# Patient Record
Sex: FEMALE | Race: WHITE | NOT HISPANIC OR LATINO | Employment: OTHER | URBAN - METROPOLITAN AREA
[De-identification: names, ages, dates, MRNs, and addresses within clinical notes are randomized per-mention and may not be internally consistent; named-entity substitution may affect disease eponyms.]

---

## 2017-01-04 ENCOUNTER — GENERIC CONVERSION - ENCOUNTER (OUTPATIENT)
Dept: OTHER | Facility: OTHER | Age: 67
End: 2017-01-04

## 2017-02-20 ENCOUNTER — GENERIC CONVERSION - ENCOUNTER (OUTPATIENT)
Dept: OTHER | Facility: OTHER | Age: 67
End: 2017-02-20

## 2017-04-03 ENCOUNTER — ALLSCRIPTS OFFICE VISIT (OUTPATIENT)
Dept: OTHER | Facility: OTHER | Age: 67
End: 2017-04-03

## 2017-05-30 ENCOUNTER — ALLSCRIPTS OFFICE VISIT (OUTPATIENT)
Dept: OTHER | Facility: OTHER | Age: 67
End: 2017-05-30

## 2017-05-30 ENCOUNTER — GENERIC CONVERSION - ENCOUNTER (OUTPATIENT)
Dept: OTHER | Facility: OTHER | Age: 67
End: 2017-05-30

## 2017-05-30 ENCOUNTER — TRANSCRIBE ORDERS (OUTPATIENT)
Dept: ADMINISTRATIVE | Facility: HOSPITAL | Age: 67
End: 2017-05-30

## 2017-05-30 ENCOUNTER — APPOINTMENT (OUTPATIENT)
Dept: LAB | Facility: MEDICAL CENTER | Age: 67
End: 2017-05-30
Payer: MEDICARE

## 2017-05-30 DIAGNOSIS — Z85.3 PERSONAL HISTORY OF MALIGNANT NEOPLASM OF BREAST: Primary | ICD-10-CM

## 2017-05-30 DIAGNOSIS — C50.919 MALIGNANT NEOPLASM OF FEMALE BREAST (HCC): ICD-10-CM

## 2017-05-30 DIAGNOSIS — C44.91 BASAL CELL CARCINOMA OF SKIN: ICD-10-CM

## 2017-05-30 DIAGNOSIS — R20.2 PARESTHESIA OF SKIN: ICD-10-CM

## 2017-05-30 DIAGNOSIS — R53.83 OTHER FATIGUE: ICD-10-CM

## 2017-05-30 DIAGNOSIS — Z85.3 PERSONAL HISTORY OF MALIGNANT NEOPLASM OF BREAST: ICD-10-CM

## 2017-05-30 DIAGNOSIS — G51.4 FACIAL MYOKYMIA: ICD-10-CM

## 2017-05-30 DIAGNOSIS — E83.41 HYPERMAGNESEMIA: ICD-10-CM

## 2017-05-30 LAB
ALBUMIN SERPL BCP-MCNC: 3.9 G/DL (ref 3.5–5)
ALP SERPL-CCNC: 88 U/L (ref 46–116)
ALT SERPL W P-5'-P-CCNC: 25 U/L (ref 12–78)
ANION GAP SERPL CALCULATED.3IONS-SCNC: 5 MMOL/L (ref 4–13)
AST SERPL W P-5'-P-CCNC: 21 U/L (ref 5–45)
BASOPHILS # BLD AUTO: 0.09 THOUSANDS/ΜL (ref 0–0.1)
BASOPHILS NFR BLD AUTO: 1 % (ref 0–1)
BILIRUB SERPL-MCNC: 0.71 MG/DL (ref 0.2–1)
BUN SERPL-MCNC: 15 MG/DL (ref 5–25)
CALCIUM SERPL-MCNC: 9.4 MG/DL (ref 8.3–10.1)
CHLORIDE SERPL-SCNC: 105 MMOL/L (ref 100–108)
CO2 SERPL-SCNC: 31 MMOL/L (ref 21–32)
CREAT SERPL-MCNC: 0.83 MG/DL (ref 0.6–1.3)
EOSINOPHIL # BLD AUTO: 0.14 THOUSAND/ΜL (ref 0–0.61)
EOSINOPHIL NFR BLD AUTO: 2 % (ref 0–6)
ERYTHROCYTE [DISTWIDTH] IN BLOOD BY AUTOMATED COUNT: 14 % (ref 11.6–15.1)
GFR SERPL CREATININE-BSD FRML MDRD: >60 ML/MIN/1.73SQ M
GLUCOSE P FAST SERPL-MCNC: 96 MG/DL (ref 65–99)
HCT VFR BLD AUTO: 41.6 % (ref 34.8–46.1)
HGB BLD-MCNC: 13.6 G/DL (ref 11.5–15.4)
LYMPHOCYTES # BLD AUTO: 2.51 THOUSANDS/ΜL (ref 0.6–4.47)
LYMPHOCYTES NFR BLD AUTO: 37 % (ref 14–44)
MAGNESIUM SERPL-MCNC: 2.7 MG/DL (ref 1.6–2.6)
MCH RBC QN AUTO: 28.9 PG (ref 26.8–34.3)
MCHC RBC AUTO-ENTMCNC: 32.7 G/DL (ref 31.4–37.4)
MCV RBC AUTO: 88 FL (ref 82–98)
MONOCYTES # BLD AUTO: 0.57 THOUSAND/ΜL (ref 0.17–1.22)
MONOCYTES NFR BLD AUTO: 8 % (ref 4–12)
NEUTROPHILS # BLD AUTO: 3.53 THOUSANDS/ΜL (ref 1.85–7.62)
NEUTS SEG NFR BLD AUTO: 52 % (ref 43–75)
NRBC BLD AUTO-RTO: 0 /100 WBCS
PLATELET # BLD AUTO: 303 THOUSANDS/UL (ref 149–390)
PMV BLD AUTO: 10.1 FL (ref 8.9–12.7)
POTASSIUM SERPL-SCNC: 4.6 MMOL/L (ref 3.5–5.3)
PROT SERPL-MCNC: 7.2 G/DL (ref 6.4–8.2)
RBC # BLD AUTO: 4.71 MILLION/UL (ref 3.81–5.12)
SODIUM SERPL-SCNC: 141 MMOL/L (ref 136–145)
TSH SERPL DL<=0.05 MIU/L-ACNC: 1.47 UIU/ML (ref 0.36–3.74)
WBC # BLD AUTO: 6.85 THOUSAND/UL (ref 4.31–10.16)

## 2017-05-30 PROCEDURE — 36415 COLL VENOUS BLD VENIPUNCTURE: CPT

## 2017-05-30 PROCEDURE — 84443 ASSAY THYROID STIM HORMONE: CPT

## 2017-05-30 PROCEDURE — 83735 ASSAY OF MAGNESIUM: CPT

## 2017-05-30 PROCEDURE — 85025 COMPLETE CBC W/AUTO DIFF WBC: CPT

## 2017-05-30 PROCEDURE — 80053 COMPREHEN METABOLIC PANEL: CPT

## 2017-06-21 ENCOUNTER — APPOINTMENT (OUTPATIENT)
Dept: LAB | Facility: MEDICAL CENTER | Age: 67
End: 2017-06-21
Payer: MEDICARE

## 2017-06-21 DIAGNOSIS — E83.41 HYPERMAGNESEMIA: ICD-10-CM

## 2017-06-21 LAB — MAGNESIUM SERPL-MCNC: 2.5 MG/DL (ref 1.6–2.6)

## 2017-06-21 PROCEDURE — 36415 COLL VENOUS BLD VENIPUNCTURE: CPT

## 2017-06-21 PROCEDURE — 83735 ASSAY OF MAGNESIUM: CPT

## 2017-06-22 ENCOUNTER — GENERIC CONVERSION - ENCOUNTER (OUTPATIENT)
Dept: OTHER | Facility: OTHER | Age: 67
End: 2017-06-22

## 2017-09-12 ENCOUNTER — TRANSCRIBE ORDERS (OUTPATIENT)
Dept: ADMINISTRATIVE | Facility: HOSPITAL | Age: 67
End: 2017-09-12

## 2017-09-12 ENCOUNTER — APPOINTMENT (OUTPATIENT)
Dept: LAB | Facility: MEDICAL CENTER | Age: 67
End: 2017-09-12
Payer: MEDICARE

## 2017-09-12 DIAGNOSIS — C50.919 MALIGNANT NEOPLASM OF FEMALE BREAST (HCC): ICD-10-CM

## 2017-09-12 LAB
ALBUMIN SERPL BCP-MCNC: 3.7 G/DL (ref 3.5–5)
ALP SERPL-CCNC: 89 U/L (ref 46–116)
ALT SERPL W P-5'-P-CCNC: 25 U/L (ref 12–78)
ANION GAP SERPL CALCULATED.3IONS-SCNC: 7 MMOL/L (ref 4–13)
AST SERPL W P-5'-P-CCNC: 20 U/L (ref 5–45)
BASOPHILS # BLD AUTO: 0.06 THOUSANDS/ΜL (ref 0–0.1)
BASOPHILS NFR BLD AUTO: 1 % (ref 0–1)
BILIRUB SERPL-MCNC: 0.96 MG/DL (ref 0.2–1)
BUN SERPL-MCNC: 15 MG/DL (ref 5–25)
CALCIUM SERPL-MCNC: 9.1 MG/DL (ref 8.3–10.1)
CANCER AG27-29 SERPL-ACNC: 11.5 U/ML (ref 0–42.3)
CHLORIDE SERPL-SCNC: 103 MMOL/L (ref 100–108)
CO2 SERPL-SCNC: 30 MMOL/L (ref 21–32)
CREAT SERPL-MCNC: 0.85 MG/DL (ref 0.6–1.3)
EOSINOPHIL # BLD AUTO: 0.16 THOUSAND/ΜL (ref 0–0.61)
EOSINOPHIL NFR BLD AUTO: 4 % (ref 0–6)
ERYTHROCYTE [DISTWIDTH] IN BLOOD BY AUTOMATED COUNT: 13.4 % (ref 11.6–15.1)
GFR SERPL CREATININE-BSD FRML MDRD: 72 ML/MIN/1.73SQ M
GLUCOSE P FAST SERPL-MCNC: 76 MG/DL (ref 65–99)
HCT VFR BLD AUTO: 41.7 % (ref 34.8–46.1)
HGB BLD-MCNC: 13.9 G/DL (ref 11.5–15.4)
LYMPHOCYTES # BLD AUTO: 1.7 THOUSANDS/ΜL (ref 0.6–4.47)
LYMPHOCYTES NFR BLD AUTO: 37 % (ref 14–44)
MCH RBC QN AUTO: 29 PG (ref 26.8–34.3)
MCHC RBC AUTO-ENTMCNC: 33.3 G/DL (ref 31.4–37.4)
MCV RBC AUTO: 87 FL (ref 82–98)
MONOCYTES # BLD AUTO: 0.45 THOUSAND/ΜL (ref 0.17–1.22)
MONOCYTES NFR BLD AUTO: 10 % (ref 4–12)
NEUTROPHILS # BLD AUTO: 2.2 THOUSANDS/ΜL (ref 1.85–7.62)
NEUTS SEG NFR BLD AUTO: 48 % (ref 43–75)
NRBC BLD AUTO-RTO: 0 /100 WBCS
PLATELET # BLD AUTO: 268 THOUSANDS/UL (ref 149–390)
PMV BLD AUTO: 10 FL (ref 8.9–12.7)
POTASSIUM SERPL-SCNC: 4.4 MMOL/L (ref 3.5–5.3)
PROT SERPL-MCNC: 7 G/DL (ref 6.4–8.2)
RBC # BLD AUTO: 4.79 MILLION/UL (ref 3.81–5.12)
SODIUM SERPL-SCNC: 140 MMOL/L (ref 136–145)
WBC # BLD AUTO: 4.58 THOUSAND/UL (ref 4.31–10.16)

## 2017-09-12 PROCEDURE — 36415 COLL VENOUS BLD VENIPUNCTURE: CPT

## 2017-09-12 PROCEDURE — 86300 IMMUNOASSAY TUMOR CA 15-3: CPT

## 2017-09-12 PROCEDURE — 85025 COMPLETE CBC W/AUTO DIFF WBC: CPT

## 2017-09-12 PROCEDURE — 80053 COMPREHEN METABOLIC PANEL: CPT

## 2017-09-13 ENCOUNTER — GENERIC CONVERSION - ENCOUNTER (OUTPATIENT)
Dept: OTHER | Facility: OTHER | Age: 67
End: 2017-09-13

## 2017-10-23 ENCOUNTER — GENERIC CONVERSION - ENCOUNTER (OUTPATIENT)
Dept: OTHER | Facility: OTHER | Age: 67
End: 2017-10-23

## 2017-10-26 ENCOUNTER — ALLSCRIPTS OFFICE VISIT (OUTPATIENT)
Dept: OTHER | Facility: OTHER | Age: 67
End: 2017-10-26

## 2017-10-27 ENCOUNTER — APPOINTMENT (OUTPATIENT)
Dept: LAB | Facility: MEDICAL CENTER | Age: 67
End: 2017-10-27
Payer: MEDICARE

## 2017-10-27 ENCOUNTER — TRANSCRIBE ORDERS (OUTPATIENT)
Dept: ADMINISTRATIVE | Facility: HOSPITAL | Age: 67
End: 2017-10-27

## 2017-10-27 DIAGNOSIS — Z01.812 PRE-OPERATIVE LABORATORY EXAMINATION: ICD-10-CM

## 2017-10-27 DIAGNOSIS — M20.21 HALLUX RIGIDUS OF RIGHT FOOT: Primary | ICD-10-CM

## 2017-10-27 DIAGNOSIS — M20.21 HALLUX RIGIDUS OF RIGHT FOOT: ICD-10-CM

## 2017-10-27 LAB
ALBUMIN SERPL BCP-MCNC: 3.8 G/DL (ref 3.5–5)
ALP SERPL-CCNC: 81 U/L (ref 46–116)
ALT SERPL W P-5'-P-CCNC: 23 U/L (ref 12–78)
ANION GAP SERPL CALCULATED.3IONS-SCNC: 5 MMOL/L (ref 4–13)
AST SERPL W P-5'-P-CCNC: 13 U/L (ref 5–45)
BASOPHILS # BLD AUTO: 0.09 THOUSANDS/ΜL (ref 0–0.1)
BASOPHILS NFR BLD AUTO: 2 % (ref 0–1)
BILIRUB SERPL-MCNC: 0.97 MG/DL (ref 0.2–1)
BUN SERPL-MCNC: 15 MG/DL (ref 5–25)
CALCIUM SERPL-MCNC: 9.1 MG/DL (ref 8.3–10.1)
CHLORIDE SERPL-SCNC: 102 MMOL/L (ref 100–108)
CO2 SERPL-SCNC: 32 MMOL/L (ref 21–32)
CREAT SERPL-MCNC: 0.8 MG/DL (ref 0.6–1.3)
EOSINOPHIL # BLD AUTO: 0.14 THOUSAND/ΜL (ref 0–0.61)
EOSINOPHIL NFR BLD AUTO: 3 % (ref 0–6)
ERYTHROCYTE [DISTWIDTH] IN BLOOD BY AUTOMATED COUNT: 13.6 % (ref 11.6–15.1)
GFR SERPL CREATININE-BSD FRML MDRD: 77 ML/MIN/1.73SQ M
GLUCOSE P FAST SERPL-MCNC: 74 MG/DL (ref 65–99)
HCT VFR BLD AUTO: 42.2 % (ref 34.8–46.1)
HGB BLD-MCNC: 13.9 G/DL (ref 11.5–15.4)
LYMPHOCYTES # BLD AUTO: 1.69 THOUSANDS/ΜL (ref 0.6–4.47)
LYMPHOCYTES NFR BLD AUTO: 34 % (ref 14–44)
MCH RBC QN AUTO: 28.8 PG (ref 26.8–34.3)
MCHC RBC AUTO-ENTMCNC: 32.9 G/DL (ref 31.4–37.4)
MCV RBC AUTO: 88 FL (ref 82–98)
MONOCYTES # BLD AUTO: 0.47 THOUSAND/ΜL (ref 0.17–1.22)
MONOCYTES NFR BLD AUTO: 9 % (ref 4–12)
NEUTROPHILS # BLD AUTO: 2.65 THOUSANDS/ΜL (ref 1.85–7.62)
NEUTS SEG NFR BLD AUTO: 52 % (ref 43–75)
NRBC BLD AUTO-RTO: 0 /100 WBCS
PLATELET # BLD AUTO: 272 THOUSANDS/UL (ref 149–390)
PMV BLD AUTO: 9.9 FL (ref 8.9–12.7)
POTASSIUM SERPL-SCNC: 4.1 MMOL/L (ref 3.5–5.3)
PROT SERPL-MCNC: 7.1 G/DL (ref 6.4–8.2)
RBC # BLD AUTO: 4.82 MILLION/UL (ref 3.81–5.12)
SODIUM SERPL-SCNC: 139 MMOL/L (ref 136–145)
WBC # BLD AUTO: 5.04 THOUSAND/UL (ref 4.31–10.16)

## 2017-10-27 PROCEDURE — 85025 COMPLETE CBC W/AUTO DIFF WBC: CPT

## 2017-10-27 PROCEDURE — 36415 COLL VENOUS BLD VENIPUNCTURE: CPT

## 2017-10-27 PROCEDURE — 80053 COMPREHEN METABOLIC PANEL: CPT

## 2018-01-09 NOTE — RESULT NOTES
Verified Results  (1) MAGNESIUM [id number] 05:54PM Sabrina Rich    Order Number: [id number]     Test Name Result Flag Reference   MAGNESIUM 2 7 mg/dL H 1 6-2 6     (1) COMPREHENSIVE METABOLIC PANEL [id number] 54:14ED Sabrina Rich    Order Number: [id number]     Test Name Result Flag Reference   SODIUM 141 mmol/L  136-145   POTASSIUM 4 6 mmol/L  3 5-5 3   CHLORIDE 105 mmol/L  100-108   CARBON DIOXIDE 31 mmol/L  21-32   ANION GAP (CALC) 5 mmol/L  4-13   BLOOD UREA NITROGEN 15 mg/dL  5-25   CREATININE 0 83 mg/dL  0 60-1 30   Standardized to IDMS reference method   CALCIUM 9 4 mg/dL  8 3-10 1   BILI, TOTAL 0 71 mg/dL  0 20-1 00   ALK PHOSPHATAS 88 U/L     ALT (SGPT) 25 U/L  12-78   AST(SGOT) 21 U/L  5-45   ALBUMIN 3 9 g/dL  3 5-5 0   TOTAL PROTEIN 7 2 g/dL  6 4-8 2   eGFR Non-African American      >60 0 ml/min/1 73sq [facility] Disease Education Program recommendations are as follows:  GFR calculation is accurate only with a steady state creatinine  Chronic Kidney disease less than 60 ml/min/1 73 sq  meters  Kidney failure less than 15 ml/min/1 73 sq  meters  GLUCOSE FASTING 96 mg/dL  65-99     (1) CBC/PLT/DIFF [id number] 05:54PM Sabrina Rich    Order Number: [id number]     Test Name Result Flag Reference   WBC COUNT 6 85 Thousand/uL  4 31-10 16   RBC COUNT 4 71 Million/uL  3 81-5 12   HEMOGLOBIN 13 6 g/dL  11 5-15 4   HEMATOCRIT 41 6 %  34 8-46  1   MCV 88 fL  82-98   MCH 28 9 pg  26 8-34 3   MCHC 32 7 g/dL  31 4-37 4   RDW 14 0 %  11 6-15 1   MPV 10 1 fL  8 9-12 7   PLATELET COUNT 754 Thousands/uL  149-390   nRBC AUTOMATED 0 /100 WBCs     NEUTROPHILS RELATIVE PERCENT 52 %  43-75   LYMPHOCYTES RELATIVE PERCENT 37 %  14-44   MONOCYTES RELATIVE PERCENT 8 %  4-12   EOSINOPHILS RELATIVE PERCENT 2 %  0-6   BASOPHILS RELATIVE PERCENT 1 %  0-1   NEUTROPHILS ABSOLUTE COUNT 3 53 Thousands/? ??L  1 85-7 62   LYMPHOCYTES ABSOLUTE COUNT 2 51 Thousands/? ??L  0 60-4 47 MONOCYTES ABSOLUTE COUNT 0 57 Thousand/? ??L  0 17-1 22   EOSINOPHILS ABSOLUTE COUNT 0 14 Thousand/? ??L  0 00-0 61   BASOPHILS ABSOLUTE COUNT 0 09 Thousands/? ??L  0 00-0 10     (1) TSH WITH FT4 REFLEX [id number] 05:54PM Rupa CHRISTENSEN Order Number: [id number]     Test Name Result Flag Reference   TSH 1 470 uIU/mL  0 358-3 740   Patients undergoing fluorescein dye angiography may retain small amounts of fluorescein in the body for 48-72 hours post procedure  Samples containing fluorescein can produce falsely depressed TSH values  If the patient had this procedure,a specimen should be resubmitted post fluorescein clearance            The recommended reference ranges for TSH during pregnancy are as follows:  First trimester 0 1 to 2 5 uIU/mL  Second trimester  0 2 to 3 0 uIU/mL  Third trimester 0 3 to 3 0 uIU/m

## 2018-01-12 VITALS
HEIGHT: 65 IN | SYSTOLIC BLOOD PRESSURE: 140 MMHG | RESPIRATION RATE: 16 BRPM | DIASTOLIC BLOOD PRESSURE: 80 MMHG | BODY MASS INDEX: 25.33 KG/M2 | WEIGHT: 152 LBS | HEART RATE: 56 BPM

## 2018-01-14 VITALS
WEIGHT: 151.25 LBS | BODY MASS INDEX: 25.2 KG/M2 | HEART RATE: 60 BPM | SYSTOLIC BLOOD PRESSURE: 138 MMHG | TEMPERATURE: 97.9 F | HEIGHT: 65 IN | DIASTOLIC BLOOD PRESSURE: 80 MMHG

## 2018-01-14 NOTE — MISCELLANEOUS
Message   Recorded as Task   Date: 09/28/2016 04:05 PM, Created By: Rene Zheng   Task Name: Miscellaneous   Assigned To: Fabiola Kat   Regarding Patient: Yani Patrick, Status: Active   Comment:    Tessa Penaloza - 28 Sep 2016 4:05 PM     TASK CREATED  Patient called said she was told to call when she had her US Thyroid done  Had it done today at [address]  Spoke with pt and reviewed results  Active Problems    1  Acute costochondritis (733 6) (M94 0)   2  Adenocarcinoma of breast, unspecified laterality (174 9) (C50 919)   3  Backache (724 5) (M54 9)   4  Basal cell carcinoma of skin (173 91) (C44 91)   5  Breast cancer (174 9) (C50 919)   6  Esophageal reflux (530 81) (K21 9)   7  Hyperlipidemia (272 4) (E78 5)   8  Insomnia (780 52) (G47 00)   9  Irritable bowel syndrome (564 1) (K58 9)   10  Lumbar disc herniation (722 10) (M51 26)   11  Migraine headache (346 90) (G43 909)   12  Need for pneumococcal vaccination (V03 82) (Z23)   13  Need for tetanus booster (V03 7) (Z23)   14  Neoplasm of skin (239 2) (D49 2)   15  Penetrating wound of face, initial encounter (873 40) (S01 83XA)   16  Rosacea (695 3) (L71 9)   17  Thyroid disorder (246 9) (E07 9)   18  Trigger point with back pain (724 5) (M54 9)   19  Well adult on routine health check (V70 0) (Z00 00)    Current Meds   1  Diltiazem HCl ER Coated Beads 180 MG Oral Capsule Extended Release 24 Hour;   TAKE ONE CAPSULE BY MOUTH EVERY DAY; Therapy: 90Zps6669 to (Evaluate:17Oct2016)  Requested for: 45AKD6479; Last   Rx:15Zae0963 Ordered   2  DrRx Flexeril 10 MG #30; TAKE 1 TABLET AT BEDTIME AS NEEDED; Therapy: 84HAV3428 to (Evaluate:11Jun2014); Last Rx:80Huq9607 Ordered   3  Elite Magnesium 100 MG Oral Tablet; Therapy: (Recorded:10Oct2012) to Recorded   4  Glucosamine-Chondroitin Oral Capsule; Therapy: (Recorded:10Oct2012) to Recorded   5  Multi Complete CAPS; Therapy: (Recorded:10Oct2012) to Recorded   6  Omega 3 CAPS;    Therapy: (Recorded:10Oct2012) to Recorded   7  Omeprazole 20 MG Oral Capsule Delayed Release; Take 1 capsule daily as needed; Therapy: (Recorded:16Nov2015) to  Requested for: 29HXQ8050 Recorded   8  Vitamin D 1000 UNIT Oral Tablet; Therapy: (Recorded:30Nov2012) to Recorded    Allergies    1  Cipro TABS   2  Sulfa Drugs   3  Adhesive Bandages MISC   4  Bactrim TABS   5  CeleBREX CAPS   6   Ciprofloxacin HCl TABS    Signatures   Electronically signed by : Tony Lopes RN; Sep 29 2016  4:10PM EST                       (Author)

## 2018-01-16 NOTE — RESULT NOTES
Verified Results  (1) MAGNESIUM 21Jun2017 05:01PM Francheska Brower    Order Number: [id number]     Test Name Result Flag Reference   MAGNESIUM 2 5 mg/dL  1 6-2 6

## 2018-01-18 NOTE — CONSULTS
Chief Complaint  Pre- op Clearance - Right foot surgery performed at [facility] by Dr Heather Arango scheduled on 11/3/2017  Up-to-date with Flu shot      History of Present Illness  Pre-Op Visit (Brief): The patient is being seen for a preoperative visit  The procedure is a(n) R foot surgery with pinning scheduled for 11/3/17 with Dr Shaquille Quevedo  The indication for surgery is Foot pain and arthritis and decreased mobility  Surgical Risk Assessment:   Prior Anesthesia: She had prior anesthesia and no prior adverse reaction to general anesthesia  Pertinent Past Medical History: no angina, no arrhythmia, no CAD, CAD without prior MI, CAD without recent PCI, no CHF, no chronic liver disease, no acute hepatitis, no coagulation delay, no primary hypercoagulable state, no secondary hypercoagulable state, no pulmonary embolism, no DVT, does not use anticoagulants, no diabetes, does not use insulin, no thyroid disease, no neck osteoarthrosis, no TMJ osteoarthrosis, does not wear dentures, no seizure disorder, no CVA, no asthma, no COPD, not FREDA, no renal disease, no low serum albumin and no obesity  Exercise Capacity: able to walk four blocks without symptoms and able to walk two flights of stairs without symptoms  Lifestyle Factors: denies alcohol use, denies tobacco use and denies illegal drug use  Symptoms: no symptoms  STOP questionnaire score is 1  Other FREDA risk factors include age over 48, but normal BMI, female gender and normal neck circumference  Predicted risk of FREDA: Mild  Pertinent Family History: family history of a prior adverse reaction to anesthesia (mother, "all kinds of stuff"), ischemic heart disease (brother in 46s) and stroke (grandmother), but no aneurysm, no bleeding problems and no sudden early deaths  Living Situation: home is secure and supportive and no post-op concerns with her living situation     HPI: 79 yo F here for preop clearance d/t a planned R foot surgery for arthritis, flexibility  She takes all her medications without any side effects  She is complaining of insomnia, intermittently, less than once a week wakes up in the night and has trouble going back to sleep  Review of Systems    Constitutional: no fever and no chills    The patient presents with complaints of feeling tired (d/t insomnia)  Eyes: no eyesight problems  ENT: no hearing loss  Cardiovascular: no chest pain, no palpitations and no lower extremity edema  Respiratory: no shortness of breath  Gastrointestinal: has IBS, but no abdominal pain  Genitourinary: no dysuria  Musculoskeletal: arthralgias and joint stiffness  Integumentary: no rashes  Neurological: difficulty walking (from foot pain), but no headache, no numbness, no dizziness and no fainting  Psychiatric: sleep disturbances, but no anxiety and no depression  Endocrine: no muscle weakness  Hematologic/Lymphatic: no swollen glands  Active Problems    1  Adenocarcinoma of breast, unspecified laterality (174 9) (C50 919)   2  Breast cancer (174 9) (C50 919)   3  Carcinoma, basal cell, skin (173 91) (C44 91)   4  Encounter for screening colonoscopy (V76 51) (Z12 11)   5  Encounter for screening mammogram for malignant neoplasm of breast (V76 12)   (Z12 31)   6  Esophageal reflux (530 81) (K21 9)   7  Facial twitching (351 8) (G51 4)   8  Fatigue (780 79) (R53 83)   9  Flu vaccine need (V04 81) (Z23)   10  High magnesium levels (275 2) (E83 41)   11  Hyperlipidemia (272 4) (E78 5)   12  Insomnia (780 52) (G47 00)   13  Irritable bowel syndrome (564 1) (K58 9)   14  Left hand paresthesia (782 0) (R20 2)   15  Lumbar disc herniation (722 10) (M51 26)   16  Migraine headache (346 90) (G43 909)   17  Need for 23-polyvalent pneumococcal polysaccharide vaccine (V03 82) (Z23)   18  Need for revaccination (V05 9) (Z23)   19  Neoplasm of skin (239 2) (D49 2)   20  Recurrent low back pain (724 2) (M54 5)   21  Rosacea (695 3) (L71 9)   22  Thyroid disorder (246 9) (E07 9)   23  Welcome to Medicare preventive visit (V70 0) (Z00 00)   24  Well adult on routine health check (V70 0) (Z00 00)    Past Medical History    · History of Abnormal weight gain (783 1) (R63 5)   · History of Acute costochondritis (733 6) (M94 0)   · History of acute bronchitis (V12 69) (Z87 09)   · History of acute sinusitis (V12 69) (Z87 09)   · History of backache (V13 59) (Z87 39)   · History of Lumbar radiculopathy (724 4) (M54 16)   · History of Lumbar Strain (847 2)   · Malignant neoplasm of breast, unspecified laterality   · History of Need for pneumococcal vaccination (V03 82) (Z23)   · History of Need for tetanus booster (V03 7) (Z23)   · History of Penetrating wound of face, initial encounter (873 40) (I75 46XM)   · Personal history of arthritis (V13 4) (Z87 39)   · Personal history of gastric ulcer (V12 79) (Z87 19)   · Personal history of malignant neoplasm (V10 90) (Z85 9)   · History of Trigger point with back pain (724 5) (M54 9)    The active problems and past medical history were reviewed and updated today  Surgical History    · History of Abdominoplasty   · History of Breast Surgery Lumpectomy   · History of Breast Surgery Reduction Procedure   · History of  Section   · History of Cholecystectomy   · History of Colon Surgery   · History of Tonsillectomy With Adenoidectomy    The surgical history was reviewed and updated today         Family History    · Family history of Hypertension (V17 49)    · Family history of Acute Myocardial Infarction (V17 3)   · Family history of CABG   · Family history of Hypertension (V17 49)    · Family history of CABG   · Family history of Non-Hodgkin's Lymphoma    · Family history of Back disorder   · Family history of cerebrovascular accident (V17 1) (Z82 3)   · Family history of diabetes mellitus (V18 0) (Z83 3)   · Family history of malignant neoplasm (V16 9) (Z80 9)    The family history was reviewed and updated today  Social History    · Being A Social Drinker   · Former smoker (K82 08) (S09 123)   · Smoker in teenage years  Quit at 22  A pack a week  The social history was reviewed and is unchanged  Current Meds   1  DilTIAZem HCl ER Coated Beads 180 MG Oral Capsule Extended Release 24 Hour;   TAKE ONE CAPSULE BY MOUTH EVERY DAY; Therapy: 98WQS1593 to 322 9354)  Requested for: 03Apr2017; Last   Rx:03Apr2017 Ordered   2  Glucosamine-Chondroitin Oral Capsule; take 1 capsule daily; Therapy: (Recorded:03Apr2017) to Recorded   3  Melatonin 3 MG Oral Tablet; TAKE 1 TABLET Bedtime; Therapy: (Recorded:03Apr2017) to Recorded   4  Multi Complete CAPS; take 1 capsule daily; Therapy: (Recorded:03Apr2017) to Recorded   5  Omega 3 CAPS; take 1 capsule daily; Therapy: (Recorded:03Apr2017) to Recorded   6  Omeprazole 20 MG Oral Capsule Delayed Release; Take 1 capsule daily as needed; Therapy: (Recorded:24Hks2052) to  Requested for: 81HBI5483 Recorded   7  Vitamin D3 5000 UNIT Oral Tablet Chewable; CHEW 1 TABLET DAILY; Therapy: (Recorded:14Jrh6682) to Recorded    The medication list was reviewed and updated today  Allergies    1  Cipro TABS   2  amitriptyline   3  Sulfa Drugs   4  Adhesive Bandages MISC   5  Bactrim TABS   6  CeleBREX CAPS   7  Ciprofloxacin HCl TABS    Vitals  Signs    Heart Rate: 64  Respiration: 16  Systolic: 238  Diastolic: 72  Height: 5 ft 4 5 in  Weight: 151 lb   BMI Calculated: 25 52  BSA Calculated: 1 75  O2 Saturation: 98, RA    Physical Exam    Constitutional   General appearance: No acute distress, well appearing and well nourished  Head and Face   Head and face: Normal     Palpation of the face and sinuses: No sinus tenderness  Eyes   Conjunctiva and lids: No swelling, erythema or discharge  Anicteric  Pupils and irises: Equal, round, reactive to light  extraocular muscles intact, accommodation intact     Ears, Nose, Mouth, and Throat   External inspection of ears and nose: Normal     Hearing: Normal     Lips, teeth, and gums: Normal, good dentition  Oropharynx: Normal with no erythema, edema, exudate or lesions  Neck   Neck: Supple, symmetric, trachea midline, no masses  no rash  Pulmonary   Respiratory effort: No increased work of breathing or signs of respiratory distress  Auscultation of lungs: Clear to auscultation  Cardiovascular   Auscultation of heart: Normal rate and rhythm, normal S1 and S2, no murmurs  except: 2/6 systolic type ejection murmur at right upper sternal border  Carotid pulses: 2+ bilaterally  no bruit  Examination of extremities for edema and/or varicosities: Normal     Abdomen   Abdomen: Non-tender, no masses  normoactive bowel sounds x4  Lymphatic   Palpation of lymph nodes in neck: No lymphadenopathy  Musculoskeletal   Gait and station: Normal   TTP right forefoot, defer more specific exam to Podiatry  Muscle strength/tone: Normal     Skin   Skin and subcutaneous tissue: Normal without rashes or lesions  warm and dry, no rash  Neurologic   Cranial nerves: Cranial nerves II-XII intact  no focal deficits  Psychiatric   Judgment and insight: Normal     Orientation to person, place, and time: Normal     Mood and affect: Normal        Results/Data  EKG/ECG- POC 18VGM2060 10:16AM Zelpha Sniff     Test Name Result Flag Reference   EKG/ECG 10/26/2017       A 12 lead ECG was performed and was normal    Rhythm and rate: ventricular rate is 61 beats per minute  P-waves: FL interval is normal, but the P wave is normal    Axis: the QRS axis is normal    QRS: the QRS is normal    ST segment: the ST segments are normal    T waves: normal    Comparison to prior ECGs:  no prior ECGs were available for comparison  Assessment    1  Pre-op chest exam (V72 82) (Z01 811)   2  Insomnia (780 52) (G47 00)   3  Preop examination (V72 84) (Z01 818)   4  Esophageal reflux (530 81) (K21 9)   5   Pain in right foot (729 5) (O95 201)    Plan  Pre-op chest exam    · EKG/ECG- POC; Status:Complete;   Done: 27UBL0833 10:16AM    Discussion/Summary  Surgical Clearance: She is at a LOW risk from a cardiovascular standpoint at this time without any additional cardiac testing  Reevaluation needed, if she should present with symptoms prior to surgery/procedure  Comments:  <1% MACE risk in perioperative period  77 yo F here today for Preop visit  1) OK to proceed to OR without any further testing from my perspective, see above   2  Insomnia   The patient does not want any benzodiazepine or hypnotic drugs at this time, she is agreeable to trying over-the-counter medications for her insomnia, she reports melatonin did not work for her in the past but she is going to try units some to see if she has any relief  I reported to her that if she takes this more than 2-3 times weekly she should definitely return to the office to be re-evaluated  I have advised her on sleep hygiene, when she does awaken in the middle of the night, she should empty her bladder, and go to a quiet place and sit in a chair not lay in bed, and do an activity that does not involve the screen such as a crossword puzzle or reading a book or a magazine   3  Acid reflux  Stable, continue current management without any changes, tolerating medication, advised on the side effects of long-term use of acid blocking medications including osteopenia or osteoporosis        EKG in office today, reviewed and normal on my evaluation, we will fax this to her podiatrist along with this note  End of Encounter Meds    1  Omeprazole 20 MG Oral Capsule Delayed Release; Take 1 capsule daily as needed; Therapy: (Recorded:16Nov2015) to  Requested for: 90XJG4499 Recorded    2  DilTIAZem HCl ER Coated Beads 180 MG Oral Capsule Extended Release 24 Hour;   TAKE ONE CAPSULE BY MOUTH EVERY DAY;    Therapy: 24PQP2168 to Tahir Cline)  Requested for: 03Apr2017; Last   Rx:03Apr2017 Ordered 3  Glucosamine-Chondroitin Oral Capsule; take 1 capsule daily; Therapy: (Recorded:03Apr2017) to Recorded   4  Melatonin 3 MG Oral Tablet; TAKE 1 TABLET Bedtime; Therapy: (Recorded:03Apr2017) to Recorded   5  Multi Complete CAPS; take 1 capsule daily; Therapy: (Recorded:03Apr2017) to Recorded   6  Omega 3 CAPS; take 1 capsule daily; Therapy: (Recorded:03Apr2017) to Recorded   7  Vitamin D3 5000 UNIT Oral Tablet Chewable; CHEW 1 TABLET DAILY;    Therapy: (Recorded:20Szq3343) to Recorded    Signatures   Electronically signed by : Kristine Ratliff MD; Oct 26 2017 12:49PM EST                       (Author)

## 2018-01-22 VITALS
HEIGHT: 65 IN | DIASTOLIC BLOOD PRESSURE: 72 MMHG | RESPIRATION RATE: 16 BRPM | WEIGHT: 151 LBS | OXYGEN SATURATION: 98 % | HEART RATE: 64 BPM | SYSTOLIC BLOOD PRESSURE: 118 MMHG | BODY MASS INDEX: 25.16 KG/M2

## 2018-01-22 VITALS
OXYGEN SATURATION: 98 % | TEMPERATURE: 96.5 F | HEART RATE: 62 BPM | BODY MASS INDEX: 25.2 KG/M2 | RESPIRATION RATE: 15 BRPM | WEIGHT: 151.25 LBS | SYSTOLIC BLOOD PRESSURE: 104 MMHG | DIASTOLIC BLOOD PRESSURE: 72 MMHG | HEIGHT: 65 IN

## 2018-03-07 NOTE — PROGRESS NOTES
History of Present Illness    Revaccination   Vaccine Information: Vaccine(s) Given (names): adacel  Spoke with patient regarding vaccine out of temperature range, risks and benefits of revaccination and risks of not revaccinating  Action(s): Pt will be revaccinated  Pt contacted and will call back  [facility] (attempt 1[phone]   Other Information: will call back to schedule after April  Revaccination Completed: [id number]  Active Problems     1  Carcinoma, basal cell, skin (173 91) (C44 91)   2  Encounter for screening colonoscopy (V76 51) (Z12 11)   3  Encounter for screening mammogram for malignant neoplasm of breast (V76 12)   (Z12 31)   4  Esophageal reflux (530 81) (K21 9)   5  Flu vaccine need (V04 81) (Z23)   6  Hyperlipidemia (272 4) (E78 5)   7  Irritable bowel syndrome (564 1) (K58 9)   8  Lumbar disc herniation (722 10) (M51 26)   9  Migraine headache (346 90) (G43 909)   10  Need for 23-polyvalent pneumococcal polysaccharide vaccine (V03 82) (Z23)   11  Need for revaccination (V05 9) (Z23)   12  Neoplasm of skin (239 2) (D49 2)   13  Rosacea (695 3) (L71 9)   14  Thyroid disorder (246 9) (E07 9)   15  Welcome to Medicare preventive visit (V70 0) (Z00 00)   16  Well adult on routine health check (V70 0) (Z00 00)    Insomnia (780 52) (G47 00)       Adenocarcinoma of breast, unspecified laterality (174 9) (C50 919)       Breast cancer (174 9) (C50 919)          Immunizations  Influenza --- Sujey Higginbotham: [id number] [place]: 09-Oct-2013; Megan Chavez: 2015; Series4:  14-Oct-2016   PCV --- Series1: 2015   PPSV --- Sujey Higginbotham: 19-Dec-2016   Tdap --- Series1: 2015   Zoster --- Series1: 2014     Current Meds   1  DilTIAZem HCl ER Coated Beads 180 MG Oral Capsule Extended Release 24 Hour;   TAKE ONE CAPSULE BY MOUTH EVERY DAY   2  DrRx Flexeril 10 MG #30; TAKE 1 TABLET AT BEDTIME AS NEEDED   3  Elite Magnesium 100 MG Oral Tablet   4   Glucosamine-Chondroitin Oral Capsule   5  Multi Complete CAPS   6  Omega 3 CAPS   7  Omeprazole 20 MG Oral Capsule Delayed Release; Take 1 capsule daily as needed   8  Vitamin D 1000 UNIT Oral Tablet    Allergies    1  Cipro TABS   2  Sulfa Drugs   3  Adhesive Bandages MISC   4  Bactrim TABS   5  CeleBREX CAPS   6  Ciprofloxacin HCl TABS    Plan    1   RVAC-Adacel 5-2-15 5 LF-MCG/0 5 Intramuscular Suspension    Future Appointments    Date/Time Provider Specialty Site   09/13/2017 10:45 AM Beverly Montoya MD Hematology Oncology [facility]     Signatures   Electronically signed by : GHANSHYAM Cardenas ; Jun 7 2017 11:21PM EST

## 2018-04-10 DIAGNOSIS — I10 ESSENTIAL HYPERTENSION: Primary | ICD-10-CM

## 2018-04-10 RX ORDER — DILTIAZEM HYDROCHLORIDE 180 MG/1
1 CAPSULE, COATED, EXTENDED RELEASE ORAL DAILY
COMMUNITY
Start: 2011-04-04 | End: 2018-04-10 | Stop reason: SDUPTHER

## 2018-04-10 RX ORDER — DILTIAZEM HYDROCHLORIDE 180 MG/1
180 CAPSULE, COATED, EXTENDED RELEASE ORAL DAILY
Qty: 90 CAPSULE | Refills: 1 | Status: SHIPPED | OUTPATIENT
Start: 2018-04-10 | End: 2018-10-03 | Stop reason: SDUPTHER

## 2018-04-11 ENCOUNTER — TELEPHONE (OUTPATIENT)
Dept: FAMILY MEDICINE CLINIC | Facility: CLINIC | Age: 68
End: 2018-04-11

## 2018-04-11 NOTE — TELEPHONE ENCOUNTER
Pharmacist called to verify that it was ok to fill cardiazem 180 mg rx  as pt had been on Cartia 180mg prior to his refill  I said ok, if this is a problem I will call pharm back

## 2018-04-11 NOTE — TELEPHONE ENCOUNTER
I meant to refill the same thing she is on  These medications are somewhat interchange a bull, but we should probably keep her on the [address] [facility] [address] if that has been working for her

## 2018-05-04 ENCOUNTER — OFFICE VISIT (OUTPATIENT)
Dept: URGENT CARE | Facility: MEDICAL CENTER | Age: 68
End: 2018-05-04
Payer: MEDICARE

## 2018-05-04 VITALS
RESPIRATION RATE: 18 BRPM | DIASTOLIC BLOOD PRESSURE: 62 MMHG | TEMPERATURE: 97.7 F | WEIGHT: 150 LBS | BODY MASS INDEX: 24.99 KG/M2 | SYSTOLIC BLOOD PRESSURE: 100 MMHG | HEIGHT: 65 IN | HEART RATE: 67 BPM | OXYGEN SATURATION: 100 %

## 2018-05-04 DIAGNOSIS — J02.9 SORE THROAT: ICD-10-CM

## 2018-05-04 DIAGNOSIS — J02.9 ACUTE VIRAL PHARYNGITIS: Primary | ICD-10-CM

## 2018-05-04 LAB — S PYO AG THROAT QL: NEGATIVE

## 2018-05-04 PROCEDURE — 87070 CULTURE OTHR SPECIMN AEROBIC: CPT | Performed by: PHYSICIAN ASSISTANT

## 2018-05-04 PROCEDURE — 87430 STREP A AG IA: CPT | Performed by: PHYSICIAN ASSISTANT

## 2018-05-04 PROCEDURE — 99203 OFFICE O/P NEW LOW 30 MIN: CPT | Performed by: PHYSICIAN ASSISTANT

## 2018-05-04 PROCEDURE — G0463 HOSPITAL OUTPT CLINIC VISIT: HCPCS | Performed by: PHYSICIAN ASSISTANT

## 2018-05-04 RX ORDER — MAG HYDROX/ALUMINUM HYD/SIMETH 400-400-40
1 SUSPENSION, ORAL (FINAL DOSE FORM) ORAL DAILY
COMMUNITY

## 2018-05-04 RX ORDER — OMEPRAZOLE 20 MG/1
1 CAPSULE, DELAYED RELEASE ORAL DAILY PRN
COMMUNITY
End: 2020-05-05 | Stop reason: SDUPTHER

## 2018-05-04 RX ORDER — LANOLIN ALCOHOL/MO/W.PET/CERES
1 CREAM (GRAM) TOPICAL AS NEEDED
COMMUNITY
End: 2020-03-26 | Stop reason: ALTCHOICE

## 2018-05-04 RX ORDER — CHLORAL HYDRATE 500 MG
CAPSULE ORAL DAILY
COMMUNITY
End: 2021-06-10

## 2018-05-04 NOTE — PATIENT INSTRUCTIONS
Follow up with PCP in 3-5 days  Proceed to  ER if symptoms worsen  Sore Throat, Ambulatory Care   GENERAL INFORMATION:   A sore throat  is often caused by a cold or flu virus  A sore throat may also be caused by bacteria such as strep  Other causes include smoking, a runny nose, allergies, or acid reflux  Seek immediate care for the following symptoms:   · Trouble breathing or swallowing because your throat is swollen or sore    · Drooling because it hurts too much to swallow    · A painful lump in your throat that does not go away after 5 days    · A fever higher than 102? F (39?C) or lasts longer than 3 days    · Confusion    · Blood in your throat or ear  Treatment for a sore throat  will depend on the cause how severe it is  A sore throat cause by a virus will go away on its own without treatment  You will need antibiotics if your sore throat is caused by bacteria  Your sore throat should start to feel better within 3 to 5 days for both viral and bacterial infections  Care for your sore throat:   · Gargle with salt water  Mix ¼ teaspoon salt in a glass of warm water and gargle  This may help reduce swelling in your throat  · Take ibuprofen or acetaminophen:  These medicines decrease pain and fever  They are available without a doctor's order  Ask your healthcare provider which medicine is best for you  Ask how much to take and how often to take it  · Drink more liquids  Cold or warm drinks may help soothe your sore throat  Drinking liquids can also help prevent dehydration  · Use a cool-steam humidifier  to help moisten the air in your room and reduce your throat pain  · Use lozenges, ice, soft foods, or popsicles  to soothe your throat  · Rest your throat as much as possible  Try not to use your voice  This may irritate your throat and worsen your symptoms    Follow up with your healthcare provider as directed:  Write down your questions so you remember to ask them during your visits  CARE AGREEMENT:   You have the right to help plan your care  Learn about your health condition and how it may be treated  Discuss treatment options with your caregivers to decide what care you want to receive  You always have the right to refuse treatment  The above information is an  only  It is not intended as medical advice for individual conditions or treatments  Talk to your doctor, nurse or pharmacist before following any medical regimen to see if it is safe and effective for you  © 2014 [address] is for End User's use only and may not be sold, redistributed or otherwise used for commercial purposes  All illustrations and images included in CareNotes® are the copyrighted property of [employer] M [employer]  or [employer]  Pharyngitis   AMBULATORY CARE:   Pharyngitis , or sore throat, is inflammation of the tissues and structures in your pharynx (throat)  Pharyngitis is most often caused by bacteria  It may also be caused by a cold or flu virus  Other causes include smoking, allergies, or acid reflux  Signs and symptoms that may occur with pharyngitis:   · Sore throat or pain when you swallow    · Fever, chills, and body aches    · Hoarse or raspy voice    · Cough, runny or stuffy nose, itchy or watery eyes    · Headache    · Upset stomach and loss of appetite    · Mild neck stiffness    · Swollen glands that feel like hard lumps when you touch your neck    · White and yellow pus-filled blisters in the back of your throat  Call 911 for any of the following:   · You have trouble breathing or swallowing because your throat is swollen or sore  Seek care immediately if:   · You are drooling because it hurts too much to swallow  · Your fever is higher than 102? F (39?C) or lasts longer than 3 days  · You are confused  · You taste blood in your throat  Contact your healthcare provider if:   · Your throat pain gets worse      · You have a painful lump in your throat that does not go away after 5 days  · Your symptoms do not improve after 5 days  · You have questions or concerns about your condition or care  Treatment for pharyngitis:  Viral pharyngitis will go away on its own without treatment  Your sore throat should start to feel better in 3 to 5 days for both viral and bacterial infections  You may need any of the following:  · Antibiotics  treat a bacterial infection  · NSAIDs , such as ibuprofen, help decrease swelling, pain, and fever  NSAIDs can cause stomach bleeding or kidney problems in certain people  If you take blood thinner medicine, always ask your healthcare provider if NSAIDs are safe for you  Always read the medicine label and follow directions  · Acetaminophen  decreases pain and fever  It is available without a doctor's order  Ask how much to take and how often to take it  Follow directions  Acetaminophen can cause liver damage if not taken correctly  Manage your symptoms:   · Gargle salt water  Mix ¼ teaspoon salt in an 8 ounce glass of warm water and gargle  This may help decrease swelling in your throat  · Drink liquids as directed  You may need to drink more liquids than usual  Liquids may help soothe your throat and prevent dehydration  Ask how much liquid to drink each day and which liquids are best for you  · Use a cool-steam humidifier  to help moisten the air in your room and calm your cough  · Soothe your throat  with cough drops, ice, soft foods, or popsicles  Prevent the spread of pharyngitis:  Cover your mouth and nose when you cough or sneeze  Do not share food or drinks  Wash your hands often  Use soap and water  If soap and water are unavailable, use an alcohol based hand   Follow up with your healthcare provider as directed:  Write down your questions so you remember to ask them during your visits     © 2017 [employer] Information is for End User's use only and may not be sold, redistributed or otherwise used for commercial purposes  All illustrations and images included in CareNotes® are the copyrighted property of [employer]  or [address]  The above information is an  only  It is not intended as medical advice for individual conditions or treatments  Talk to your doctor, nurse or pharmacist before following any medical regimen to see if it is safe and effective for you

## 2018-05-04 NOTE — PROGRESS NOTES
[facility]        NAME: Tara Duverney is a 80 y o  female  : 1950    MRN: [id number]  DATE: May 4, 2018  TIME: 12:28 PM    Assessment and Plan   Acute viral pharyngitis [J02 8, B97 89]  1  Acute viral pharyngitis     2  Sore throat  POCT rapid strepA    Throat culture    Throat culture         Patient Instructions       Follow up with PCP in 3-5 days  Proceed to  ER if symptoms worsen  Chief Complaint     Chief Complaint   Patient presents with    Sore Throat     Patient c/o sore throat          History of Present Illness       Pt reports a sore throat since this morning  NO fevers but just didn't feel right  Was around someone with strep throat  Sore Throat    This is a new problem  The current episode started today  The problem has been unchanged  There has been no fever  The pain is mild  Pertinent negatives include no abdominal pain, congestion, coughing, diarrhea, ear pain, hoarse voice, shortness of breath, swollen glands, trouble swallowing or vomiting  She has had exposure to strep  She has tried nothing for the symptoms  Review of Systems   Review of Systems   Constitutional: Negative  HENT: Positive for sore throat  Negative for congestion, ear pain, hoarse voice and trouble swallowing  Eyes: Negative  Respiratory: Negative for cough and shortness of breath  Cardiovascular: Negative  Gastrointestinal: Negative for abdominal pain, diarrhea and vomiting  Musculoskeletal: Negative  Skin: Negative            Current Medications       Current Outpatient Prescriptions:     Cholecalciferol (VITAMIN D3) 1000 UNIT/SPRAY LIQD, Take 1 tablet by mouth daily, Disp: , Rfl:     diltiazem (CARDIZEM CD) 180 mg 24 hr capsule, Take 1 capsule (180 mg total) by mouth daily, Disp: 90 capsule, Rfl: 1    melatonin 3 mg, Take 1 tablet by mouth, Disp: , Rfl:     Misc Natural Products (GLUCOSAMINE CHONDROITIN ADV PO), Take 1 capsule by mouth daily, Disp: , Rfl:    Multiple Vitamins-Minerals (MULTI COMPLETE) CAPS, Take 1 capsule by mouth daily, Disp: , Rfl:     Omega-3 1000 MG CAPS, Take 1 capsule by mouth daily, Disp: , Rfl:     omeprazole (PriLOSEC) 20 mg delayed release capsule, Take 1 capsule by mouth daily as needed, Disp: , Rfl:     Current Allergies     Allergies as of 2018 - Reviewed 2018   Allergen Reaction Noted    Ciprofloxacin Anaphylaxis 10/30/2003    Amitriptyline GI Intolerance 2017    Celecoxib  2012    Other Other (See Comments) 10/02/2014    Wound dressing adhesive  2012    Bactrim [sulfamethoxazole-trimethoprim] Rash 10/30/2003    Sulfa antibiotics Rash 2012            The following portions of the patient's history were reviewed and updated as appropriate: allergies, current medications, past family history, past medical history, past social history, past surgical history and problem list      Past Medical History:   Diagnosis Date    Heart murmur     Migraine     skin and breast        Past Surgical History:   Procedure Laterality Date    BREAST SURGERY       SECTION      CHOLECYSTECTOMY      TONSILLECTOMY         No family history on file  Medications have been verified  Objective   /62   Pulse 67   Temp 97 7 °F (36 5 °C)   Resp 18   Ht 5' 5" (1 651 m)   Wt 68 kg (150 lb)   SpO2 100%   BMI 24 96 kg/m²        Physical Exam     Physical Exam   Constitutional: She is oriented to person, place, and time  She appears well-developed and well-nourished  No distress  HENT:   Head: Normocephalic and atraumatic  Right Ear: External ear normal    Left Ear: External ear normal    Nose: Nose normal    Mouth/Throat: Uvula is midline and mucous membranes are normal  Posterior oropharyngeal erythema present  Eyes: Conjunctivae are normal    Neck: Normal range of motion  Neck supple  Cardiovascular: Normal rate, regular rhythm, normal heart sounds and intact distal pulses  No murmur heard  Pulmonary/Chest: Effort normal and breath sounds normal  No respiratory distress  She has no rales  Abdominal: Soft  Bowel sounds are normal  There is no tenderness  Musculoskeletal: Normal range of motion  Lymphadenopathy:     She has no cervical adenopathy  Neurological: She is alert and oriented to person, place, and time  Skin: Skin is warm and dry  Psychiatric: She has a normal mood and affect  Nursing note and vitals reviewed

## 2018-05-06 LAB — BACTERIA THROAT CULT: NORMAL

## 2018-05-16 ENCOUNTER — APPOINTMENT (OUTPATIENT)
Dept: LAB | Facility: MEDICAL CENTER | Age: 68
End: 2018-05-16
Payer: MEDICARE

## 2018-05-16 ENCOUNTER — TRANSCRIBE ORDERS (OUTPATIENT)
Dept: ADMINISTRATIVE | Facility: HOSPITAL | Age: 68
End: 2018-05-16

## 2018-05-16 DIAGNOSIS — Z85.3 PERSONAL HISTORY OF MALIGNANT NEOPLASM OF BREAST: Primary | ICD-10-CM

## 2018-05-16 DIAGNOSIS — Z85.3 PERSONAL HISTORY OF MALIGNANT NEOPLASM OF BREAST: ICD-10-CM

## 2018-05-16 LAB
BUN SERPL-MCNC: 15 MG/DL (ref 5–25)
CREAT SERPL-MCNC: 0.8 MG/DL (ref 0.6–1.3)
GFR SERPL CREATININE-BSD FRML MDRD: 77 ML/MIN/1.73SQ M

## 2018-05-16 PROCEDURE — 36415 COLL VENOUS BLD VENIPUNCTURE: CPT

## 2018-05-16 PROCEDURE — 82565 ASSAY OF CREATININE: CPT

## 2018-05-16 PROCEDURE — 84520 ASSAY OF UREA NITROGEN: CPT

## 2018-08-13 ENCOUNTER — OFFICE VISIT (OUTPATIENT)
Dept: URGENT CARE | Facility: MEDICAL CENTER | Age: 68
End: 2018-08-13
Payer: MEDICARE

## 2018-08-13 VITALS
OXYGEN SATURATION: 96 % | HEIGHT: 65 IN | DIASTOLIC BLOOD PRESSURE: 78 MMHG | RESPIRATION RATE: 16 BRPM | BODY MASS INDEX: 25.33 KG/M2 | SYSTOLIC BLOOD PRESSURE: 121 MMHG | WEIGHT: 152 LBS | HEART RATE: 66 BPM | TEMPERATURE: 96.9 F

## 2018-08-13 DIAGNOSIS — J20.9 ACUTE BRONCHITIS, UNSPECIFIED ORGANISM: Primary | ICD-10-CM

## 2018-08-13 DIAGNOSIS — J04.0 ACUTE LARYNGITIS: ICD-10-CM

## 2018-08-13 PROCEDURE — 99213 OFFICE O/P EST LOW 20 MIN: CPT | Performed by: FAMILY MEDICINE

## 2018-08-13 PROCEDURE — G0463 HOSPITAL OUTPT CLINIC VISIT: HCPCS | Performed by: FAMILY MEDICINE

## 2018-08-13 RX ORDER — AZITHROMYCIN 250 MG/1
TABLET, FILM COATED ORAL
Qty: 6 TABLET | Refills: 0 | Status: SHIPPED | OUTPATIENT
Start: 2018-08-13 | End: 2018-08-17

## 2018-08-13 RX ORDER — BENZONATATE 100 MG/1
100 CAPSULE ORAL 3 TIMES DAILY PRN
Qty: 20 CAPSULE | Refills: 0 | Status: SHIPPED | OUTPATIENT
Start: 2018-08-13 | End: 2018-09-18

## 2018-08-13 NOTE — PATIENT INSTRUCTIONS
I prescribed Zithromax Z-Gio, Tessalon Perles every 8 hours for cough  Advised patient to increase fluid intake  Recommended gargling with salt water  Follow up with primary care provider symptoms worsen  Laryngitis   WHAT YOU NEED TO KNOW:   Laryngitis is when your larynx is swollen because of an infection or irritation  The larynx is also called the voice box because it contains your vocal cords  Your vocal cords also swell and change shape, which can cause your voice to sound different  DISCHARGE INSTRUCTIONS:   Take your medicine as directed  Contact your healthcare provider if you think your medicine is not helping or if you have side effects  Tell him of her if you are allergic to any medicine  Keep a list of the medicines, vitamins, and herbs you take  Include the amounts, and when and why you take them  Bring the list or the pill bottles to follow-up visits  Carry your medicine list with you in case of an emergency  Prevent laryngitis:   · Rest your voice:  Do not shout or scream if you get laryngitis often  This will help prevent swelling and irritation of your larynx  · Avoid irritants and harmful substances:  Do not breathe in chemicals or allergens, such as pollen  Alcohol and tobacco can also irritate your larynx  · Avoid foods and liquids that can cause acid reflux: These may include carbonated drinks, spicy foods and sauces, citrus fruit, peppermint, and chocolate  · Avoid the spread of germs:        [facility] your hands often with soap and water  Carry germ-killing gel with you  You can use the gel to clean your hands when there is no soap and water available  ¨ Do not touch your eyes, nose, or mouth unless you have washed your hands first     ¨ Always cover your mouth when you cough  Cough into a tissue or your shirtsleeve so you do not spread germs from your hands  ¨ Try to avoid people who have a cold or the flu   If you are sick, stay away from others as much as possible  Follow up with your healthcare provider as directed:  Write down your questions so you remember to ask them during your visits  Contact your healthcare provider if:   · You have a fever  · You feel large, tender lumps in your neck  · You are hoarse for more than 7 days  · You have new or increased throat pain  · You have questions about your condition or care  Return to the emergency department if:   · Your throat is bleeding  · You are hoarse for more than 7 days and your chest feels tight  · You are drooling and have trouble swallowing  · You have trouble breathing  © 2017 [address]  Information is for End User's use only and may not be sold, redistributed or otherwise used for commercial purposes  All illustrations and images included in CareNotes® are the copyrighted property of [employer]  or [employer]  The above information is an  only  It is not intended as medical advice for individual conditions or treatments  Talk to your doctor, nurse or pharmacist before following any medical regimen to see if it is safe and effective for you  Acute Bronchitis   WHAT YOU NEED TO KNOW:   Acute bronchitis is swelling and irritation in the air passages of your lungs  This irritation may cause you to cough or have other breathing problems  Acute bronchitis often starts because of another illness, such as a cold or the flu  The illness spreads from your nose and throat to your windpipe and airways  Bronchitis is often called a chest cold  Acute bronchitis lasts about 3 to 6 weeks and is usually not a serious illness  Your cough can last for several weeks  DISCHARGE INSTRUCTIONS:   Return to the emergency department if:   · You cough up blood  · Your lips or fingernails turn blue  · You feel like you are not getting enough air when you breathe  Contact your healthcare provider if:   · You have a fever      · Your breathing problems do not go away or get worse  · Your cough does not get better within 4 weeks  · You have questions or concerns about your condition or care  Self-care:   · Get more rest   Rest helps your body to heal  Slowly start to do more each day  Rest when you feel it is needed  · Avoid irritants in the air  Avoid chemicals, fumes, and dust  Wear a face mask if you must work around dust or fumes  Stay inside on days when air pollution levels are high  If you have allergies, stay inside when pollen counts are high  Do not use aerosol products, such as spray-on deodorant, bug spray, and hair spray  · Do not smoke or be around others who smoke  Nicotine and other chemicals in cigarettes and cigars damages the cilia that move mucus out of your lungs  Ask your healthcare provider for information if you currently smoke and need help to quit  E-cigarettes or smokeless tobacco still contain nicotine  Talk to your healthcare provider before you use these products  · Drink liquids as directed  Liquids help keep your air passages moist and help you cough up mucus  You may need to drink more liquids when you have acute bronchitis  Ask how much liquid to drink each day and which liquids are best for you  · Use a humidifier or vaporizer  Use a cool mist humidifier or a vaporizer to increase air moisture in your home  This may make it easier for you to breathe and help decrease your cough  Decrease risk for acute bronchitis:   · Get the vaccinations you need  Ask your healthcare provider if you should get vaccinated against the flu or pneumonia  · Prevent the spread of germs  You can decrease your risk of acute bronchitis and other illnesses by doing the following:     [facility] your hands often with soap and water  Carry germ-killing hand lotion or gel with you  You can use the lotion or gel to clean your hands when soap and water are not available      ¨ Do not touch your eyes, nose, or mouth unless you have washed your hands first     ¨ Always cover your mouth when you cough to prevent the spread of germs  It is best to cough into a tissue or your shirt sleeve instead of into your hand  Ask those around you cover their mouths when they cough  ¨ Try to avoid people who have a cold or the flu  If you are sick, stay away from others as much as possible  Medicines: Your healthcare provider may  give you any of the following:  · Ibuprofen or acetaminophen  are medicines that help lower your fever  They are available without a doctor's order  Ask your healthcare provider which medicine is right for you  Ask how much to take and how often to take it  Follow directions  These medicines can cause stomach bleeding if not taken correctly  Ibuprofen can cause kidney damage  Do not take ibuprofen if you have kidney disease, an ulcer, or allergies to aspirin  Acetaminophen can cause liver damage  Do not take more than 4,000 milligrams in 24 hours  · Decongestants  help loosen mucus in your lungs and make it easier to cough up  This can help you breathe easier  · Cough suppressants  decrease your urge to cough  If your cough produces mucus, do not take a cough suppressant unless your healthcare provider tells you to  Your healthcare provider may suggest that you take a cough suppressant at night so you can rest     · Inhalers  may be given  Your healthcare provider may give you one or more inhalers to help you breathe easier and cough less  An inhaler gives your medicine to open your airways  Ask your healthcare provider to show you how to use your inhaler correctly  · Take your medicine as directed  Contact your healthcare provider if you think your medicine is not helping or if you have side effects  Tell him of her if you are allergic to any medicine  Keep a list of the medicines, vitamins, and herbs you take  Include the amounts, and when and why you take them   Bring the list or the pill bottles to follow-up visits  Carry your medicine list with you in case of an emergency  Follow up with your healthcare provider as directed:  Write down questions you have so you will remember to ask them during your follow-up visits  © 2017 [address] Information is for End User's use only and may not be sold, redistributed or otherwise used for commercial purposes  All illustrations and images included in CareNotes® are the copyrighted property of [employer]  or [employer]  The above information is an  only  It is not intended as medical advice for individual conditions or treatments  Talk to your doctor, nurse or pharmacist before following any medical regimen to see if it is safe and effective for you

## 2018-08-13 NOTE — PROGRESS NOTES
[facility]        NAME: Gee Inman is a 80 y o  female  : 1950    MRN: [id number]  DATE: 2018  TIME: 2:47 PM    Assessment and Plan   Acute bronchitis, unspecified organism [J20 9]  1  Acute bronchitis, unspecified organism  azithromycin (ZITHROMAX) 250 mg tablet    benzonatate (TESSALON PERLES) 100 mg capsule   2  Acute laryngitis           Patient Instructions       Follow up with PCP in 3-5 days  Proceed to  ER if symptoms worsen  Chief Complaint     Chief Complaint   Patient presents with    Cough     Patient relates has been sick for 8 days now with a cough mostly dry, but some sputum yellow and green in color  Denies fever  History of Present Illness       Patient here today with complaints of cough for the last a day  It has gotten progressively worse, predominantly nonproductive cough however at time she is expectorating yellowish greenish sputum  Refers to some mild shortness of breath and wheezing  She has been taking some cough drops which seemed to help  Recently return from a trip 2 days ago and use some leftover promethazine syrup which seems to help  She has also developed some hoarseness in last 2 days  Developed headache secondary to cough  Refers to scratchy throat  She informs me she was exposed to her granddaughter who have viral-like symptoms about a week ago  Review of Systems   Review of Systems   Constitutional: Negative  HENT: Positive for voice change  Respiratory: Positive for cough and shortness of breath            Current Medications       Current Outpatient Prescriptions:     Cholecalciferol (VITAMIN D3) 5000 units CAPS, Take 1 tablet by mouth daily, Disp: , Rfl:     diltiazem (CARDIZEM CD) 180 mg 24 hr capsule, Take 1 capsule (180 mg total) by mouth daily, Disp: 90 capsule, Rfl: 1    melatonin 3 mg, Take 1 tablet by mouth as needed  , Disp: , Rfl:     Misc Natural Products (GLUCOSAMINE CHONDROITIN ADV PO), Take 1 capsule by mouth daily, Disp: , Rfl:     Multiple Vitamins-Minerals (MULTI COMPLETE) CAPS, Take 1 capsule by mouth daily, Disp: , Rfl:     Omega-3 1000 MG CAPS, Take 1 capsule by mouth daily, Disp: , Rfl:     omeprazole (PriLOSEC) 20 mg delayed release capsule, Take 1 capsule by mouth daily as needed, Disp: , Rfl:     azithromycin (ZITHROMAX) 250 mg tablet, Take 2 tablets today then 1 tablet daily x 4 days, Disp: 6 tablet, Rfl: 0    benzonatate (TESSALON PERLES) 100 mg capsule, Take 1 capsule (100 mg total) by mouth 3 (three) times a day as needed for cough, Disp: 20 capsule, Rfl: 0    Current Allergies     Allergies as of 2018 - Reviewed 2018   Allergen Reaction Noted    Ciprofloxacin Anaphylaxis 10/30/2003    Amitriptyline GI Intolerance 2017    Celecoxib  2012    Other Other (See Comments) 10/02/2014    Wound dressing adhesive  2012    Bactrim [sulfamethoxazole-trimethoprim] Rash 10/30/2003    Sulfa antibiotics Rash 2012            The following portions of the patient's history were reviewed and updated as appropriate: allergies, current medications, past family history, past medical history, past social history, past surgical history and problem list      Past Medical History:   Diagnosis Date    Heart murmur     Migraine     skin and breast        Past Surgical History:   Procedure Laterality Date    BREAST SURGERY       SECTION      CHOLECYSTECTOMY      TONSILLECTOMY         No family history on file  Medications have been verified  Objective   /78   Pulse 66   Temp (!) 96 9 °F (36 1 °C) (Tympanic)   Resp 16   Ht 5' 5" (1 651 m)   Wt 68 9 kg (152 lb)   SpO2 96%   BMI 25 29 kg/m²        Physical Exam     Physical Exam   Constitutional: She appears well-developed and well-nourished  HENT:   Mouth/Throat: Oropharynx is clear and moist    Neck: Normal range of motion  Neck supple     Pulmonary/Chest: Effort normal  She has abdiel    Nursing note and vitals reviewed

## 2018-09-05 DIAGNOSIS — C50.919 MALIGNANT NEOPLASM OF FEMALE BREAST (HCC): ICD-10-CM

## 2018-09-10 ENCOUNTER — APPOINTMENT (OUTPATIENT)
Dept: LAB | Facility: MEDICAL CENTER | Age: 68
End: 2018-09-10
Payer: MEDICARE

## 2018-09-10 ENCOUNTER — TRANSCRIBE ORDERS (OUTPATIENT)
Dept: ADMINISTRATIVE | Facility: HOSPITAL | Age: 68
End: 2018-09-10

## 2018-09-10 DIAGNOSIS — Z01.812 PRE-OPERATIVE LABORATORY EXAMINATION: ICD-10-CM

## 2018-09-10 DIAGNOSIS — M20.22 HALLUX RIGIDUS OF LEFT FOOT: ICD-10-CM

## 2018-09-10 DIAGNOSIS — M20.22 HALLUX RIGIDUS OF LEFT FOOT: Primary | ICD-10-CM

## 2018-09-10 LAB
ALBUMIN SERPL BCP-MCNC: 3.9 G/DL (ref 3.5–5)
ALP SERPL-CCNC: 97 U/L (ref 46–116)
ALT SERPL W P-5'-P-CCNC: 24 U/L (ref 12–78)
ANION GAP SERPL CALCULATED.3IONS-SCNC: 4 MMOL/L (ref 4–13)
AST SERPL W P-5'-P-CCNC: 19 U/L (ref 5–45)
BASOPHILS # BLD AUTO: 0.08 THOUSANDS/ΜL (ref 0–0.1)
BASOPHILS NFR BLD AUTO: 2 % (ref 0–1)
BILIRUB SERPL-MCNC: 0.74 MG/DL (ref 0.2–1)
BUN SERPL-MCNC: 11 MG/DL (ref 5–25)
CALCIUM SERPL-MCNC: 9.3 MG/DL (ref 8.3–10.1)
CHLORIDE SERPL-SCNC: 103 MMOL/L (ref 100–108)
CO2 SERPL-SCNC: 29 MMOL/L (ref 21–32)
CREAT SERPL-MCNC: 0.78 MG/DL (ref 0.6–1.3)
EOSINOPHIL # BLD AUTO: 0.17 THOUSAND/ΜL (ref 0–0.61)
EOSINOPHIL NFR BLD AUTO: 3 % (ref 0–6)
ERYTHROCYTE [DISTWIDTH] IN BLOOD BY AUTOMATED COUNT: 13.3 % (ref 11.6–15.1)
GFR SERPL CREATININE-BSD FRML MDRD: 79 ML/MIN/1.73SQ M
GLUCOSE SERPL-MCNC: 87 MG/DL (ref 65–140)
HCT VFR BLD AUTO: 42 % (ref 34.8–46.1)
HGB BLD-MCNC: 13.5 G/DL (ref 11.5–15.4)
IMM GRANULOCYTES # BLD AUTO: 0.01 THOUSAND/UL (ref 0–0.2)
IMM GRANULOCYTES NFR BLD AUTO: 0 % (ref 0–2)
LYMPHOCYTES # BLD AUTO: 2.06 THOUSANDS/ΜL (ref 0.6–4.47)
LYMPHOCYTES NFR BLD AUTO: 39 % (ref 14–44)
MCH RBC QN AUTO: 28.5 PG (ref 26.8–34.3)
MCHC RBC AUTO-ENTMCNC: 32.1 G/DL (ref 31.4–37.4)
MCV RBC AUTO: 89 FL (ref 82–98)
MONOCYTES # BLD AUTO: 0.44 THOUSAND/ΜL (ref 0.17–1.22)
MONOCYTES NFR BLD AUTO: 8 % (ref 4–12)
NEUTROPHILS # BLD AUTO: 2.56 THOUSANDS/ΜL (ref 1.85–7.62)
NEUTS SEG NFR BLD AUTO: 48 % (ref 43–75)
NRBC BLD AUTO-RTO: 0 /100 WBCS
PLATELET # BLD AUTO: 283 THOUSANDS/UL (ref 149–390)
PMV BLD AUTO: 10.3 FL (ref 8.9–12.7)
POTASSIUM SERPL-SCNC: 3.8 MMOL/L (ref 3.5–5.3)
PROT SERPL-MCNC: 7 G/DL (ref 6.4–8.2)
RBC # BLD AUTO: 4.73 MILLION/UL (ref 3.81–5.12)
SODIUM SERPL-SCNC: 136 MMOL/L (ref 136–145)
WBC # BLD AUTO: 5.32 THOUSAND/UL (ref 4.31–10.16)

## 2018-09-10 PROCEDURE — 85025 COMPLETE CBC W/AUTO DIFF WBC: CPT

## 2018-09-10 PROCEDURE — 80053 COMPREHEN METABOLIC PANEL: CPT

## 2018-09-10 PROCEDURE — 36415 COLL VENOUS BLD VENIPUNCTURE: CPT

## 2018-09-18 ENCOUNTER — CONSULT (OUTPATIENT)
Dept: FAMILY MEDICINE CLINIC | Facility: CLINIC | Age: 68
End: 2018-09-18
Payer: MEDICARE

## 2018-09-18 VITALS
HEART RATE: 62 BPM | RESPIRATION RATE: 18 BRPM | WEIGHT: 153.6 LBS | TEMPERATURE: 97.9 F | DIASTOLIC BLOOD PRESSURE: 82 MMHG | HEIGHT: 65 IN | SYSTOLIC BLOOD PRESSURE: 118 MMHG | BODY MASS INDEX: 25.59 KG/M2

## 2018-09-18 DIAGNOSIS — Z23 NEED FOR INFLUENZA VACCINATION: ICD-10-CM

## 2018-09-18 DIAGNOSIS — Z01.818 PREOPERATIVE CLEARANCE: Primary | ICD-10-CM

## 2018-09-18 DIAGNOSIS — E78.5 HYPERLIPIDEMIA, UNSPECIFIED HYPERLIPIDEMIA TYPE: ICD-10-CM

## 2018-09-18 DIAGNOSIS — M20.32 HALLUX HAMMERTOE, LEFT: ICD-10-CM

## 2018-09-18 PROBLEM — M19.90 ARTHRITIS: Status: ACTIVE | Noted: 2018-09-18

## 2018-09-18 PROBLEM — M85.80 OSTEOPENIA: Status: ACTIVE | Noted: 2017-10-23

## 2018-09-18 PROBLEM — F41.9 ANXIETY: Status: ACTIVE | Noted: 2018-09-18

## 2018-09-18 PROCEDURE — 90662 IIV NO PRSV INCREASED AG IM: CPT

## 2018-09-18 PROCEDURE — G0008 ADMIN INFLUENZA VIRUS VAC: HCPCS

## 2018-09-18 PROCEDURE — 99214 OFFICE O/P EST MOD 30 MIN: CPT | Performed by: INTERNAL MEDICINE

## 2018-09-18 NOTE — PROGRESS NOTES
Assessment/Plan:     Diagnoses and all orders for this visit:    Preoperative clearance    Hallux hammertoe, left    Hyperlipidemia, unspecified hyperlipidemia type  -     Lipid panel    Need for influenza vaccination  -     influenza vaccine, [id number], high-dose, PF 0 5 mL, for patients 65 yr+ (FLUZONE HIGH-DOSE)      Tarik Arboleda was seen and examined in the office today  Her examination today was largely normal  She does have noted murmur on exam but is otherwise largely normal    She does have previous known breast cancer and is UTD with mammogram follow up  WE did request a record of this  She is also now due for a colonoscopy follow up and will reach out to her gastroenterology group  Subjective:      Patient ID: Lorraine Can is a 80 y o  female  Tarikgema Arboleda is here today for a preoperative visit  She is going to under hammertoe surgery of the left foot  She actually had this in the past but seems that it was not successful  She had the right done with another physician and had great success with this  Other history was reviewed and updated  She reports feeling well overall  The following portions of the patient's history were reviewed and updated as appropriate: allergies, current medications, past family history, past medical history, past social history, past surgical history and problem list     Review of Systems   Constitutional: Negative for chills, fever and unexpected weight change  HENT: Negative for sinus pain, sinus pressure and sore throat  Eyes: Negative for visual disturbance  Respiratory: Negative for cough, chest tightness, shortness of breath and wheezing  Cardiovascular: Negative for chest pain, palpitations and leg swelling  Gastrointestinal: Negative for abdominal pain, blood in stool, constipation, diarrhea, nausea and vomiting  Genitourinary: Negative for difficulty urinating and dysuria  Musculoskeletal: Positive for arthralgias   Negative for back pain and myalgias  Neurological: Negative for dizziness, syncope, numbness and headaches  Psychiatric/Behavioral: Negative for dysphoric mood and sleep disturbance  The patient is not nervous/anxious  Objective:      /82   Pulse 62   Temp 97 9 °F (36 6 °C)   Resp 18   Ht 5' 5" (1 651 m)   Wt 69 7 kg (153 lb 9 6 oz)   BMI 25 56 kg/m²          Physical Exam   Constitutional: She is oriented to person, place, and time  She appears well-developed and well-nourished  No distress  HENT:   Head: Normocephalic and atraumatic  Right Ear: External ear normal    Left Ear: External ear normal    Mouth/Throat: Oropharynx is clear and moist    Eyes: Conjunctivae and EOM are normal  Pupils are equal, round, and reactive to light  Right eye exhibits no discharge  Left eye exhibits no discharge  Neck: Normal range of motion  Neck supple  No JVD present  No tracheal deviation present  No thyromegaly present  Cardiovascular: Normal rate and regular rhythm  Murmur heard  Pulmonary/Chest: Effort normal and breath sounds normal  No respiratory distress  She has no wheezes  Abdominal: Soft  Bowel sounds are normal  There is no tenderness  Musculoskeletal: Normal range of motion  Neurological: She is alert and oriented to person, place, and time  No cranial nerve deficit  Skin: Skin is warm and dry  She is not diaphoretic  Psychiatric: She has a normal mood and affect  Her speech is normal and behavior is normal  Judgment and thought content normal    Vitals reviewed

## 2018-09-26 ENCOUNTER — TELEPHONE (OUTPATIENT)
Dept: HEMATOLOGY ONCOLOGY | Facility: CLINIC | Age: 68
End: 2018-09-26

## 2018-09-26 ENCOUNTER — OFFICE VISIT (OUTPATIENT)
Dept: HEMATOLOGY ONCOLOGY | Facility: CLINIC | Age: 68
End: 2018-09-26
Payer: MEDICARE

## 2018-09-26 VITALS
DIASTOLIC BLOOD PRESSURE: 88 MMHG | RESPIRATION RATE: 18 BRPM | HEART RATE: 71 BPM | BODY MASS INDEX: 25.36 KG/M2 | HEIGHT: 65 IN | WEIGHT: 152.2 LBS | SYSTOLIC BLOOD PRESSURE: 126 MMHG | OXYGEN SATURATION: 97 % | TEMPERATURE: 97.5 F

## 2018-09-26 DIAGNOSIS — C50.911 ADENOCARCINOMA OF RIGHT BREAST (HCC): Primary | ICD-10-CM

## 2018-09-26 PROCEDURE — 99213 OFFICE O/P EST LOW 20 MIN: CPT | Performed by: INTERNAL MEDICINE

## 2018-09-26 NOTE — PROGRESS NOTES
HPI:  In 2007 patient was diagnosed to have hormone receptor positive, HER-2 negative stage I cancer of right breast  and she had lumpectomy, sentinel lymph node sampling and partial radiation therapy  Oncotype score was 20  Patient  did not want  to have chemotherapy  She decided to have hormonal therapy only  She was postmenopausal at that time  She was tried on Femara first but that did not agree with her  Treatment was changed to Aromasin and that did not agree with her either  At that point she decided not to have any hormonal therapy, not even tamoxifen  she has dryness of the vagina  She follows with her gynecologist for pelvic and breast examination and mammography              Current Outpatient Prescriptions:     Cholecalciferol (VITAMIN D3) 5000 units CAPS, Take 1 tablet by mouth daily, Disp: , Rfl:     diltiazem (CARDIZEM CD) 180 mg 24 hr capsule, Take 1 capsule (180 mg total) by mouth daily, Disp: 90 capsule, Rfl: 1    melatonin 3 mg, Take 1 tablet by mouth as needed  , Disp: , Rfl:     Misc Natural Products (GLUCOSAMINE CHONDROITIN ADV PO), Take 1 capsule by mouth daily, Disp: , Rfl:     Multiple Vitamins-Minerals (MULTI COMPLETE) CAPS, Take 1 capsule by mouth daily, Disp: , Rfl:     Omega-3 1000 MG CAPS, Take 1 capsule by mouth daily, Disp: , Rfl:     omeprazole (PriLOSEC) 20 mg delayed release capsule, Take 1 capsule by mouth daily as needed, Disp: , Rfl:     Allergies   Allergen Reactions    Ciprofloxacin Anaphylaxis     Anaphylaxis    Amitriptyline GI Intolerance     Action Taken: bloating,GI problems;     Celecoxib     Other Other (See Comments)     "black olives->GI upset"    Wound Dressing Adhesive     Bactrim [Sulfamethoxazole-Trimethoprim] Rash    Sulfa Antibiotics Rash        No history exists         ROS:  09/26/18 Reviewed 13 systems:  Presently no headaches, seizures, dizziness, diplopia, dysphagia, hoarseness, chest pain, palpitations, shortness of breath, cough, hemoptysis, abdominal pain, nausea, vomiting, change in bowel habits, melena, hematuria, fever, chills, bleeding, bone pains, skin rash, weight loss, arthritic symptoms, tiredness ,  weakness, numbness,  claudication and gait problem  No frequent infections  Not unusually sensitive to heat or cold  No swelling of the ankles  No swollen glands  Patient is anxious  Other symptoms are in HPI        /88 (BP Location: Left arm, Patient Position: Sitting, Cuff Size: Adult)   Pulse 71   Temp 97 5 °F (36 4 °C)   Resp 18   Ht 5' 5 25" (1 657 m)   Wt 69 kg (152 lb 3 2 oz)   SpO2 97%   BMI 25 13 kg/m²     Physical Exam:  Alert, oriented, not in distress, no icterus, no oral thrush, no palpable neck mass, clear lung fields, regular heart rate, abdomen  soft and non tender, no palpable abdominal mass, no ascites, no edema of ankles, no calf tenderness, no focal neurological deficit, no skin rash, no palpable lymphadenopathy  in the neck and axillary areas, good arterial pulses, no clubbing  Patient is anxious  Performance status 0      IMAGING:      LABS:  Results for orders placed or performed in visit on 09/10/18   Comprehensive metabolic panel   Result Value Ref Range    Sodium 136 136 - 145 mmol/L    Potassium 3 8 3 5 - 5 3 mmol/L    Chloride 103 100 - 108 mmol/L    CO2 29 21 - 32 mmol/L    ANION GAP 4 4 - 13 mmol/L    BUN 11 5 - 25 mg/dL    Creatinine 0 78 0 60 - 1 30 mg/dL    Glucose 87 65 - 140 mg/dL    Calcium 9 3 8 3 - 10 1 mg/dL    AST 19 5 - 45 U/L    ALT 24 12 - 78 U/L    Alkaline Phosphatase 97 46 - 116 U/L    Total Protein 7 0 6 4 - 8 2 g/dL    Albumin 3 9 3 5 - 5 0 g/dL    Total Bilirubin 0 74 0 20 - 1 00 mg/dL    eGFR 79 ml/min/1 73sq m   CBC and differential   Result Value Ref Range    WBC 5 32 4 31 - 10 16 Thousand/uL    RBC 4 73 3 81 - 5 12 Million/uL    Hemoglobin 13 5 11 5 - 15 4 g/dL    Hematocrit 42 0 34 8 - 46 1 %    MCV 89 82 - 98 fL    MCH 28 5 26 8 - 34 3 pg    MCHC 32 1 31 4 - 37 4 g/dL    RDW 13 3 11 6 - 15 1 %    MPV 10 3 8 9 - 12 7 fL    Platelets 146 826 - 096 Thousands/uL    nRBC 0 /100 WBCs    Neutrophils Relative 48 43 - 75 %    Immat GRANS % 0 0 - 2 %    Lymphocytes Relative 39 14 - 44 %    Monocytes Relative 8 4 - 12 %    Eosinophils Relative 3 0 - 6 %    Basophils Relative 2 (H) 0 - 1 %    Neutrophils Absolute 2 56 1 85 - 7 62 Thousands/µL    Immature Grans Absolute 0 01 0 00 - 0 20 Thousand/uL    Lymphocytes Absolute 2 06 0 60 - 4 47 Thousands/µL    Monocytes Absolute 0 44 0 17 - 1 22 Thousand/µL    Eosinophils Absolute 0 17 0 00 - 0 61 Thousand/µL    Basophils Absolute 0 08 0 00 - 0 10 Thousands/µL     Labs, Imaging, & Other studies:   All pertinent labs and imaging studies were personally reviewed    Lab Results   Component Value Date     09/10/2018    K 3 8 09/10/2018     09/10/2018    CO2 29 09/10/2018    ANIONGAP 5 12/17/2015    BUN 11 09/10/2018    CREATININE 0 78 09/10/2018    GLUCOSE 98 12/17/2015    GLUF 74 10/27/2017    CALCIUM 9 3 09/10/2018    AST 19 09/10/2018    ALT 24 09/10/2018    ALKPHOS 97 09/10/2018    PROT 6 9 12/17/2015    BILITOT 1 32 (H) 12/17/2015    EGFR 79 09/10/2018     Lab Results   Component Value Date    WBC 5 32 09/10/2018    HGB 13 5 09/10/2018    HCT 42 0 09/10/2018    MCV 89 09/10/2018     09/10/2018       Reviewed and discussed with patient  Assessment and plan: In 2007 patient was diagnosed to have hormone receptor positive, HER-2 negative stage I cancer of right breast  and she had lumpectomy, sentinel lymph node sampling and partial radiation therapy  Oncotype score was 20  Patient  did not want  to have chemotherapy  She decided to have hormonal therapy only  She was postmenopausal at that time  She was tried on Femara first but that did not agree with her  Treatment was changed to Aromasin and that did not agree with her either  At that point she decided not to have any hormonal therapy, not even tamoxifen   she has dryness of the vagina  She follows with her gynecologist for pelvic and breast examination and mammography     Physical examination and test results are as recorded and discussed  Breast cancer is in remission  Goal is cure from breast cancer  Condition discussed and explained  Questions answered  Discussed the importance of self-breast examination, eating healthy foods, staying active and health screening test    She will come back in 1 year with blood tests  She is self-care  1  Adenocarcinoma of right breast ([facility] [address] )    - CBC and differential; Future  - Cancer antigen 27 29; Future  - Comprehensive metabolic panel; Future      Patient voiced understanding and agreement in the discussion  Counseling / Coordination of Care   Greater than 50% of total time was spent with the patient and / or family counseling and / or coordination of care

## 2018-09-26 NOTE — TELEPHONE ENCOUNTER
Called and LVM for patient to get her labs completed prior to her appt  Informed her that if she is not able to get her labs completed we may need to R/S her appt

## 2018-09-26 NOTE — LETTER
September 26, 2018     Zaheer Flynn MD  [address]    Patient: Gabe Enriquez   YOB: 1950   Date of Visit: 9/26/2018       Dear Dr Smiley Bee: Thank you for referring Yennifer Lanza to me for evaluation  Below are my notes for this consultation  If you have questions, please do not hesitate to call me  I look forward to following your patient along with you  Sincerely,        Rosibel Bull MD        CC: No Recipients  Rosibel Bull MD  9/26/2018  5:49 PM  Sign at close encounter    HPI:  In 2007 patient was diagnosed to have hormone receptor positive, HER-2 negative stage I cancer of right breast  and she had lumpectomy, sentinel lymph node sampling and partial radiation therapy  Oncotype score was 20  Patient  did not want  to have chemotherapy  She decided to have hormonal therapy only  She was postmenopausal at that time  She was tried on Femara first but that did not agree with her  Treatment was changed to Aromasin and that did not agree with her either  At that point she decided not to have any hormonal therapy, not even tamoxifen  she has dryness of the vagina     She follows with her gynecologist for pelvic and breast examination and mammography              Current Outpatient Prescriptions:     Cholecalciferol (VITAMIN D3) 5000 units CAPS, Take 1 tablet by mouth daily, Disp: , Rfl:     diltiazem (CARDIZEM CD) 180 mg 24 hr capsule, Take 1 capsule (180 mg total) by mouth daily, Disp: 90 capsule, Rfl: 1    melatonin 3 mg, Take 1 tablet by mouth as needed  , Disp: , Rfl:     Misc Natural Products (GLUCOSAMINE CHONDROITIN ADV PO), Take 1 capsule by mouth daily, Disp: , Rfl:     Multiple Vitamins-Minerals (MULTI COMPLETE) CAPS, Take 1 capsule by mouth daily, Disp: , Rfl:     Omega-3 1000 MG CAPS, Take 1 capsule by mouth daily, Disp: , Rfl:     omeprazole (PriLOSEC) 20 mg delayed release capsule, Take 1 capsule by mouth daily as needed, Disp: , Rfl:     Allergies   Allergen Reactions    Ciprofloxacin Anaphylaxis     Anaphylaxis    Amitriptyline GI Intolerance     Action Taken: bloating,GI problems;     Celecoxib     Other Other (See Comments)     "black olives->GI upset"    Wound Dressing Adhesive     Bactrim [Sulfamethoxazole-Trimethoprim] Rash    Sulfa Antibiotics Rash        No history exists  ROS:  09/26/18 Reviewed 13 systems:  Presently no headaches, seizures, dizziness, diplopia, dysphagia, hoarseness, chest pain, palpitations, shortness of breath, cough, hemoptysis, abdominal pain, nausea, vomiting, change in bowel habits, melena, hematuria, fever, chills, bleeding, bone pains, skin rash, weight loss, arthritic symptoms, tiredness ,  weakness, numbness,  claudication and gait problem  No frequent infections  Not unusually sensitive to heat or cold  No swelling of the ankles  No swollen glands  Patient is anxious  Other symptoms are in HPI        /88 (BP Location: Left arm, Patient Position: Sitting, Cuff Size: Adult)   Pulse 71   Temp 97 5 °F (36 4 °C)   Resp 18   Ht 5' 5 25" (1 657 m)   Wt 69 kg (152 lb 3 2 oz)   SpO2 97%   BMI 25 13 kg/m²      Physical Exam:  Alert, oriented, not in distress, no icterus, no oral thrush, no palpable neck mass, clear lung fields, regular heart rate, abdomen  soft and non tender, no palpable abdominal mass, no ascites, no edema of ankles, no calf tenderness, no focal neurological deficit, no skin rash, no palpable lymphadenopathy  in the neck and axillary areas, good arterial pulses, no clubbing  Patient is anxious  Performance status 0      IMAGING:      LABS:  Results for orders placed or performed in visit on 09/10/18   Comprehensive metabolic panel   Result Value Ref Range    Sodium 136 136 - 145 mmol/L    Potassium 3 8 3 5 - 5 3 mmol/L    Chloride 103 100 - 108 mmol/L    CO2 29 21 - 32 mmol/L    ANION GAP 4 4 - 13 mmol/L    BUN 11 5 - 25 mg/dL Creatinine 0 78 0 60 - 1 30 mg/dL    Glucose 87 65 - 140 mg/dL    Calcium 9 3 8 3 - 10 1 mg/dL    AST 19 5 - 45 U/L    ALT 24 12 - 78 U/L    Alkaline Phosphatase 97 46 - 116 U/L    Total Protein 7 0 6 4 - 8 2 g/dL    Albumin 3 9 3 5 - 5 0 g/dL    Total Bilirubin 0 74 0 20 - 1 00 mg/dL    eGFR 79 ml/min/1 73sq m   CBC and differential   Result Value Ref Range    WBC 5 32 4 31 - 10 16 Thousand/uL    RBC 4 73 3 81 - 5 12 Million/uL    Hemoglobin 13 5 11 5 - 15 4 g/dL    Hematocrit 42 0 34 8 - 46 1 %    MCV 89 82 - 98 fL    MCH 28 5 26 8 - 34 3 pg    MCHC 32 1 31 4 - 37 4 g/dL    RDW 13 3 11 6 - 15 1 %    MPV 10 3 8 9 - 12 7 fL    Platelets 014 035 - 443 Thousands/uL    nRBC 0 /100 WBCs    Neutrophils Relative 48 43 - 75 %    Immat GRANS % 0 0 - 2 %    Lymphocytes Relative 39 14 - 44 %    Monocytes Relative 8 4 - 12 %    Eosinophils Relative 3 0 - 6 %    Basophils Relative 2 (H) 0 - 1 %    Neutrophils Absolute 2 56 1 85 - 7 62 Thousands/µL    Immature Grans Absolute 0 01 0 00 - 0 20 Thousand/uL    Lymphocytes Absolute 2 06 0 60 - 4 47 Thousands/µL    Monocytes Absolute 0 44 0 17 - 1 22 Thousand/µL    Eosinophils Absolute 0 17 0 00 - 0 61 Thousand/µL    Basophils Absolute 0 08 0 00 - 0 10 Thousands/µL     Labs, Imaging, & Other studies:   All pertinent labs and imaging studies were personally reviewed    Lab Results   Component Value Date     09/10/2018    K 3 8 09/10/2018     09/10/2018    CO2 29 09/10/2018    ANIONGAP 5 12/17/2015    BUN 11 09/10/2018    CREATININE 0 78 09/10/2018    GLUCOSE 98 12/17/2015    GLUF 74 10/27/2017    CALCIUM 9 3 09/10/2018    AST 19 09/10/2018    ALT 24 09/10/2018    ALKPHOS 97 09/10/2018    PROT 6 9 12/17/2015    BILITOT 1 32 (H) 12/17/2015    EGFR 79 09/10/2018     Lab Results   Component Value Date    WBC 5 32 09/10/2018    HGB 13 5 09/10/2018    HCT 42 0 09/10/2018    MCV 89 09/10/2018     09/10/2018       Reviewed and discussed with patient      Assessment and plan:   In 2007 patient was diagnosed to have hormone receptor positive, HER-2 negative stage I cancer of right breast  and she had lumpectomy, sentinel lymph node sampling and partial radiation therapy  Oncotype score was 20  Patient  did not want  to have chemotherapy  She decided to have hormonal therapy only  She was postmenopausal at that time  She was tried on Femara first but that did not agree with her  Treatment was changed to Aromasin and that did not agree with her either  At that point she decided not to have any hormonal therapy, not even tamoxifen  she has dryness of the vagina  She follows with her gynecologist for pelvic and breast examination and mammography     Physical examination and test results are as recorded and discussed  Breast cancer is in remission  Goal is cure from breast cancer  Condition discussed and explained  Questions answered  Discussed the importance of self-breast examination, eating healthy foods, staying active and health screening test    She will come back in 1 year with blood tests  She is self-care  1  Adenocarcinoma of right breast ([facility] [address] )    - CBC and differential; Future  - Cancer antigen 27 29; Future  - Comprehensive metabolic panel; Future      Patient voiced understanding and agreement in the discussion  Counseling / Coordination of Care   Greater than 50% of total time was spent with the patient and / or family counseling and / or coordination of care

## 2018-10-03 DIAGNOSIS — I10 ESSENTIAL HYPERTENSION: ICD-10-CM

## 2018-10-04 RX ORDER — DILTIAZEM HYDROCHLORIDE 180 MG/1
CAPSULE, EXTENDED RELEASE ORAL
Qty: 90 CAPSULE | Refills: 0 | Status: SHIPPED | OUTPATIENT
Start: 2018-10-04 | End: 2019-01-03 | Stop reason: SDUPTHER

## 2019-01-03 DIAGNOSIS — I10 ESSENTIAL HYPERTENSION: ICD-10-CM

## 2019-01-03 RX ORDER — DILTIAZEM HYDROCHLORIDE 180 MG/1
CAPSULE, EXTENDED RELEASE ORAL
Qty: 90 CAPSULE | Refills: 1 | Status: SHIPPED | OUTPATIENT
Start: 2019-01-03 | End: 2019-07-06 | Stop reason: SDUPTHER

## 2019-03-06 ENCOUNTER — TELEPHONE (OUTPATIENT)
Dept: FAMILY MEDICINE CLINIC | Facility: CLINIC | Age: 69
End: 2019-03-06

## 2019-03-06 NOTE — TELEPHONE ENCOUNTER
----- Message from Haley Winn sent at 3/6/2019  3:20 PM EST -----  Regarding: RE: Schedule  Scheduled awv for 8/14/19 w/ dr Grimes [facility]  ----- Message -----  From: Sabina Pedersen  Sent: 2/26/2019   6:43 PM  To: Juan Luis Dietz MA  Subject: Schedule                                         Please attempt to call and schedule pt for AWV, Last two visits have been pre-op visits

## 2019-05-07 ENCOUNTER — OFFICE VISIT (OUTPATIENT)
Dept: URGENT CARE | Facility: MEDICAL CENTER | Age: 69
End: 2019-05-07
Payer: MEDICARE

## 2019-05-07 VITALS
WEIGHT: 151 LBS | DIASTOLIC BLOOD PRESSURE: 75 MMHG | BODY MASS INDEX: 25.16 KG/M2 | RESPIRATION RATE: 18 BRPM | TEMPERATURE: 97.4 F | SYSTOLIC BLOOD PRESSURE: 140 MMHG | HEART RATE: 68 BPM | OXYGEN SATURATION: 98 % | HEIGHT: 65 IN

## 2019-05-07 DIAGNOSIS — B96.89 ACUTE BACTERIAL BRONCHITIS: Primary | ICD-10-CM

## 2019-05-07 DIAGNOSIS — J20.8 ACUTE BACTERIAL BRONCHITIS: Primary | ICD-10-CM

## 2019-05-07 PROCEDURE — G0463 HOSPITAL OUTPT CLINIC VISIT: HCPCS | Performed by: FAMILY MEDICINE

## 2019-05-07 PROCEDURE — 99214 OFFICE O/P EST MOD 30 MIN: CPT | Performed by: FAMILY MEDICINE

## 2019-05-07 RX ORDER — PROPRANOLOL/HYDROCHLOROTHIAZID 40 MG-25MG
1000 TABLET ORAL DAILY
COMMUNITY

## 2019-05-07 RX ORDER — BENZONATATE 100 MG/1
100 CAPSULE ORAL 3 TIMES DAILY PRN
Qty: 20 CAPSULE | Refills: 0 | Status: SHIPPED | OUTPATIENT
Start: 2019-05-07 | End: 2019-08-14 | Stop reason: ALTCHOICE

## 2019-05-07 RX ORDER — NIACIN 100 MG
100 TABLET ORAL
COMMUNITY
End: 2021-06-10

## 2019-05-07 RX ORDER — AZITHROMYCIN 250 MG/1
TABLET, FILM COATED ORAL
Qty: 6 TABLET | Refills: 0 | Status: SHIPPED | OUTPATIENT
Start: 2019-05-07 | End: 2019-05-11

## 2019-06-07 ENCOUNTER — TRANSCRIBE ORDERS (OUTPATIENT)
Dept: ADMINISTRATIVE | Facility: HOSPITAL | Age: 69
End: 2019-06-07

## 2019-06-07 ENCOUNTER — APPOINTMENT (OUTPATIENT)
Dept: LAB | Facility: MEDICAL CENTER | Age: 69
End: 2019-06-07
Payer: MEDICARE

## 2019-06-07 DIAGNOSIS — Z85.3 PERSONAL HISTORY OF MALIGNANT NEOPLASM OF BREAST: Primary | ICD-10-CM

## 2019-06-07 DIAGNOSIS — Z85.3 PERSONAL HISTORY OF MALIGNANT NEOPLASM OF BREAST: ICD-10-CM

## 2019-06-07 LAB
BUN SERPL-MCNC: 15 MG/DL (ref 5–25)
CREAT SERPL-MCNC: 0.85 MG/DL (ref 0.6–1.3)
GFR SERPL CREATININE-BSD FRML MDRD: 71 ML/MIN/1.73SQ M

## 2019-06-07 PROCEDURE — 36415 COLL VENOUS BLD VENIPUNCTURE: CPT

## 2019-06-07 PROCEDURE — 84520 ASSAY OF UREA NITROGEN: CPT

## 2019-06-07 PROCEDURE — 82565 ASSAY OF CREATININE: CPT

## 2019-06-23 DIAGNOSIS — Z85.3 PERSONAL HISTORY OF MALIGNANT NEOPLASM OF BREAST: ICD-10-CM

## 2019-07-06 DIAGNOSIS — I10 ESSENTIAL HYPERTENSION: ICD-10-CM

## 2019-07-07 RX ORDER — DILTIAZEM HYDROCHLORIDE 180 MG/1
CAPSULE, EXTENDED RELEASE ORAL
Qty: 90 CAPSULE | Refills: 0 | Status: SHIPPED | OUTPATIENT
Start: 2019-07-07 | End: 2019-08-14 | Stop reason: SDUPTHER

## 2019-08-14 ENCOUNTER — OFFICE VISIT (OUTPATIENT)
Dept: FAMILY MEDICINE CLINIC | Facility: CLINIC | Age: 69
End: 2019-08-14
Payer: MEDICARE

## 2019-08-14 VITALS
RESPIRATION RATE: 18 BRPM | WEIGHT: 156 LBS | OXYGEN SATURATION: 98 % | HEART RATE: 76 BPM | DIASTOLIC BLOOD PRESSURE: 88 MMHG | HEIGHT: 65 IN | SYSTOLIC BLOOD PRESSURE: 128 MMHG | BODY MASS INDEX: 25.99 KG/M2

## 2019-08-14 DIAGNOSIS — Z78.0 POST-MENOPAUSAL: ICD-10-CM

## 2019-08-14 DIAGNOSIS — G43.909 MIGRAINE WITHOUT STATUS MIGRAINOSUS, NOT INTRACTABLE, UNSPECIFIED MIGRAINE TYPE: ICD-10-CM

## 2019-08-14 DIAGNOSIS — M20.32 HALLUX HAMMERTOE, LEFT: ICD-10-CM

## 2019-08-14 DIAGNOSIS — I10 ESSENTIAL HYPERTENSION: ICD-10-CM

## 2019-08-14 DIAGNOSIS — Z12.11 ENCOUNTER FOR SCREENING COLONOSCOPY: ICD-10-CM

## 2019-08-14 DIAGNOSIS — C50.911 ADENOCARCINOMA OF RIGHT BREAST (HCC): ICD-10-CM

## 2019-08-14 DIAGNOSIS — E78.5 HYPERLIPIDEMIA, UNSPECIFIED HYPERLIPIDEMIA TYPE: ICD-10-CM

## 2019-08-14 DIAGNOSIS — E66.3 OVERWEIGHT WITH BODY MASS INDEX (BMI) 25.0-29.9: ICD-10-CM

## 2019-08-14 DIAGNOSIS — Z11.59 NEED FOR HEPATITIS C SCREENING TEST: ICD-10-CM

## 2019-08-14 DIAGNOSIS — M85.89 OSTEOPENIA OF MULTIPLE SITES: ICD-10-CM

## 2019-08-14 DIAGNOSIS — G60.9 IDIOPATHIC PERIPHERAL NEUROPATHY: ICD-10-CM

## 2019-08-14 DIAGNOSIS — K21.9 GASTROESOPHAGEAL REFLUX DISEASE WITHOUT ESOPHAGITIS: ICD-10-CM

## 2019-08-14 DIAGNOSIS — Z00.00 MEDICARE ANNUAL WELLNESS VISIT, SUBSEQUENT: Primary | ICD-10-CM

## 2019-08-14 DIAGNOSIS — C44.91 BASAL CELL CARCINOMA (BCC), UNSPECIFIED SITE: ICD-10-CM

## 2019-08-14 PROBLEM — Z01.818 PREOPERATIVE CLEARANCE: Status: RESOLVED | Noted: 2018-09-18 | Resolved: 2019-08-14

## 2019-08-14 PROBLEM — C44.90 MALIGNANT NEOPLASM OF SKIN: Status: ACTIVE | Noted: 2018-11-08

## 2019-08-14 PROCEDURE — 99214 OFFICE O/P EST MOD 30 MIN: CPT | Performed by: FAMILY MEDICINE

## 2019-08-14 PROCEDURE — G0439 PPPS, SUBSEQ VISIT: HCPCS | Performed by: FAMILY MEDICINE

## 2019-08-14 RX ORDER — DILTIAZEM HYDROCHLORIDE 180 MG/1
180 CAPSULE, COATED, EXTENDED RELEASE ORAL DAILY
Qty: 90 CAPSULE | Refills: 3 | Status: SHIPPED | OUTPATIENT
Start: 2019-08-14 | End: 2020-09-23

## 2019-08-14 RX ORDER — ASCORBIC ACID 250 MG
1000 TABLET,CHEWABLE ORAL DAILY
COMMUNITY

## 2019-08-14 NOTE — PROGRESS NOTES
Assessment and Plan:     Problem List Items Addressed This Visit        Other    Adenocarcinoma of breast [facility])     She follows with oncology           Other Visit Diagnoses     Medicare annual wellness visit, subsequent    -  Primary    Overweight with body mass index (BMI) 25 0-29 9        Need for hepatitis C screening test        Relevant Orders    Hepatitis C antibody    Encounter for screening colonoscopy        Relevant Orders    Ambulatory referral to Gastroenterology    Essential hypertension        Relevant Medications    diltiazem (CARTIA XT) 180 mg 24 hr capsule    Post-menopausal        Relevant Orders    DXA bone density spine hip and pelvis       The patient presents today for her Medicare wellness visit  Overall, she is doing quite well  She is up-to-date on follow-up for her history of breast cancer  She is due for colon cancer screening and will be referred to GI  She has no difficulty with her ADLs  She did have a recent fall when she tripped on a step but does not feel she is at risk for falls and in fact enjoys hiking has a past time  She is up-to-date on vaccines except for the Shingrix vaccine which is available at the pharmacy and is a vaccine for shingles       History of Present Illness:     Patient presents for Medicare Annual Wellness visit    Patient Care Team:  Martina Armendariz MD as PCP - General  DO Jed Sandy MD Hildred Pierre, DO     Problem List:     Patient Active Problem List   Diagnosis    Adenocarcinoma of breast ([facility] [address] )    Anxiety    Arthritis    Carcinoma, basal cell, skin    Esophageal reflux    Hyperlipidemia    Insomnia    Irritable bowel syndrome    Lumbar disc herniation    Migraine headache    Osteopenia    Preoperative clearance    Hallux hammertoe, left    Malignant neoplasm of skin      Past Medical and Surgical History:     Past Medical History:   Diagnosis Date    Arthritis     Breast cancer ([facility] [address] )     s/p Lumpectomy, last assessed 2012    Gastric ulcer     Heart murmur     Malignant neoplasm [facility])      Past Surgical History:   Procedure Laterality Date    ABDOMINOPLASTY      ADENOIDECTOMY      BREAST SURGERY      lumpectomy, resolved 2007     SECTION      CHOLECYSTECTOMY      COLON SURGERY      FOOT SURGERY      MOHS SURGERY      REDUCTION MAMMAPLASTY  2000    TOE SURGERY      left toe surgery    TONSILLECTOMY        Family History:     Family History   Problem Relation Age of Onset    Hypertension Mother     Melanoma Mother     Heart attack Father         acute MI, CABG    Hypertension Father     Other Brother         CABG    Lymphoma Brother         non-Hodgkin's    Hypertension Brother     Other Family         back disorder    Stroke Family         CVA    Diabetes Family     Cancer Family       Social History:     Social History     Tobacco Use   Smoking Status Former Smoker   Smokeless Tobacco Never Used   Tobacco Comment    Smoker in teenage years  Quit at 22  One pack per week       Social History     Substance and Sexual Activity   Alcohol Use Yes    Comment: occasional     Social History     Substance and Sexual Activity   Drug Use No      Medications and Allergies:     Current Outpatient Medications   Medication Sig Dispense Refill    Ascorbic Acid (VITAMIN C) 250 MG CHEW Chew daily      Cholecalciferol (VITAMIN D3) 5000 units CAPS Take 1 tablet by mouth daily      Collagen 500 MG CAPS Take by mouth daily      diltiazem (CARTIA XT) 180 mg 24 hr capsule Take 1 capsule (180 mg total) by mouth daily 90 capsule 3    melatonin 3 mg Take 1 tablet by mouth as needed        Misc Natural Products (GLUCOSAMINE CHONDROITIN ADV PO) Take 2 capsules by mouth daily       Multiple Vitamins-Minerals (MULTI COMPLETE) CAPS Take 1 capsule by mouth daily      niacin 100 mg tablet Take 100 mg by mouth daily with breakfast      Omega-3 1000 MG CAPS Take 1 capsule by mouth daily      omeprazole (PriLOSEC) 20 mg delayed release capsule Take 1 capsule by mouth daily as needed      Turmeric 500 MG CAPS Take 1,000 mg by mouth daily        No current facility-administered medications for this visit  Allergies   Allergen Reactions    Ciprofloxacin Anaphylaxis     Anaphylaxis    Amitriptyline GI Intolerance     Action Taken: bloating,GI problems;     Celecoxib     Other Other (See Comments)     "black olives->GI upset"    Wound Dressing Adhesive     Bactrim [Sulfamethoxazole-Trimethoprim] Rash    Sulfa Antibiotics Rash      Immunizations:     Immunization History   Administered Date(s) Administered    H1N1, All Formulations 10/26/2009    INFLUENZA 11/18/2005, 12/01/2006, 10/19/2007, 11/01/2015    Influenza Quadrivalent, 6-35 Months IM 11/01/2015    Influenza Split High Dose Preservative Free IM 10/14/2016, 10/09/2017    Influenza TIV (IM) 09/14/1997, 10/09/2013    Influenza, high dose seasonal 0 5 mL 09/18/2018    Pneumococcal Conjugate 13-Valent 11/16/2015    Pneumococcal Polysaccharide PPV23 12/19/2016    Tdap 11/16/2015, 07/04/2016, 04/03/2017    Zoster 01/06/2014      Medicare Screening Tests and Risk Assessments:     Adelina De La Torre is here for her Subsequent Wellness visit  Health Risk Assessment:  Patient rates overall health as excellent  Patient feels that their physical health rating is Same  Eyesight was rated as Same  Hearing was rated as Same  Patient feels that their emotional and mental health rating is Same  Pain experienced by patient in the last 7 days has been Some  Patient's pain rating has been 3/10  Emotional/Mental Health:  Patient has not been feeling nervous/anxious  PHQ-9 Depression Screening:    Frequency of the following problems over the past two weeks:      1  Little interest or pleasure in doing things: 0 - not at all      2  Feeling down, depressed, or hopeless: 0 - not at all  PHQ-2 Score: 0          Broken Bones/Falls:     Fall Risk Assessment:    In the past year, patient has experienced: History of falling in past year    Number of falls: 1    Injured during fall: No      Patient feels steady when standing or walking  Patient is not worried about falling  Bladder/Bowel:  Patient has not leaked urine accidently in the last six months  Patient reports no loss of bowel control  Immunizations:  Patient has had a flu vaccination within the last year  Patient has received a pneumonia shot  Patient has received a shingles shot  Patient has received tetanus/diphtheria shot  Date of tetanus/diphtheria shot: 4/3/2017    Home Safety:  Patient does not have trouble with stairs inside or outside of their home  Patient currently reports that there are no safety hazards present in home, working smoke alarms, working carbon monoxide detectors  Preventative Screenings:   Breast cancer screening performed, colon cancer screen completed, cholesterol screen completed, glaucoma eye exam completed,     Nutrition:  Current diet: Regular and Low Saturated Fat with servings of the following:    Medications:  Patient is currently taking over-the-counter supplements  Patient is able to manage medications  Lifestyle Choices:  Patient reports no tobacco use  Patient has smoked or used tobacco in the past   Patient has stopped her tobacco use  Tobacco use quit date: Quit 1977  Patient reports alcohol use  Patient drives a vehicle  Patient wears seat belt  Activities of Daily Living:  Can get out of bed by his or her self, able to dress self, able to make own meals, able to do own shopping, able to bathe self, can do own laundry/housekeeping, can manage own money, pay bills and track expenses    Previous Hospitalizations:  No hospitalization or ED visit in past 12 months        Advanced Directives:  Patient has decided on a power of   Patient has spoken to designated power of     Patient has not completed advanced directive  Preventative Screening/Counseling:      Cardiovascular:     Due for Labs/Analytes/Optional EKG: Lipid Panel          Diabetes:      General: Risks and Benefits Discussed      Due for labs: Blood Glucose          Colorectal Cancer:      General: Risks and Benefits Discussed      Due for studies: Colonoscopy - Low Risk          Breast Cancer:      General: Screening Current          Cervical Cancer:      General: Screening Not Indicated          Osteoporosis:      General: Risks and Benefits Discussed      Due for studies: DXA Appendicular          AAA:      General: Screening Not Indicated          Glaucoma:      General: Screening Current          HIV:      General: Screening Not Indicated          Hepatitis C:      General: Risks and Benefits Discussed      Counseling: has received general HCV counseling        Advanced Directives:   Patient has no living will for healthcare, patient does not have an advanced directive  Information on ACP and/or AD provided  No 5 wishes given  End of life assessment reviewed with patient  Additional Comments: Pt declines 5 wishes  Falls Plan of Care: Gait/balance/strength training instructions were given to patient   she had one trip and fell to her L knee

## 2019-08-14 NOTE — PATIENT INSTRUCTIONS
Obesity   AMBULATORY CARE:   Obesity  is when your body mass index (BMI) is greater than 30  Your healthcare provider will use your height and weight to measure your BMI  The risks of obesity include  many health problems, such as injuries or physical disability  You may need tests to check for the following:  · Diabetes     · High blood pressure or high cholesterol     · Heart disease     · Gallbladder or liver disease     · Cancer of the colon, breast, prostate, liver, or kidney     · Sleep apnea     · Arthritis or gout  Seek care immediately if:   · You have a severe headache, confusion, or difficulty speaking  · You have weakness on one side of your body  · You have chest pain, sweating, or shortness of breath  Contact your healthcare provider if:   · You have symptoms of gallbladder or liver disease, such as pain in your upper abdomen  · You have knee or hip pain and discomfort while walking  · You have symptoms of diabetes, such as intense hunger and thirst, and frequent urination  · You have symptoms of sleep apnea, such as snoring or daytime sleepiness  · You have questions or concerns about your condition or care  Treatment for obesity  focuses on helping you lose weight to improve your health  Even a small decrease in BMI can reduce the risk for many health problems  Your healthcare provider will help you set a weight-loss goal   · Lifestyle changes  are the first step in treating obesity  These include making healthy food choices and getting regular physical activity  Your healthcare provider may suggest a weight-loss program that involves coaching, education, and therapy  · Medicine  may help you lose weight when it is used with a healthy diet and physical activity  · Surgery  can help you lose weight if you are very obese and have other health problems  There are several types of weight-loss surgery  Ask your healthcare provider for more information    Be successful losing weight:   · Set small, realistic goals  An example of a small goal is to walk for 20 minutes 5 days a week  Anther goal is to lose 5% of your body weight  · Tell friends, family members, and coworkers about your goals  and ask for their support  Ask a friend to lose weight with you, or join a weight-loss support group  · Identify foods or triggers that may cause you to overeat , and find ways to avoid them  Remove tempting high-calorie foods from your home and workplace  Place a bowl of fresh fruit on your kitchen counter  If stress causes you to eat, then find other ways to cope with stress  · Keep a diary to track what you eat and drink  Also write down how many minutes of physical activity you do each day  Weigh yourself once a week and record it in your diary  Eating changes: You will need to eat 500 to 1,000 fewer calories each day than you currently eat to lose 1 to 2 pounds a week  The following changes will help you cut calories:  · Eat smaller portions  Use small plates, no larger than 9 inches in diameter  Fill your plate half full of fruits and vegetables  Measure your food using measuring cups until you know what a serving size looks like  · Eat 3 meals and 1 or 2 snacks each day  Plan your meals in advance  William Lard and eat at home most of the time  Eat slowly  · Eat fruits and vegetables at every meal   They are low in calories and high in fiber, which makes you feel full  Do not add butter, margarine, or cream sauce to vegetables  Use herbs to season steamed vegetables  · Eat less fat and fewer fried foods  Eat more baked or grilled chicken and fish  These protein sources are lower in calories and fat than red meat  Limit fast food  Dress your salads with olive oil and vinegar instead of bottled dressing  · Limit the amount of sugar you eat  Do not drink sugary beverages  Limit alcohol  Activity changes:  Physical activity is good for your body in many ways   It helps you burn calories and build strong muscles  It decreases stress and depression, and improves your mood  It can also help you sleep better  Talk to your healthcare provider before you begin an exercise program   · Exercise for at least 30 minutes 5 days a week  Start slowly  Set aside time each day for physical activity that you enjoy and that is convenient for you  It is best to do both weight training and an activity that increases your heart rate, such as walking, bicycling, or swimming  · Find ways to be more active  Do yard work and housecleaning  Walk up the stairs instead of using elevators  Spend your leisure time going to events that require walking, such as outdoor festivals or fairs  This extra physical activity can help you lose weight and keep it off  Follow up with your healthcare provider as directed: You may need to meet with a dietitian  Write down your questions so you remember to ask them during your visits  © 2017 [address] [employer] Information is for End User's use only and may not be sold, redistributed or otherwise used for commercial purposes  All illustrations and images included in CareNotes® are the copyrighted property of [employer]  or [employer]  The above information is an  only  It is not intended as medical advice for individual conditions or treatments  Talk to your doctor, nurse or pharmacist before following any medical regimen to see if it is safe and effective for you  Urinary Incontinence   WHAT YOU NEED TO KNOW:   What is urinary incontinence? Urinary incontinence (UI) is when you lose control of your bladder  What causes UI? UI occurs because your bladder cannot store or empty urine properly  The following are the most common types of UI:  · Stress incontinence  is when you leak urine due to increased bladder pressure  This may happen when you cough, sneeze, or exercise       · Urge incontinence  is when you feel the need to urinate right away and leak urine accidentally  · Mixed incontinence  is when you have both stress and urge UI  What are the signs and symptoms of UI?   · You feel like your bladder does not empty completely when you urinate  · You urinate often and need to urinate immediately  · You leak urine when you sleep, or you wake up with the urge to urinate  · You leak urine when you cough, sneeze, exercise, or laugh  How is UI diagnosed? Your healthcare provider will ask how often you leak urine and whether you have stress or urge symptoms  Tell him which medicines you take, how often you urinate, and how much liquid you drink each day  You may need any of the following tests:  · Urine tests  may show infection or kidney function  · A pelvic exam  may be done to check for blockages  A pelvic exam will also show if your bladder, uterus, or other organs have moved out of place  · An x-ray, ultrasound, or CT  may show problems with parts of your urinary system  You may be given contrast liquid to help your organs show up better in the pictures  Tell the healthcare provider if you have ever had an allergic reaction to contrast liquid  Do not enter the MRI room with anything metal  Metal can cause serious injury  Tell the healthcare provider if you have any metal in or on your body  · A bladder scan  will show how much urine is left in your bladder after you urinate  You will be asked to urinate and then healthcare providers will use a small ultrasound machine to check the urine left in your bladder  · Cystometry  is used to check the function of your urinary system  Your healthcare provider checks the pressure in your bladder while filling it with fluid  Your bladder pressure may also be tested when your bladder is full and while you urinate  How is UI treated? · Medicines  can help strengthen your bladder control      · Electrical stimulation  is used to send a small amount of electrical energy to your pelvic floor muscles  This helps control your bladder function  Electrodes may be placed outside your body or in your rectum  For women, the electrodes may be placed in the vagina  · A bulking agent  may be injected into the wall of your urethra to make it thicker  This helps keep your urethra closed and decreases urine leakage  · Devices  such as a clamp, pessary, or tampon may help stop urine leaks  Ask your healthcare provider for more information about these and other devices  · Surgery  may be needed if other treatments do not work  Several types of surgery can help improve your bladder control  Ask your healthcare provider for more information about the surgery you may need  How can I manage my symptoms? · Do pelvic muscle exercises often  Your pelvic muscles help you stop urinating  Squeeze these muscles tight for 5 seconds, then relax for 5 seconds  Gradually work up to squeezing for 10 seconds  Do 3 sets of 15 repetitions a day, or as directed  This will help strengthen your pelvic muscles and improve bladder control  · A catheter  may be used to help empty your bladder  A catheter is a tiny, plastic tube that is put into your bladder to drain your urine  Your healthcare provider may tell you to use a catheter to prevent your bladder from getting too full and leaking urine  · Keep a UI record  Write down how often you leak urine and how much you leak  Make a note of what you were doing when you leaked urine  · Train your bladder  Go to the bathroom at set times, such as every 2 hours, even if you do not feel the urge to go  You can also try to hold your urine when you feel the urge to go  For example, hold your urine for 5 minutes when you feel the urge to go  As that becomes easier, hold your urine for 10 minutes  · Drink liquids as directed  Ask your healthcare provider how much liquid to drink each day and which liquids are best for you   You may need to limit the amount of liquid you drink to help control your urine leakage  Limit or do not have drinks that contain caffeine or alcohol  Do not drink any liquid right before you go to bed  · Prevent constipation  Eat a variety of high-fiber foods  Good examples are high-fiber cereals, beans, vegetables, and whole-grain breads  Prune juice may help make your bowel movement softer  Walking is the best way to trigger your intestines to have a bowel movement  · Exercise regularly and maintain a healthy weight  Ask your healthcare provider how much you should weigh and about the best exercise plan for you  Weight loss and exercise will decrease pressure on your bladder and help you control your leakage  Ask him to help you create a weight loss plan if you are overweight  When should I seek immediate care? · You have severe pain  · You are confused or cannot think clearly  When should I contact my healthcare provider? · You have a fever  · You see blood in your urine  · You have pain when you urinate  · You have new or worse pain, even after treatment  · Your mouth feels dry or you have vision changes  · Your urine is cloudy or smells bad  · You have questions or concerns about your condition or care  CARE AGREEMENT:   You have the right to help plan your care  Learn about your health condition and how it may be treated  Discuss treatment options with your caregivers to decide what care you want to receive  You always have the right to refuse treatment  The above information is an  only  It is not intended as medical advice for individual conditions or treatments  Talk to your doctor, nurse or pharmacist before following any medical regimen to see if it is safe and effective for you  © 2017 [address] [employer] Information is for End User's use only and may not be sold, redistributed or otherwise used for commercial purposes   All illustrations and images included in CareNotes® are the copyrighted property of [employer] Inc  or [employer]  Cigarette Smoking and Your Health   AMBULATORY CARE:   Risks to your health if you smoke:  Nicotine and other chemicals found in tobacco damage every cell in your body  Even if you are a light smoker, you have an increased risk for cancer, heart disease, and lung disease  If you are pregnant or have diabetes, smoking increases your risk for complications  Benefits to your health if you stop smoking:   · You decrease respiratory symptoms such as coughing, wheezing, and shortness of breath  · You reduce your risk for cancers of the lung, mouth, throat, kidney, bladder, pancreas, stomach, and cervix  If you already have cancer, you increase the benefits of chemotherapy  You also reduce your risk for cancer returning or a second cancer from developing  · You reduce your risk for heart disease, blood clots, heart attack, and stroke  · You reduce your risk for lung infections, and diseases such as pneumonia, asthma, chronic bronchitis, and emphysema  · Your circulation improves  More oxygen can be delivered to your body  If you have diabetes, you lower your risk for complications, such as kidney, artery, and eye diseases  You also lower your risk for nerve damage  Nerve damage can lead to amputations, poor vision, and blindness  · You improve your body's ability to heal and to fight infections  Benefits to the health of others if you stop smoking:  Tobacco is harmful to nonsmokers who breathe in your secondhand smoke  The following are ways the health of others around you may improve when you stop smoking:  · You lower the risks for lung cancer and heart disease in nonsmoking adults  · If you are pregnant, you lower the risk for miscarriage, early delivery, low birth weight, and stillbirth  You also lower your baby's risk for SIDS, obesity, developmental delay, and neurobehavioral problems, such as ADHD  · If you have children, you lower their risk for ear infections, colds, pneumonia, bronchitis, and asthma  For more information and support to stop smoking:   · Smokefree  gov  Phone: 5- 176 - 627-1854  Web Address: www smokefree  gov  Follow up with your healthcare provider as directed:  Write down your questions so you remember to ask them during your visits  © 2017 [address] [employer] Information is for End User's use only and may not be sold, redistributed or otherwise used for commercial purposes  All illustrations and images included in CareNotes® are the copyrighted property of [employer]  or [employer]  The above information is an  only  It is not intended as medical advice for individual conditions or treatments  Talk to your doctor, nurse or pharmacist before following any medical regimen to see if it is safe and effective for you  Fall Prevention   AMBULATORY CARE:   Fall prevention  includes ways to make your home and other areas safer  It also includes ways you can move more carefully to prevent a fall  Health conditions that cause changes in your blood pressure, vision, or muscle strength and coordination may increase your risk for falls  Medicines may also increase your risk for falls if they make you dizzy, weak, or sleepy  Call 911 or have someone else call if:   · You have fallen and are unconscious  · You have fallen and cannot move part of your body  Contact your healthcare provider if:   · You have fallen and have pain or a headache  · You have questions or concerns about your condition or care  Fall prevention tips:   · Stand or sit up slowly  This may help you keep your balance and prevent falls  · Use assistive devices as directed  Your healthcare provider may suggest that you use a cane or walker to help you keep your balance  You may need to have grab bars put in your bathroom near the toilet or in the shower      · Wear shoes that fit well and have soles that   Wear shoes both inside and outside  Use slippers with good   Do not wear shoes with high heels  · Wear a personal alarm  This is a device that allows you to call 911 if you fall and need help  Ask your healthcare provider for more information  · Stay active  Exercise can help strengthen your muscles and improve your balance  Your healthcare provider may recommend water aerobics or walking  He or she may also recommend physical therapy to improve your coordination  Never start an exercise program without talking to your healthcare provider first      · Manage your medical conditions  Keep all appointments with your healthcare providers  Visit your eye doctor as directed  Home safety tips:   · Add items to prevent falls in the bathroom  Put nonslip strips on your bath or shower floor to prevent you from slipping  Use a bath mat if you do not have carpet in the bathroom  This will prevent you from falling when you step out of the bath or shower  Use a shower seat so you do not need to stand while you shower  Sit on the toilet or a chair in your bathroom to dry yourself and put on clothing  This will prevent you from losing your balance from drying or dressing yourself while you are standing  · Keep paths clear  Remove books, shoes, and other objects from walkways and stairs  Place cords for telephones and lamps out of the way so that you do not need to walk over them  Tape them down if you cannot move them  Remove small rugs  If you cannot remove a rug, secure it with double-sided tape  This will prevent you from tripping  · Install bright lights in your home  Use night lights to help light paths to the bathroom or kitchen  Always turn on the light before you start walking  · Keep items you use often on shelves within reach  Do not use a step stool to help you reach an item  · Paint or place reflective tape on the edges of your stairs    This will help you see the stairs better  Follow up with your healthcare provider as directed:  Write down your questions so you remember to ask them during your visits  © 2017 [address] [employer] Information is for End User's use only and may not be sold, redistributed or otherwise used for commercial purposes  All illustrations and images included in CareNotes® are the copyrighted property of [employer]  or [employer]  The above information is an  only  It is not intended as medical advice for individual conditions or treatments  Talk to your doctor, nurse or pharmacist before following any medical regimen to see if it is safe and effective for you  Advance Directives   WHAT YOU NEED TO KNOW:   What are advance directives? Advance directives are legal documents that state your wishes and plans for medical care  These plans are made ahead of time in case you lose your ability to make decisions for yourself  Advance directives can apply to any medical decision, such as the treatments you want, and if you want to donate organs  What are the types of advance directives? There are many types of advance directives, and each state has rules about how to use them  You may choose a combination of any of the following:  · Living will: This is a written record of the treatment you want  You can also choose which treatments you do not want, which to limit, and which to stop at a certain time  This includes surgery, medicine, IV fluid, and tube feedings  · Durable power of  for healthcare [facility]): This is a written record that states who you want to make healthcare choices for you when you are unable to make them for yourself  This person, called a proxy, is usually a family member or a friend  You may choose more than 1 proxy  · Do not resuscitate (DNR) order:  A DNR order is used in case your heart stops beating or you stop breathing   It is a request not to have certain forms of treatment, such as CPR  A DNR order may be included in other types of advance directives  · Medical directive: This covers the care that you want if you are in a coma, near death, or unable to make decisions for yourself  You can list the treatments you want for each condition  Treatment may include pain medicine, surgery, blood transfusions, dialysis, IV or tube feedings, and a ventilator (breathing machine)  · Values history: This document has questions about your views, beliefs, and how you feel and think about life  This information can help others choose the care that you would choose  Why are advance directives important? An advance directive helps you control your care  Although spoken wishes may be used, it is better to have your wishes written down  Spoken wishes can be misunderstood, or not followed  Treatments may be given even if you do not want them  An advance directive may make it easier for your family to make difficult choices about your care  How do I decide what to put in my advance directives? · Make informed decisions:  Make sure you fully understand treatments or care you may receive  Think about the benefits and problems your decisions could cause for you or your family  Talk to healthcare providers if you have concerns or questions before you write down your wishes  You may also want to talk with your Lutheran or , or a   Check your state laws to make sure that what you put in your advance directive is legal      · Sign all forms:  Sign and date your advance directive when you have finished  You may also need 2 witnesses to sign the forms  Witnesses cannot be your doctor or his staff, your spouse, heirs or beneficiaries, people you owe money to, or your chosen proxy  Talk to your family, proxy, and healthcare providers about your advance directive  Give each person a copy, and keep one for yourself in a place you can get to easily   Do not keep it hidden or locked away  · Review and revise your plans: You can revise your advance directive at any time, as long as you are able to make decisions  Review your plan every year, and when there are changes in your life, or your health  When you make changes, let your family, proxy, and healthcare providers know  Give each a new copy  Where can I find more information? · [employer]  [address] , [address]  Phone: 6- 740 - [phone]  Phone: 8- 220 - [phone]  Web Address: http://www  aafp org  · [address] [facility])  [address] [address] [facility], [address] [facility] , [address] [place] [address]  Phone: 6- [phone] - [phone]  Phone: [phone]  Web Address: Elsa foster  CARE AGREEMENT:   You have the right to help plan your care  To help with this plan, you must learn about your health condition and treatment options  You must also learn about advance directives and how they are used  Work with your healthcare providers to decide what care will be used to treat you  You always have the right to refuse treatment  The above information is an  only  It is not intended as medical advice for individual conditions or treatments  Talk to your doctor, nurse or pharmacist before following any medical regimen to see if it is safe and effective for you  © 2017 [address] Information is for End User's use only and may not be sold, redistributed or otherwise used for commercial purposes  All illustrations and images included in CareNotes® are the copyrighted property of [employer]  or [employer]

## 2019-08-14 NOTE — PROGRESS NOTES
Assessment/Plan:       Problem List Items Addressed This Visit        Digestive    Esophageal reflux     Stable with just occasional Prilosec  Relevant Orders    Magnesium       Cardiovascular and Mediastinum    Migraine headache    Relevant Medications    diltiazem (CARTIA XT) 180 mg 24 hr capsule       Nervous and Auditory    Idiopathic peripheral neuropathy     Check B12         Relevant Orders    Vitamin B12       Musculoskeletal and Integument    Carcinoma, basal cell, skin     Sees Derm annually  Osteopenia    RESOLVED: Hallux hammertoe, left     S/p joint replacement L 1st toe 11/18            Other    Adenocarcinoma of breast ([facility] [address] )     She follows with oncology         Hyperlipidemia    Relevant Orders    Comprehensive metabolic panel    Lipid Panel with Direct LDL reflex      Other Visit Diagnoses     Medicare annual wellness visit, subsequent    -  Primary    Overweight with body mass index (BMI) 25 0-29 9        Need for hepatitis C screening test        Relevant Orders    Hepatitis C antibody    Encounter for screening colonoscopy        Relevant Orders    Ambulatory referral to Gastroenterology    Essential hypertension        Relevant Medications    diltiazem (CARTIA XT) 180 mg 24 hr capsule    Other Relevant Orders    CBC and differential    Comprehensive metabolic panel    Post-menopausal        Relevant Orders    DXA bone density spine hip and pelvis          BMI Counseling: Body mass index is 25 96 kg/m²  Discussed the patient's BMI with her  The BMI is above average  BMI counseling and education was provided to the patient  Nutrition recommendations include reducing portion sizes  Exercise recommendations include moderate aerobic physical activity for 150 minutes/week  Subjective:      Patient ID: Phillip Hill is a 77 y o  female  HPI patient presents today for follow-up for chronic health issues    She is due for colonoscopy and she does have a history of intermittent reflux  She has noted intermittent sensation of a bubble at her stomach when she attempts to swallow  She will note that she has to regurgitate air  She denies food ever getting stuck but does continue to have some intermittent heartburn  She takes omeprazole just a few times a week at the most   She has a history of chronic migraines and has been on long-term diltiazem  She notes this has controlled her headaches very significantly for many years, so I am hesitant to stop this for her history of reflux  She has insomnia which is currently controlled with melatonin  She denies depression or anxiety  She has a history of breast cancer and continues to follow closely with Oncology as well as her gynecologist   She has a history of osteopenia and is due for DEXA scan  She remains very active with hiking and has had no cardiovascular limitations  She complains of occasional tingling over her body is if there is a insight climbing on her  This is very transient but is diffuse  She has a history of chemotherapy treatment but no known peripheral neuropathy at this time      The following portions of the patient's history were reviewed and updated as appropriate: allergies, current medications, past family history, past medical history, past social history, past surgical history and problem list       Current Outpatient Medications:     Ascorbic Acid (VITAMIN C) 250 MG CHEW, Chew daily, Disp: , Rfl:     Cholecalciferol (VITAMIN D3) 5000 units CAPS, Take 1 tablet by mouth daily, Disp: , Rfl:     Collagen 500 MG CAPS, Take by mouth daily, Disp: , Rfl:     diltiazem (CARTIA XT) 180 mg 24 hr capsule, Take 1 capsule (180 mg total) by mouth daily, Disp: 90 capsule, Rfl: 3    melatonin 3 mg, Take 1 tablet by mouth as needed  , Disp: , Rfl:     Misc Natural Products (GLUCOSAMINE CHONDROITIN ADV PO), Take 2 capsules by mouth daily , Disp: , Rfl:     Multiple Vitamins-Minerals (MULTI COMPLETE) CAPS, Take 1 capsule by mouth daily, Disp: , Rfl:     niacin 100 mg tablet, Take 100 mg by mouth daily with breakfast, Disp: , Rfl:     Omega-3 1000 MG CAPS, Take 1 capsule by mouth daily, Disp: , Rfl:     omeprazole (PriLOSEC) 20 mg delayed release capsule, Take 1 capsule by mouth daily as needed, Disp: , Rfl:     Turmeric 500 MG CAPS, Take 1,000 mg by mouth daily , Disp: , Rfl:      Review of Systems   Constitutional: Negative for appetite change, chills, fatigue, fever and unexpected weight change  HENT: Negative for trouble swallowing  Eyes: Negative for visual disturbance  Respiratory: Negative for cough, chest tightness, shortness of breath and wheezing  Cardiovascular: Negative for chest pain  Gastrointestinal: Negative for abdominal distention, abdominal pain, blood in stool, constipation and diarrhea  Endocrine: Negative for polyuria  Genitourinary: Negative for difficulty urinating and flank pain  Musculoskeletal: Negative for arthralgias and myalgias  Skin: Negative for rash  Neurological: Negative for dizziness and light-headedness  Hematological: Negative for adenopathy  Does not bruise/bleed easily  Psychiatric/Behavioral: Negative for sleep disturbance  Objective:      /88   Pulse 76   Resp 18   Ht 5' 5" (1 651 m)   Wt 70 8 kg (156 lb)   SpO2 98%   BMI 25 96 kg/m²          Physical Exam   Constitutional: She is oriented to person, place, and time  She appears well-developed and well-nourished  No distress  HENT:   Head: Normocephalic  Eyes: Pupils are equal, round, and reactive to light  Right eye exhibits no discharge  Left eye exhibits no discharge  Neck: No tracheal deviation present  No thyromegaly present  Cardiovascular: Normal rate, regular rhythm and normal heart sounds  No murmur heard  Pulmonary/Chest: Effort normal  No respiratory distress  She has no wheezes  She has no rales  Abdominal: Soft  She exhibits no distension  There is no tenderness  Musculoskeletal: Normal range of motion  She exhibits no edema  Lymphadenopathy:     She has no cervical adenopathy  Neurological: She is alert and oriented to person, place, and time  No cranial nerve deficit  Skin: Skin is warm  She is not diaphoretic  No erythema  Psychiatric: She has a normal mood and affect   Judgment and thought content normal          Marilee Salazar MD

## 2019-09-12 ENCOUNTER — APPOINTMENT (OUTPATIENT)
Dept: LAB | Facility: MEDICAL CENTER | Age: 69
End: 2019-09-12
Payer: MEDICARE

## 2019-09-12 DIAGNOSIS — G60.9 IDIOPATHIC PERIPHERAL NEUROPATHY: ICD-10-CM

## 2019-09-12 DIAGNOSIS — K21.9 GASTROESOPHAGEAL REFLUX DISEASE WITHOUT ESOPHAGITIS: ICD-10-CM

## 2019-09-12 DIAGNOSIS — I10 ESSENTIAL HYPERTENSION: ICD-10-CM

## 2019-09-12 DIAGNOSIS — Z11.59 NEED FOR HEPATITIS C SCREENING TEST: ICD-10-CM

## 2019-09-12 DIAGNOSIS — C50.911 ADENOCARCINOMA OF RIGHT BREAST (HCC): ICD-10-CM

## 2019-09-12 DIAGNOSIS — E78.5 HYPERLIPIDEMIA, UNSPECIFIED HYPERLIPIDEMIA TYPE: ICD-10-CM

## 2019-09-12 LAB
ALBUMIN SERPL BCP-MCNC: 4 G/DL (ref 3.5–5)
ALP SERPL-CCNC: 91 U/L (ref 46–116)
ALT SERPL W P-5'-P-CCNC: 23 U/L (ref 12–78)
ANION GAP SERPL CALCULATED.3IONS-SCNC: 3 MMOL/L (ref 4–13)
AST SERPL W P-5'-P-CCNC: 17 U/L (ref 5–45)
BASOPHILS # BLD AUTO: 0.07 THOUSANDS/ΜL (ref 0–0.1)
BASOPHILS NFR BLD AUTO: 2 % (ref 0–1)
BILIRUB SERPL-MCNC: 0.84 MG/DL (ref 0.2–1)
BUN SERPL-MCNC: 11 MG/DL (ref 5–25)
CALCIUM SERPL-MCNC: 9.1 MG/DL (ref 8.3–10.1)
CANCER AG27-29 SERPL-ACNC: 14.7 U/ML (ref 0–42.3)
CHLORIDE SERPL-SCNC: 106 MMOL/L (ref 100–108)
CHOLEST SERPL-MCNC: 215 MG/DL (ref 50–200)
CO2 SERPL-SCNC: 30 MMOL/L (ref 21–32)
CREAT SERPL-MCNC: 0.87 MG/DL (ref 0.6–1.3)
EOSINOPHIL # BLD AUTO: 0.1 THOUSAND/ΜL (ref 0–0.61)
EOSINOPHIL NFR BLD AUTO: 3 % (ref 0–6)
ERYTHROCYTE [DISTWIDTH] IN BLOOD BY AUTOMATED COUNT: 13.2 % (ref 11.6–15.1)
GFR SERPL CREATININE-BSD FRML MDRD: 69 ML/MIN/1.73SQ M
GLUCOSE P FAST SERPL-MCNC: 94 MG/DL (ref 65–99)
HCT VFR BLD AUTO: 42 % (ref 34.8–46.1)
HCV AB SER QL: NORMAL
HDLC SERPL-MCNC: 51 MG/DL (ref 40–60)
HGB BLD-MCNC: 13.2 G/DL (ref 11.5–15.4)
IMM GRANULOCYTES # BLD AUTO: 0.01 THOUSAND/UL (ref 0–0.2)
IMM GRANULOCYTES NFR BLD AUTO: 0 % (ref 0–2)
LDLC SERPL CALC-MCNC: 144 MG/DL (ref 0–100)
LYMPHOCYTES # BLD AUTO: 1.18 THOUSANDS/ΜL (ref 0.6–4.47)
LYMPHOCYTES NFR BLD AUTO: 34 % (ref 14–44)
MAGNESIUM SERPL-MCNC: 2.4 MG/DL (ref 1.6–2.6)
MCH RBC QN AUTO: 28.2 PG (ref 26.8–34.3)
MCHC RBC AUTO-ENTMCNC: 31.4 G/DL (ref 31.4–37.4)
MCV RBC AUTO: 90 FL (ref 82–98)
MONOCYTES # BLD AUTO: 0.38 THOUSAND/ΜL (ref 0.17–1.22)
MONOCYTES NFR BLD AUTO: 11 % (ref 4–12)
NEUTROPHILS # BLD AUTO: 1.71 THOUSANDS/ΜL (ref 1.85–7.62)
NEUTS SEG NFR BLD AUTO: 50 % (ref 43–75)
NRBC BLD AUTO-RTO: 0 /100 WBCS
PLATELET # BLD AUTO: 267 THOUSANDS/UL (ref 149–390)
PMV BLD AUTO: 10.4 FL (ref 8.9–12.7)
POTASSIUM SERPL-SCNC: 3.8 MMOL/L (ref 3.5–5.3)
PROT SERPL-MCNC: 7 G/DL (ref 6.4–8.2)
RBC # BLD AUTO: 4.68 MILLION/UL (ref 3.81–5.12)
SODIUM SERPL-SCNC: 139 MMOL/L (ref 136–145)
TRIGL SERPL-MCNC: 99 MG/DL
VIT B12 SERPL-MCNC: 709 PG/ML (ref 100–900)
WBC # BLD AUTO: 3.45 THOUSAND/UL (ref 4.31–10.16)

## 2019-09-12 PROCEDURE — 80061 LIPID PANEL: CPT

## 2019-09-12 PROCEDURE — 85025 COMPLETE CBC W/AUTO DIFF WBC: CPT

## 2019-09-12 PROCEDURE — 86300 IMMUNOASSAY TUMOR CA 15-3: CPT

## 2019-09-12 PROCEDURE — 86803 HEPATITIS C AB TEST: CPT

## 2019-09-12 PROCEDURE — 83735 ASSAY OF MAGNESIUM: CPT

## 2019-09-12 PROCEDURE — 36415 COLL VENOUS BLD VENIPUNCTURE: CPT

## 2019-09-12 PROCEDURE — 80053 COMPREHEN METABOLIC PANEL: CPT

## 2019-09-12 PROCEDURE — 82607 VITAMIN B-12: CPT

## 2019-09-27 ENCOUNTER — TELEPHONE (OUTPATIENT)
Dept: HEMATOLOGY ONCOLOGY | Facility: CLINIC | Age: 69
End: 2019-09-27

## 2019-09-27 ENCOUNTER — IMMUNIZATIONS (OUTPATIENT)
Dept: FAMILY MEDICINE CLINIC | Facility: CLINIC | Age: 69
End: 2019-09-27
Payer: MEDICARE

## 2019-09-27 DIAGNOSIS — Z23 NEED FOR INFLUENZA VACCINATION: Primary | ICD-10-CM

## 2019-09-27 PROCEDURE — 90662 IIV NO PRSV INCREASED AG IM: CPT

## 2019-09-27 PROCEDURE — G0008 ADMIN INFLUENZA VIRUS VAC: HCPCS

## 2019-09-27 NOTE — TELEPHONE ENCOUNTER
MARITZAM informing her that her appt on 10/2/19 will be with DILLON Fraser instead of Dr Jinny Jarquin  Instructed patient to call the office to R/S if the appt change does not work for her

## 2019-09-30 ENCOUNTER — TELEPHONE (OUTPATIENT)
Dept: HEMATOLOGY ONCOLOGY | Facility: CLINIC | Age: 69
End: 2019-09-30

## 2019-09-30 NOTE — TELEPHONE ENCOUNTER
Patient called answering service to R/S her appt  Called patient today and was able to R/S her appt to 11/4/19 at 11:20 am with Dr Nelsy Avendano  Patient is informed of appt date time and location

## 2019-11-04 ENCOUNTER — OFFICE VISIT (OUTPATIENT)
Dept: HEMATOLOGY ONCOLOGY | Facility: CLINIC | Age: 69
End: 2019-11-04
Payer: MEDICARE

## 2019-11-04 VITALS
TEMPERATURE: 97.9 F | SYSTOLIC BLOOD PRESSURE: 124 MMHG | HEART RATE: 71 BPM | BODY MASS INDEX: 26.2 KG/M2 | DIASTOLIC BLOOD PRESSURE: 84 MMHG | WEIGHT: 163 LBS | RESPIRATION RATE: 16 BRPM | HEIGHT: 66 IN | OXYGEN SATURATION: 99 %

## 2019-11-04 DIAGNOSIS — D70.9 NEUTROPENIA, UNSPECIFIED TYPE (HCC): ICD-10-CM

## 2019-11-04 DIAGNOSIS — C44.91 BASAL CELL CARCINOMA (BCC), UNSPECIFIED SITE: ICD-10-CM

## 2019-11-04 DIAGNOSIS — M19.90 ARTHRITIS: ICD-10-CM

## 2019-11-04 DIAGNOSIS — C50.911 ADENOCARCINOMA OF RIGHT BREAST (HCC): Primary | ICD-10-CM

## 2019-11-04 PROCEDURE — 99214 OFFICE O/P EST MOD 30 MIN: CPT | Performed by: INTERNAL MEDICINE

## 2019-11-04 NOTE — PROGRESS NOTES
HPI:  Follow-up visit for hormone receptor positive, HER2 negative, stage I cancer in right breast in 2007 and patient had lumpectomy, sentinel lymph node sampling and partial radiation therapy  Oncotype score was 20  Patient  did not want  to have chemotherapy  She decided to have hormonal therapy only  She was postmenopausal at that time  She was tried on Femara first but that did not agree with her  Treatment was changed to Aromasin and that did not agree with her either  At that point she decided not to have any hormonal therapy, not even tamoxifen     She is being followed    No symptoms at present          Current Outpatient Medications:     Ascorbic Acid (VITAMIN C) 250 MG CHEW, Chew daily, Disp: , Rfl:     Cholecalciferol (VITAMIN D3) 5000 units CAPS, Take 1 tablet by mouth daily, Disp: , Rfl:     Collagen 500 MG CAPS, Take by mouth daily, Disp: , Rfl:     diltiazem (CARTIA XT) 180 mg 24 hr capsule, Take 1 capsule (180 mg total) by mouth daily, Disp: 90 capsule, Rfl: 3    melatonin 3 mg, Take 1 tablet by mouth as needed  , Disp: , Rfl:     Misc Natural Products (GLUCOSAMINE CHONDROITIN ADV PO), Take 2 capsules by mouth daily , Disp: , Rfl:     Multiple Vitamins-Minerals (MULTI COMPLETE) CAPS, Take 1 capsule by mouth daily, Disp: , Rfl:     niacin 100 mg tablet, Take 100 mg by mouth daily with breakfast, Disp: , Rfl:     Omega-3 1000 MG CAPS, Take 1 capsule by mouth daily, Disp: , Rfl:     omeprazole (PriLOSEC) 20 mg delayed release capsule, Take 1 capsule by mouth daily as needed, Disp: , Rfl:     Turmeric 500 MG CAPS, Take 1,000 mg by mouth daily , Disp: , Rfl:     Allergies   Allergen Reactions    Ciprofloxacin Anaphylaxis     Anaphylaxis    Amitriptyline GI Intolerance     Action Taken: bloating,GI problems;     Celecoxib     Other Other (See Comments)     "black olives->GI upset"    Wound Dressing Adhesive     Bactrim [Sulfamethoxazole-Trimethoprim] Rash    Sulfa Antibiotics Rash No history exists  ROS:  11/04/19 Reviewed 13 systems:  Presently no neurological, cardiac, pulmonary, GI and  symptoms  No other symptoms like   fever, chills, bleeding, bone pains, skin rash, weight loss, arthritic symptoms, tiredness ,  weakness, numbness,  claudication and gait problem  No frequent infections  Not unusually sensitive to heat or cold  No swelling of the ankles  No swollen glands  Patient is not anxious  Other symptoms are in HPI        /84 (BP Location: Right arm, Patient Position: Sitting, Cuff Size: Adult)   Pulse 71   Temp 97 9 °F (36 6 °C)   Resp 16   Ht 5' 5 5" (1 664 m)   Wt 73 9 kg (163 lb)   SpO2 99%   BMI 26 71 kg/m²     Physical Exam:  Alert, oriented, not in distress, no icterus, no oral thrush, no palpable neck mass, clear lung fields, regular heart rate, abdomen  soft and non tender, no palpable abdominal mass, no ascites, no edema of ankles, no calf tenderness, no focal neurological deficit, no skin rash, no palpable lymphadenopathy in the neck and axillary areas, good arterial pulses, no clubbing  Patient is not anxious  Performance status 0  She goes to her gynecologist for examination of breasts  No lymphedema       IMAGING:      LABS:  Results for orders placed or performed in visit on 09/12/19   Cancer antigen 27 29   Result Value Ref Range    CA 27 29 14 7 0 0 - 42 3 U/mL   Hepatitis C antibody   Result Value Ref Range    Hepatitis C Ab Non-reactive Non-reactive   CBC and differential   Result Value Ref Range    WBC 3 45 (L) 4 31 - 10 16 Thousand/uL    RBC 4 68 3 81 - 5 12 Million/uL    Hemoglobin 13 2 11 5 - 15 4 g/dL    Hematocrit 42 0 34 8 - 46 1 %    MCV 90 82 - 98 fL    MCH 28 2 26 8 - 34 3 pg    MCHC 31 4 31 4 - 37 4 g/dL    RDW 13 2 11 6 - 15 1 %    MPV 10 4 8 9 - 12 7 fL    Platelets 385 436 - 452 Thousands/uL    nRBC 0 /100 WBCs    Neutrophils Relative 50 43 - 75 %    Immat GRANS % 0 0 - 2 %    Lymphocytes Relative 34 14 - 44 % Monocytes Relative 11 4 - 12 %    Eosinophils Relative 3 0 - 6 %    Basophils Relative 2 (H) 0 - 1 %    Neutrophils Absolute 1 71 (L) 1 85 - 7 62 Thousands/µL    Immature Grans Absolute 0 01 0 00 - 0 20 Thousand/uL    Lymphocytes Absolute 1 18 0 60 - 4 47 Thousands/µL    Monocytes Absolute 0 38 0 17 - 1 22 Thousand/µL    Eosinophils Absolute 0 10 0 00 - 0 61 Thousand/µL    Basophils Absolute 0 07 0 00 - 0 10 Thousands/µL   Comprehensive metabolic panel   Result Value Ref Range    Sodium 139 136 - 145 mmol/L    Potassium 3 8 3 5 - 5 3 mmol/L    Chloride 106 100 - 108 mmol/L    CO2 30 21 - 32 mmol/L    ANION GAP 3 (L) 4 - 13 mmol/L    BUN 11 5 - 25 mg/dL    Creatinine 0 87 0 60 - 1 30 mg/dL    Glucose, Fasting 94 65 - 99 mg/dL    Calcium 9 1 8 3 - 10 1 mg/dL    AST 17 5 - 45 U/L    ALT 23 12 - 78 U/L    Alkaline Phosphatase 91 46 - 116 U/L    Total Protein 7 0 6 4 - 8 2 g/dL    Albumin 4 0 3 5 - 5 0 g/dL    Total Bilirubin 0 84 0 20 - 1 00 mg/dL    eGFR 69 ml/min/1 73sq m   Lipid Panel with Direct LDL reflex   Result Value Ref Range    Cholesterol 215 (H) 50 - 200 mg/dL    Triglycerides 99 <=150 mg/dL    HDL, Direct 51 40 - 60 mg/dL    LDL Calculated 144 (H) 0 - 100 mg/dL   Vitamin B12   Result Value Ref Range    Vitamin B-12 709 100 - 900 pg/mL   Magnesium   Result Value Ref Range    Magnesium 2 4 1 6 - 2 6 mg/dL     Labs, Imaging, & Other studies:   All pertinent labs and imaging studies were personally reviewed    Lab Results   Component Value Date     12/17/2015    K 3 8 09/12/2019     09/12/2019    CO2 30 09/12/2019    ANIONGAP 5 12/17/2015    BUN 11 09/12/2019    CREATININE 0 87 09/12/2019    GLUCOSE 98 12/17/2015    GLUF 94 09/12/2019    CALCIUM 9 1 09/12/2019    AST 17 09/12/2019    ALT 23 09/12/2019    ALKPHOS 91 09/12/2019    PROT 6 9 12/17/2015    BILITOT 1 32 (H) 12/17/2015    EGFR 69 09/12/2019     Lab Results   Component Value Date    WBC 3 45 (L) 09/12/2019    HGB 13 2 09/12/2019    HCT 42 0 09/12/2019    MCV 90 09/12/2019     09/12/2019       Reviewed and discussed with patient  Assessment and plan: Follow-up visit for hormone receptor positive, HER2 negative, stage I cancer in right breast in 2007 and patient had lumpectomy, sentinel lymph node sampling and partial radiation therapy  Oncotype score was 20  Patient  did not want  to have chemotherapy  She decided to have hormonal therapy only  She was postmenopausal at that time  She was tried on Femara first but that did not agree with her  Treatment was changed to Aromasin and that did not agree with her either  At that point she decided not to have any hormonal therapy, not even tamoxifen     She is being followed  No symptoms at present      Physical examination and test results are as recorded and discussed  Breast cancer is in remission  Goal is cure from breast cancer  Condition discussed and explained  Questions answered  Discussed the importance of self-breast examination, eating healthy foods, staying active and health screening test   Patient goes to her gynecologist for pelvic and breast examination and mammography  I have noticed that she has leukopenia  She will have counts checked once a month for next 3 months and if problem persisted or worsened she will be contacted    She will come back in 1 year with blood tests  She is capable of self-care  1  Adenocarcinoma of right breast (HCC)    - Cancer antigen 27 29; Future  - CBC and differential; Future  - Comprehensive metabolic panel; Future    2  Neutropenia, unspecified type ([facility] [address] )    - CBC and differential; Standing  - CBC and differential    3  Basal cell carcinoma (BCC), unspecified site      4  Arthritis              Patient voiced understanding and agreement in the discussion  Counseling / Coordination of Care   Greater than 50% of total time was spent with the patient and / or family counseling and / or coordination of care

## 2019-11-18 ENCOUNTER — CONSULT (OUTPATIENT)
Dept: GASTROENTEROLOGY | Facility: MEDICAL CENTER | Age: 69
End: 2019-11-18
Payer: MEDICARE

## 2019-11-18 VITALS
BODY MASS INDEX: 26.2 KG/M2 | DIASTOLIC BLOOD PRESSURE: 77 MMHG | HEART RATE: 60 BPM | SYSTOLIC BLOOD PRESSURE: 122 MMHG | TEMPERATURE: 98.6 F | WEIGHT: 163 LBS | HEIGHT: 66 IN

## 2019-11-18 DIAGNOSIS — Z12.11 ENCOUNTER FOR SCREENING COLONOSCOPY: Primary | ICD-10-CM

## 2019-11-18 DIAGNOSIS — K58.1 IRRITABLE BOWEL SYNDROME WITH CONSTIPATION: ICD-10-CM

## 2019-11-18 DIAGNOSIS — K21.9 GASTROESOPHAGEAL REFLUX DISEASE WITHOUT ESOPHAGITIS: ICD-10-CM

## 2019-11-18 PROCEDURE — 99204 OFFICE O/P NEW MOD 45 MIN: CPT | Performed by: INTERNAL MEDICINE

## 2019-11-18 NOTE — PROGRESS NOTES
[facility] - Outpatient Consultation  Phu Acosta 77 y o  female MRN: [id number]  Encounter: [id number]          ASSESSMENT AND PLAN:      1  Encounter for screening colonoscopy    Her last colonoscopy was approximately 11 years ago  She is due for colonoscopy now based on this  We discuss risk and benefit of the procedure  Will be scheduling her for colonoscopy for surveillance of colonic polyps  - Colonoscopy; Future  - polyethylene glycol (GOLYTELY) 4000 mL solution; Take 4,000 mL by mouth once for 1 dose  Dispense: 4000 mL; Refill: 0    2  Irritable bowel syndrome with constipation  She has IBS like symptoms and history of acid reflux disease  Symptoms of IBS are stable which she has never had extensive evaluation for this  Will plan for evaluation with colonoscopy as well  - EGD; Future  - TSH, 3rd generation with Free T4 reflex; Future  - Celiac Disease Antibody Profile; Future    3  Gastroesophageal reflux disease without esophagitis-she takes p r n  PPI therapy no more than 4 to 5 times per year  Her symptoms are controlled by lifestyle and dietary changes  Denies any acute distress at this time  Will plan for EGD evaluation of for hiatal hernia and Martin's esophagus  Symptoms have also improved with being more inclined at night  Pelvic floor issues are likely in setting of hormonal therapies the past due to breast cancer  We can refer to pelvic PT in the future if she is interested     ______________________________________________________________________    HPI:      She is a 20-year-old with history of IBS mixed presents here for evaluation for screening for colorectal cancer  She does have history of stage I breast cancer currently in remission  Denies any acute distress at this time  She had her last colonoscopy 11 years ago in due for 1 now  She does have symptoms of IBS with constipation and mix at time    She may have fecal incontinence possibly secondary to overflow diarrhea  TSH was within normal limits in 2017  She does have symptoms of acid reflux overall well managed at this time with lifestyle and dietary changes  REVIEW OF SYSTEMS:    CONSTITUTIONAL: Denies any fever, chills, rigors, and weight loss  HEENT: No earache or tinnitus  Denies hearing loss or visual disturbances  CARDIOVASCULAR: No chest pain or palpitations  RESPIRATORY: Denies any cough, hemoptysis, shortness of breath or dyspnea on exertion  GASTROINTESTINAL: As noted in the History of Present Illness  GENITOURINARY: No problems with urination  Denies any hematuria or dysuria  NEUROLOGIC: No dizziness or vertigo, denies headaches  MUSCULOSKELETAL: Denies any muscle or joint pain  SKIN: Denies skin rashes or itching  ENDOCRINE: Denies excessive thirst  Denies intolerance to heat or cold  PSYCHOSOCIAL: Denies depression or anxiety  Denies any recent memory loss  Historical Information   Past Medical History:   Diagnosis Date    Arthritis     Breast cancer ([facility] [address] )     s/p Lumpectomy, last assessed 2012    Gastric ulcer     Heart murmur     Malignant [facility]oplasm [facility])      Past Surgical History:   Procedure Laterality Date    ABDOMINOPLASTY      ADENOIDECTOMY      BREAST SURGERY      lumpectomy, resolved 2007     SECTION      CHOLECYSTECTOMY      COLON SURGERY      FOOT SURGERY      MOHS SURGERY      REDUCTION MAMMAPLASTY      TOE SURGERY      left toe surgery    TONSILLECTOMY       Social History   Social History     Substance and Sexual Activity   Alcohol Use Yes    Comment: occasional     Social History     Substance and Sexual Activity   Drug Use No     Social History     Tobacco Use   Smoking Status Former Smoker   Smokeless Tobacco Never Used   Tobacco Comment    Smoker in teenage years  Quit at 22  One pack per week       Family History   Problem Relation Age of Onset    Hypertension Mother     Melanoma Mother    [facility] Heart attack Father         acute MI, CABG    Hypertension Father     Other Brother         CABG    Lymphoma Brother         non-Hodgkin's    Hypertension Brother     Other Family         back disorder    Stroke Family         CVA    Diabetes Family     Cancer Family        Meds/Allergies       Current Outpatient Medications:     Ascorbic Acid (VITAMIN C) 250 MG CHEW    Cholecalciferol (VITAMIN D3) 5000 units CAPS    Collagen 500 MG CAPS    diltiazem (CARTIA XT) 180 mg 24 hr capsule    melatonin 3 mg    Misc Natural Products (GLUCOSAMINE CHONDROITIN ADV PO)    Multiple Vitamins-Minerals (MULTI COMPLETE) CAPS    niacin 100 mg tablet    Omega-3 1000 MG CAPS    omeprazole (PriLOSEC) 20 mg delayed release capsule    Turmeric 500 MG CAPS    polyethylene glycol (GOLYTELY) 4000 mL solution    Allergies   Allergen Reactions    Ciprofloxacin Anaphylaxis     Anaphylaxis    Amitriptyline GI Intolerance     Action Taken: bloating,GI problems;     Celecoxib     Other Other (See Comments)     "black olives->GI upset"    Wound Dressing Adhesive     Bactrim [Sulfamethoxazole-Trimethoprim] Rash    Sulfa Antibiotics Rash           Objective     Blood pressure 122/77, pulse 60, temperature 98 6 °F (37 °C), temperature source Tympanic, height 5' 5 5" (1 664 m), weight 73 9 kg (163 lb)  Body mass index is 26 71 kg/m²  PHYSICAL EXAM:      General Appearance:   Alert, cooperative, no distress   HEENT:   Normocephalic, atraumatic, anicteric      Neck:  Supple, symmetrical, trachea midline   Lungs:   Clear to auscultation bilaterally; no rales, rhonchi or wheezing; respirations unlabored    Heart[de-identified]   Regular rate and rhythm; no murmur, rub, or gallop     Abdomen:   Soft, non-tender, non-distended; normal bowel sounds; no masses, no organomegaly    Genitalia:   Deferred    Rectal:   Deferred    Extremities:  No cyanosis, clubbing or edema    Pulses:  2+ and symmetric    Skin:  No jaundice, rashes, or lesions  Lymph nodes:  No palpable cervical lymphadenopathy        Lab Results:   No visits with results within 1 Day(s) from this visit  Latest known visit with results is:   Appointment on 09/12/2019   Component Date Value    CA 27 29 09/12/2019 14 7     Hepatitis C Ab 09/12/2019 Non-reactive     WBC 09/12/2019 3 45*    RBC 09/12/2019 4 68     Hemoglobin 09/12/2019 13 2     Hematocrit 09/12/2019 42 0     MCV 09/12/2019 90     MCH 09/12/2019 28 2     MCHC 09/12/2019 31 4     RDW 09/12/2019 13 2     MPV 09/12/2019 10 4     Platelets 00/29/4491 267     nRBC 09/12/2019 0     Neutrophils Relative 09/12/2019 50     Immat GRANS % 09/12/2019 0     Lymphocytes Relative 09/12/2019 34     Monocytes Relative 09/12/2019 11     Eosinophils Relative 09/12/2019 3     Basophils Relative 09/12/2019 2*    Neutrophils Absolute 09/12/2019 1 71*    Immature Grans Absolute 09/12/2019 0 01     Lymphocytes Absolute 09/12/2019 1 18     Monocytes Absolute 09/12/2019 0 38     Eosinophils Absolute 09/12/2019 0 10     Basophils Absolute 09/12/2019 0 07     Sodium 09/12/2019 139     Potassium 09/12/2019 3 8     Chloride 09/12/2019 106     CO2 09/12/2019 30     ANION GAP 09/12/2019 3*    BUN 09/12/2019 11     Creatinine 09/12/2019 0 87     Glucose, Fasting 09/12/2019 94     Calcium 09/12/2019 9 1     AST 09/12/2019 17     ALT 09/12/2019 23     Alkaline Phosphatase 09/12/2019 91     Total Protein 09/12/2019 7 0     Albumin 09/12/2019 4 0     Total Bilirubin 09/12/2019 0 84     eGFR 09/12/2019 69     Cholesterol 09/12/2019 215*    Triglycerides 09/12/2019 99     HDL, Direct 09/12/2019 51     LDL Calculated 09/12/2019 144*    Vitamin B-12 09/12/2019 709     Magnesium 09/12/2019 2 4          Radiology Results:   No results found

## 2019-11-18 NOTE — PATIENT INSTRUCTIONS
Patient scheduled for procedure on 12/17/19 with Golytely/Dulcolax prep  She is aware she will need a  and did not have any additional questions  Labs were given to the patient

## 2019-11-20 ENCOUNTER — ANESTHESIA EVENT (OUTPATIENT)
Dept: GASTROENTEROLOGY | Facility: MEDICAL CENTER | Age: 69
End: 2019-11-20

## 2019-11-21 ENCOUNTER — APPOINTMENT (OUTPATIENT)
Dept: LAB | Facility: MEDICAL CENTER | Age: 69
End: 2019-11-21
Payer: MEDICARE

## 2019-11-21 DIAGNOSIS — K58.1 IRRITABLE BOWEL SYNDROME WITH CONSTIPATION: ICD-10-CM

## 2019-11-21 LAB
BASOPHILS # BLD AUTO: 0.09 THOUSANDS/ΜL (ref 0–0.1)
BASOPHILS NFR BLD AUTO: 2 % (ref 0–1)
EOSINOPHIL # BLD AUTO: 0.14 THOUSAND/ΜL (ref 0–0.61)
EOSINOPHIL NFR BLD AUTO: 3 % (ref 0–6)
ERYTHROCYTE [DISTWIDTH] IN BLOOD BY AUTOMATED COUNT: 13.3 % (ref 11.6–15.1)
HCT VFR BLD AUTO: 42.9 % (ref 34.8–46.1)
HGB BLD-MCNC: 13.6 G/DL (ref 11.5–15.4)
IMM GRANULOCYTES # BLD AUTO: 0.01 THOUSAND/UL (ref 0–0.2)
IMM GRANULOCYTES NFR BLD AUTO: 0 % (ref 0–2)
LYMPHOCYTES # BLD AUTO: 1.67 THOUSANDS/ΜL (ref 0.6–4.47)
LYMPHOCYTES NFR BLD AUTO: 35 % (ref 14–44)
MCH RBC QN AUTO: 28.8 PG (ref 26.8–34.3)
MCHC RBC AUTO-ENTMCNC: 31.7 G/DL (ref 31.4–37.4)
MCV RBC AUTO: 91 FL (ref 82–98)
MONOCYTES # BLD AUTO: 0.55 THOUSAND/ΜL (ref 0.17–1.22)
MONOCYTES NFR BLD AUTO: 12 % (ref 4–12)
NEUTROPHILS # BLD AUTO: 2.31 THOUSANDS/ΜL (ref 1.85–7.62)
NEUTS SEG NFR BLD AUTO: 48 % (ref 43–75)
NRBC BLD AUTO-RTO: 0 /100 WBCS
PLATELET # BLD AUTO: 286 THOUSANDS/UL (ref 149–390)
PMV BLD AUTO: 9.8 FL (ref 8.9–12.7)
RBC # BLD AUTO: 4.72 MILLION/UL (ref 3.81–5.12)
TSH SERPL DL<=0.05 MIU/L-ACNC: 1.24 UIU/ML (ref 0.36–3.74)
WBC # BLD AUTO: 4.77 THOUSAND/UL (ref 4.31–10.16)

## 2019-11-21 PROCEDURE — 85025 COMPLETE CBC W/AUTO DIFF WBC: CPT | Performed by: INTERNAL MEDICINE

## 2019-11-21 PROCEDURE — 86255 FLUORESCENT ANTIBODY SCREEN: CPT

## 2019-11-21 PROCEDURE — 84443 ASSAY THYROID STIM HORMONE: CPT

## 2019-11-21 PROCEDURE — 83516 IMMUNOASSAY NONANTIBODY: CPT

## 2019-11-21 PROCEDURE — 36415 COLL VENOUS BLD VENIPUNCTURE: CPT | Performed by: INTERNAL MEDICINE

## 2019-11-21 PROCEDURE — 82784 ASSAY IGA/IGD/IGG/IGM EACH: CPT

## 2019-11-22 ENCOUNTER — HOSPITAL ENCOUNTER (EMERGENCY)
Facility: HOSPITAL | Age: 69
Discharge: HOME | End: 2019-11-22
Attending: EMERGENCY MEDICINE | Admitting: EMERGENCY MEDICINE
Payer: MEDICARE

## 2019-11-22 ENCOUNTER — APPOINTMENT (EMERGENCY)
Dept: RADIOLOGY | Facility: HOSPITAL | Age: 69
End: 2019-11-22
Payer: MEDICARE

## 2019-11-22 VITALS
BODY MASS INDEX: 26.3 KG/M2 | WEIGHT: 160.5 LBS | OXYGEN SATURATION: 100 % | RESPIRATION RATE: 16 BRPM | TEMPERATURE: 97.7 F | SYSTOLIC BLOOD PRESSURE: 154 MMHG | DIASTOLIC BLOOD PRESSURE: 70 MMHG | HEART RATE: 60 BPM

## 2019-11-22 DIAGNOSIS — S90.32XA CONTUSION OF LEFT FOOT, INITIAL ENCOUNTER: Primary | ICD-10-CM

## 2019-11-22 LAB
ENDOMYSIUM IGA SER QL: NEGATIVE
GLIADIN PEPTIDE IGA SER-ACNC: 4 UNITS (ref 0–19)
GLIADIN PEPTIDE IGG SER-ACNC: 2 UNITS (ref 0–19)
IGA SERPL-MCNC: 119 MG/DL (ref 87–352)
TTG IGA SER-ACNC: <2 U/ML (ref 0–3)
TTG IGG SER-ACNC: <2 U/ML (ref 0–5)

## 2019-11-22 PROCEDURE — 73630 X-RAY EXAM OF FOOT: CPT

## 2019-11-22 PROCEDURE — 99283 EMERGENCY DEPT VISIT LOW MDM: CPT

## 2019-11-22 PROCEDURE — 99282 EMERGENCY DEPT VISIT SF MDM: CPT | Performed by: EMERGENCY MEDICINE

## 2019-11-22 NOTE — ED PROVIDER NOTES
History  Chief Complaint   Patient presents with    Foot Injury     folding table fell on patient's foot last night  left foot  CMS intact     A 52-year-old female with past medical history of breast cancer and arthritis; presents with left foot pain  Patient states she dropped a foldable tray table on her left foot last evening  Patient states since then she has developed increased pain, bruising and swelling  Patient has been keeping the foot elevated and taking extra-strength Tylenol with some relief of her symptoms  Patient has been able to ambulate on the foot, although with a limp  Patient does have prior surgery on the left first MTP joint  Patient otherwise denies fever, chills, chest pain, shortness of breath, abdominal pain, nausea, vomiting, diarrhea, paresthesias, focal weakness, peripheral edema and rashes  A/P:  Left foot pain, ecchymosis and swelling appreciated across the mid metatarsals  Neurovascularly intact  Will obtain x-ray to rule out underlying fracture  History provided by:  Patient      Prior to Admission Medications   Prescriptions Last Dose Informant Patient Reported? Taking?    Ascorbic Acid (VITAMIN C) 250 MG CHEW  Self Yes No   Sig: Chew daily   Cholecalciferol (VITAMIN D3) 5000 units CAPS  Self Yes No   Sig: Take 1 tablet by mouth daily   Collagen 500 MG CAPS  Self Yes No   Sig: Take by mouth daily   Misc Natural Products (GLUCOSAMINE CHONDROITIN ADV PO)  Self Yes No   Sig: Take 2 capsules by mouth daily    Multiple Vitamins-Minerals (MULTI COMPLETE) CAPS  Self Yes No   Sig: Take 1 capsule by mouth daily   Omega-3 1000 MG CAPS  Self Yes No   Sig: Take 1 capsule by mouth daily   Turmeric 500 MG CAPS  Self Yes No   Sig: Take 1,000 mg by mouth daily    diltiazem (CARTIA XT) 180 mg 24 hr capsule  Self No No   Sig: Take 1 capsule (180 mg total) by mouth daily   melatonin 3 mg  Self Yes No   Sig: Take 1 tablet by mouth as needed     niacin 100 mg tablet  Self Yes No   Sig: Take 100 mg by mouth daily with breakfast   omeprazole (PriLOSEC) 20 mg delayed release capsule  Self Yes No   Sig: Take 1 capsule by mouth daily as needed   polyethylene glycol (GOLYTELY) 4000 mL solution   No No   Sig: Take 4,000 mL by mouth once for 1 dose      Facility-Administered Medications: None       Past Medical History:   Diagnosis Date    Arthritis     Breast cancer ([facility] [address] )     s/p Lumpectomy, last assessed 2012    Gastric ulcer     Heart murmur     Malignant neoplasm (HCC)        Past Surgical History:   Procedure Laterality Date    ABDOMINOPLASTY      ADENOIDECTOMY      BREAST SURGERY      lumpectomy, resolved 2007     SECTION      CHOLECYSTECTOMY      COLON SURGERY      FOOT SURGERY      MOHS SURGERY      REDUCTION MAMMAPLASTY  2000    TOE SURGERY      left toe surgery    TONSILLECTOMY         Family History   Problem Relation Age of Onset    Hypertension Mother     Melanoma Mother     Heart attack Father         acute MI, CABG    Hypertension Father     Other Brother         CABG    Lymphoma Brother         non-Hodgkin's    Hypertension Brother     Other Family         back disorder    Stroke Family         CVA    Diabetes Family     Cancer Family      I have reviewed and agree with the history as documented  Social History     Tobacco Use    Smoking status: Former Smoker    Smokeless tobacco: Never Used    Tobacco comment: Smoker in teenage years  Quit at 22  One pack per week  Substance Use Topics    Alcohol use: Yes     Comment: occasional    Drug use: No        Review of Systems   Musculoskeletal: Positive for arthralgias ( left foot)  All other systems reviewed and are negative        Physical Exam  Physical Exam  General Appearance: alert and oriented, nad, non toxic appearing  Skin:  Warm, dry, intact  HEENT: atraumatic, normocephalic  Neck: Supple, trachea midline  Cardiac: RRR; no murmurs, rub, gallops  Pulmonary: lungs CTAB; no wheezes, rales, rhonchi  Gastrointestinal: abdomen soft, nontender, nondistended; no guarding or rebound tenderness; good bowel sounds, no mass or bruits  Extremities:  Left foot with tenderness across the mid metatarsals, with associated swelling and faint ecchymosis  No obvious deformity  Remainder of left foot, ankle and lower extremity nontender with full range of motion  Sensation intact throughout    No pedal edema, 2+ pulses; no calf tenderness, no clubbing, no cyanosis  Neuro:  no focal motor or sensory deficits, CN 2-12 grossly intact  Psych:  Normal mood and affect, normal judgement and insight      Vital Signs  ED Triage Vitals   Temperature Pulse Respirations Blood Pressure SpO2   11/22/19 1615 11/22/19 1614 11/22/19 1614 11/22/19 1614 11/22/19 1614   97 7 °F (36 5 °C) 70 18 (!) 190/85 99 %      Temp Source Heart Rate Source Patient Position - Orthostatic VS BP Location FiO2 (%)   11/22/19 1615 11/22/19 1614 11/22/19 1614 11/22/19 1614 --   Oral Monitor Sitting Right arm       Pain Score       --                  Vitals:    11/22/19 1614 11/22/19 1719   BP: (!) 190/85 154/70   Pulse: 70 60   Patient Position - Orthostatic VS: Sitting Sitting         Visual Acuity      ED Medications  Medications - No data to display    Diagnostic Studies  Results Reviewed     None                 XR foot 3+ views LEFT   ED Interpretation by Maureen Brewer DO (11/22 1720)   No acute fracture or dislocation                 Procedures  Procedures       ED Course                               MDM    Disposition  Final diagnoses:   Contusion of left foot, initial encounter     Time reflects when diagnosis was documented in both MDM as applicable and the Disposition within this note     Time User Action Codes Description Comment    11/22/2019  5:21 PM Mariluz Hicks Add [S90 [id number]] Contusion of left foot, initial encounter       ED Disposition     ED Disposition Condition Date/Time Comment    Discharge Stable Fri Nov 22, 2019  5:21 PM Nathalienayeli Montesjoey discharge to home/self care  Follow-up Information     Follow up With Specialties Details Why Contact Info    Yony Lynch MD [facility] Schedule an appointment as soon as possible for a visit in 2 days For re-evaluation [address]  [address]  [phone]            Discharge Medication List as of 11/22/2019  5:22 PM      CONTINUE these medications which have NOT CHANGED    Details   Ascorbic Acid (VITAMIN C) 250 MG CHEW Chew daily, Historical Med      Cholecalciferol (VITAMIN D3) 5000 units CAPS Take 1 tablet by mouth daily, Historical Med      Collagen 500 MG CAPS Take by mouth daily, Historical Med      diltiazem (CARTIA XT) 180 mg 24 hr capsule Take 1 capsule (180 mg total) by mouth daily, Starting Wed 8/14/2019, Normal      melatonin 3 mg Take 1 tablet by mouth as needed  , Historical Med      Misc Natural Products (GLUCOSAMINE CHONDROITIN ADV PO) Take 2 capsules by mouth daily , Historical Med      Multiple Vitamins-Minerals (MULTI COMPLETE) CAPS Take 1 capsule by mouth daily, Historical Med      niacin 100 mg tablet Take 100 mg by mouth daily with breakfast, Historical Med      Omega-3 1000 MG CAPS Take 1 capsule by mouth daily, Historical Med      omeprazole (PriLOSEC) 20 mg delayed release capsule Take 1 capsule by mouth daily as needed, Historical Med      polyethylene glycol (GOLYTELY) 4000 mL solution Take 4,000 mL by mouth once for 1 dose, Starting Mon 11/18/2019, Normal      Turmeric 500 MG CAPS Take 1,000 mg by mouth daily , Historical Med           No discharge procedures on file      ED Provider  Electronically Signed by           Shlomo Ibanez DO  11/22/19 2936

## 2019-11-22 NOTE — DISCHARGE INSTRUCTIONS
Continue to take Tylenol as needed for pain  Apply ice and keep the foot elevated as this will help decrease swelling

## 2019-11-25 ENCOUNTER — TELEPHONE (OUTPATIENT)
Dept: GASTROENTEROLOGY | Facility: MEDICAL CENTER | Age: 69
End: 2019-11-25

## 2019-11-25 NOTE — TELEPHONE ENCOUNTER
----- Message from Edwar Knight MD sent at 11/22/2019  8:40 PM EST -----  Call patient to report normal results

## 2019-12-17 ENCOUNTER — HOSPITAL ENCOUNTER (OUTPATIENT)
Dept: GASTROENTEROLOGY | Facility: MEDICAL CENTER | Age: 69
Setting detail: OUTPATIENT SURGERY
Discharge: HOME | End: 2019-12-17
Attending: INTERNAL MEDICINE
Payer: MEDICARE

## 2019-12-17 ENCOUNTER — ANESTHESIA (OUTPATIENT)
Dept: GASTROENTEROLOGY | Facility: MEDICAL CENTER | Age: 69
End: 2019-12-17

## 2019-12-17 VITALS
WEIGHT: 150 LBS | TEMPERATURE: 97.2 F | OXYGEN SATURATION: 98 % | HEART RATE: 79 BPM | DIASTOLIC BLOOD PRESSURE: 76 MMHG | HEIGHT: 66 IN | SYSTOLIC BLOOD PRESSURE: 155 MMHG | RESPIRATION RATE: 20 BRPM | BODY MASS INDEX: 24.11 KG/M2

## 2019-12-17 DIAGNOSIS — Z12.11 ENCOUNTER FOR SCREENING COLONOSCOPY: ICD-10-CM

## 2019-12-17 DIAGNOSIS — K58.1 IRRITABLE BOWEL SYNDROME WITH CONSTIPATION: ICD-10-CM

## 2019-12-17 PROCEDURE — 88305 TISSUE EXAM BY PATHOLOGIST: CPT | Performed by: PATHOLOGY

## 2019-12-17 PROCEDURE — 45378 DIAGNOSTIC COLONOSCOPY: CPT | Performed by: INTERNAL MEDICINE

## 2019-12-17 PROCEDURE — 43239 EGD BIOPSY SINGLE/MULTIPLE: CPT | Performed by: INTERNAL MEDICINE

## 2019-12-17 PROCEDURE — NC001 PR NO CHARGE: Performed by: INTERNAL MEDICINE

## 2019-12-17 RX ORDER — ONDANSETRON 2 MG/ML
4 INJECTION INTRAMUSCULAR; INTRAVENOUS ONCE AS NEEDED
Status: DISCONTINUED | OUTPATIENT
Start: 2019-12-17 | End: 2019-12-21 | Stop reason: HOSPADM

## 2019-12-17 RX ORDER — SODIUM CHLORIDE 9 MG/ML
125 INJECTION, SOLUTION INTRAVENOUS CONTINUOUS
Status: DISCONTINUED | OUTPATIENT
Start: 2019-12-17 | End: 2019-12-17

## 2019-12-17 RX ORDER — PROPOFOL 10 MG/ML
INJECTION, EMULSION INTRAVENOUS CONTINUOUS PRN
Status: DISCONTINUED | OUTPATIENT
Start: 2019-12-17 | End: 2019-12-17 | Stop reason: SURG

## 2019-12-17 RX ORDER — LIDOCAINE HYDROCHLORIDE 20 MG/ML
INJECTION, SOLUTION EPIDURAL; INFILTRATION; INTRACAUDAL; PERINEURAL AS NEEDED
Status: DISCONTINUED | OUTPATIENT
Start: 2019-12-17 | End: 2019-12-17 | Stop reason: SURG

## 2019-12-17 RX ORDER — PROPOFOL 10 MG/ML
INJECTION, EMULSION INTRAVENOUS AS NEEDED
Status: DISCONTINUED | OUTPATIENT
Start: 2019-12-17 | End: 2019-12-17 | Stop reason: SURG

## 2019-12-17 RX ADMIN — PROPOFOL 20 MG: 10 INJECTION, EMULSION INTRAVENOUS at 10:20

## 2019-12-17 RX ADMIN — PROPOFOL 20 MG: 10 INJECTION, EMULSION INTRAVENOUS at 10:18

## 2019-12-17 RX ADMIN — PROPOFOL 130 MCG/KG/MIN: 10 INJECTION, EMULSION INTRAVENOUS at 10:21

## 2019-12-17 RX ADMIN — PROPOFOL 120 MG: 10 INJECTION, EMULSION INTRAVENOUS at 10:16

## 2019-12-17 RX ADMIN — LIDOCAINE HYDROCHLORIDE 3 ML: 20 INJECTION, SOLUTION EPIDURAL; INFILTRATION; INTRACAUDAL; PERINEURAL at 10:16

## 2019-12-17 RX ADMIN — SODIUM CHLORIDE: 0.9 INJECTION, SOLUTION INTRAVENOUS at 10:15

## 2019-12-17 NOTE — ANESTHESIA PREPROCEDURE EVALUATION
Review of Systems/Medical History  Patient summary reviewed  Chart reviewed      Cardiovascular  Hyperlipidemia,    Pulmonary  Negative pulmonary ROS        GI/Hepatic    PUD, GERD , Bowel prep            Endo/Other  Negative endo/other ROS      GYN    Breast cancer        Hematology  Negative hematology ROS      Musculoskeletal    Arthritis     Neurology    Headaches,    Psychology   Anxiety,              Physical Exam    Airway    Mallampati score: I  TM Distance: <3 FB  Neck ROM: full     Dental   No notable dental hx     Cardiovascular  Rhythm: regular, Rate: normal, Cardiovascular exam normal    Pulmonary  Pulmonary exam normal     Other Findings        Anesthesia Plan  ASA Score- 2     Anesthesia Type- IV sedation with anesthesia with ASA Monitors  Additional Monitors:   Airway Plan:         Plan Factors- Patient instructed to abstain from smoking on day of procedure  Patient did not smoke on day of surgery  Induction- intravenous  Postoperative Plan-     Informed Consent- Anesthetic plan and risks discussed with patient

## 2019-12-17 NOTE — DISCHARGE INSTRUCTIONS
Colonoscopy   WHAT YOU NEED TO KNOW:   A colonoscopy is a procedure to examine the inside of your colon (intestine) with a scope  Polyps or tissue growths may have been removed during your colonoscopy  It is normal to feel bloated and to have some abdominal discomfort  You should be passing gas  If you have hemorrhoids or you had polyps removed, you may have a small amount of bleeding  DISCHARGE INSTRUCTIONS:   Seek care immediately if:   · You have a large amount of bright red blood in your bowel movements  · Your abdomen is hard and firm and you have severe pain  · You have sudden trouble breathing  Contact your healthcare provider if:   · You develop a rash or hives  · You have a fever within 24 hours of your procedure  · You have not had a bowel movement for 3 days after your procedure  · You have questions or concerns about your condition or care  Activity:   · Do not lift, strain, or run  for 3 days after your procedure  · Rest after your procedure  You have been given medicine to relax you  Do not  drive or make important decisions until the day after your procedure  Return to your normal activity as directed  · Relieve gas and discomfort from bloating  by lying on your right side with a heating pad on your abdomen  You may need to take short walks to help the gas move out  Eat small meals until bloating is relieved  If you had polyps removed: For 7 days after your procedure:  · Do not  take aspirin  · Do not  go on long car rides  Help prevent constipation:   · Eat a variety of healthy foods  Healthy foods include fruit, vegetables, whole-grain breads, low-fat dairy products, beans, lean meat, and fish  Ask if you need to be on a special diet  Your healthcare provider may recommend that you eat high-fiber foods such as cooked beans  Fiber helps you have regular bowel movements  · Drink liquids as directed    Adults should drink between 9 and 13 eight-ounce cups of liquid every day  Ask what amount is best for you  For most people, good liquids to drink are water, juice, and milk  · Exercise as directed  Talk to your healthcare provider about the best exercise plan for you  Exercise can help prevent constipation, decrease your blood pressure and improve your health  Follow up with your healthcare provider as directed:  Write down your questions so you remember to ask them during your visits  © 2017 [address] [employer] Information is for End User's use only and may not be sold, redistributed or otherwise used for commercial purposes  All illustrations and images included in CareNotes® are the copyrighted property of [employer]  or [employer]  The above information is an  only  It is not intended as medical advice for individual conditions or treatments  Talk to your doctor, nurse or pharmacist before following any medical regimen to see if it is safe and effective for you  Diverticulosis   WHAT YOU NEED TO KNOW:   What is diverticulosis? Diverticulosis is a condition that causes small pockets called diverticula to form in your intestine  These pockets make it difficult for bowel movements to pass through your digestive system  What causes diverticulosis? Diverticula form when muscles have to work hard to move bowel movements through the intestine  The force causes bulges to form at weak areas in the intestine  This may happen if you eat foods that are low in fiber  Fiber helps give your bowel movements more bulk so they are larger and easier to move through your colon  The following may increase your risk of diverticulosis:  · A history of constipation    · Age 36 or older    · Obesity    · Lack of exercise  What are the signs and symptoms of diverticulosis? Diverticulosis usually does not cause any signs or symptoms   It may cause any of the following in some people:  · Pain or discomfort in your lower abdomen    · Abdominal bloating    · Constipation or diarrhea  How is diverticulosis diagnosed? Your healthcare provider will examine you and ask about your bowel movements, diet, and symptoms  He or she will also ask about any medical conditions you have or medicines you take  You may need any of the following:  · Blood tests  may be done to check for signs of inflammation  · A barium enema  is an x-ray of your colon that may show diverticula  A tube is put into your anus, and a liquid called barium is put through the tube  Barium is used so that healthcare providers can see your colon more clearly  · Flexible sigmoidoscopy  is a test to look for any changes in your lower intestines and rectum  It may also show the cause of any bleeding or pain  A soft, bendable tube with a light on the end will be put into your anus  It will then be moved forward into your intestine  · A colonoscopy  is used to look at your whole colon  A scope (long bendable tube with a light on the end) is used to take pictures  This test may show diverticula  · A CT scan , or CAT scan, may show diverticula  You may be given contrast liquid before the scan  Tell the healthcare provider if you have ever had an allergic reaction to contrast liquid  How is diverticulosis managed? The goal of treatment is to manage any symptoms you have and prevent other problems such as diverticulitis  Diverticulitis is swelling or infection of the diverticula  Your healthcare provider may recommend any of the following:  · Eat a variety of high-fiber foods  High-fiber foods help you have regular bowel movements  High-fiber foods include cooked beans, fruits, vegetables, and some cereals  Most adults need 25 to 35 grams of fiber each day  Your healthcare provider may recommend that you have more  Ask your healthcare provider how much fiber you need  Increase fiber slowly   You may have abdominal discomfort, bloating, and gas if you add fiber to your diet too quickly  You may need to take a fiber supplement if you are not getting enough fiber from food  · Medicines  to soften your bowel movements may be given  You may also need medicines to treat symptoms such as bloating and pain  · Drink liquids as directed  You may need to drink 2 to 3 liters (8 to 12 cups) of liquids every day  Ask your healthcare provider how much liquid to drink each day and which liquids are best for you  · Apply heat  on your abdomen for 20 to 30 minutes every 2 hours for as many days as directed  Heat helps decrease pain and muscle spasms  How can I help prevent diverticulitis or other symptoms? The following may help decrease your risk for diverticulitis or symptoms, such as bleeding  Talk to your provider about these or other things you can do to prevent problems that may occur with diverticulosis  · Exercise regularly  Ask your healthcare provider about the best exercise plan for you  Exercise can help you have regular bowel movements  Get 30 minutes of exercise on most days of the week  · Maintain a healthy weight  Ask your healthcare provider how much you should weigh  Ask him or her to help you create a weight loss plan if you are overweight  · Do not smoke  Nicotine and other chemicals in cigarettes increase your risk for diverticulitis  Ask your healthcare provider for information if you currently smoke and need help to quit  E-cigarettes or smokeless tobacco still contain nicotine  Talk to your healthcare provider before you use these products  · Ask your healthcare provider if it is safe to take NSAIDs  NSAIDs may increase your risk of diverticulitis  When should I seek immediate care? · You have severe pain on the left side of your lower abdomen  · Your bowel movements are bright or dark red  When should I contact my healthcare provider? · You have a fever and chills  · You feel dizzy or lightheaded       · You have nausea, or you are vomiting  · You have a change in your bowel movements  · You have questions or concerns about your condition or care  CARE AGREEMENT:   You have the right to help plan your care  Learn about your health condition and how it may be treated  Discuss treatment options with your caregivers to decide what care you want to receive  You always have the right to refuse treatment  The above information is an  only  It is not intended as medical advice for individual conditions or treatments  Talk to your doctor, nurse or pharmacist before following any medical regimen to see if it is safe and effective for you  © 2017 [address] [facility] Information is for End User's use only and may not be sold, redistributed or otherwise used for commercial purposes  All illustrations and images included in CareNotes® are the copyrighted property of A [employer] A M [employer] Inc  or [employer]

## 2019-12-17 NOTE — H&P
History and Physical -  [facility]  Zachary Uribe 77 y o  female MRN: [id number]                  HPI: Zachary Uribe is a 77y o  year old female who presents for screening colonoscopy and GERD  REVIEW OF SYSTEMS: Per the HPI, and otherwise unremarkable  Historical Information   Past Medical History:   Diagnosis Date    Arthritis     Breast cancer ([address] )     s/p Lumpectomy, last assessed 2012    Gastric ulcer     GERD (gastroesophageal reflux disease)     Heart murmur     Malignant neoplasm (HCC)     Migraine      Past Surgical History:   Procedure Laterality Date    ABDOMINOPLASTY      ADENOIDECTOMY      BREAST SURGERY      lumpectomy, resolved 2007     SECTION      CHOLECYSTECTOMY      COLON SURGERY      FOOT SURGERY      MOHS SURGERY      REDUCTION MAMMAPLASTY      TOE SURGERY      left toe surgery    TONSILLECTOMY       Social History   Social History     Substance and Sexual Activity   Alcohol Use Yes    Comment: occasional     Social History     Substance and Sexual Activity   Drug Use No     Social History     Tobacco Use   Smoking Status Former Smoker   Smokeless Tobacco Never Used   Tobacco Comment    Smoker in teenage years  Quit at 22  One pack per week       Family History   Problem Relation Age of Onset    Hypertension Mother     Melanoma Mother     Heart attack Father         acute MI, CABG    Hypertension Father     Other Brother         CABG    Lymphoma Brother         non-Hodgkin's    Hypertension Brother     Other Family         back disorder    Stroke Family         CVA    Diabetes Family     Cancer Family        Meds/Allergies       (Not in a hospital admission)    Allergies   Allergen Reactions    Ciprofloxacin Anaphylaxis     Anaphylaxis    Amitriptyline GI Intolerance     Action Taken: bloating,GI problems;     Celecoxib     Other Other (See Comments)     "black olives->GI upset"    Wound Dressing Adhesive     Bactrim [Sulfamethoxazole-Trimethoprim] Rash    Sulfa Antibiotics Rash       Objective     /82   Pulse 64   Temp (!) 97 2 °F (36 2 °C) (Temporal)   Resp 16   Ht 5' 5 5" (1 664 m)   Wt 68 kg (150 lb)   SpO2 99%   BMI 24 58 kg/m²       PHYSICAL EXAM    Gen: NAD  CV: RRR  CHEST: Clear  ABD: soft, NT/ND  EXT: no edema      ASSESSMENT/PLAN:  This is a 77y o  year old female here for EGD and colonoscopy evaluation and she is stable and optimized for her procedure

## 2019-12-17 NOTE — ANESTHESIA POSTPROCEDURE EVALUATION
Post-Op Assessment Note    CV Status:  Stable    Pain management: adequate     Mental Status:  Alert and awake   Hydration Status:  Euvolemic   PONV Controlled:  Controlled   Airway Patency:  Patent   Post Op Vitals Reviewed: Yes      Staff: Anesthesiologist           /71 (12/17/19 1035)    Temp     Pulse 69 (12/17/19 1035)   Resp 20 (12/17/19 1035)    SpO2 97 % (12/17/19 1035)

## 2020-03-26 ENCOUNTER — OFFICE VISIT (OUTPATIENT)
Dept: URGENT CARE | Facility: MEDICAL CENTER | Age: 70
End: 2020-03-26
Payer: MEDICARE

## 2020-03-26 ENCOUNTER — APPOINTMENT (OUTPATIENT)
Dept: RADIOLOGY | Facility: MEDICAL CENTER | Age: 70
End: 2020-03-26
Payer: MEDICARE

## 2020-03-26 VITALS
WEIGHT: 155 LBS | TEMPERATURE: 97.2 F | OXYGEN SATURATION: 97 % | SYSTOLIC BLOOD PRESSURE: 171 MMHG | HEIGHT: 65 IN | RESPIRATION RATE: 20 BRPM | DIASTOLIC BLOOD PRESSURE: 79 MMHG | HEART RATE: 82 BPM | BODY MASS INDEX: 25.83 KG/M2
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
## 2020-03-26 DIAGNOSIS — S62.339A CLOSED BOXER'S FRACTURE, INITIAL ENCOUNTER: Primary | ICD-10-CM

## 2020-03-26 PROCEDURE — 73130 X-RAY EXAM OF HAND: CPT

## 2020-03-26 PROCEDURE — 29125 APPL SHORT ARM SPLINT STATIC: CPT | Performed by: PHYSICIAN ASSISTANT

## 2020-03-26 PROCEDURE — 99213 OFFICE O/P EST LOW 20 MIN: CPT | Performed by: PHYSICIAN ASSISTANT

## 2020-03-26 PROCEDURE — G0463 HOSPITAL OUTPT CLINIC VISIT: HCPCS | Performed by: PHYSICIAN ASSISTANT

## 2020-03-26 NOTE — PATIENT INSTRUCTIONS
Follow up with Orthopedics  Monitor for numbness, dec  Circulation, increase in pain, loss of sensation  Can apply ice over the splint  Take tylenol as needed for pain  Boxer Fracture   WHAT YOU NEED TO KNOW:   A boxer fracture is a break of a bone in your hand  This type of fracture usually happens in the bone that connects your wrist to your little finger  It can also happen in bone that connects your wrist to your ring finger  A boxer fracture occurs when you hit an object with a closed fist  The bone may be out of place or in pieces  An open fracture is when there is a break in the skin  DISCHARGE INSTRUCTIONS:   Medicines:   · Medicines  may be given to decrease pain  Ask your healthcare provider how to take prescription pain medicine safely  If you have an open wound, you may also be given antibiotics or a tetanus vaccine to prevent an infection  · Take your medicine as directed  Contact your healthcare provider if you think your medicine is not helping or if you have side effects  Tell him or her if you are allergic to any medicine  Keep a list of the medicines, vitamins, and herbs you take  Include the amounts, and when and why you take them  Bring the list or the pill bottles to follow-up visits  Carry your medicine list with you in case of an emergency  Follow up with your healthcare provider or orthopedist as directed: You may need to return for more x-rays to check bone position  Write down your questions so you remember to ask them during your visits  Apply ice:  Apply ice on your injury for 15 to 20 minutes every hour or as directed  Use an ice pack, or put crushed ice in a plastic bag  Cover it with a towel  Ice helps prevent tissue damage and decreases swelling and pain  Elevate your hand:  Elevate your hand above the level of your heart as often as you can  This will help decrease swelling and pain  Prop your hand on pillows or blankets to keep it elevated comfortably     Go to physical therapy:  A physical therapist teaches you exercises to help improve movement and strength, and to decrease pain  Wound care:  Care for your wound as directed  Carefully wash the wound with soap and water  Dry the area and put on new, clean bandages as directed  Change your bandages when they get wet or dirty  Contact your healthcare provider or orthopedist if:   · You have a fever  · Your open wound is red, swollen, or draining pus  · You have trouble moving your finger  · You have questions or concerns about your condition or care  Return to the emergency department if:   · You cannot bend or extend your finger  · You have severe pain  · You have numbness or tingling in your finger  © 2017 [address] [facility] Information is for End User's use only and may not be sold, redistributed or otherwise used for commercial purposes  All illustrations and images included in CareNotes® are the copyrighted property of [employer]  or [employer]  The above information is an  only  It is not intended as medical advice for individual conditions or treatments  Talk to your doctor, nurse or pharmacist before following any medical regimen to see if it is safe and effective for you
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
## 2020-03-27 ENCOUNTER — TELEPHONE (OUTPATIENT)
Dept: OBGYN CLINIC | Facility: MEDICAL CENTER | Age: 70
End: 2020-03-27

## 2020-03-27 ENCOUNTER — OFFICE VISIT (OUTPATIENT)
Dept: OBGYN CLINIC | Facility: HOSPITAL | Age: 70
End: 2020-03-27
Payer: MEDICARE

## 2020-03-27 ENCOUNTER — OFFICE VISIT (OUTPATIENT)
Dept: OCCUPATIONAL THERAPY | Facility: HOSPITAL | Age: 70
End: 2020-03-27
Payer: MEDICARE

## 2020-03-27 VITALS
HEIGHT: 65 IN | SYSTOLIC BLOOD PRESSURE: 145 MMHG | HEART RATE: 67 BPM | BODY MASS INDEX: 25.83 KG/M2 | WEIGHT: 155 LBS | DIASTOLIC BLOOD PRESSURE: 86 MMHG

## 2020-03-27 DIAGNOSIS — S62.347D CLOSED NONDISPLACED FRACTURE OF BASE OF FIFTH METACARPAL BONE OF LEFT HAND WITH ROUTINE HEALING, SUBSEQUENT ENCOUNTER: ICD-10-CM

## 2020-03-27 DIAGNOSIS — M19.042 OSTEOARTHRITIS OF FINGERS OF BOTH HANDS: ICD-10-CM

## 2020-03-27 DIAGNOSIS — S62.347D CLOSED NONDISPLACED FRACTURE OF BASE OF FIFTH METACARPAL BONE OF LEFT HAND WITH ROUTINE HEALING, SUBSEQUENT ENCOUNTER: Primary | ICD-10-CM

## 2020-03-27 DIAGNOSIS — S62.337D CLOSED DISPLACED FRACTURE OF NECK OF FIFTH METACARPAL BONE OF LEFT HAND WITH ROUTINE HEALING, SUBSEQUENT ENCOUNTER: Primary | ICD-10-CM

## 2020-03-27 DIAGNOSIS — M19.041 OSTEOARTHRITIS OF FINGERS OF BOTH HANDS: ICD-10-CM

## 2020-03-27 DIAGNOSIS — M18.0 ARTHRITIS OF CARPOMETACARPAL (CMC) JOINT OF BOTH THUMBS: ICD-10-CM

## 2020-03-27 PROCEDURE — 99203 OFFICE O/P NEW LOW 30 MIN: CPT | Performed by: SURGERY

## 2020-03-27 PROCEDURE — 1036F TOBACCO NON-USER: CPT | Performed by: SURGERY

## 2020-03-27 PROCEDURE — L3913 HFO W/O JOINTS CF: HCPCS | Performed by: OCCUPATIONAL THERAPIST

## 2020-03-27 PROCEDURE — 3079F DIAST BP 80-89 MM HG: CPT | Performed by: SURGERY

## 2020-03-27 PROCEDURE — 3008F BODY MASS INDEX DOCD: CPT | Performed by: SURGERY

## 2020-03-27 PROCEDURE — 1160F RVW MEDS BY RX/DR IN RCRD: CPT | Performed by: SURGERY

## 2020-03-27 PROCEDURE — 3077F SYST BP >= 140 MM HG: CPT | Performed by: SURGERY

## 2020-03-27 PROCEDURE — 4040F PNEUMOC VAC/ADMIN/RCVD: CPT | Performed by: SURGERY

## 2020-03-27 NOTE — TELEPHONE ENCOUNTER
Tyrese Vargas please call back   Apply to [facility]

## 2020-03-27 NOTE — PROGRESS NOTES
Sathya GUO  Attending, Orthopaedic Surgery  Hand, Wrist, and Elbow Surgery  [facility]      ORTHOPAEDIC HAND, WRIST, AND ELBOW OFFICE  VISIT       ASSESSMENT/PLAN:      76 yo female with left 5th metacarpal neck fracture, DOI 3/26/20, and BL CMC arthritis   Ulnar gutter removable splint placed today, patient will be immobilized for roughly 6 weeks in the splint  We will see her back in about 3 weeks for repeat x-rays out of the splint , suspect this should heal completely fine without surgery  She was also given a right CMC Comfort Cool brace to wear during the day, will give her one for the left side once her fracture is healed  Voltaren gel script was sent to pharmacy  Discussed with the future she may consider steroid injections into the [facility] joints, also discussed possibility of surgery in the future however will proceed conservatively for now  Follow-up in 3 weeks for repeat x-rays of the left hand  The patient verbalized understanding of exam findings and treatment plan  We engaged in the shared decision-making process and treatment options were discussed at length with the patient  Surgical and conservative management discussed today along with risks and benefits  Diagnoses and all orders for this visit:    Closed nondisplaced fracture of base of fifth metacarpal bone of left hand with routine healing, subsequent encounter  -     Ambulatory referral to PT/OT hand therapy; Future    Arthritis of carpometacarpal (CMC) joint of both thumbs  -     diclofenac sodium (VOLTAREN) 1 %; Apply 2 g topically 4 (four) times a day    Osteoarthritis of fingers of both hands  -     diclofenac sodium (VOLTAREN) 1 %; Apply 2 g topically 4 (four) times a day        Follow Up:  Return in about 3 weeks (around 4/17/2020) for Recheck  To Do Next Visit:  Re-evaluation of current issue and X-rays of the  left  hand      General Discussions:  Fracture - Nonoperative Care:  The physiology of a fractured bone was discussed with the patient today  With non-displaced or minimally displaced fractures, conservative treatment such as casting or splinting often results in a functional recovery  Typically, these fractures are immobilized in either a cast or splint depending on the pattern  Radiographs are typically taken at intervals throughout the fracture healing to ensure that reduction or alignment is not lost   If the fracture loses its alignment, surgical intervention may be required to stabilize it  Medical conditions such as diabetes, osteoporosis, vitamin D deficiency, and a history of or exposure to smoking may delay or prevent fracture healing  Options between cast/splint immobilization and surgical treatment were offered and the risks and benefits of both were discussed  The anatomy and physiology of carpometacarpal joint arthritis was discussed with the patient today in the office  Deterioration of the articular cartilage eventually leads to hypermobility at the thumb [facility] joint, resulting in joint subluxation, osteophyte formation, cystic changes within the trapezium and base of the first metacarpal, as well as subchondral sclerosis  Eventually, pain, limited mobility, and compensatory hyperextension at the metacarpophalangeal joint may develop  While normal activity and usage of the thumb joint may provide a painful experience to the patient, this typically does not result in damage to the thumb or hand  Treatment options include resting thumb spica splints to decreased joint edema, pain, and inflammation  Therapy exercises to strengthen the thenar musculature may relieve pain, but do not alter the overall continued development of osteoarthritis  Oral medications, topical medications, corticosteroid injections may decrease pain and increase overall function    Eventually, approximately 5% of patients may require surgical intervention  ____________________________________________________________________________________________________________________________________________      CHIEF COMPLAINT:  Chief Complaint   Patient presents with    Left Hand - Pain       SUBJECTIVE:  Donnie Sylvester is a 77y o  year old RHD female who presents for evaluation of a left hand injury that occurred on 2020  Patient states she was walking on a sidewalk when she tripped over an uneven portion and fell on both outstretched hands  She was seen at urgent care and found to have a suspected 5th metacarpal neck fracture  She currently notes pain in the same area, denies any numbness or tingling  She does note joint pain particularly at the [facility] [place] joints of both hands which she has had for years  No fevers or chills          Pain/symptom timing:  Worse during the day when active  Pain/symptom context:  Worse with activites and work  Pain/symptom modifying factors:  Rest makes better, activities make worse  Pain/symptom associated signs/symptoms: none    Prior treatment   · NSAIDsYes   · Injections No   · Bracing/Orthotics No    Physical Therapy No     I have personally reviewed all the relevant PMH, PSH, SH, FH, Medications and allergies      PAST MEDICAL HISTORY:  Past Medical History:   Diagnosis Date    Arthritis     Breast cancer ([facility] [address] )     s/p Lumpectomy, last assessed 2012    Gastric ulcer     GERD (gastroesophageal reflux disease)     Heart murmur     Malignant neoplasm (HCC)     Migraine        PAST SURGICAL HISTORY:  Past Surgical History:   Procedure Laterality Date    ABDOMINOPLASTY      ADENOIDECTOMY      BREAST SURGERY      lumpectomy, resolved 2007     SECTION      CHOLECYSTECTOMY      COLON SURGERY      FOOT SURGERY      MOHS SURGERY      REDUCTION MAMMAPLASTY      TOE SURGERY      left toe surgery    TONSILLECTOMY         FAMILY HISTORY:  Family History   Problem Relation Age of Onset  Hypertension Mother     Melanoma Mother     Heart attack Father         acute MI, CABG    Hypertension Father     Other Brother         CABG    Lymphoma Brother         non-Hodgkin's    Hypertension Brother     Other Family         back disorder    Stroke Family         CVA    Diabetes Family     Cancer Family        SOCIAL HISTORY:  Social History     Tobacco Use    Smoking status: Former Smoker    Smokeless tobacco: Never Used    Tobacco comment: Smoker in teenage years  Quit at 22  One pack per week     Substance Use Topics    Alcohol use: Yes     Comment: occasional    Drug use: No       MEDICATIONS:    Current Outpatient Medications:     Ascorbic Acid (VITAMIN C) 250 MG CHEW, Chew daily, Disp: , Rfl:     Cholecalciferol (VITAMIN D3) 5000 units CAPS, Take 1 tablet by mouth daily, Disp: , Rfl:     Collagen 500 MG CAPS, Take by mouth daily, Disp: , Rfl:     diltiazem (CARTIA XT) 180 mg 24 hr capsule, Take 1 capsule (180 mg total) by mouth daily, Disp: 90 capsule, Rfl: 3    Misc Natural Products (GLUCOSAMINE CHONDROITIN ADV PO), Take 2 capsules by mouth daily , Disp: , Rfl:     Multiple Vitamins-Minerals (MULTI COMPLETE) CAPS, Take 1 capsule by mouth daily, Disp: , Rfl:     niacin 100 mg tablet, Take 100 mg by mouth daily with breakfast, Disp: , Rfl:     Omega-3 1000 MG CAPS, Take 1 capsule by mouth daily, Disp: , Rfl:     omeprazole (PriLOSEC) 20 mg delayed release capsule, Take 1 capsule by mouth daily as needed, Disp: , Rfl:     Turmeric 500 MG CAPS, Take 1,000 mg by mouth daily , Disp: , Rfl:     diclofenac sodium (VOLTAREN) 1 %, Apply 2 g topically 4 (four) times a day, Disp: 1 Tube, Rfl: 2    ALLERGIES:  Allergies   Allergen Reactions    Ciprofloxacin Anaphylaxis     Anaphylaxis    Amitriptyline GI Intolerance     Action Taken: bloating,GI problems;     Celecoxib     Other Other (See Comments)     "black olives->GI upset"    Wound Dressing Adhesive     Bactrim [Sulfamethoxazole-Trimethoprim] Rash    Sulfa Antibiotics Rash           REVIEW OF SYSTEMS:  Review of Systems   Constitutional: Negative for chills, diaphoresis, fatigue and fever  HENT: Negative for congestion, facial swelling, hearing loss, sore throat, trouble swallowing and voice change  Respiratory: Negative for apnea, cough, shortness of breath, wheezing and stridor  Cardiovascular: Negative for chest pain, palpitations and leg swelling  Gastrointestinal: Negative for abdominal pain, constipation, nausea and vomiting  Endocrine: Negative for cold intolerance and heat intolerance  Musculoskeletal: Positive for arthralgias and joint swelling  Negative for gait problem and myalgias  Skin: Negative for color change, pallor, rash and wound  Neurological: Negative for tremors, speech difficulty, weakness and numbness  Psychiatric/Behavioral: Negative for agitation, confusion, decreased concentration and hallucinations  The patient is not nervous/anxious          VITALS:  Vitals:    03/27/20 1316   BP: 145/86   Pulse: 67       LABS:  HgA1c: No results found for: HGBA1C  BMP:   Lab Results   Component Value Date    GLUCOSE 98 12/17/2015    CALCIUM 9 1 09/12/2019     12/17/2015    K 3 8 09/12/2019    CO2 30 09/12/2019     09/12/2019    BUN 11 09/12/2019    CREATININE 0 87 09/12/2019       _____________________________________________________  PHYSICAL EXAMINATION:  General: well developed and well nourished, alert, oriented times 3 and appears comfortable  Psychiatric: Normal  HEENT: Normocephalic, Atraumatic Trachea Midline, No torticollis  Pulmonary: No audible wheezing or respiratory distress   Cardiovascular: No pitting edema, 2+ radial pulse   Skin: No masses, erythema, lacerations, fluctation, ulcerations  Neurovascular: Sensation Intact to the Median, Ulnar, Radial Nerve, Motor Intact to the Median, Ulnar, Radial Nerve and Pulses Intact  Musculoskeletal: Normal, except as noted in detailed exam and in HPI        MUSCULOSKELETAL EXAMINATION:  Left hand:   Mild edema and ecchymosis in area of 5th MC   Tender to palpation over 5th MC head  Composite fist limited by pain at the 5th  Superficial abrasions on both palms, no active drainage, no evidence of infection    Bilateral CMC Exam:  No adduction contracture  10 degrees hyperextension deformity of MCP joint  Positive localized tenderness over radial and dorsal aspect of thumb (CMC joint)  Grind test is Positive for pain and Positive for crepitus  Metacarpal load shift test Positive  No triggering or tenderness over the A1 pulley  No pain with Finkelsteins maneuver       ___________________________________________________  STUDIES REVIEWED:  I have personally reviewed AP lateral and oblique radiographs of left hand which demonstrate suspected nondisplaced fracture of 5th MC neck       PROCEDURES PERFORMED:  Procedures  No procedures today  _____________________________________________________      Scribe Attestation    I,:   Jose G Kovacs PA-C am acting as a scribe while in the presence of the attending physician :        I,:   Soumya Madison MD personally performed the services described in this documentation    as scribed in my presence :

## 2020-03-27 NOTE — PROGRESS NOTES
Orthosis    Diagnosis:   1  Closed displaced fracture of neck of fifth metacarpal bone of left hand with routine healing, subsequent encounter       Indication: Motion Blocking    Location: Left  hand, ring finger and small finger  Supplies: Custom Fit Orthotic  Orthosis type: HB ulnar gutter with 4/5 MCP in flexion - IPs free  Wearing Schedule: Remove for hygiene only  Describe Position: MCP flexed    Precautions: Fracture    Patient or Caregiver expresses understanding of wearing Schedule and Precautions? Yes  Patient or Caregiver able to don/doff orthotic independently? Yes    Written orders provided to patient?  Yes  Orders Obtained: Written  Orders Obtained from: Rik Arciniega     Return for evaluation and treatment not at this time

## 2020-03-27 NOTE — TELEPHONE ENCOUNTER
Patient sees Dr Aj Meredith  [address] calling in wanting clarification on where the Voltaren is being used  She is asking for a call back relating to this        Call back # [phone]

## 2020-03-27 NOTE — TELEPHONE ENCOUNTER
I spoke with pharmacist and advised the voltaren gel is for B/L thumbs 2 grams, 4 x daily per thumb  WDL

## 2020-04-16 ENCOUNTER — APPOINTMENT (OUTPATIENT)
Dept: RADIOLOGY | Facility: AMBULARY SURGERY CENTER | Age: 70
End: 2020-04-16
Attending: SURGERY
Payer: MEDICARE

## 2020-04-16 ENCOUNTER — OFFICE VISIT (OUTPATIENT)
Dept: OBGYN CLINIC | Facility: CLINIC | Age: 70
End: 2020-04-16
Payer: MEDICARE

## 2020-04-16 VITALS
HEART RATE: 81 BPM | DIASTOLIC BLOOD PRESSURE: 88 MMHG | WEIGHT: 155 LBS | BODY MASS INDEX: 25.83 KG/M2 | HEIGHT: 65 IN | SYSTOLIC BLOOD PRESSURE: 138 MMHG

## 2020-04-16 DIAGNOSIS — S62.347D CLOSED NONDISPLACED FRACTURE OF BASE OF FIFTH METACARPAL BONE OF LEFT HAND WITH ROUTINE HEALING, SUBSEQUENT ENCOUNTER: ICD-10-CM

## 2020-04-16 DIAGNOSIS — S62.347D CLOSED NONDISPLACED FRACTURE OF BASE OF FIFTH METACARPAL BONE OF LEFT HAND WITH ROUTINE HEALING, SUBSEQUENT ENCOUNTER: Primary | ICD-10-CM

## 2020-04-16 PROCEDURE — 3079F DIAST BP 80-89 MM HG: CPT | Performed by: SURGERY

## 2020-04-16 PROCEDURE — 4040F PNEUMOC VAC/ADMIN/RCVD: CPT | Performed by: SURGERY

## 2020-04-16 PROCEDURE — 1160F RVW MEDS BY RX/DR IN RCRD: CPT | Performed by: SURGERY

## 2020-04-16 PROCEDURE — 3008F BODY MASS INDEX DOCD: CPT | Performed by: SURGERY

## 2020-04-16 PROCEDURE — 1036F TOBACCO NON-USER: CPT | Performed by: SURGERY

## 2020-04-16 PROCEDURE — 3075F SYST BP GE 130 - 139MM HG: CPT | Performed by: SURGERY

## 2020-04-16 PROCEDURE — 73130 X-RAY EXAM OF HAND: CPT

## 2020-04-16 PROCEDURE — 99213 OFFICE O/P EST LOW 20 MIN: CPT | Performed by: SURGERY

## 2020-05-05 ENCOUNTER — TELEMEDICINE (OUTPATIENT)
Dept: FAMILY MEDICINE CLINIC | Facility: CLINIC | Age: 70
End: 2020-05-05
Payer: MEDICARE

## 2020-05-05 VITALS
BODY MASS INDEX: 26.13 KG/M2 | DIASTOLIC BLOOD PRESSURE: 80 MMHG | WEIGHT: 157 LBS | SYSTOLIC BLOOD PRESSURE: 122 MMHG | HEART RATE: 66 BPM

## 2020-05-05 DIAGNOSIS — K21.9 GASTROESOPHAGEAL REFLUX DISEASE WITHOUT ESOPHAGITIS: ICD-10-CM

## 2020-05-05 DIAGNOSIS — R51.9 NONINTRACTABLE HEADACHE, UNSPECIFIED CHRONICITY PATTERN, UNSPECIFIED HEADACHE TYPE: ICD-10-CM

## 2020-05-05 DIAGNOSIS — H81.13 BENIGN PAROXYSMAL POSITIONAL VERTIGO DUE TO BILATERAL VESTIBULAR DISORDER: Primary | ICD-10-CM

## 2020-05-05 PROCEDURE — 99214 OFFICE O/P EST MOD 30 MIN: CPT | Performed by: PHYSICIAN ASSISTANT

## 2020-05-05 RX ORDER — FAMOTIDINE 40 MG/1
40 TABLET, FILM COATED ORAL DAILY PRN
Qty: 30 TABLET | Refills: 1 | Status: SHIPPED | OUTPATIENT
Start: 2020-05-05 | End: 2021-03-08

## 2020-05-05 RX ORDER — ASCORBIC ACID 125 MG
TABLET,CHEWABLE ORAL
COMMUNITY

## 2020-05-05 RX ORDER — OMEPRAZOLE 20 MG/1
20 CAPSULE, DELAYED RELEASE ORAL DAILY PRN
Qty: 30 CAPSULE | Refills: 1 | Status: SHIPPED | OUTPATIENT
Start: 2020-05-05 | End: 2020-05-26

## 2020-05-07 ENCOUNTER — OFFICE VISIT (OUTPATIENT)
Dept: OBGYN CLINIC | Facility: CLINIC | Age: 70
End: 2020-05-07
Payer: MEDICARE

## 2020-05-07 VITALS
HEIGHT: 65 IN | DIASTOLIC BLOOD PRESSURE: 90 MMHG | SYSTOLIC BLOOD PRESSURE: 138 MMHG | BODY MASS INDEX: 26.16 KG/M2 | WEIGHT: 157 LBS | HEART RATE: 58 BPM

## 2020-05-07 DIAGNOSIS — S62.347D CLOSED NONDISPLACED FRACTURE OF BASE OF FIFTH METACARPAL BONE OF LEFT HAND WITH ROUTINE HEALING, SUBSEQUENT ENCOUNTER: Primary | ICD-10-CM

## 2020-05-07 PROCEDURE — 3080F DIAST BP >= 90 MM HG: CPT | Performed by: SURGERY

## 2020-05-07 PROCEDURE — 99213 OFFICE O/P EST LOW 20 MIN: CPT | Performed by: SURGERY

## 2020-05-07 PROCEDURE — 4040F PNEUMOC VAC/ADMIN/RCVD: CPT | Performed by: SURGERY

## 2020-05-07 PROCEDURE — 1036F TOBACCO NON-USER: CPT | Performed by: SURGERY

## 2020-05-07 PROCEDURE — 3008F BODY MASS INDEX DOCD: CPT | Performed by: SURGERY

## 2020-05-07 PROCEDURE — 3075F SYST BP GE 130 - 139MM HG: CPT | Performed by: SURGERY

## 2020-05-07 PROCEDURE — 1160F RVW MEDS BY RX/DR IN RCRD: CPT | Performed by: SURGERY

## 2020-05-19 ENCOUNTER — TELEPHONE (OUTPATIENT)
Dept: FAMILY MEDICINE CLINIC | Facility: CLINIC | Age: 70
End: 2020-05-19

## 2020-05-26 ENCOUNTER — OFFICE VISIT (OUTPATIENT)
Dept: FAMILY MEDICINE CLINIC | Facility: CLINIC | Age: 70
End: 2020-05-26
Payer: MEDICARE

## 2020-05-26 VITALS
DIASTOLIC BLOOD PRESSURE: 50 MMHG | HEART RATE: 72 BPM | HEIGHT: 65 IN | OXYGEN SATURATION: 97 % | TEMPERATURE: 97.9 F | BODY MASS INDEX: 26.7 KG/M2 | WEIGHT: 160.25 LBS | SYSTOLIC BLOOD PRESSURE: 102 MMHG

## 2020-05-26 DIAGNOSIS — H69.82 EUSTACHIAN TUBE DYSFUNCTION, LEFT: Primary | ICD-10-CM

## 2020-05-26 PROCEDURE — 3008F BODY MASS INDEX DOCD: CPT | Performed by: PHYSICIAN ASSISTANT

## 2020-05-26 PROCEDURE — 1036F TOBACCO NON-USER: CPT | Performed by: PHYSICIAN ASSISTANT

## 2020-05-26 PROCEDURE — 3078F DIAST BP <80 MM HG: CPT | Performed by: PHYSICIAN ASSISTANT

## 2020-05-26 PROCEDURE — 1160F RVW MEDS BY RX/DR IN RCRD: CPT | Performed by: PHYSICIAN ASSISTANT

## 2020-05-26 PROCEDURE — 4040F PNEUMOC VAC/ADMIN/RCVD: CPT | Performed by: PHYSICIAN ASSISTANT

## 2020-05-26 PROCEDURE — 3074F SYST BP LT 130 MM HG: CPT | Performed by: PHYSICIAN ASSISTANT

## 2020-05-26 PROCEDURE — 99213 OFFICE O/P EST LOW 20 MIN: CPT | Performed by: PHYSICIAN ASSISTANT

## 2020-05-26 RX ORDER — FLUTICASONE PROPIONATE 50 MCG
2 SPRAY, SUSPENSION (ML) NASAL DAILY
Qty: 16 G | Refills: 0 | Status: SHIPPED | OUTPATIENT
Start: 2020-05-26

## 2020-08-24 ENCOUNTER — OFFICE VISIT (OUTPATIENT)
Dept: FAMILY MEDICINE CLINIC | Facility: CLINIC | Age: 70
End: 2020-08-24
Payer: MEDICARE

## 2020-08-24 VITALS
HEIGHT: 65 IN | SYSTOLIC BLOOD PRESSURE: 122 MMHG | BODY MASS INDEX: 26.12 KG/M2 | OXYGEN SATURATION: 98 % | RESPIRATION RATE: 20 BRPM | TEMPERATURE: 98 F | DIASTOLIC BLOOD PRESSURE: 72 MMHG | WEIGHT: 156.8 LBS | HEART RATE: 63 BPM

## 2020-08-24 DIAGNOSIS — M76.891 HIP FLEXOR TENDINITIS, RIGHT: ICD-10-CM

## 2020-08-24 DIAGNOSIS — Z20.828 EXPOSURE TO SARS-ASSOCIATED CORONAVIRUS: ICD-10-CM

## 2020-08-24 DIAGNOSIS — D70.9 NEUTROPENIA, UNSPECIFIED TYPE (HCC): ICD-10-CM

## 2020-08-24 DIAGNOSIS — Z00.00 WELCOME TO MEDICARE PREVENTIVE VISIT: ICD-10-CM

## 2020-08-24 DIAGNOSIS — E78.5 HYPERLIPIDEMIA, UNSPECIFIED HYPERLIPIDEMIA TYPE: ICD-10-CM

## 2020-08-24 DIAGNOSIS — K21.9 GASTROESOPHAGEAL REFLUX DISEASE WITHOUT ESOPHAGITIS: ICD-10-CM

## 2020-08-24 DIAGNOSIS — M72.0 DUPUYTREN'S CONTRACTURE: ICD-10-CM

## 2020-08-24 DIAGNOSIS — Z12.31 BREAST CANCER SCREENING BY MAMMOGRAM: ICD-10-CM

## 2020-08-24 DIAGNOSIS — I10 ESSENTIAL HYPERTENSION: ICD-10-CM

## 2020-08-24 DIAGNOSIS — C50.911 ADENOCARCINOMA OF RIGHT BREAST (HCC): ICD-10-CM

## 2020-08-24 DIAGNOSIS — F41.9 ANXIETY: ICD-10-CM

## 2020-08-24 DIAGNOSIS — F51.01 PRIMARY INSOMNIA: ICD-10-CM

## 2020-08-24 DIAGNOSIS — M85.89 OSTEOPENIA OF MULTIPLE SITES: ICD-10-CM

## 2020-08-24 DIAGNOSIS — Z85.3 PERSONAL HISTORY OF MALIGNANT NEOPLASM OF BREAST: Primary | ICD-10-CM

## 2020-08-24 PROCEDURE — 4040F PNEUMOC VAC/ADMIN/RCVD: CPT | Performed by: FAMILY MEDICINE

## 2020-08-24 PROCEDURE — 1036F TOBACCO NON-USER: CPT | Performed by: FAMILY MEDICINE

## 2020-08-24 PROCEDURE — 99214 OFFICE O/P EST MOD 30 MIN: CPT | Performed by: FAMILY MEDICINE

## 2020-08-24 PROCEDURE — 3074F SYST BP LT 130 MM HG: CPT | Performed by: FAMILY MEDICINE

## 2020-08-24 PROCEDURE — 3008F BODY MASS INDEX DOCD: CPT | Performed by: FAMILY MEDICINE

## 2020-08-24 PROCEDURE — 1160F RVW MEDS BY RX/DR IN RCRD: CPT | Performed by: FAMILY MEDICINE

## 2020-08-24 PROCEDURE — 1125F AMNT PAIN NOTED PAIN PRSNT: CPT | Performed by: FAMILY MEDICINE

## 2020-08-24 PROCEDURE — 3078F DIAST BP <80 MM HG: CPT | Performed by: FAMILY MEDICINE

## 2020-08-24 PROCEDURE — 1170F FXNL STATUS ASSESSED: CPT | Performed by: FAMILY MEDICINE

## 2020-08-24 PROCEDURE — G0438 PPPS, INITIAL VISIT: HCPCS | Performed by: FAMILY MEDICINE

## 2020-08-24 RX ORDER — MELOXICAM 15 MG/1
15 TABLET ORAL DAILY
Qty: 30 TABLET | Refills: 1 | Status: SHIPPED | OUTPATIENT
Start: 2020-08-24 | End: 2021-08-27 | Stop reason: ALTCHOICE

## 2020-08-24 RX ORDER — DILTIAZEM HYDROCHLORIDE 180 MG/1
180 CAPSULE, COATED, EXTENDED RELEASE ORAL DAILY
Qty: 90 CAPSULE | Refills: 3 | Status: CANCELLED | OUTPATIENT
Start: 2020-08-24

## 2020-08-24 NOTE — PROGRESS NOTES
Assessment/Plan:       Problem List Items Addressed This Visit        Digestive    Esophageal reflux     Stable at this time  Continue on twice daily Pepcid            Cardiovascular and Mediastinum    Essential hypertension     Controlled at this time  Continue diltiazem  Relevant Orders    CBC and differential    Comprehensive metabolic panel    Lipid Panel with Direct LDL reflex       Musculoskeletal and Integument    Osteopenia     Check DEXA after her routine follow-up  Continue weight-bearing exercise  Hip flexor tendinitis, right     Trial of meloxicam 15 mg daily as needed  I gave her number 30 with a refill  She should take the medication for at least a week straight to try to decrease inflammation present  Consider physical therapy if she is not improving  Relevant Medications    meloxicam (MOBIC) 15 mg tablet       Other    Adenocarcinoma of breast ([address] )     Gets an annual mammogram and MRI done through her gynecologist   She was scheduling that in near future         Anxiety     She is having mostly some insomnia at this point  She is going to try melatonin  Consider low-dose lorazepam as needed  Relevant Orders    CBC and differential    Comprehensive metabolic panel    TSH, 3rd generation with Free T4 reflex    Hyperlipidemia     Continue to monitor lipids         Relevant Orders    Comprehensive metabolic panel    Lipid Panel with Direct LDL reflex    Insomnia     Trial of melatonin 10 mg nightly  If she is not improving, we could try something such as a low-dose lorazepam on as needed basis  She had some side effects from Ambien in the past         Neutropenia (HCC)     Stable on recent CBC  Dupuytren's contracture     We will continue to monitor this  This is not causing any flexion deformity of her head at this time             Other Visit Diagnoses     Personal history of malignant neoplasm of breast    -  Primary    Breast cancer screening by mammogram        Welcome to Medicare preventive visit        BMI 26 0-26 9,adult        Exposure to SARS-associated coronavirus        Relevant Orders    SARS-CoV2 Antibody, Total (IgG, IgA, IgM) [facility]- Lab Collect          BMI Counseling: Body mass index is 26 09 kg/m²  The BMI is above normal  Nutrition recommendations include encouraging healthy choices of fruits and vegetables  Exercise recommendations include moderate physical activity 150 minutes/week  Subjective:      Patient ID: Kamlesh Velasquez is a 72 y o  female  HPI patient presents today for follow-up for multiple chronic health issues in addition to Medicare wellness visit  She would like to have antibodies done for COVID  She notes in late December she was quite ill after some travel down [address]  She was extremely fatigue and had a cough the last several weeks  She is doing well at this time and denies any further cough, fever, wheeze or shortness of breath  She has a history of some mild peripheral neuropathy symptoms  This is actually stable at this time she denies excessive pain or numbness of the lower extremities  The patient presents today for a hypertension follow-up  Patient remains compliant with medications and denies any chest pain, shortness of breath, palpitations, lightheadedness or diaphoresis  She is having some pain in her right upper leg  She notes she had been walking aggressively to try to lose weight and start having some acute pain about a month ago  This pain is worse with ongoing ambulation      The following portions of the patient's history were reviewed and updated as appropriate: allergies, current medications, past family history, past medical history, past social history, past surgical history and problem list       Current Outpatient Medications:     Ascorbic Acid (VITAMIN C) 250 MG CHEW, Chew 1,000 mg daily , Disp: , Rfl:     Cholecalciferol (VITAMIN D3) 5000 units CAPS, Take 1 tablet by mouth daily, Disp: , Rfl:     Collagen 500 MG CAPS, Take by mouth daily, Disp: , Rfl:     Cyanocobalamin (B-12) 5000 MCG CAPS, Take by mouth, Disp: , Rfl:     diclofenac sodium (VOLTAREN) 1 %, Apply 2 g topically 4 (four) times a day, Disp: 1 Tube, Rfl: 2    diltiazem (CARTIA XT) 180 mg 24 hr capsule, Take 1 capsule (180 mg total) by mouth daily, Disp: 90 capsule, Rfl: 3    famotidine (PEPCID) 40 MG tablet, Take 1 tablet (40 mg total) by mouth daily as needed for heartburn, Disp: 30 tablet, Rfl: 1    fluticasone (FLONASE) 50 mcg/act nasal spray, 2 sprays into each nostril daily, Disp: 16 g, Rfl: 0    Misc Natural Products (GLUCOSAMINE CHONDROITIN ADV PO), Take 2 capsules by mouth daily , Disp: , Rfl:     Multiple Vitamins-Minerals (MULTI COMPLETE) CAPS, Take 1 capsule by mouth daily, Disp: , Rfl:     niacin 100 mg tablet, Take 100 mg by mouth daily with breakfast, Disp: , Rfl:     Omega-3 1000 MG CAPS, Take by mouth daily , Disp: , Rfl:     Turmeric 500 MG CAPS, Take 1,000 mg by mouth daily , Disp: , Rfl:     Zinc 50 MG CAPS, Take by mouth daily, Disp: , Rfl:     meloxicam (MOBIC) 15 mg tablet, Take 1 tablet (15 mg total) by mouth daily, Disp: 30 tablet, Rfl: 1     Review of Systems   Constitutional: Negative for appetite change, chills, fatigue, fever and unexpected weight change  HENT: Negative for trouble swallowing  Eyes: Negative for visual disturbance  Respiratory: Negative for cough, chest tightness, shortness of breath and wheezing  Cardiovascular: Negative for chest pain, palpitations and leg swelling  Gastrointestinal: Negative for abdominal distention, abdominal pain, blood in stool, constipation and diarrhea  Endocrine: Negative for polyuria  Genitourinary: Negative for difficulty urinating and flank pain  Musculoskeletal: Negative for arthralgias and myalgias  Skin: Negative for rash  Neurological: Negative for dizziness and light-headedness     Hematological: Negative for adenopathy  Does not bruise/bleed easily  Psychiatric/Behavioral: Positive for dysphoric mood and sleep disturbance  The patient is not nervous/anxious  Objective:      /72 (BP Location: Left arm, Patient Position: Sitting, Cuff Size: Adult)   Pulse 63   Temp 98 °F (36 7 °C) (Temporal)   Resp 20   Ht 5' 5" (1 651 m)   Wt 71 1 kg (156 lb 12 8 oz)   SpO2 98%   BMI 26 09 kg/m²          Physical Exam  Constitutional:       General: She is not in acute distress  Appearance: She is well-developed  She is not diaphoretic  HENT:      Head: Normocephalic  Eyes:      General:         Right eye: No discharge  Left eye: No discharge  Pupils: Pupils are equal, round, and reactive to light  Neck:      Thyroid: No thyromegaly  Trachea: No tracheal deviation  Cardiovascular:      Rate and Rhythm: Normal rate and regular rhythm  Heart sounds: Normal heart sounds  No murmur  Pulmonary:      Effort: Pulmonary effort is normal  No respiratory distress  Breath sounds: No wheezing or rales  Abdominal:      General: There is no distension  Palpations: Abdomen is soft  Tenderness: There is no abdominal tenderness  Musculoskeletal: Normal range of motion  General: No swelling  Comments: She has Dupuytren's contracture of the left palm  There is no flexion abnormality  She has some pain over her right hip flexors  Bilateral hips move extremely well  There is no limitation to movement  She has some pain over bilateral trochanteric bursa  Lymphadenopathy:      Cervical: No cervical adenopathy  Skin:     General: Skin is warm  Findings: No erythema  Neurological:      Mental Status: She is alert and oriented to person, place, and time  Cranial Nerves: No cranial nerve deficit  Psychiatric:         Thought Content:  Thought content normal          Judgment: Judgment normal            Gabriel Terrazas MD

## 2020-08-24 NOTE — ASSESSMENT & PLAN NOTE
Trial of melatonin 10 mg nightly  If she is not improving, we could try something such as a low-dose lorazepam on as needed basis    She had some side effects from Ambien in the past

## 2020-08-24 NOTE — ASSESSMENT & PLAN NOTE
She is having mostly some insomnia at this point  She is going to try melatonin  Consider low-dose lorazepam as needed

## 2020-08-24 NOTE — ASSESSMENT & PLAN NOTE
Trial of meloxicam 15 mg daily as needed  I gave her number 30 with a refill  She should take the medication for at least a week straight to try to decrease inflammation present  Consider physical therapy if she is not improving

## 2020-08-24 NOTE — ASSESSMENT & PLAN NOTE
We will continue to monitor this  This is not causing any flexion deformity of her head at this time

## 2020-08-24 NOTE — ASSESSMENT & PLAN NOTE
Gets an annual mammogram and MRI done through her gynecologist   She was scheduling that in near future

## 2020-08-24 NOTE — PROGRESS NOTES
Assessment and Plan:     Problem List Items Addressed This Visit     None      Visit Diagnoses     Personal history of malignant neoplasm of breast    -  Primary    Breast cancer screening by mammogram        Welcome to Medicare preventive visit        BMI 26 0-26 9,adult        Essential hypertension            BMI Counseling: Body mass index is 26 09 kg/m²  The BMI is above normal  Nutrition recommendations include encouraging healthy choices of fruits and vegetables  Exercise recommendations include moderate physical activity 150 minutes/week  Patient presents today for Medicare wellness visit  Overall, she is doing well  She has some persistent pain in her right hip area which we will be evaluating  She is up-to-date on vaccines except for Shingrix which I explained is available at the pharmacy for Medicare patients  She does not have a living will and I will give her a copy of 5 wishes today  She screens negative for cognitive issues and depression  She remains very active in completes all of her own ADLs  Preventive health issues were discussed with patient, and age appropriate screening tests were ordered as noted in patient's After Visit Summary  Personalized health advice and appropriate referrals for health education or preventive services given if needed, as noted in patient's After Visit Summary       History of Present Illness:     Patient presents for Medicare Annual Wellness visit    Patient Care Team:  Carolina Salguero MD as PCP - General  DO Rell Harrison MD Adelfa Griffiths, DO     Problem List:     Patient Active Problem List   Diagnosis    Adenocarcinoma of breast ([address] )    Anxiety    Arthritis    Carcinoma, basal cell, skin    Esophageal reflux    Hyperlipidemia    Insomnia    Irritable bowel syndrome    Lumbar disc herniation    Migraine headache    Osteopenia    Malignant neoplasm of skin    Idiopathic peripheral neuropathy    Neutropenia [facility])    Encounter for screening colonoscopy      Past Medical and Surgical History:     Past Medical History:   Diagnosis Date    Arthritis     Breast cancer ([address] )     s/p Lumpectomy, last assessed 2012    Gastric ulcer     GERD (gastroesophageal reflux disease)     Heart murmur     Malignant neoplasm (HCC)     Migraine      Past Surgical History:   Procedure Laterality Date    ABDOMINOPLASTY      ADENOIDECTOMY      BREAST SURGERY      lumpectomy, resolved 2007     SECTION      CHOLECYSTECTOMY      COLON SURGERY      FOOT SURGERY      MOHS SURGERY      REDUCTION MAMMAPLASTY      TOE SURGERY      left toe surgery    TONSILLECTOMY        Family History:     Family History   Problem Relation Age of Onset    Hypertension Mother     Melanoma Mother     Heart attack Father         acute MI, CABG    Hypertension Father     Other Brother         CABG    Lymphoma Brother         non-Hodgkin's    Hypertension Brother     Other Family         back disorder    Stroke Family         CVA    Diabetes Family     Cancer Family       Social History:        Social History     Socioeconomic History    Marital status:      Spouse name: None    Number of children: None    Years of education: None    Highest education level: None   Occupational History    None   Social Needs    Financial resource strain: None    Food insecurity     Worry: None     Inability: None    Transportation needs     Medical: None     Non-medical: None   Tobacco Use    Smoking status: Former Smoker    Smokeless tobacco: Never Used    Tobacco comment: Smoker in teenage years  Quit at 22  One pack per week     Substance and Sexual Activity    Alcohol use: Yes     Comment: occasional    Drug use: No    Sexual activity: None   Lifestyle    Physical activity     Days per week: None     Minutes per session: None    Stress: None   Relationships    Social connections     Talks on phone: None Gets together: None     Attends [employer] service: None     Active member of club or organization: None     Attends meetings of clubs or organizations: None     Relationship status: None    Intimate partner violence     Fear of current or ex partner: None     Emotionally abused: None     Physically abused: None     Forced sexual activity: None   Other Topics Concern    None   Social History Narrative    None      Medications and Allergies:     Current Outpatient Medications   Medication Sig Dispense Refill    Ascorbic Acid (VITAMIN C) 250 MG CHEW Chew 1,000 mg daily       Cholecalciferol (VITAMIN D3) 5000 units CAPS Take 1 tablet by mouth daily      Collagen 500 MG CAPS Take by mouth daily      Cyanocobalamin (B-12) 5000 MCG CAPS Take by mouth      diclofenac sodium (VOLTAREN) 1 % Apply 2 g topically 4 (four) times a day 1 Tube 2    diltiazem (CARTIA XT) 180 mg 24 hr capsule Take 1 capsule (180 mg total) by mouth daily 90 capsule 3    famotidine (PEPCID) 40 MG tablet Take 1 tablet (40 mg total) by mouth daily as needed for heartburn 30 tablet 1    fluticasone (FLONASE) 50 mcg/act nasal spray 2 sprays into each nostril daily 16 g 0    Misc Natural Products (GLUCOSAMINE CHONDROITIN ADV PO) Take 2 capsules by mouth daily       Multiple Vitamins-Minerals (MULTI COMPLETE) CAPS Take 1 capsule by mouth daily      niacin 100 mg tablet Take 100 mg by mouth daily with breakfast      Omega-3 1000 MG CAPS Take by mouth daily       Turmeric 500 MG CAPS Take 1,000 mg by mouth daily       Zinc 50 MG CAPS Take by mouth daily       No current facility-administered medications for this visit        Allergies   Allergen Reactions    Ciprofloxacin Anaphylaxis     Anaphylaxis    Amitriptyline GI Intolerance     Action Taken: bloating,GI problems;     Celecoxib     Other Other (See Comments)     "black olives->GI upset"    Wound Dressing Adhesive     Bactrim [Sulfamethoxazole-Trimethoprim] Rash    Sulfa Antibiotics Rash      Immunizations:     Immunization History   Administered Date(s) Administered    H1N1, All Formulations 10/26/2009    INFLUENZA 11/18/2005, 12/01/2006, 10/19/2007, 11/01/2015    Influenza Quadrivalent, 6-35 Months IM 11/01/2015    Influenza Split High Dose Preservative Free IM 10/14/2016, 10/09/2017    Influenza TIV (IM) 09/14/1997, 10/09/2013    Influenza, high dose seasonal 0 7 mL 09/18/2018, 09/27/2019    Pneumococcal Conjugate 13-Valent 11/16/2015    Pneumococcal Polysaccharide PPV23 12/19/2016    Tdap 11/16/2015, 07/04/2016, 04/03/2017    Zoster 01/06/2014      Health Maintenance:         Topic Date Due    Cervical Cancer Screening  04/30/1971    MAMMOGRAM  06/17/2020    Hepatitis C Screening  Completed         Topic Date Due    Influenza Vaccine  07/01/2020      Medicare Health Risk Assessment:     /72 (BP Location: Left arm, Patient Position: Sitting, Cuff Size: Adult)   Pulse 63   Temp 98 °F (36 7 °C) (Temporal)   Resp 20   Ht 5' 5" (1 651 m)   Wt 71 1 kg (156 lb 12 8 oz)   SpO2 98%   BMI 26 09 kg/m²      Tyrone Alfred is here for her Subsequent Wellness visit  Health Risk Assessment:   Patient rates overall health as very good  Patient feels that their physical health rating is same  Eyesight was rated as same  Hearing was rated as same  Patient feels that their emotional and mental health rating is same  Pain experienced in the last 7 days has been some  Patient's pain rating has been 6/10  Patient states that she has experienced weight loss or gain in last 6 months  Depression Screening:   PHQ-2 Score: 0      Fall Risk Screening: In the past year, patient has experienced: history of falling in past year    Number of falls: 1  Injured during fall?: Yes    Feels unsteady when standing or walking?: No    Worried about falling?: No      Urinary Incontinence Screening:   Patient has not leaked urine accidently in the last six months       Home Safety:  Patient does not have trouble with stairs inside or outside of their home  Patient has working smoke alarms and has working carbon monoxide detector  Home safety hazards include: none  Nutrition:   Current diet is Regular  Medications:   Patient is currently taking over-the-counter supplements  OTC medications include: see medication list  Patient is able to manage medications  Activities of Daily Living (ADLs)/Instrumental Activities of Daily Living (IADLs):   Walk and transfer into and out of bed and chair?: Yes  Dress and groom yourself?: Yes    Bathe or shower yourself?: Yes    Feed yourself?  Yes  Do your laundry/housekeeping?: Yes  Manage your money, pay your bills and track your expenses?: Yes  Make your own meals?: Yes    Do your own shopping?: Yes    Advance Care Planning:   Living will: No    Advanced directive: Yes    Advanced directive counseling given: Yes    Five wishes given: Yes    End of Life Decisions reviewed with patient: Yes      Cognitive Screening:   Provider or family/friend/caregiver concerned regarding cognition?: No    PREVENTIVE SCREENINGS      Cardiovascular Screening:    General: Screening Not Indicated and History Lipid Disorder      Diabetes Screening:     General: Screening Current      Colorectal Cancer Screening:     General: Screening Current      Breast Cancer Screening:     General: History Breast Cancer      Cervical Cancer Screening:    General: Screening Not Indicated      Osteoporosis Screening:    General: Risks and Benefits Discussed    Due for: DXA Appendicular      Abdominal Aortic Aneurysm (AAA) Screening:        General: Screening Not Indicated      Lung Cancer Screening:     General: Screening Not Indicated      Hepatitis C Screening:    General: Screening Current      Bárbara Malave MD

## 2020-08-24 NOTE — ASSESSMENT & PLAN NOTE
She has a history of skin cancer  She does have a few lesions on her chest that may be early basal cell cancers and she was encouraged to follow-up with dermatology  She notes she will schedule that in the very near future

## 2020-08-28 ENCOUNTER — APPOINTMENT (OUTPATIENT)
Dept: LAB | Facility: MEDICAL CENTER | Age: 70
End: 2020-08-28
Payer: MEDICARE

## 2020-08-28 DIAGNOSIS — Z20.828 EXPOSURE TO SARS-ASSOCIATED CORONAVIRUS: ICD-10-CM

## 2020-08-28 DIAGNOSIS — E78.5 HYPERLIPIDEMIA, UNSPECIFIED HYPERLIPIDEMIA TYPE: ICD-10-CM

## 2020-08-28 DIAGNOSIS — I10 ESSENTIAL HYPERTENSION: ICD-10-CM

## 2020-08-28 DIAGNOSIS — F41.9 ANXIETY: ICD-10-CM

## 2020-08-28 LAB
ALBUMIN SERPL BCP-MCNC: 4.3 G/DL (ref 3.5–5)
ALP SERPL-CCNC: 89 U/L (ref 46–116)
ALT SERPL W P-5'-P-CCNC: 31 U/L (ref 12–78)
ANION GAP SERPL CALCULATED.3IONS-SCNC: 3 MMOL/L (ref 4–13)
AST SERPL W P-5'-P-CCNC: 18 U/L (ref 5–45)
BILIRUB SERPL-MCNC: 0.84 MG/DL (ref 0.2–1)
BUN SERPL-MCNC: 14 MG/DL (ref 5–25)
CALCIUM SERPL-MCNC: 9.5 MG/DL (ref 8.3–10.1)
CHLORIDE SERPL-SCNC: 105 MMOL/L (ref 100–108)
CHOLEST SERPL-MCNC: 249 MG/DL (ref 50–200)
CO2 SERPL-SCNC: 29 MMOL/L (ref 21–32)
CREAT SERPL-MCNC: 0.87 MG/DL (ref 0.6–1.3)
GFR SERPL CREATININE-BSD FRML MDRD: 68 ML/MIN/1.73SQ M
GLUCOSE P FAST SERPL-MCNC: 100 MG/DL (ref 65–99)
HDLC SERPL-MCNC: 50 MG/DL
LDLC SERPL CALC-MCNC: 176 MG/DL (ref 0–100)
POTASSIUM SERPL-SCNC: 4.4 MMOL/L (ref 3.5–5.3)
PROT SERPL-MCNC: 7.5 G/DL (ref 6.4–8.2)
SODIUM SERPL-SCNC: 137 MMOL/L (ref 136–145)
TRIGL SERPL-MCNC: 115 MG/DL
TSH SERPL DL<=0.05 MIU/L-ACNC: 0.94 UIU/ML (ref 0.36–3.74)

## 2020-08-28 PROCEDURE — 80053 COMPREHEN METABOLIC PANEL: CPT

## 2020-08-28 PROCEDURE — 80061 LIPID PANEL: CPT

## 2020-08-28 PROCEDURE — 86769 SARS-COV-2 COVID-19 ANTIBODY: CPT

## 2020-08-28 PROCEDURE — 84443 ASSAY THYROID STIM HORMONE: CPT

## 2020-08-29 LAB — SARS-COV-2 IGG+IGM SERPL QL IA: NORMAL

## 2020-09-09 DIAGNOSIS — E78.5 HYPERLIPIDEMIA, UNSPECIFIED HYPERLIPIDEMIA TYPE: Primary | ICD-10-CM

## 2020-09-09 RX ORDER — ROSUVASTATIN CALCIUM 10 MG/1
10 TABLET, COATED ORAL DAILY
Qty: 90 TABLET | Refills: 3 | Status: SHIPPED | OUTPATIENT
Start: 2020-09-09 | End: 2021-09-03

## 2020-09-09 NOTE — PROGRESS NOTES
The patient has elevated lipids and she would like to initiate statin therapy  I am going to place her on rosuvastatin 10 mg daily    Check fasting labs in 4-6 months

## 2020-09-23 ENCOUNTER — OFFICE VISIT (OUTPATIENT)
Dept: GASTROENTEROLOGY | Facility: CLINIC | Age: 70
End: 2020-09-23
Payer: MEDICARE

## 2020-09-23 VITALS
HEART RATE: 62 BPM | DIASTOLIC BLOOD PRESSURE: 70 MMHG | HEIGHT: 65 IN | TEMPERATURE: 98 F | WEIGHT: 158 LBS | SYSTOLIC BLOOD PRESSURE: 120 MMHG | BODY MASS INDEX: 26.33 KG/M2

## 2020-09-23 DIAGNOSIS — K62.5 RECTAL BLEEDING: Primary | ICD-10-CM

## 2020-09-23 DIAGNOSIS — I10 ESSENTIAL HYPERTENSION: ICD-10-CM

## 2020-09-23 DIAGNOSIS — R10.30 LOWER ABDOMINAL PAIN: ICD-10-CM

## 2020-09-23 DIAGNOSIS — K58.9 IRRITABLE BOWEL SYNDROME, UNSPECIFIED TYPE: ICD-10-CM

## 2020-09-23 DIAGNOSIS — Z12.11 ENCOUNTER FOR SCREENING COLONOSCOPY: ICD-10-CM

## 2020-09-23 PROCEDURE — 99214 OFFICE O/P EST MOD 30 MIN: CPT | Performed by: INTERNAL MEDICINE

## 2020-09-23 RX ORDER — POLYETHYLENE GLYCOL 3350 17 G/17G
17 POWDER, FOR SOLUTION ORAL DAILY
Qty: 14 EACH | Refills: 0 | Status: SHIPPED | OUTPATIENT
Start: 2020-09-23 | End: 2021-01-06 | Stop reason: ALTCHOICE

## 2020-09-23 RX ORDER — DILTIAZEM HYDROCHLORIDE 180 MG/1
CAPSULE, EXTENDED RELEASE ORAL
Qty: 90 CAPSULE | Refills: 3 | Status: SHIPPED | OUTPATIENT
Start: 2020-09-23 | End: 2021-09-27 | Stop reason: SDUPTHER

## 2020-09-23 NOTE — PROGRESS NOTES
[facility] - Outpatient Consultation  Abigail Alvarez 72 y o  female MRN: [id number]  Encounter: [id number]      PCP: Kelli Sicard, MD  Referring: Kelli Sicard , MD  [address] [facility]  [address] [place]      ASSESSMENT AND PLAN:      1  Rectal bleeding  - with 4-6 weeks of rectal bleeding  - concern for anorectal disease vs diverticular vs other lesion  - polyethylene glycol (MIRALAX) 17 g packet; Take 17 g by mouth daily  Dispense: 14 each; Refill: 0, encouraged use 3-4 days prior to colonoscopy prep instructions given her previous poor prep and concern for underlying constipation  - Colonoscopy; Future  - polyethylene glycol (GOLYTELY) 4000 mL solution; Take 4,000 mL by mouth once for 1 dose  Dispense: 4000 mL; Refill: 0    2  Irritable bowel syndrome, unspecified type  3  Lower abdominal pain  - plan for CT if colonoscopy is unrevealing    4  Encounter for screening colonoscopy  - poor prep previously in Dec 2019  - polyethylene glycol (GOLYTELY) 4000 mL solution; Take 4,000 mL by mouth once for 1 dose  Dispense: 4000 mL; Refill: 0      ______________________________________________________________________    HPI:      Patient is a 56-year-old female who sees our office in follow-up for rectal bleeding  She has breast cancer (in remission for 13 years), lumbar disc herniation, idiopathic peripheral neuropathy, HTN, migraine headaches, osteopenia, basal cell skin cancer, HLD  She is post-menopausal  Surgical history for  x 3, abdominoplasty, and open cholecystectomy remotely  She was last seen in 2019 by Dr Kevin Hunter and underwent EGD/colonoscopy with evidence of diverticular disease, poor prep (repeat recommended in 1 year), and LA class B esophagitis, biopsies negative for celiac disease, H pylori  She reports a 4-6 week history of rectal bleeding  She states that she sees blood when wiping, and blood in a halo around the stool    This is associated with anal itching, liquid seepage and loose liquid blood coming from her rectum not always associated with bowel movements  She has been taking Metamucil 1 tsp daily, and states that this has softened her stool, she has bowel movements once daily  She feels that she is not straining, but does have a longstanding history of irregular bowel habits (constipation in childhood - does not feel she has it now)  She complains of crampy lower abdominal pain  She describes long standing "IBS symptoms" that consist of fecal urgency and abdominal pain when stressed and after eating  She feels these symptoms are under control at this time and not related to her rectal bleeding  She is concerned about her personal history of breast cancer, however  REVIEW OF SYSTEMS:    CONSTITUTIONAL: Denies any fever, chills, rigors, and weight loss  HEENT: No earache or tinnitus  Denies hearing loss or visual disturbances  CARDIOVASCULAR: No chest pain or palpitations  RESPIRATORY: Denies any cough, hemoptysis, shortness of breath or dyspnea on exertion  GASTROINTESTINAL: As noted in the History of Present Illness  GENITOURINARY: No problems with urination  Denies any hematuria or dysuria  NEUROLOGIC: No dizziness or vertigo, denies headaches  MUSCULOSKELETAL: Denies any muscle or joint pain  SKIN: Denies skin rashes or itching  ENDOCRINE: Denies excessive thirst  Denies intolerance to heat or cold  PSYCHOSOCIAL: Denies depression or anxiety  Denies any recent memory loss         Historical Information   Past Medical History:   Diagnosis Date    Arthritis     Breast cancer ([facility] [address] )     s/p Lumpectomy, last assessed 12/11/2012    Gastric ulcer     GERD (gastroesophageal reflux disease)     Heart murmur     Malignant neoplasm (HCC)     Migraine      Past Surgical History:   Procedure Laterality Date    ABDOMINOPLASTY      ADENOIDECTOMY      BREAST SURGERY      lumpectomy, resolved 11/01/2007     SECTION      CHOLECYSTECTOMY      COLON SURGERY      FOOT SURGERY      MOHS SURGERY      REDUCTION MAMMAPLASTY      TOE SURGERY      left toe surgery    TONSILLECTOMY       Social History   Social History     Substance and Sexual Activity   Alcohol Use Yes    Comment: occasional     Social History     Substance and Sexual Activity   Drug Use No     Social History     Tobacco Use   Smoking Status Former Smoker   Smokeless Tobacco Never Used   Tobacco Comment    Smoker in teenage years  Quit at 22  One pack per week       Family History   Problem Relation Age of Onset    Hypertension Mother     Melanoma Mother     Heart attack Father         acute MI, CABG    Hypertension Father     Other Brother         CABG    Lymphoma Brother         non-Hodgkin's    Hypertension Brother     Other Family         back disorder    Stroke Family         CVA    Diabetes Family     Cancer Family        Meds/Allergies       Current Outpatient Medications:     Ascorbic Acid (VITAMIN C) 250 MG CHEW    Cholecalciferol (VITAMIN D3) 5000 units CAPS    Collagen 500 MG CAPS    Cyanocobalamin (B-12) 5000 MCG CAPS    diclofenac sodium (VOLTAREN) 1 %    diltiazem (CARTIA XT) 180 mg 24 hr capsule    famotidine (PEPCID) 40 MG tablet    fluticasone (FLONASE) 50 mcg/act nasal spray    meloxicam (MOBIC) 15 mg tablet    Misc Natural Products (GLUCOSAMINE CHONDROITIN ADV PO)    Multiple Vitamins-Minerals (MULTI COMPLETE) CAPS    niacin 100 mg tablet    Omega-3 1000 MG CAPS    rosuvastatin (CRESTOR) 10 MG tablet    Turmeric 500 MG CAPS    Zinc 50 MG CAPS    Allergies   Allergen Reactions    Ciprofloxacin Anaphylaxis     Anaphylaxis    Amitriptyline GI Intolerance     Action Taken: bloating,GI problems;     Celecoxib     Other Other (See Comments)     "black olives->GI upset"    Wound Dressing Adhesive     Bactrim [Sulfamethoxazole-Trimethoprim] Rash    Sulfa Antibiotics Rash           Objective There were no vitals taken for this visit  There is no height or weight on file to calculate BMI  PHYSICAL EXAM:      General Appearance:   Alert, cooperative, no distress   HEENT:   Normocephalic, atraumatic, anicteric  Neck:  Supple, symmetrical, trachea midline   Lungs:   Clear to auscultation bilaterally; no rales, rhonchi or wheezing; respirations unlabored    Heart[de-identified]   Regular rate and rhythm; no murmur, rub, or gallop  Abdomen:   Soft, non-tender, non-distended; normal bowel sounds; no masses, no organomegaly    Genitalia:   Deferred    Rectal:   Deferred    Extremities:  No cyanosis, clubbing or edema    Pulses:  2+ and symmetric    Skin:  No jaundice, rashes, or lesions    Lymph nodes:  No palpable cervical lymphadenopathy        Lab Results:     Lab Results   Component Value Date    WBC 4 21 (L) 08/28/2020    HGB 13 7 08/28/2020    HCT 43 8 08/28/2020    MCV 91 08/28/2020     08/28/2020       Lab Results   Component Value Date     12/17/2015    K 4 4 08/28/2020     08/28/2020    CO2 29 08/28/2020    ANIONGAP 5 12/17/2015    BUN 14 08/28/2020    CREATININE 0 87 08/28/2020    GLUCOSE 98 12/17/2015    GLUF 100 (H) 08/28/2020    CALCIUM 9 5 08/28/2020    AST 18 08/28/2020    ALT 31 08/28/2020    ALKPHOS 89 08/28/2020    PROT 6 9 12/17/2015    BILITOT 1 32 (H) 12/17/2015    EGFR 68 08/28/2020       No results found for: INR, PROTIME      Radiology Results:   No results found  Portions of the record may have been created with voice recognition software  Occasional wrong word or "sound a like" substitutions may have occurred due to the inherent limitations of voice recognition software  Read the chart carefully and recognize, using context, where substitutions have occurred

## 2020-09-23 NOTE — PATIENT INSTRUCTIONS
- miralax to jump start colon  - start taking 1 capful daily x 3-4 days beforehand  - if you notice more seepage or leakage, stop the medication    Then start colonoscopy prep per instructions        Colonoscopy scheduled on 10/7/20 with Dr Singleton at [facility] gave patient verbal instructions/paper work given  Prep-Golytely/Magnesium Citrate

## 2020-10-06 ENCOUNTER — ANESTHESIA EVENT (OUTPATIENT)
Dept: GASTROENTEROLOGY | Facility: AMBULARY SURGERY CENTER | Age: 70
End: 2020-10-06

## 2020-10-07 ENCOUNTER — ANESTHESIA (OUTPATIENT)
Dept: GASTROENTEROLOGY | Facility: AMBULARY SURGERY CENTER | Age: 70
End: 2020-10-07

## 2020-10-07 ENCOUNTER — HOSPITAL ENCOUNTER (OUTPATIENT)
Dept: GASTROENTEROLOGY | Facility: AMBULARY SURGERY CENTER | Age: 70
Setting detail: OUTPATIENT SURGERY
Discharge: HOME | End: 2020-10-07
Attending: INTERNAL MEDICINE
Payer: MEDICARE

## 2020-10-07 VITALS
SYSTOLIC BLOOD PRESSURE: 170 MMHG | DIASTOLIC BLOOD PRESSURE: 80 MMHG | WEIGHT: 154 LBS | BODY MASS INDEX: 25.66 KG/M2 | HEIGHT: 65 IN | TEMPERATURE: 97 F | OXYGEN SATURATION: 99 % | HEART RATE: 57 BPM | RESPIRATION RATE: 16 BRPM

## 2020-10-07 VITALS — HEART RATE: 61 BPM

## 2020-10-07 DIAGNOSIS — K62.5 RECTAL BLEEDING: ICD-10-CM

## 2020-10-07 PROCEDURE — 88342 IMHCHEM/IMCYTCHM 1ST ANTB: CPT | Performed by: PATHOLOGY

## 2020-10-07 PROCEDURE — 45380 COLONOSCOPY AND BIOPSY: CPT | Performed by: INTERNAL MEDICINE

## 2020-10-07 PROCEDURE — 88305 TISSUE EXAM BY PATHOLOGIST: CPT | Performed by: PATHOLOGY

## 2020-10-07 PROCEDURE — 88341 IMHCHEM/IMCYTCHM EA ADD ANTB: CPT | Performed by: PATHOLOGY

## 2020-10-07 RX ORDER — PROPOFOL 10 MG/ML
INJECTION, EMULSION INTRAVENOUS AS NEEDED
Status: DISCONTINUED | OUTPATIENT
Start: 2020-10-07 | End: 2020-10-07

## 2020-10-07 RX ORDER — SODIUM CHLORIDE, SODIUM LACTATE, POTASSIUM CHLORIDE, CALCIUM CHLORIDE 600; 310; 30; 20 MG/100ML; MG/100ML; MG/100ML; MG/100ML
125 INJECTION, SOLUTION INTRAVENOUS CONTINUOUS
Status: DISCONTINUED | OUTPATIENT
Start: 2020-10-07 | End: 2020-10-11 | Stop reason: HOSPADM

## 2020-10-07 RX ORDER — LIDOCAINE HYDROCHLORIDE 10 MG/ML
0.5 INJECTION, SOLUTION EPIDURAL; INFILTRATION; INTRACAUDAL; PERINEURAL ONCE AS NEEDED
Status: DISCONTINUED | OUTPATIENT
Start: 2020-10-07 | End: 2020-10-11 | Stop reason: HOSPADM

## 2020-10-07 RX ORDER — MESALAMINE 1000 MG/1
1000 SUPPOSITORY RECTAL
Qty: 30 SUPPOSITORY | Refills: 3 | Status: SHIPPED | OUTPATIENT
Start: 2020-10-07 | End: 2020-11-09

## 2020-10-07 RX ADMIN — PROPOFOL 50 MG: 10 INJECTION, EMULSION INTRAVENOUS at 12:24

## 2020-10-07 RX ADMIN — PROPOFOL 50 MG: 10 INJECTION, EMULSION INTRAVENOUS at 12:30

## 2020-10-07 RX ADMIN — PROPOFOL 50 MG: 10 INJECTION, EMULSION INTRAVENOUS at 12:32

## 2020-10-07 RX ADMIN — SODIUM CHLORIDE, SODIUM LACTATE, POTASSIUM CHLORIDE, AND CALCIUM CHLORIDE: .6; .31; .03; .02 INJECTION, SOLUTION INTRAVENOUS at 12:16

## 2020-10-07 RX ADMIN — PROPOFOL 50 MG: 10 INJECTION, EMULSION INTRAVENOUS at 12:36

## 2020-10-07 RX ADMIN — PROPOFOL 100 MG: 10 INJECTION, EMULSION INTRAVENOUS at 12:19

## 2020-11-03 ENCOUNTER — LAB (OUTPATIENT)
Dept: LAB | Facility: MEDICAL CENTER | Age: 70
End: 2020-11-03
Payer: MEDICARE

## 2020-11-03 DIAGNOSIS — C50.911 ADENOCARCINOMA OF RIGHT BREAST (HCC): ICD-10-CM

## 2020-11-03 DIAGNOSIS — C50.911 ADENOCARCINOMA OF RIGHT BREAST (HCC): Primary | ICD-10-CM

## 2020-11-03 LAB
ALBUMIN SERPL BCP-MCNC: 4.2 G/DL (ref 3.5–5)
ALP SERPL-CCNC: 91 U/L (ref 46–116)
ALT SERPL W P-5'-P-CCNC: 25 U/L (ref 12–78)
ANION GAP SERPL CALCULATED.3IONS-SCNC: 3 MMOL/L (ref 4–13)
AST SERPL W P-5'-P-CCNC: 17 U/L (ref 5–45)
BASOPHILS # BLD AUTO: 0.11 THOUSANDS/ΜL (ref 0–0.1)
BASOPHILS NFR BLD AUTO: 2 % (ref 0–1)
BILIRUB SERPL-MCNC: 1 MG/DL (ref 0.2–1)
BUN SERPL-MCNC: 19 MG/DL (ref 5–25)
CALCIUM SERPL-MCNC: 9.5 MG/DL (ref 8.3–10.1)
CANCER AG27-29 SERPL-ACNC: 7.1 U/ML (ref 0–42.3)
CHLORIDE SERPL-SCNC: 107 MMOL/L (ref 100–108)
CO2 SERPL-SCNC: 30 MMOL/L (ref 21–32)
CREAT SERPL-MCNC: 0.92 MG/DL (ref 0.6–1.3)
EOSINOPHIL # BLD AUTO: 0.18 THOUSAND/ΜL (ref 0–0.61)
EOSINOPHIL NFR BLD AUTO: 3 % (ref 0–6)
ERYTHROCYTE [DISTWIDTH] IN BLOOD BY AUTOMATED COUNT: 13.7 % (ref 11.6–15.1)
GFR SERPL CREATININE-BSD FRML MDRD: 64 ML/MIN/1.73SQ M
GLUCOSE SERPL-MCNC: 94 MG/DL (ref 65–140)
HCT VFR BLD AUTO: 42 % (ref 34.8–46.1)
HGB BLD-MCNC: 13.1 G/DL (ref 11.5–15.4)
IMM GRANULOCYTES # BLD AUTO: 0.02 THOUSAND/UL (ref 0–0.2)
IMM GRANULOCYTES NFR BLD AUTO: 0 % (ref 0–2)
LYMPHOCYTES # BLD AUTO: 2.04 THOUSANDS/ΜL (ref 0.6–4.47)
LYMPHOCYTES NFR BLD AUTO: 35 % (ref 14–44)
MCH RBC QN AUTO: 28 PG (ref 26.8–34.3)
MCHC RBC AUTO-ENTMCNC: 31.2 G/DL (ref 31.4–37.4)
MCV RBC AUTO: 90 FL (ref 82–98)
MONOCYTES # BLD AUTO: 0.53 THOUSAND/ΜL (ref 0.17–1.22)
MONOCYTES NFR BLD AUTO: 9 % (ref 4–12)
NEUTROPHILS # BLD AUTO: 3.04 THOUSANDS/ΜL (ref 1.85–7.62)
NEUTS SEG NFR BLD AUTO: 51 % (ref 43–75)
NRBC BLD AUTO-RTO: 0 /100 WBCS
PLATELET # BLD AUTO: 281 THOUSANDS/UL (ref 149–390)
PMV BLD AUTO: 10.5 FL (ref 8.9–12.7)
POTASSIUM SERPL-SCNC: 4 MMOL/L (ref 3.5–5.3)
PROT SERPL-MCNC: 6.9 G/DL (ref 6.4–8.2)
RBC # BLD AUTO: 4.68 MILLION/UL (ref 3.81–5.12)
SODIUM SERPL-SCNC: 140 MMOL/L (ref 136–145)
WBC # BLD AUTO: 5.92 THOUSAND/UL (ref 4.31–10.16)

## 2020-11-03 PROCEDURE — 36415 COLL VENOUS BLD VENIPUNCTURE: CPT

## 2020-11-03 PROCEDURE — 86300 IMMUNOASSAY TUMOR CA 15-3: CPT

## 2020-11-03 PROCEDURE — 80053 COMPREHEN METABOLIC PANEL: CPT

## 2020-11-03 PROCEDURE — 85025 COMPLETE CBC W/AUTO DIFF WBC: CPT

## 2020-11-04 ENCOUNTER — OFFICE VISIT (OUTPATIENT)
Dept: HEMATOLOGY ONCOLOGY | Facility: CLINIC | Age: 70
End: 2020-11-04
Payer: MEDICARE

## 2020-11-04 VITALS
BODY MASS INDEX: 26.82 KG/M2 | DIASTOLIC BLOOD PRESSURE: 80 MMHG | SYSTOLIC BLOOD PRESSURE: 122 MMHG | WEIGHT: 161 LBS | HEIGHT: 65 IN | RESPIRATION RATE: 16 BRPM | OXYGEN SATURATION: 97 % | TEMPERATURE: 97.4 F | HEART RATE: 67 BPM

## 2020-11-04 DIAGNOSIS — M19.90 ARTHRITIS: ICD-10-CM

## 2020-11-04 DIAGNOSIS — C44.91 BASAL CELL CARCINOMA (BCC), UNSPECIFIED SITE: ICD-10-CM

## 2020-11-04 DIAGNOSIS — Z78.9 NEED FOR FOLLOW-UP BY SOCIAL WORKER: Primary | ICD-10-CM

## 2020-11-04 DIAGNOSIS — C50.911 ADENOCARCINOMA OF RIGHT BREAST (HCC): ICD-10-CM

## 2020-11-04 PROCEDURE — 99213 OFFICE O/P EST LOW 20 MIN: CPT | Performed by: INTERNAL MEDICINE

## 2020-11-05 ENCOUNTER — PATIENT OUTREACH (OUTPATIENT)
Dept: CASE MANAGEMENT | Facility: HOSPITAL | Age: 70
End: 2020-11-05

## 2020-11-09 ENCOUNTER — PREP FOR PROCEDURE (OUTPATIENT)
Dept: GASTROENTEROLOGY | Facility: CLINIC | Age: 70
End: 2020-11-09

## 2020-11-09 ENCOUNTER — LAB (OUTPATIENT)
Dept: LAB | Facility: MEDICAL CENTER | Age: 70
End: 2020-11-09
Attending: INTERNAL MEDICINE
Payer: MEDICARE

## 2020-11-09 ENCOUNTER — OFFICE VISIT (OUTPATIENT)
Dept: GASTROENTEROLOGY | Facility: CLINIC | Age: 70
End: 2020-11-09
Payer: MEDICARE

## 2020-11-09 VITALS
HEART RATE: 81 BPM | TEMPERATURE: 97.3 F | HEIGHT: 65 IN | DIASTOLIC BLOOD PRESSURE: 84 MMHG | WEIGHT: 162.2 LBS | SYSTOLIC BLOOD PRESSURE: 126 MMHG | BODY MASS INDEX: 27.02 KG/M2

## 2020-11-09 DIAGNOSIS — K51.211 ULCERATIVE PROCTITIS WITH RECTAL BLEEDING (HCC): Primary | ICD-10-CM

## 2020-11-09 DIAGNOSIS — K62.5 RECTAL BLEEDING: ICD-10-CM

## 2020-11-09 DIAGNOSIS — K51.211 ULCERATIVE PROCTITIS WITH RECTAL BLEEDING (HCC): ICD-10-CM

## 2020-11-09 DIAGNOSIS — Z11.59 ENCOUNTER FOR SCREENING FOR OTHER VIRAL DISEASES: ICD-10-CM

## 2020-11-09 DIAGNOSIS — K62.5 RECTAL BLEEDING: Primary | ICD-10-CM

## 2020-11-09 LAB
CRP SERPL QL: <3 MG/L
ERYTHROCYTE [SEDIMENTATION RATE] IN BLOOD: 2 MM/HOUR (ref 0–29)

## 2020-11-09 PROCEDURE — 86140 C-REACTIVE PROTEIN: CPT

## 2020-11-09 PROCEDURE — 86803 HEPATITIS C AB TEST: CPT

## 2020-11-09 PROCEDURE — 36415 COLL VENOUS BLD VENIPUNCTURE: CPT

## 2020-11-09 PROCEDURE — 86708 HEPATITIS A ANTIBODY: CPT

## 2020-11-09 PROCEDURE — 85652 RBC SED RATE AUTOMATED: CPT

## 2020-11-09 PROCEDURE — 99214 OFFICE O/P EST MOD 30 MIN: CPT | Performed by: INTERNAL MEDICINE

## 2020-11-09 PROCEDURE — 82542 COL CHROMOTOGRAPHY QUAL/QUAN: CPT

## 2020-11-09 PROCEDURE — 86706 HEP B SURFACE ANTIBODY: CPT

## 2020-11-10 LAB
HAV AB SER QL IA: REACTIVE
HBV SURFACE AB SER-ACNC: <3.1 MIU/ML
HCV AB SER QL: NORMAL

## 2020-11-13 LAB
REF LAB TEST METHOD: NORMAL
TEST INTERPRETATION: NORMAL
TPMT RBC-CCNC: 21 UNITS/ML RBC

## 2020-12-08 ENCOUNTER — TELEMEDICINE (OUTPATIENT)
Dept: FAMILY MEDICINE CLINIC | Facility: CLINIC | Age: 70
End: 2020-12-08
Payer: MEDICARE

## 2020-12-08 VITALS — TEMPERATURE: 97.6 F

## 2020-12-08 DIAGNOSIS — Z03.818 ENCOUNTER FOR OBSERVATION FOR SUSPECTED EXPOSURE TO OTHER BIOLOGICAL AGENTS RULED OUT: Primary | ICD-10-CM

## 2020-12-08 PROCEDURE — 99213 OFFICE O/P EST LOW 20 MIN: CPT | Performed by: INTERNAL MEDICINE

## 2020-12-10 DIAGNOSIS — Z03.818 ENCOUNTER FOR OBSERVATION FOR SUSPECTED EXPOSURE TO OTHER BIOLOGICAL AGENTS RULED OUT: ICD-10-CM

## 2020-12-10 PROCEDURE — U0003 INFECTIOUS AGENT DETECTION BY NUCLEIC ACID (DNA OR RNA); SEVERE ACUTE RESPIRATORY SYNDROME CORONAVIRUS 2 (SARS-COV-2) (CORONAVIRUS DISEASE [COVID-19]), AMPLIFIED PROBE TECHNIQUE, MAKING USE OF HIGH THROUGHPUT TECHNOLOGIES AS DESCRIBED BY CMS-2020-01-R: HCPCS | Performed by: INTERNAL MEDICINE

## 2020-12-11 LAB — SARS-COV-2 RNA SPEC QL NAA+PROBE: NOT DETECTED

## 2020-12-19 ENCOUNTER — HOSPITAL ENCOUNTER (EMERGENCY)
Facility: HOSPITAL | Age: 70
Discharge: HOME | End: 2020-12-19
Attending: EMERGENCY MEDICINE | Admitting: EMERGENCY MEDICINE
Payer: MEDICARE

## 2020-12-19 ENCOUNTER — APPOINTMENT (EMERGENCY)
Dept: CT IMAGING | Facility: HOSPITAL | Age: 70
End: 2020-12-19
Payer: MEDICARE

## 2020-12-19 VITALS
TEMPERATURE: 97.5 F | RESPIRATION RATE: 16 BRPM | WEIGHT: 164.24 LBS | SYSTOLIC BLOOD PRESSURE: 150 MMHG | DIASTOLIC BLOOD PRESSURE: 85 MMHG | HEART RATE: 73 BPM | OXYGEN SATURATION: 98 % | BODY MASS INDEX: 27.33 KG/M2

## 2020-12-19 DIAGNOSIS — H53.9 VISUAL CHANGES: ICD-10-CM

## 2020-12-19 DIAGNOSIS — H57.04 PUPIL DILATION: Primary | ICD-10-CM

## 2020-12-19 DIAGNOSIS — R51.9 HEADACHE: ICD-10-CM

## 2020-12-19 LAB
ANION GAP SERPL CALCULATED.3IONS-SCNC: 5 MMOL/L (ref 4–13)
BASOPHILS # BLD AUTO: 0.1 THOUSANDS/ΜL (ref 0–0.1)
BASOPHILS NFR BLD AUTO: 2 % (ref 0–1)
BUN SERPL-MCNC: 12 MG/DL (ref 5–25)
CALCIUM SERPL-MCNC: 9.7 MG/DL (ref 8.3–10.1)
CHLORIDE SERPL-SCNC: 105 MMOL/L (ref 100–108)
CO2 SERPL-SCNC: 33 MMOL/L (ref 21–32)
CREAT SERPL-MCNC: 0.69 MG/DL (ref 0.6–1.3)
EOSINOPHIL # BLD AUTO: 0.19 THOUSAND/ΜL (ref 0–0.61)
EOSINOPHIL NFR BLD AUTO: 3 % (ref 0–6)
ERYTHROCYTE [DISTWIDTH] IN BLOOD BY AUTOMATED COUNT: 13.2 % (ref 11.6–15.1)
GFR SERPL CREATININE-BSD FRML MDRD: 89 ML/MIN/1.73SQ M
GLUCOSE SERPL-MCNC: 100 MG/DL (ref 65–140)
HCT VFR BLD AUTO: 43.1 % (ref 34.8–46.1)
HGB BLD-MCNC: 13.7 G/DL (ref 11.5–15.4)
IMM GRANULOCYTES # BLD AUTO: 0.02 THOUSAND/UL (ref 0–0.2)
IMM GRANULOCYTES NFR BLD AUTO: 0 % (ref 0–2)
LYMPHOCYTES # BLD AUTO: 2.53 THOUSANDS/ΜL (ref 0.6–4.47)
LYMPHOCYTES NFR BLD AUTO: 39 % (ref 14–44)
MCH RBC QN AUTO: 28.5 PG (ref 26.8–34.3)
MCHC RBC AUTO-ENTMCNC: 31.8 G/DL (ref 31.4–37.4)
MCV RBC AUTO: 90 FL (ref 82–98)
MONOCYTES # BLD AUTO: 0.6 THOUSAND/ΜL (ref 0.17–1.22)
MONOCYTES NFR BLD AUTO: 9 % (ref 4–12)
NEUTROPHILS # BLD AUTO: 3.1 THOUSANDS/ΜL (ref 1.85–7.62)
NEUTS SEG NFR BLD AUTO: 47 % (ref 43–75)
NRBC BLD AUTO-RTO: 0 /100 WBCS
PLATELET # BLD AUTO: 319 THOUSANDS/UL (ref 149–390)
PMV BLD AUTO: 9.4 FL (ref 8.9–12.7)
POTASSIUM SERPL-SCNC: 3.4 MMOL/L (ref 3.5–5.3)
RBC # BLD AUTO: 4.8 MILLION/UL (ref 3.81–5.12)
SODIUM SERPL-SCNC: 143 MMOL/L (ref 136–145)
WBC # BLD AUTO: 6.54 THOUSAND/UL (ref 4.31–10.16)

## 2020-12-19 PROCEDURE — 99284 EMERGENCY DEPT VISIT MOD MDM: CPT

## 2020-12-19 PROCEDURE — 93005 ELECTROCARDIOGRAM TRACING: CPT

## 2020-12-19 PROCEDURE — 85025 COMPLETE CBC W/AUTO DIFF WBC: CPT | Performed by: INTERNAL MEDICINE

## 2020-12-19 PROCEDURE — 36415 COLL VENOUS BLD VENIPUNCTURE: CPT | Performed by: INTERNAL MEDICINE

## 2020-12-19 PROCEDURE — 70496 CT ANGIOGRAPHY HEAD: CPT

## 2020-12-19 PROCEDURE — 80048 BASIC METABOLIC PNL TOTAL CA: CPT | Performed by: INTERNAL MEDICINE

## 2020-12-19 PROCEDURE — G1004 CDSM NDSC: HCPCS

## 2020-12-19 PROCEDURE — 99284 EMERGENCY DEPT VISIT MOD MDM: CPT | Performed by: EMERGENCY MEDICINE

## 2020-12-19 PROCEDURE — 70498 CT ANGIOGRAPHY NECK: CPT

## 2020-12-19 RX ADMIN — IOHEXOL 85 ML: 350 INJECTION, SOLUTION INTRAVENOUS at 17:59

## 2020-12-20 LAB
ATRIAL RATE: 69 BPM
P AXIS: 43 DEGREES
PR INTERVAL: 156 MS
QRS AXIS: 44 DEGREES
QRSD INTERVAL: 94 MS
QT INTERVAL: 404 MS
QTC INTERVAL: 432 MS
T WAVE AXIS: 70 DEGREES
VENTRICULAR RATE: 69 BPM

## 2020-12-20 PROCEDURE — 93010 ELECTROCARDIOGRAM REPORT: CPT | Performed by: INTERNAL MEDICINE

## 2020-12-23 ENCOUNTER — ANESTHESIA EVENT (OUTPATIENT)
Dept: GASTROENTEROLOGY | Facility: AMBULARY SURGERY CENTER | Age: 70
End: 2020-12-23

## 2020-12-28 DIAGNOSIS — M25.551 PAIN IN RIGHT HIP: Primary | ICD-10-CM

## 2021-01-05 ENCOUNTER — OFFICE VISIT (OUTPATIENT)
Dept: OBGYN CLINIC | Facility: HOSPITAL | Age: 71
End: 2021-01-05
Payer: MEDICARE

## 2021-01-05 ENCOUNTER — HOSPITAL ENCOUNTER (OUTPATIENT)
Dept: RADIOLOGY | Facility: HOSPITAL | Age: 71
Discharge: HOME | End: 2021-01-05
Attending: ORTHOPAEDIC SURGERY
Payer: MEDICARE

## 2021-01-05 VITALS
SYSTOLIC BLOOD PRESSURE: 126 MMHG | HEIGHT: 65 IN | DIASTOLIC BLOOD PRESSURE: 86 MMHG | HEART RATE: 80 BPM | BODY MASS INDEX: 27.33 KG/M2

## 2021-01-05 DIAGNOSIS — M70.61 TROCHANTERIC BURSITIS OF RIGHT HIP: Primary | ICD-10-CM

## 2021-01-05 DIAGNOSIS — M25.551 PAIN IN RIGHT HIP: ICD-10-CM

## 2021-01-05 DIAGNOSIS — M25.561 CHRONIC PAIN OF RIGHT KNEE: ICD-10-CM

## 2021-01-05 DIAGNOSIS — G89.29 CHRONIC PAIN OF RIGHT KNEE: ICD-10-CM

## 2021-01-05 DIAGNOSIS — M17.11 PRIMARY OSTEOARTHRITIS OF RIGHT KNEE: ICD-10-CM

## 2021-01-05 PROCEDURE — 20610 DRAIN/INJ JOINT/BURSA W/O US: CPT | Performed by: ORTHOPAEDIC SURGERY

## 2021-01-05 PROCEDURE — 73502 X-RAY EXAM HIP UNI 2-3 VIEWS: CPT

## 2021-01-05 PROCEDURE — 73560 X-RAY EXAM OF KNEE 1 OR 2: CPT

## 2021-01-05 PROCEDURE — 99214 OFFICE O/P EST MOD 30 MIN: CPT | Performed by: ORTHOPAEDIC SURGERY

## 2021-01-05 RX ORDER — BETAMETHASONE SODIUM PHOSPHATE AND BETAMETHASONE ACETATE 3; 3 MG/ML; MG/ML
12 INJECTION, SUSPENSION INTRA-ARTICULAR; INTRALESIONAL; INTRAMUSCULAR; SOFT TISSUE
Status: COMPLETED | OUTPATIENT
Start: 2021-01-05 | End: 2021-01-05

## 2021-01-05 RX ORDER — BUPIVACAINE HYDROCHLORIDE 2.5 MG/ML
2 INJECTION, SOLUTION INFILTRATION; PERINEURAL
Status: COMPLETED | OUTPATIENT
Start: 2021-01-05 | End: 2021-01-05

## 2021-01-05 RX ORDER — LIDOCAINE HYDROCHLORIDE 10 MG/ML
2 INJECTION, SOLUTION INFILTRATION; PERINEURAL
Status: COMPLETED | OUTPATIENT
Start: 2021-01-05 | End: 2021-01-05

## 2021-01-05 RX ADMIN — BETAMETHASONE SODIUM PHOSPHATE AND BETAMETHASONE ACETATE 12 MG: 3; 3 INJECTION, SUSPENSION INTRA-ARTICULAR; INTRALESIONAL; INTRAMUSCULAR; SOFT TISSUE at 11:58

## 2021-01-05 RX ADMIN — BUPIVACAINE HYDROCHLORIDE 2 ML: 2.5 INJECTION, SOLUTION INFILTRATION; PERINEURAL at 11:58

## 2021-01-05 RX ADMIN — LIDOCAINE HYDROCHLORIDE 2 ML: 10 INJECTION, SOLUTION INFILTRATION; PERINEURAL at 11:58

## 2021-01-05 NOTE — PROGRESS NOTES
Assessment:   Diagnosis ICD-10-CM Associated Orders   1  Trochanteric bursitis of right hip  M70 61 Large joint arthrocentesis: R greater trochanteric bursa   2  Chronic pain of right knee  M25 561 XR knee 1 or 2 vw right    G89 29 Large joint arthrocentesis: R knee   3  Primary osteoarthritis of right knee  M17 11 Large joint arthrocentesis: R knee       Plan:  Diagnostics reviewed and physical exam performed  Diagnosis, treatment options and associated risks were discussed with the patient including no treatment, nonsurgical treatment and potential for surgical intervention  The patient was given the opportunity to ask questions regarding each  Her x-rays of both her hips and right knee show very well-preserved joint spaces  On exam she has right hip trochanteric bursitis  She was offered, surgical form injection of cortisone into her right trochanteric bursa area as well as right knee joint intra-articular  Patient tolerated both injections well  I suppose injection provided  Weightbearing and activity as tolerated  She was advised and educated that if she has any remaining symptoms that are not alleviated with these injections and knee being irritated lumbar nerve with a documented MRI from 2014 showing spondylolisthesis of L4 on L5  To do next visit:  Return in about 3 months (around 4/5/2021), or if symptoms worsen or fail to improve, for re-check  The above stated was discussed in layman's terms and the patient expressed understanding  All questions were answered to the patient's satisfaction  Scribe Attestation    I,:  Jason Zhang am acting as a scribe while in the presence of the attending physician :       I,:  Shakir Santiago MD personally performed the services described in this documentation    as scribed in my presence :             Subjective:   Lyssa Gloria is a 72 y o  female who presents initial evaluation due to pain at her right hip and knee    Patient was walking in August when she started with pain anterior and her thigh  She went to her primary care physician was prescribed Mobic which she took for 2 months and helps  She stopped taking the Mobic and her pain returned  She has pain laterally where she wakes up she rolls onto her right side at night  She has pain posteriorly and relates it to taking care of her mother, who has since now passed  She denies any groin pain  She denies any difficulty putting on her upper structure shoes or getting out of her car  She denies any hip pain getting up after prolonged sedentary position  She does have discomfort at her knee both medially and laterally and has a click  She was diagnosed with meniscus tear as an adolescent however never had it surgically addressed  She does have back pain        Review of systems negative unless otherwise specified in HPI  Review of Systems    Past Medical History:   Diagnosis Date    Arthritis     Breast cancer ([facility] [address] )     s/p Lumpectomy, last assessed 2012    Gastric ulcer     GERD (gastroesophageal reflux disease)     Heart murmur     Malignant neoplasm (HCC)     Migraine        Past Surgical History:   Procedure Laterality Date    ABDOMINOPLASTY      ADENOIDECTOMY      BREAST SURGERY      lumpectomy, resolved 2007     SECTION      CHOLECYSTECTOMY      COLON SURGERY      COLONOSCOPY      FOOT SURGERY      MOHS SURGERY      REDUCTION MAMMAPLASTY      TOE SURGERY      left toe surgery    TONSILLECTOMY         Family History   Problem Relation Age of Onset    Hypertension Mother     Melanoma Mother     Heart attack Father         acute MI, CABG    Hypertension Father     Other Brother         CABG    Lymphoma Brother         non-Hodgkin's    Hypertension Brother     Other Family         back disorder    Stroke Family         CVA    Diabetes Family     Cancer Family        Social History     Occupational History    Not on file   Tobacco Use  Smoking status: Former Smoker    Smokeless tobacco: Never Used    Tobacco comment: Smoker in teenage years  Quit at 22  One pack per week     Substance and Sexual Activity    Alcohol use: Yes     Frequency: 2-3 times a week     Comment: occasional    Drug use: No    Sexual activity: Not on file         Current Outpatient Medications:     Ascorbic Acid (VITAMIN C) 250 MG CHEW, Chew 1,000 mg daily , Disp: , Rfl:     Bacillus Coagulans-Inulin (ALIGN PREBIOTIC-PROBIOTIC PO), Take by mouth, Disp: , Rfl:     Cartia  MG 24 hr capsule, TAKE 1 CAPSULE BY MOUTH DAILY , Disp: 90 capsule, Rfl: 3    Cholecalciferol (VITAMIN D3) 5000 units CAPS, Take 1 tablet by mouth daily, Disp: , Rfl:     Collagen 500 MG CAPS, Take by mouth daily, Disp: , Rfl:     Cyanocobalamin (B-12) 5000 MCG CAPS, Take by mouth, Disp: , Rfl:     diclofenac sodium (VOLTAREN) 1 %, Apply 2 g topically 4 (four) times a day (Patient taking differently: Apply 2 g topically as needed ), Disp: 1 Tube, Rfl: 2    famotidine (PEPCID) 40 MG tablet, Take 1 tablet (40 mg total) by mouth daily as needed for heartburn (Patient not taking: Reported on 12/8/2020), Disp: 30 tablet, Rfl: 1    fluticasone (FLONASE) 50 mcg/act nasal spray, 2 sprays into each nostril daily, Disp: 16 g, Rfl: 0    meloxicam (MOBIC) 15 mg tablet, Take 1 tablet (15 mg total) by mouth daily (Patient not taking: Reported on 12/8/2020), Disp: 30 tablet, Rfl: 1    Misc Natural Products (GLUCOSAMINE CHONDROITIN ADV PO), Take 2 capsules by mouth daily , Disp: , Rfl:     Multiple Vitamins-Minerals (MULTI COMPLETE) CAPS, Take 1 capsule by mouth daily, Disp: , Rfl:     niacin 100 mg tablet, Take 100 mg by mouth daily with breakfast, Disp: , Rfl:     Omega-3 1000 MG CAPS, Take by mouth daily , Disp: , Rfl:     polyethylene glycol (GOLYTELY) 4000 mL solution, Take 4,000 mL by mouth once for 1 dose, Disp: 4000 mL, Rfl: 0    polyethylene glycol (MIRALAX) 17 g packet, Take 17 g by mouth daily (Patient not taking: Reported on 12/8/2020), Disp: 14 each, Rfl: 0    rosuvastatin (CRESTOR) 10 MG tablet, Take 1 tablet (10 mg total) by mouth daily, Disp: 90 tablet, Rfl: 3    Turmeric 500 MG CAPS, Take 1,000 mg by mouth daily , Disp: , Rfl:     Zinc 50 MG CAPS, Take by mouth daily, Disp: , Rfl:     Allergies   Allergen Reactions    Ciprofloxacin Anaphylaxis     Anaphylaxis    Amitriptyline GI Intolerance     Action Taken: bloating,GI problems;     Celecoxib     Mesalamine Dizziness and Headache    Other Other (See Comments)     "black olives->GI upset"    Wound Dressing Adhesive     Bactrim [Sulfamethoxazole-Trimethoprim] Rash    Sulfa Antibiotics Rash            Vitals:    01/05/21 1103   BP: 126/86   Pulse: 80       Objective:                    Right Knee Exam     Muscle Strength   The patient has normal right knee strength  Tenderness   Right knee tenderness location: distal IT band     Range of Motion   The patient has normal right knee ROM  Right knee flexion: with crepitation  Other   Erythema: absent  Sensation: normal  Swelling: mild  Effusion: no effusion present    Comments:    Slight valgus alignment      Right Hip Exam     Tenderness   The patient is experiencing tenderness in the greater trochanter (SI joint posteriorly)  Range of Motion   The patient has normal right hip ROM  Muscle Strength   The patient has normal right hip strength (increased pain laterally with resistance to ABD)  Tests   SHERMAN: negative    Other   Erythema: absent  Sensation: normal            Diagnostics, reviewed and taken today if performed as documented: The attending physician has personally reviewed the pertinent films in PACS and interpretation is as follows:    Right hip and pelvis x-rays taken and reviewed in the office today show:  Very minimal degenerative changes at both hips otherwise well preserved    Limited assessment of lumbar sacral spine which does show degenerative changes throughout  Right knee x-rays taken and reviewed in the office today show: Moderate medial and mild lateral joint space narrowing with modest patellofemoral degeneration      Procedures, if performed today:    Large joint arthrocentesis: R greater trochanteric bursa  Universal Protocol:  Consent: Verbal consent obtained  Risks and benefits: risks, benefits and alternatives were discussed  Consent given by: patient  Time out: Immediately prior to procedure a "time out" was called to verify the correct patient, procedure, equipment, support staff and site/side marked as required  Timeout called at: 1/5/2021 11:57 AM   Patient understanding: patient states understanding of the procedure being performed  Site marked: the operative site was marked  Patient identity confirmed: verbally with patient    Supporting Documentation  Indications: pain   Procedure Details  Location: hip - R greater trochanteric bursa  Preparation: Patient was prepped and draped in the usual sterile fashion  Needle size: 22 G  Ultrasound guidance: no  Approach: lateral  Medications administered: 2 mL lidocaine 1 %; 2 mL bupivacaine 0 25 %; 12 mg betamethasone acetate-betamethasone sodium phosphate 6 (3-3) mg/mL    Patient tolerance: patient tolerated the procedure well with no immediate complications  Dressing:  Sterile dressing applied    Large joint arthrocentesis: R knee  Universal Protocol:  Consent: Verbal consent obtained  Risks and benefits: risks, benefits and alternatives were discussed  Consent given by: patient  Time out: Immediately prior to procedure a "time out" was called to verify the correct patient, procedure, equipment, support staff and site/side marked as required    Timeout called at: 1/5/2021 11:57 AM   Patient understanding: patient states understanding of the procedure being performed  Site marked: the operative site was marked  Patient identity confirmed: verbally with patient    Supporting Documentation  Indications: pain   Procedure Details  Location: knee - R knee  Preparation: Patient was prepped and draped in the usual sterile fashion  Needle size: 22 G  Ultrasound guidance: no  Approach: anterolateral  Medications administered: 12 mg betamethasone acetate-betamethasone sodium phosphate 6 (3-3) mg/mL; 2 mL lidocaine 1 %; 2 mL bupivacaine 0 25 %    Patient tolerance: patient tolerated the procedure well with no immediate complications  Dressing:  Sterile dressing applied            Portions of the record may have been created with voice recognition software  Occasional wrong word or "sound a like" substitutions may have occurred due to the inherent limitations of voice recognition software  Read the chart carefully and recognize, using context, where substitutions have occurred

## 2021-01-06 ENCOUNTER — TELEPHONE (OUTPATIENT)
Dept: GASTROENTEROLOGY | Facility: AMBULARY SURGERY CENTER | Age: 71
End: 2021-01-06

## 2021-01-06 ENCOUNTER — ANESTHESIA (OUTPATIENT)
Dept: GASTROENTEROLOGY | Facility: AMBULARY SURGERY CENTER | Age: 71
End: 2021-01-06

## 2021-01-06 ENCOUNTER — HOSPITAL ENCOUNTER (OUTPATIENT)
Dept: GASTROENTEROLOGY | Facility: AMBULARY SURGERY CENTER | Age: 71
Setting detail: OUTPATIENT SURGERY
Discharge: HOME | End: 2021-01-06
Attending: INTERNAL MEDICINE
Payer: MEDICARE

## 2021-01-06 VITALS
OXYGEN SATURATION: 97 % | TEMPERATURE: 97 F | DIASTOLIC BLOOD PRESSURE: 85 MMHG | HEIGHT: 65 IN | HEART RATE: 65 BPM | WEIGHT: 157 LBS | RESPIRATION RATE: 22 BRPM | BODY MASS INDEX: 26.16 KG/M2 | SYSTOLIC BLOOD PRESSURE: 156 MMHG

## 2021-01-06 VITALS — HEART RATE: 69 BPM

## 2021-01-06 DIAGNOSIS — K51.211 ULCERATIVE PROCTITIS WITH RECTAL BLEEDING (HCC): ICD-10-CM

## 2021-01-06 PROCEDURE — 45331 SIGMOIDOSCOPY AND BIOPSY: CPT | Performed by: INTERNAL MEDICINE

## 2021-01-06 PROCEDURE — 88305 TISSUE EXAM BY PATHOLOGIST: CPT | Performed by: PATHOLOGY

## 2021-01-06 RX ORDER — LACTOBACIL 2/BIFIDO 1/S.THERMO 450B CELL
1 PACKET (EA) ORAL DAILY
Qty: 90 CAPSULE | Refills: 3 | Status: SHIPPED | OUTPATIENT
Start: 2021-01-06

## 2021-01-06 RX ORDER — LIDOCAINE HYDROCHLORIDE 10 MG/ML
0.5 INJECTION, SOLUTION EPIDURAL; INFILTRATION; INTRACAUDAL; PERINEURAL ONCE AS NEEDED
Status: DISCONTINUED | OUTPATIENT
Start: 2021-01-06 | End: 2021-01-10 | Stop reason: HOSPADM

## 2021-01-06 RX ORDER — SODIUM CHLORIDE, SODIUM LACTATE, POTASSIUM CHLORIDE, CALCIUM CHLORIDE 600; 310; 30; 20 MG/100ML; MG/100ML; MG/100ML; MG/100ML
125 INJECTION, SOLUTION INTRAVENOUS CONTINUOUS
Status: DISCONTINUED | OUTPATIENT
Start: 2021-01-06 | End: 2021-01-10 | Stop reason: HOSPADM

## 2021-01-06 RX ORDER — PROPOFOL 10 MG/ML
INJECTION, EMULSION INTRAVENOUS AS NEEDED
Status: DISCONTINUED | OUTPATIENT
Start: 2021-01-06 | End: 2021-01-06

## 2021-01-06 RX ADMIN — SODIUM CHLORIDE, SODIUM LACTATE, POTASSIUM CHLORIDE, AND CALCIUM CHLORIDE: .6; .31; .03; .02 INJECTION, SOLUTION INTRAVENOUS at 07:47

## 2021-01-06 RX ADMIN — PROPOFOL 50 MG: 10 INJECTION, EMULSION INTRAVENOUS at 08:02

## 2021-01-06 RX ADMIN — PROPOFOL 50 MG: 10 INJECTION, EMULSION INTRAVENOUS at 08:03

## 2021-01-06 RX ADMIN — PROPOFOL 50 MG: 10 INJECTION, EMULSION INTRAVENOUS at 08:06

## 2021-01-06 NOTE — TELEPHONE ENCOUNTER
Called and spoke with Arron Ruelas, pharmacist  She states budesonide foam is only available in 2 pack and wanted to know if it was okay to dispense like that   I let her know it was fine so she will prepare and dispense for patient

## 2021-01-06 NOTE — ANESTHESIA PREPROCEDURE EVALUATION
Procedure:  FLEXIBLE SIGMOIDOSCOPY    Relevant Problems   ANESTHESIA (within normal limits)   (-) History of anesthesia complications      CARDIO   (+) Essential hypertension   (+) Hyperlipidemia   (+) Migraine headache   (-) Chest pain   (-) HEREDIA (dyspnea on exertion)      GI/HEPATIC   (+) Esophageal reflux      GYN   (+) Adenocarcinoma of breast (HCC)      MUSCULOSKELETAL   (+) Arthritis      NEURO/PSYCH   (+) Anxiety      PULMONARY   (-) Shortness of breath   (-) URI (upper respiratory infection)      Other   (+) Idiopathic peripheral neuropathy        Physical Exam    Airway    Mallampati score: II  TM Distance: >3 FB  Neck ROM: full     Dental   No notable dental hx     Cardiovascular      Pulmonary      Other Findings        Anesthesia Plan  ASA Score- 2     Anesthesia Type- IV sedation with anesthesia with ASA Monitors  Additional Monitors:   Airway Plan:           Plan Factors-Exercise tolerance (METS): >4 METS  Chart reviewed  EKG reviewed  Existing labs reviewed  Patient is not a current smoker  Induction- intravenous  Postoperative Plan-     Informed Consent- Anesthetic plan and risks discussed with patient  I personally reviewed this patient with the CRNA  Discussed and agreed on the Anesthesia Plan with the CRNA  Enrique Johnston

## 2021-01-06 NOTE — ANESTHESIA POSTPROCEDURE EVALUATION
Post-Op Assessment Note    CV Status:  Stable  Pain Score: 0    Pain management: adequate     Mental Status:  Alert and awake   Hydration Status:  Euvolemic and stable   PONV Controlled:  None   Airway Patency:  Patent      Post Op Vitals Reviewed: Yes      Staff: CRNA         No complications documented      /79 (01/06/21 0814)    Temp      Pulse 79 (01/06/21 0814)   Resp 19 (01/06/21 0814)    SpO2 95 % (01/06/21 0814)

## 2021-01-06 NOTE — H&P
History and Physical -  [facility]  Sherin Suarez 72 y o  female MRN: [id number]                  HPI: Sherin Suarez is a 72y o  year old female who presents for UC - rectal bleeding  REVIEW OF SYSTEMS: Per the HPI, and otherwise unremarkable  Historical Information   Past Medical History:   Diagnosis Date    Arthritis     Breast cancer ([address] )     s/p Lumpectomy, last assessed 2012    Gastric ulcer     GERD (gastroesophageal reflux disease)     Heart murmur     Malignant neoplasm (HCC)     Migraine     Skin cancer      Past Surgical History:   Procedure Laterality Date    ABDOMINOPLASTY      ADENOIDECTOMY      BREAST SURGERY      lumpectomy, resolved 2007     SECTION      CHOLECYSTECTOMY      COLON SURGERY      COLONOSCOPY      COSMETIC SURGERY      forehead    FOOT SURGERY      MOHS SURGERY      REDUCTION MAMMAPLASTY  2000    TOE SURGERY      left toe surgery    TONSILLECTOMY       Social History   Social History     Substance and Sexual Activity   Alcohol Use Yes    Frequency: 2-3 times a week    Comment: occasional     Social History     Substance and Sexual Activity   Drug Use No     Social History     Tobacco Use   Smoking Status Former Smoker   Smokeless Tobacco Never Used   Tobacco Comment    Smoker in teenage years  Quit at 22  One pack per week       Family History   Problem Relation Age of Onset    Hypertension Mother     Melanoma Mother     Heart attack Father         acute MI, CABG    Hypertension Father     Other Brother         CABG    Lymphoma Brother         non-Hodgkin's    Hypertension Brother     Other Family         back disorder    Stroke Family         CVA    Diabetes Family     Cancer Family        Meds/Allergies     (Not in a hospital admission)      Allergies   Allergen Reactions    Ciprofloxacin Anaphylaxis     Anaphylaxis    Amitriptyline GI Intolerance     Action Taken: bloating,GI problems;  Celecoxib     Mesalamine Dizziness and Headache    Other Other (See Comments)     "black olives->GI upset"    Wound Dressing Adhesive     Bactrim [Sulfamethoxazole-Trimethoprim] Rash    Sulfa Antibiotics Rash       Objective     /91   Pulse 84   Temp 97 5 °F (36 4 °C) (Temporal)   Resp 12   Ht 5' 5" (1 651 m)   Wt 71 2 kg (157 lb)   SpO2 96%   BMI 26 13 kg/m²       PHYSICAL EXAM    Gen: NAD  CV: RRR  CHEST: Clear  ABD: soft, NT/ND  EXT: no edema      ASSESSMENT/PLAN:  This is a 72y o  year old female here for UC, and she is stable and optimized for her procedure

## 2021-01-06 NOTE — TELEPHONE ENCOUNTER
Patients GI provider:  Dr Laverne Amin    Number to return call: [phone]    Reason for call: Jeffrey Moran from [facility] called stated they received an Escript today for budesonide but they only have double pack, need clarifiaction    Scheduled procedure/appointment date if applicable: Apt/procedure n/a

## 2021-01-21 ENCOUNTER — TRANSCRIBE ORDERS (OUTPATIENT)
Dept: ADMINISTRATIVE | Facility: HOSPITAL | Age: 71
End: 2021-01-21

## 2021-01-21 ENCOUNTER — LAB (OUTPATIENT)
Dept: LAB | Facility: MEDICAL CENTER | Age: 71
End: 2021-01-21
Payer: MEDICARE

## 2021-01-21 DIAGNOSIS — Z85.3 PERSONAL HISTORY OF MALIGNANT NEOPLASM OF BREAST: ICD-10-CM

## 2021-01-21 DIAGNOSIS — E78.5 HYPERLIPIDEMIA, UNSPECIFIED HYPERLIPIDEMIA TYPE: ICD-10-CM

## 2021-01-21 DIAGNOSIS — Z85.3 PERSONAL HISTORY OF MALIGNANT NEOPLASM OF BREAST: Primary | ICD-10-CM

## 2021-01-21 LAB
ALBUMIN SERPL BCP-MCNC: 4.2 G/DL (ref 3.5–5)
ALP SERPL-CCNC: 94 U/L (ref 46–116)
ALT SERPL W P-5'-P-CCNC: 34 U/L (ref 12–78)
ANION GAP SERPL CALCULATED.3IONS-SCNC: 4 MMOL/L (ref 4–13)
AST SERPL W P-5'-P-CCNC: 21 U/L (ref 5–45)
BILIRUB SERPL-MCNC: 1.08 MG/DL (ref 0.2–1)
BUN SERPL-MCNC: 19 MG/DL (ref 5–25)
CALCIUM SERPL-MCNC: 9.6 MG/DL (ref 8.3–10.1)
CHLORIDE SERPL-SCNC: 107 MMOL/L (ref 100–108)
CHOLEST SERPL-MCNC: 158 MG/DL (ref 50–200)
CO2 SERPL-SCNC: 31 MMOL/L (ref 21–32)
CREAT SERPL-MCNC: 0.92 MG/DL (ref 0.6–1.3)
GFR SERPL CREATININE-BSD FRML MDRD: 64 ML/MIN/1.73SQ M
GLUCOSE P FAST SERPL-MCNC: 97 MG/DL (ref 65–99)
HDLC SERPL-MCNC: 61 MG/DL
LDLC SERPL CALC-MCNC: 86 MG/DL (ref 0–100)
POTASSIUM SERPL-SCNC: 3.8 MMOL/L (ref 3.5–5.3)
PROT SERPL-MCNC: 7.3 G/DL (ref 6.4–8.2)
SODIUM SERPL-SCNC: 142 MMOL/L (ref 136–145)
TRIGL SERPL-MCNC: 56 MG/DL

## 2021-01-21 PROCEDURE — 80061 LIPID PANEL: CPT

## 2021-01-21 PROCEDURE — 36415 COLL VENOUS BLD VENIPUNCTURE: CPT

## 2021-01-21 PROCEDURE — 80053 COMPREHEN METABOLIC PANEL: CPT

## 2021-01-26 ENCOUNTER — TRANSCRIBE ORDERS (OUTPATIENT)
Dept: ADMINISTRATIVE | Facility: HOSPITAL | Age: 71
End: 2021-01-26

## 2021-01-26 DIAGNOSIS — Z85.3 PERSONAL HISTORY OF BREAST CANCER: Primary | ICD-10-CM

## 2021-01-26 DIAGNOSIS — M81.0 OSTEOPOROSIS, POST-MENOPAUSAL: ICD-10-CM

## 2021-02-10 ENCOUNTER — TELEPHONE (OUTPATIENT)
Dept: HEMATOLOGY ONCOLOGY | Facility: CLINIC | Age: 71
End: 2021-02-10

## 2021-02-10 NOTE — TELEPHONE ENCOUNTER
New Patient Breast Form   Patient Details:     Bella Martin     1950     [id number]     Background Information:   [address] starts by opening a telephone encounter and gathering the following information   Who is calling to schedule and relationship?  self   Referring Provider Dr Janett Farrar   To which speciality is the referral? Surgical Oncology   Reason for Visit? New Diagnosis   Tumor Type? Abnormal mass on adrian   Is there a confimed diagnosis from biopsy/tissue reviewed by Pathology? No   Date of Tissue Diagnosis (If done outside of [facility] please obtain report and slides) na   Is patient aware of diagnosis, and who made them aware? yes   (If no tissue diagnosis, please stop and discuss with Navigator prior to scheduling)     When was the diagnosis made? 2/5/21   Were outside slides requested (If biopsy done eternally, obtain reports and slides for internal review)  n/a   Have you had any imaging or labs done? Yes   If YES, when and  where was the blood work done? MRI/Mammo - 2/5/21    (If outside of [facility] obtain records)  records being faxed   Was the patient told to bring a disk? Yes   Are records in [facility]? Being faxed   Is there a personal history of cancer? Yes    (If YES please list type and YR diagnosed) 2007 - right breast   If patient has a prior history of breast cancer were old records obtained? Yes   Is there a family history of cancer? Yes    (If YES please list type)  No   Does the patient smoke or Vape? no    If yes, how many packs or cartridges per day? Scheduling Information:   [facility]   Are there any days the patient cannot be seen? Miscellaneous: Pt having records faxed and will bring disk with imaging  Once fax is received I will contact pt to schedule appt with Dr Bella Pruitt  Will take first available  After completing the above information, please route to finance, nurse navigation and clinical trials for review

## 2021-03-07 DIAGNOSIS — K21.9 GASTROESOPHAGEAL REFLUX DISEASE WITHOUT ESOPHAGITIS: ICD-10-CM

## 2021-03-08 RX ORDER — FAMOTIDINE 40 MG/1
TABLET, FILM COATED ORAL
Qty: 30 TABLET | Refills: 1 | Status: SHIPPED | OUTPATIENT
Start: 2021-03-08 | End: 2021-08-18

## 2021-03-10 DIAGNOSIS — Z23 ENCOUNTER FOR IMMUNIZATION: ICD-10-CM

## 2021-05-04 ENCOUNTER — OFFICE VISIT (OUTPATIENT)
Dept: OBGYN CLINIC | Facility: HOSPITAL | Age: 71
End: 2021-05-04
Payer: MEDICARE

## 2021-05-04 VITALS
DIASTOLIC BLOOD PRESSURE: 80 MMHG | HEART RATE: 60 BPM | BODY MASS INDEX: 27.09 KG/M2 | HEIGHT: 65 IN | WEIGHT: 162.6 LBS | SYSTOLIC BLOOD PRESSURE: 152 MMHG

## 2021-05-04 DIAGNOSIS — M70.61 TROCHANTERIC BURSITIS OF RIGHT HIP: Primary | ICD-10-CM

## 2021-05-04 DIAGNOSIS — M25.561 CHRONIC PAIN OF RIGHT KNEE: ICD-10-CM

## 2021-05-04 DIAGNOSIS — M17.11 PRIMARY OSTEOARTHRITIS OF RIGHT KNEE: ICD-10-CM

## 2021-05-04 DIAGNOSIS — G89.29 CHRONIC PAIN OF RIGHT KNEE: ICD-10-CM

## 2021-05-04 PROCEDURE — 99212 OFFICE O/P EST SF 10 MIN: CPT | Performed by: ORTHOPAEDIC SURGERY

## 2021-05-04 NOTE — PROGRESS NOTES
Assessment:  1  Trochanteric bursitis of right hip     2  Chronic pain of right knee     3  Primary osteoarthritis of right knee         Plan:  The patient is doing well  No treatment is rendered as she has very minimal symptoms  Repeat injections can be considered at any point as needed  The patient can follow up as needed and is welcome to return at any point with any new or old issue  To do next visit:  Return if symptoms worsen or fail to improve  The above stated was discussed in layman's terms and the patient expressed understanding  All questions were answered to the patient's satisfaction  Scribe Attestation    I,:  José Miguel Duque am acting as a scribe while in the presence of the attending physician :       I,:  Melvin Hernandez MD personally performed the services described in this documentation    as scribed in my presence :             Subjective:   Krystyna Velasquez is a 80 y o  female who presents for follow up of right hip and knee  She is s/p right trochanteric bursa and right IA knee steroid injections with lasting benefit, 2021  Today she complains of occasional right anterior thigh pain and right knee instability and generalized knee pain          Review of systems negative unless otherwise specified in HPI    Past Medical History:   Diagnosis Date    Arthritis     Breast cancer ([facility] [address] [address] )     s/p Lumpectomy, last assessed 2012    Gastric ulcer     GERD (gastroesophageal reflux disease)     Heart murmur     Malignant neoplasm (HCC)     Migraine     Skin cancer        Past Surgical History:   Procedure Laterality Date    ABDOMINOPLASTY      ADENOIDECTOMY      BREAST SURGERY      lumpectomy, resolved 2007     SECTION      CHOLECYSTECTOMY      COLON SURGERY      COLONOSCOPY      COSMETIC SURGERY      forehead    FOOT SURGERY      MOHS SURGERY      REDUCTION MAMMAPLASTY  2000    TOE SURGERY      left toe surgery    TONSILLECTOMY Family History   Problem Relation Age of Onset    Hypertension Mother     Melanoma Mother     Heart attack Father         acute MI, CABG    Hypertension Father     Other Brother         CABG    Lymphoma Brother         non-Hodgkin's    Hypertension Brother     Other Family         back disorder    Stroke Family         CVA    Diabetes Family     Cancer Family        Social History     Occupational History    Not on file   Tobacco Use    Smoking status: Former Smoker    Smokeless tobacco: Never Used    Tobacco comment: Smoker in teenage years  Quit at 22  One pack per week     Substance and Sexual Activity    Alcohol use: Yes     Frequency: 2-3 times a week     Comment: occasional    Drug use: No    Sexual activity: Not on file         Current Outpatient Medications:     Ascorbic Acid (VITAMIN C) 250 MG CHEW, Chew 1,000 mg daily , Disp: , Rfl:     Bacillus Coagulans-Inulin (ALIGN PREBIOTIC-PROBIOTIC PO), Take by mouth, Disp: , Rfl:     Budesonide 2 MG/ACT FOAM, Insert 1 Act (2 mg total) into the rectum daily at bedtime, Disp: 33 4 g, Rfl: 3    Cartia  MG 24 hr capsule, TAKE 1 CAPSULE BY MOUTH DAILY , Disp: 90 capsule, Rfl: 3    Cholecalciferol (VITAMIN D3) 5000 units CAPS, Take 1 tablet by mouth daily, Disp: , Rfl:     Collagen 500 MG CAPS, Take by mouth daily, Disp: , Rfl:     Cyanocobalamin (B-12) 5000 MCG CAPS, Take by mouth, Disp: , Rfl:     diclofenac sodium (VOLTAREN) 1 %, Apply 2 g topically 4 (four) times a day (Patient taking differently: Apply 2 g topically as needed ), Disp: 1 Tube, Rfl: 2    famotidine (PEPCID) 40 MG tablet, TAKE ONE TABLET BY MOUTH DAILY AS NEEDED FOR HEARTBURN, Disp: 30 tablet, Rfl: 1    fluticasone (FLONASE) 50 mcg/act nasal spray, 2 sprays into each nostril daily, Disp: 16 g, Rfl: 0    meloxicam (MOBIC) 15 mg tablet, Take 1 tablet (15 mg total) by mouth daily (Patient not taking: Reported on 12/8/2020), Disp: 30 tablet, Rfl: 1    Misc Natural Products (GLUCOSAMINE CHONDROITIN ADV PO), Take 2 capsules by mouth daily , Disp: , Rfl:     Multiple Vitamins-Minerals (MULTI COMPLETE) CAPS, Take 1 capsule by mouth daily, Disp: , Rfl:     niacin 100 mg tablet, Take 100 mg by mouth daily with breakfast, Disp: , Rfl:     Omega-3 1000 MG CAPS, Take by mouth daily , Disp: , Rfl:     Probiotic Product (VSL#3) CAPS, Take 1 capsule by mouth daily, Disp: 90 capsule, Rfl: 3    rosuvastatin (CRESTOR) 10 MG tablet, Take 1 tablet (10 mg total) by mouth daily, Disp: 90 tablet, Rfl: 3    Turmeric 500 MG CAPS, Take 1,000 mg by mouth daily , Disp: , Rfl:     Zinc 50 MG CAPS, Take by mouth daily, Disp: , Rfl:     Allergies   Allergen Reactions    Ciprofloxacin Anaphylaxis     Anaphylaxis    Amitriptyline GI Intolerance     Action Taken: bloating,GI problems;     Celecoxib     Mesalamine Dizziness and Headache    Other Other (See Comments)     "black olives->GI upset"    Wound Dressing Adhesive     Bactrim [Sulfamethoxazole-Trimethoprim] Rash    Sulfa Antibiotics Rash            Vitals:    05/04/21 0836   BP: 152/80   Pulse: 60       Objective:  Physical exam  · General: Awake, Alert, Oriented  · Eyes: Pupils equal, round and reactive to light  · Heart: regular rate and rhythm  · Lungs: No audible wheezing  · Abdomen: soft                    Ortho Exam  Right hip:  TTP over greater trochanter    Right knee:    Slight varus alignment   No erythema or ecchymosis  No effusion or swelling  Normal strength  Good ROM with crepitus   Calf compartments soft and supple  Sensation intact  Toes are warm sensate and mobile        Diagnostics, reviewed and taken today if performed as documented:    None performed     Procedures, if performed today:    Procedures    None performed      Portions of the record may have been created with voice recognition software    Occasional wrong word or "sound a like" substitutions may have occurred due to the inherent limitations of voice recognition software  Read the chart carefully and recognize, using context, where substitutions have occurred

## 2021-05-11 ENCOUNTER — TELEPHONE (OUTPATIENT)
Dept: OBGYN CLINIC | Facility: HOSPITAL | Age: 71
End: 2021-05-11

## 2021-05-11 DIAGNOSIS — M18.0 ARTHRITIS OF CARPOMETACARPAL (CMC) JOINT OF BOTH THUMBS: ICD-10-CM

## 2021-05-11 DIAGNOSIS — M19.041 OSTEOARTHRITIS OF FINGERS OF BOTH HANDS: ICD-10-CM

## 2021-05-11 DIAGNOSIS — M19.042 OSTEOARTHRITIS OF FINGERS OF BOTH HANDS: ICD-10-CM

## 2021-05-11 NOTE — TELEPHONE ENCOUNTER
Patient sees Dr Cindy Ibarra  Pharmacist called requesting medication refill of: diclofenac sodium (VOLTAREN) 1 %  [facility] pharmacy on file

## 2021-05-21 ENCOUNTER — OFFICE VISIT (OUTPATIENT)
Dept: FAMILY MEDICINE CLINIC | Facility: CLINIC | Age: 71
End: 2021-05-21
Payer: MEDICARE

## 2021-05-21 VITALS
OXYGEN SATURATION: 96 % | BODY MASS INDEX: 26.99 KG/M2 | SYSTOLIC BLOOD PRESSURE: 110 MMHG | HEART RATE: 64 BPM | HEIGHT: 65 IN | RESPIRATION RATE: 18 BRPM | WEIGHT: 162 LBS | DIASTOLIC BLOOD PRESSURE: 84 MMHG

## 2021-05-21 DIAGNOSIS — E78.00 PURE HYPERCHOLESTEROLEMIA: ICD-10-CM

## 2021-05-21 DIAGNOSIS — I10 ESSENTIAL HYPERTENSION: ICD-10-CM

## 2021-05-21 DIAGNOSIS — G43.909 MIGRAINE WITHOUT STATUS MIGRAINOSUS, NOT INTRACTABLE, UNSPECIFIED MIGRAINE TYPE: ICD-10-CM

## 2021-05-21 DIAGNOSIS — R42 VERTIGO: ICD-10-CM

## 2021-05-21 DIAGNOSIS — R26.9 NEUROLOGIC GAIT DYSFUNCTION: Primary | ICD-10-CM

## 2021-05-21 DIAGNOSIS — H93.13 TINNITUS OF BOTH EARS: ICD-10-CM

## 2021-05-21 DIAGNOSIS — C50.911 ADENOCARCINOMA OF RIGHT BREAST (HCC): ICD-10-CM

## 2021-05-21 DIAGNOSIS — D70.9 NEUTROPENIA, UNSPECIFIED TYPE (HCC): ICD-10-CM

## 2021-05-21 PROCEDURE — 99214 OFFICE O/P EST MOD 30 MIN: CPT | Performed by: FAMILY MEDICINE

## 2021-05-21 NOTE — ASSESSMENT & PLAN NOTE
She has had some longstanding intermittent tenderness  In light of her recent development of probable vertiginous symptoms, I am going to get an MRI and have her see ENT

## 2021-05-21 NOTE — PROGRESS NOTES
Assessment/Plan:       Problem List Items Addressed This Visit        Cardiovascular and Mediastinum    Migraine headache     Migraines are pretty stable  She had a normal CT a in the ER back in December  Essential hypertension     Well controlled at this time  Other    Adenocarcinoma of breast ([address] )     Continue routine follow-up with her oncologist          Pure hypercholesterolemia     Much improved with rosuvastatin  Neutropenia (HCC)     Recent CBC was within normal limits  Continue with routine monitoring         Vertigo    Relevant Orders    Ambulatory Referral to Otolaryngology    MRI brain IAC wo contrast    Neurologic gait dysfunction - Primary     Check an MRI of her brain with intermittent gait dysfunction  She does have significant claustrophobia and will need anesthesia  Relevant Orders    Ambulatory Referral to Otolaryngology    MRI brain IAC wo contrast    Tinnitus of both ears     She has had some longstanding intermittent tenderness  In light of her recent development of probable vertiginous symptoms, I am going to get an MRI and have her see ENT  Relevant Orders    Ambulatory Referral to Otolaryngology    MRI brain IAC wo contrast            Subjective:      Patient ID: Jose Armando Grove is a 80 y o  female  HPI patient presents today complaining of several episodes of imbalance  She notes in December she was feeling poorly while at dinner and her daughter, who is a nurse, noted asymmetry in her pupils  She was seen in the ED  CT of the brain was normal   She was discharged without medication changes  She does have a history of migraines which have remained stable  She notes about 5 weeks ago she got out of bed and found a very difficult time getting her balance  She was able to make it to the bathroom but felt very imbalance  She did not have lightheadedness or vertiginous symptoms at the time    She has noted 3 similar episodes over the last 5 weeks that occurred randomly have affected her balance for several seconds or minutes  She had no difficulty speaking or numbness or weakness at the time  She also has noted at least 3 episodes of spinning while turning in bed that of lasted for several minutes  She has no prior history of significant vertigo  She does have a history of hypertension and migraines which have been well controlled    The following portions of the patient's history were reviewed and updated as appropriate: allergies, current medications, past family history, past medical history, past social history, past surgical history and problem list       Current Outpatient Medications:     Ascorbic Acid (VITAMIN C) 250 MG CHEW, Chew 1,000 mg daily , Disp: , Rfl:     Cartia  MG 24 hr capsule, TAKE 1 CAPSULE BY MOUTH DAILY , Disp: 90 capsule, Rfl: 3    Cholecalciferol (VITAMIN D3) 5000 units CAPS, Take 1 tablet by mouth daily, Disp: , Rfl:     Cyanocobalamin (B-12) 5000 MCG CAPS, Take by mouth, Disp: , Rfl:     Diclofenac Sodium (VOLTAREN) 1 %, Apply 2 g topically 4 (four) times a day, Disp: 100 g, Rfl: 3    famotidine (PEPCID) 40 MG tablet, TAKE ONE TABLET BY MOUTH DAILY AS NEEDED FOR HEARTBURN, Disp: 30 tablet, Rfl: 1    fluticasone (FLONASE) 50 mcg/act nasal spray, 2 sprays into each nostril daily, Disp: 16 g, Rfl: 0    meloxicam (MOBIC) 15 mg tablet, Take 1 tablet (15 mg total) by mouth daily, Disp: 30 tablet, Rfl: 1    Misc Natural Products (GLUCOSAMINE CHONDROITIN ADV PO), Take 2 capsules by mouth daily , Disp: , Rfl:     Multiple Vitamins-Minerals (MULTI COMPLETE) CAPS, Take 1 capsule by mouth daily, Disp: , Rfl:     Probiotic Product (VSL#3) CAPS, Take 1 capsule by mouth daily, Disp: 90 capsule, Rfl: 3    rosuvastatin (CRESTOR) 10 MG tablet, Take 1 tablet (10 mg total) by mouth daily, Disp: 90 tablet, Rfl: 3    Turmeric 500 MG CAPS, Take 1,000 mg by mouth daily , Disp: , Rfl:     Zinc 50 MG CAPS, Take by mouth daily, Disp: , Rfl:     niacin 100 mg tablet, Take 100 mg by mouth daily with breakfast, Disp: , Rfl:     Omega-3 1000 MG CAPS, Take by mouth daily , Disp: , Rfl:      Review of Systems   Constitutional: Negative for appetite change, chills, fatigue, fever and unexpected weight change  HENT: Negative for trouble swallowing  Eyes: Negative for visual disturbance  Respiratory: Negative for cough, chest tightness, shortness of breath and wheezing  Cardiovascular: Negative for chest pain, palpitations and leg swelling  Gastrointestinal: Negative for abdominal distention, abdominal pain, blood in stool, constipation and diarrhea  Endocrine: Negative for polyuria  Genitourinary: Negative for difficulty urinating and flank pain  Musculoskeletal: Negative for arthralgias and myalgias  Skin: Negative for rash  Neurological: Positive for dizziness and headaches  Negative for syncope, weakness and light-headedness  Hematological: Negative for adenopathy  Does not bruise/bleed easily  Psychiatric/Behavioral: Negative for dysphoric mood and sleep disturbance  The patient is not nervous/anxious  Objective:      /84 (BP Location: Left arm, Patient Position: Sitting, Cuff Size: Standard)   Pulse 64   Resp 18   Ht 5' 5" (1 651 m)   Wt 73 5 kg (162 lb)   SpO2 96%   BMI 26 96 kg/m²          Physical Exam  Constitutional:       General: She is not in acute distress  Appearance: She is well-developed  She is not diaphoretic  HENT:      Head: Normocephalic  Eyes:      General:         Right eye: No discharge  Left eye: No discharge  Pupils: Pupils are equal, round, and reactive to light  Neck:      Thyroid: No thyromegaly  Trachea: No tracheal deviation  Cardiovascular:      Rate and Rhythm: Normal rate and regular rhythm  Heart sounds: Normal heart sounds  No murmur  Pulmonary:      Effort: Pulmonary effort is normal  No respiratory distress  Breath sounds: No wheezing or rales  Abdominal:      General: There is no distension  Palpations: Abdomen is soft  Tenderness: There is no abdominal tenderness  Musculoskeletal: Normal range of motion  Lymphadenopathy:      Cervical: No cervical adenopathy  Skin:     General: Skin is warm  Findings: No erythema  Neurological:      General: No focal deficit present  Mental Status: She is alert and oriented to person, place, and time  Cranial Nerves: No cranial nerve deficit  Sensory: No sensory deficit  Motor: No weakness  Coordination: Coordination normal       Gait: Gait normal       Deep Tendon Reflexes: Reflexes normal    Psychiatric:         Thought Content:  Thought content normal          Judgment: Judgment normal            Reed Morelos MD

## 2021-05-21 NOTE — ASSESSMENT & PLAN NOTE
Check an MRI of her brain with intermittent gait dysfunction  She does have significant claustrophobia and will need anesthesia

## 2021-05-28 ENCOUNTER — TELEPHONE (OUTPATIENT)
Dept: FAMILY MEDICINE CLINIC | Facility: CLINIC | Age: 71
End: 2021-05-28

## 2021-05-28 NOTE — TELEPHONE ENCOUNTER
Elicia from [facility] called, she stated that pt needs updated H&Pw ROS prior to sedation case

## 2021-05-28 NOTE — TELEPHONE ENCOUNTER
Per [facility], she did check with anesthesia to see if the May 21 date would be [id number] [address] and they denied it  I advised that patient has a Consult on 6/21 with Dr Mika Garcia of surgical oncology, and would that be considered updated enough? She stated that if patient keeps appointment with Dr Mika Garcia and there is an H&P and ROS on it, it would be acceptable; however, she is not sure when her department would need the complete information in order to keep the appointment  Advised we would contact the patient and see how she would like to proceed

## 2021-06-10 ENCOUNTER — ANESTHESIA EVENT (OUTPATIENT)
Dept: MRI IMAGING | Facility: HOSPITAL | Age: 71
End: 2021-06-10

## 2021-06-10 PROBLEM — R29.6 FREQUENT FALLS: Status: RESOLVED | Noted: 2021-06-10 | Resolved: 2021-06-10

## 2021-06-10 PROBLEM — R29.6 FREQUENT FALLS: Status: ACTIVE | Noted: 2021-06-10

## 2021-06-15 DIAGNOSIS — R42 VERTIGO: Primary | ICD-10-CM

## 2021-06-21 ENCOUNTER — CONSULT (OUTPATIENT)
Dept: SURGICAL ONCOLOGY | Facility: CLINIC | Age: 71
End: 2021-06-21
Payer: MEDICARE

## 2021-06-21 ENCOUNTER — ANESTHESIA EVENT (OUTPATIENT)
Dept: ANESTHESIOLOGY | Facility: HOSPITAL | Age: 71
End: 2021-06-21

## 2021-06-21 ENCOUNTER — ANESTHESIA (OUTPATIENT)
Dept: ANESTHESIOLOGY | Facility: HOSPITAL | Age: 71
End: 2021-06-21

## 2021-06-21 VITALS
WEIGHT: 159 LBS | DIASTOLIC BLOOD PRESSURE: 78 MMHG | HEART RATE: 82 BPM | BODY MASS INDEX: 26.49 KG/M2 | SYSTOLIC BLOOD PRESSURE: 122 MMHG | HEIGHT: 65 IN | TEMPERATURE: 96.8 F

## 2021-06-21 DIAGNOSIS — R92.1 BREAST CALCIFICATIONS: ICD-10-CM

## 2021-06-21 DIAGNOSIS — Z85.3 PERSONAL HISTORY OF ADENOCARCINOMA OF BREAST: Primary | ICD-10-CM

## 2021-06-21 PROCEDURE — 99204 OFFICE O/P NEW MOD 45 MIN: CPT | Performed by: SURGERY

## 2021-06-21 NOTE — ANESTHESIA PREPROCEDURE EVALUATION
Procedure:  PRE-OP ONLY    Relevant Problems   CARDIO   (+) Essential hypertension   (+) Pure hypercholesterolemia      GI/HEPATIC   (+) Esophageal reflux      MUSCULOSKELETAL   (+) Arthritis      NEURO/PSYCH   (+) Anxiety   (+) Personal history of adenocarcinoma of breast             Anesthesia Plan  ASA Score- 2     Anesthesia Type- IV sedation with anesthesia with ASA Monitors  Additional Monitors:   Airway Plan:           Plan Factors-    Chart reviewed  Patient is not a current smoker  Patient not instructed to abstain from smoking on day of procedure  Patient did not smoke on day of surgery  Induction- intravenous  Postoperative Plan-     Informed Consent- Anesthetic plan and risks discussed with patient  I personally reviewed this patient with the CRNA  Discussed and agreed on the Anesthesia Plan with the CRNA  Soham Davis

## 2021-06-21 NOTE — H&P (VIEW-ONLY)
Breast Consultation-Surgical Oncology     [address] [facility] [place] [facility] [address]  [place]  [phone]    Name:  Rao Oconnor  YOB: 1950  MRN:  [id number]    Assessment/Plan   Diagnoses and all orders for this visit:    Personal history of adenocarcinoma of breast    Breast calcifications  -     Mammo diagnostic right w cad; Future            HPI: Rao Oconnor is a 80y o  year old female who presents with  A history of right breast cancer  She is here to reestablish care  She is concerned about the calcifications seen on her last mammogram   She denies any current breast referable concerns  She denies any family history of breast cancer  Surgical treatment to date consisted of   Right lumpectomy, sentinel node biopsy, partial breast radiation and Femara x2 years  Oncology History:    Oncology History    No history exists  Pertinent reproductive history:  Age at menarche:    OB History    No obstetric history on file        Obstetric Comments   Menarche-12  Age at pregnancy-28  bcp-20yrs  Hormone replacement-15yrs               Problem List:   Patient Active Problem List   Diagnosis    Personal history of adenocarcinoma of breast    Anxiety    Arthritis    Carcinoma, basal cell, skin    Esophageal reflux    Pure hypercholesterolemia    Insomnia    Irritable bowel syndrome    Lumbar disc herniation    Migraine headache    Osteopenia    Malignant neoplasm of skin    Idiopathic peripheral neuropathy    Neutropenia ([address] )    Dupuytren's contracture    Hip flexor tendinitis, right    Essential hypertension    Vertigo    Neurologic gait dysfunction    Tinnitus of both ears    Breast calcifications     Past Medical History:   Diagnosis Date    Arthritis     Gastric ulcer     GERD (gastroesophageal reflux disease)     Heart murmur     History of radiation therapy 2007    PBRT    IBS (irritable bowel syndrome)  Malignant neoplasm (HCC)     Migraine     Skin cancer     Use of letrozole (Femara)     took for 2 years D/john due to side effects     Past Surgical History:   Procedure Laterality Date    ABDOMINOPLASTY      ADENOIDECTOMY      BREAST BIOPSY Right 10/12/2007    [facility]    BREAST EXCISIONAL BIOPSY Right 10/07/2014    benign    BREAST SURGERY      lumpectomy, resolved 2007     SECTION      CHOLECYSTECTOMY      COLON SURGERY      COLONOSCOPY      COSMETIC SURGERY  1975    forehead    FOOT SURGERY      MOHS SURGERY      REDUCTION MAMMAPLASTY  2000    SENTINEL LYMPH NODE BIOPSY Right 2007    TOE SURGERY      left toe surgery    TONSILLECTOMY       Family History   Problem Relation Age of Onset    Hypertension Mother     Melanoma Mother     Heart disease Mother     Heart attack Father         acute MI, CABG    Hypertension Father     Heart disease Father     Other Brother         CABG    Lymphoma Brother         non-Hodgkin's    Hypertension Brother     Heart disease Brother     Other Family         back disorder    Stroke Family         CVA    Diabetes Family     Cancer Family      Social History     Socioeconomic History    Marital status:      Spouse name: Not on file    Number of children: Not on file    Years of education: Not on file    Highest education level: Not on file   Occupational History    Not on file   Tobacco Use    Smoking status: Former Smoker    Smokeless tobacco: Never Used    Tobacco comment: Smoker in teenage years  Quit at 22  One pack per week     Vaping Use    Vaping Use: Never used   Substance and Sexual Activity    Alcohol use: Not Currently     Comment: May have alcohol but is not drinking it currently    Drug use: No    Sexual activity: Not on file   Other Topics Concern    Not on file   Social History Narrative    Daily caffeine use- 2 cups of green tea and chocolate     Social Determinants of Health     Financial Resource Strain:     Difficulty of Paying Living Expenses:    Food Insecurity:     Worried About Running Out of Food in the Last Year:     [address] in the Last Year:    Transportation Needs:     Lack of Transportation (Medical):      Lack of Transportation (Non-Medical):    Physical Activity:     Days of Exercise per Week:     Minutes of Exercise per Session:    Stress:     Feeling of Stress :    Social Connections:     Frequency of Communication with Friends and Family:     Frequency of Social Gatherings with Friends and Family:     Attends [employer] Services:     Active Member of Clubs or Organizations:     Attends Club or Organization Meetings:     Marital Status:    Intimate Partner Violence:     Fear of Current or Ex-Partner:     Emotionally Abused:     Physically Abused:     Sexually Abused:      Current Outpatient Medications   Medication Sig Dispense Refill    Ascorbic Acid (VITAMIN C) 250 MG CHEW Chew 1,000 mg daily       Calcium Carbonate-Vit D-Min (CALCIUM 1200 PO) Take by mouth      Cartia  MG 24 hr capsule TAKE 1 CAPSULE BY MOUTH DAILY  90 capsule 3    Cholecalciferol (VITAMIN D3) 5000 units CAPS Take 1 tablet by mouth daily      Cyanocobalamin (B-12) 5000 MCG CAPS Take by mouth      Diclofenac Sodium (VOLTAREN) 1 % Apply 2 g topically 4 (four) times a day 100 g 3    famotidine (PEPCID) 40 MG tablet TAKE ONE TABLET BY MOUTH DAILY AS NEEDED FOR HEARTBURN 30 tablet 1    fluticasone (FLONASE) 50 mcg/act nasal spray 2 sprays into each nostril daily 16 g 0    meloxicam (MOBIC) 15 mg tablet Take 1 tablet (15 mg total) by mouth daily 30 tablet 1    Misc Natural Products (GLUCOSAMINE CHONDROITIN ADV PO) Take 2 capsules by mouth daily       Multiple Vitamins-Minerals (MULTI COMPLETE) CAPS Take 1 capsule by mouth daily      Probiotic Product (VSL#3) CAPS Take 1 capsule by mouth daily 90 capsule 3    rosuvastatin (CRESTOR) 10 MG tablet Take 1 tablet (10 mg total) by mouth daily 90 tablet 3    Turmeric 500 MG CAPS Take 1,000 mg by mouth daily       Zinc 50 MG CAPS Take by mouth daily       No current facility-administered medications for this visit  Allergies   Allergen Reactions    Ciprofloxacin Anaphylaxis     Anaphylaxis    Celecoxib Swelling     Edema of legs    Amitriptyline GI Intolerance     Action Taken: bloating,GI problems;     Bactrim [Sulfamethoxazole-Trimethoprim] Rash    Mesalamine Dizziness and Headache    Other Vomiting     "black olives->GI upset"    Sulfa Antibiotics Rash    Wound Dressing Adhesive Rash         The following portions of the patient's history were reviewed and updated as appropriate: allergies, current medications, past family history, past medical history, past social history, past surgical history and problem list     Review of Systems:  Review of Systems   Constitutional: Negative  Negative for appetite change and fever  Eyes: Negative  Respiratory: Negative for shortness of breath  Cardiovascular: Negative  Gastrointestinal: Negative  Endocrine: Negative  Genitourinary: Negative  Musculoskeletal: Negative  Negative for arthralgias and myalgias  Skin: Negative  Allergic/Immunologic: Negative  Neurological: Negative  Hematological: Negative  Negative for adenopathy  Does not bruise/bleed easily  Psychiatric/Behavioral: Negative  Physical Exam:  Physical Exam  Constitutional:       General: She is not in acute distress  Appearance: She is well-developed  HENT:      Head: Normocephalic and atraumatic  Cardiovascular:      Heart sounds: Normal heart sounds  Pulmonary:      Breath sounds: Normal breath sounds  Chest:      Breasts:         Right: Skin change (  Reduction pattern scar) present  No inverted nipple, mass, nipple discharge or tenderness  Left: Skin change ( reduction pattern scar) present  No inverted nipple, mass, nipple discharge or tenderness     Abdominal: Palpations: Abdomen is soft  Lymphadenopathy:      Upper Body:      Right upper body: No supraclavicular, axillary or pectoral adenopathy  Left upper body: No supraclavicular, axillary or pectoral adenopathy  Neurological:      Mental Status: She is alert and oriented to person, place, and time  Psychiatric:         Mood and Affect: Mood normal          Laboratory:   2007 right lumpectomy and sentinel node biopsy reveals a 1 5 cm invasive ductal carcinoma low grade ER/MD positive HER2 Don negative, 0/3 sentinel nodes      Imagin2021 bilateral diagnostic mammogram was a BI-RADS three secondary to new heterogeneous calcifications at this site of the hematoma/ seroma deep in the outer central right breast, left side was benign   2021 breast MRI shows no suspicious masses or enhancement with a stable hematoma/seroma cavity     Reports were reviewed in Care everywhere  The patient did not bring her disc for my review  She states that she will obtain the disc and bring them to my office so that we can upload them and I can personally review them  Discussion/Summary:  61-year-old female status post right breast conservation for a stage I invasive ductal carcinoma  She had partial breast radiation and took Femara for two years  She denies any family history of breast cancer  There is no evidence of disease based on examination today  Her most recent mammogram done in February of this year showed new heterogeneous calcifications in the site of the hematoma/ seroma  There were no concerns on the MRI done that same day  A six month follow-up was recommended  All of these reports were reviewed in Care everywhere  She did not bring her disc for my personal review  She states that she will obtain a disc and bring that to us so that I may review her images  If so, I will call her with my opinion  She will plan to have her follow-up mammogram done with us    I will make these arrangements for her  If there are no concerns, I will see her on an annual basis

## 2021-06-21 NOTE — PROGRESS NOTES
Breast Consultation-Surgical Oncology     [address] [facility] [place] [address]  [place]  [phone]    Name:  Abigail Alvarez  YOB: 1950  MRN:  [id number]    Assessment/Plan   Diagnoses and all orders for this visit:    Personal history of adenocarcinoma of breast    Breast calcifications  -     Mammo diagnostic right w cad; Future            HPI: Abigail Alvarez is a 80y o  year old female who presents with  A history of right breast cancer  She is here to reestablish care  She is concerned about the calcifications seen on her last mammogram   She denies any current breast referable concerns  She denies any family history of breast cancer  Surgical treatment to date consisted of   Right lumpectomy, sentinel node biopsy, partial breast radiation and Femara x2 years  Oncology History:    Oncology History    No history exists  Pertinent reproductive history:  Age at menarche:    OB History    No obstetric history on file        Obstetric Comments   Menarche-12  Age at pregnancy-28  bcp-20yrs  Hormone replacement-15yrs               Problem List:   Patient Active Problem List   Diagnosis    Personal history of adenocarcinoma of breast    Anxiety    Arthritis    Carcinoma, basal cell, skin    Esophageal reflux    Pure hypercholesterolemia    Insomnia    Irritable bowel syndrome    Lumbar disc herniation    Migraine headache    Osteopenia    Malignant neoplasm of skin    Idiopathic peripheral neuropathy    Neutropenia ([address]    Dupuytren's contracture    Hip flexor tendinitis, right    Essential hypertension    Vertigo    Neurologic gait dysfunction    Tinnitus of both ears    Breast calcifications     Past Medical History:   Diagnosis Date    Arthritis     Gastric ulcer     GERD (gastroesophageal reflux disease)     Heart murmur     History of radiation therapy 2007    PBRT    IBS (irritable bowel syndrome)  Malignant neoplasm (HCC)     Migraine     Skin cancer     Use of letrozole (Femara)     took for 2 years D/john due to side effects     Past Surgical History:   Procedure Laterality Date    ABDOMINOPLASTY      ADENOIDECTOMY      BREAST BIOPSY Right 10/12/2007    [facility]    BREAST EXCISIONAL BIOPSY Right 10/07/2014    benign    BREAST SURGERY      lumpectomy, resolved 2007     SECTION      CHOLECYSTECTOMY      COLON SURGERY      COLONOSCOPY      COSMETIC SURGERY  1975    forehead    FOOT SURGERY      MOHS SURGERY      REDUCTION MAMMAPLASTY  2000    SENTINEL LYMPH NODE BIOPSY Right 2007    TOE SURGERY      left toe surgery    TONSILLECTOMY       Family History   Problem Relation Age of Onset    Hypertension Mother     Melanoma Mother     Heart disease Mother     Heart attack Father         acute MI, CABG    Hypertension Father     Heart disease Father     Other Brother         CABG    Lymphoma Brother         non-Hodgkin's    Hypertension Brother     Heart disease Brother     Other Family         back disorder    Stroke Family         CVA    Diabetes Family     Cancer Family      Social History     Socioeconomic History    Marital status:      Spouse name: Not on file    Number of children: Not on file    Years of education: Not on file    Highest education level: Not on file   Occupational History    Not on file   Tobacco Use    Smoking status: Former Smoker    Smokeless tobacco: Never Used    Tobacco comment: Smoker in teenage years  Quit at 22  One pack per week     Vaping Use    Vaping Use: Never used   Substance and Sexual Activity    Alcohol use: Not Currently     Comment: May have alcohol but is not drinking it currently    Drug use: No    Sexual activity: Not on file   Other Topics Concern    Not on file   Social History Narrative    Daily caffeine use- 2 cups of green tea and chocolate     Social Determinants of Health     Financial Resource Strain:     Difficulty of Paying Living Expenses:    Food Insecurity:     Worried About Running Out of Food in the Last Year:     [address] in the Last Year:    Transportation Needs:     Lack of Transportation (Medical):      Lack of Transportation (Non-Medical):    Physical Activity:     Days of Exercise per Week:     Minutes of Exercise per Session:    Stress:     Feeling of Stress :    Social Connections:     Frequency of Communication with Friends and Family:     Frequency of Social Gatherings with Friends and Family:     Attends Bapt[employer] Services:     Active Member of Clubs or Organizations:     Attends Club or Organization Meetings:     Marital Status:    Intimate Partner Violence:     Fear of Current or Ex-Partner:     Emotionally Abused:     Physically Abused:     Sexually Abused:      Current Outpatient Medications   Medication Sig Dispense Refill    Ascorbic Acid (VITAMIN C) 250 MG CHEW Chew 1,000 mg daily       Calcium Carbonate-Vit D-Min (CALCIUM 1200 PO) Take by mouth      Cartia  MG 24 hr capsule TAKE 1 CAPSULE BY MOUTH DAILY  90 capsule 3    Cholecalciferol (VITAMIN D3) 5000 units CAPS Take 1 tablet by mouth daily      Cyanocobalamin (B-12) 5000 MCG CAPS Take by mouth      Diclofenac Sodium (VOLTAREN) 1 % Apply 2 g topically 4 (four) times a day 100 g 3    famotidine (PEPCID) 40 MG tablet TAKE ONE TABLET BY MOUTH DAILY AS NEEDED FOR HEARTBURN 30 tablet 1    fluticasone (FLONASE) 50 mcg/act nasal spray 2 sprays into each nostril daily 16 g 0    meloxicam (MOBIC) 15 mg tablet Take 1 tablet (15 mg total) by mouth daily 30 tablet 1    Misc Natural Products (GLUCOSAMINE CHONDROITIN ADV PO) Take 2 capsules by mouth daily       Multiple Vitamins-Minerals (MULTI COMPLETE) CAPS Take 1 capsule by mouth daily      Probiotic Product (VSL#3) CAPS Take 1 capsule by mouth daily 90 capsule 3    rosuvastatin (CRESTOR) 10 MG tablet Take 1 tablet (10 mg total) by mouth daily 90 tablet 3    Turmeric 500 MG CAPS Take 1,000 mg by mouth daily       Zinc 50 MG CAPS Take by mouth daily       No current facility-administered medications for this visit  Allergies   Allergen Reactions    Ciprofloxacin Anaphylaxis     Anaphylaxis    Celecoxib Swelling     Edema of legs    Amitriptyline GI Intolerance     Action Taken: bloating,GI problems;     Bactrim [Sulfamethoxazole-Trimethoprim] Rash    Mesalamine Dizziness and Headache    Other Vomiting     "black olives->GI upset"    Sulfa Antibiotics Rash    Wound Dressing Adhesive Rash         The following portions of the patient's history were reviewed and updated as appropriate: allergies, current medications, past family history, past medical history, past social history, past surgical history and problem list     Review of Systems:  Review of Systems   Constitutional: Negative  Negative for appetite change and fever  Eyes: Negative  Respiratory: Negative for shortness of breath  Cardiovascular: Negative  Gastrointestinal: Negative  Endocrine: Negative  Genitourinary: Negative  Musculoskeletal: Negative  Negative for arthralgias and myalgias  Skin: Negative  Allergic/Immunologic: Negative  Neurological: Negative  Hematological: Negative  Negative for adenopathy  Does not bruise/bleed easily  Psychiatric/Behavioral: Negative  Physical Exam:  Physical Exam  Constitutional:       General: She is not in acute distress  Appearance: She is well-developed  HENT:      Head: Normocephalic and atraumatic  Cardiovascular:      Heart sounds: Normal heart sounds  Pulmonary:      Breath sounds: Normal breath sounds  Chest:      Breasts:         Right: Skin change (  Reduction pattern scar) present  No inverted nipple, mass, nipple discharge or tenderness  Left: Skin change ( reduction pattern scar) present  No inverted nipple, mass, nipple discharge or tenderness     Abdominal: Palpations: Abdomen is soft  Lymphadenopathy:      Upper Body:      Right upper body: No supraclavicular, axillary or pectoral adenopathy  Left upper body: No supraclavicular, axillary or pectoral adenopathy  Neurological:      Mental Status: She is alert and oriented to person, place, and time  Psychiatric:         Mood and Affect: Mood normal          Laboratory:   2007 right lumpectomy and sentinel node biopsy reveals a 1 5 cm invasive ductal carcinoma low grade ER/NC positive HER2 Don negative, 0/3 sentinel nodes      Imagin2021 bilateral diagnostic mammogram was a BI-RADS three secondary to new heterogeneous calcifications at this site of the hematoma/ seroma deep in the outer central right breast, left side was benign   2021 breast MRI shows no suspicious masses or enhancement with a stable hematoma/seroma cavity     Reports were reviewed in Care everywhere  The patient did not bring her disc for my review  She states that she will obtain the disc and bring them to my office so that we can upload them and I can personally review them  Discussion/Summary:  61-year-old female status post right breast conservation for a stage I invasive ductal carcinoma  She had partial breast radiation and took Femara for two years  She denies any family history of breast cancer  There is no evidence of disease based on examination today  Her most recent mammogram done in February of this year showed new heterogeneous calcifications in the site of the hematoma/ seroma  There were no concerns on the MRI done that same day  A six month follow-up was recommended  All of these reports were reviewed in Care everywhere  She did not bring her disc for my personal review  She states that she will obtain a disc and bring that to us so that I may review her images  If so, I will call her with my opinion  She will plan to have her follow-up mammogram done with us    I will make these arrangements for her  If there are no concerns, I will see her on an annual basis

## 2021-06-22 ENCOUNTER — HOSPITAL ENCOUNTER (OUTPATIENT)
Dept: MRI IMAGING | Facility: HOSPITAL | Age: 71
Discharge: HOME | End: 2021-06-22
Payer: MEDICARE

## 2021-06-22 ENCOUNTER — ANESTHESIA (OUTPATIENT)
Dept: MRI IMAGING | Facility: HOSPITAL | Age: 71
End: 2021-06-22

## 2021-06-22 VITALS
DIASTOLIC BLOOD PRESSURE: 73 MMHG | RESPIRATION RATE: 18 BRPM | TEMPERATURE: 97 F | HEART RATE: 57 BPM | OXYGEN SATURATION: 100 % | SYSTOLIC BLOOD PRESSURE: 153 MMHG

## 2021-06-22 DIAGNOSIS — R42 VERTIGO: ICD-10-CM

## 2021-06-22 DIAGNOSIS — R26.9 NEUROLOGIC GAIT DYSFUNCTION: ICD-10-CM

## 2021-06-22 DIAGNOSIS — H93.13 TINNITUS OF BOTH EARS: ICD-10-CM

## 2021-06-22 PROCEDURE — 70553 MRI BRAIN STEM W/O & W/DYE: CPT

## 2021-06-22 PROCEDURE — A9585 GADOBUTROL INJECTION: HCPCS | Performed by: FAMILY MEDICINE

## 2021-06-22 RX ORDER — SODIUM CHLORIDE, SODIUM LACTATE, POTASSIUM CHLORIDE, CALCIUM CHLORIDE 600; 310; 30; 20 MG/100ML; MG/100ML; MG/100ML; MG/100ML
125 INJECTION, SOLUTION INTRAVENOUS CONTINUOUS
Status: DISCONTINUED | OUTPATIENT
Start: 2021-06-22 | End: 2021-06-26 | Stop reason: HOSPADM

## 2021-06-22 RX ORDER — LIDOCAINE HYDROCHLORIDE 10 MG/ML
0.5 INJECTION, SOLUTION EPIDURAL; INFILTRATION; INTRACAUDAL; PERINEURAL ONCE AS NEEDED
Status: DISCONTINUED | OUTPATIENT
Start: 2021-06-22 | End: 2021-06-26 | Stop reason: HOSPADM

## 2021-06-22 RX ORDER — PROPOFOL 10 MG/ML
INJECTION, EMULSION INTRAVENOUS CONTINUOUS PRN
Status: DISCONTINUED | OUTPATIENT
Start: 2021-06-22 | End: 2021-06-22

## 2021-06-22 RX ORDER — MIDAZOLAM HYDROCHLORIDE 2 MG/2ML
INJECTION, SOLUTION INTRAMUSCULAR; INTRAVENOUS AS NEEDED
Status: DISCONTINUED | OUTPATIENT
Start: 2021-06-22 | End: 2021-06-22

## 2021-06-22 RX ORDER — PROPOFOL 10 MG/ML
INJECTION, EMULSION INTRAVENOUS AS NEEDED
Status: DISCONTINUED | OUTPATIENT
Start: 2021-06-22 | End: 2021-06-22

## 2021-06-22 RX ADMIN — MIDAZOLAM 2 MG: 1 INJECTION INTRAMUSCULAR; INTRAVENOUS at 08:24

## 2021-06-22 RX ADMIN — GADOBUTROL 7 ML: 604.72 INJECTION INTRAVENOUS at 09:19

## 2021-06-22 RX ADMIN — PROPOFOL 30 MG: 10 INJECTION, EMULSION INTRAVENOUS at 09:11

## 2021-06-22 RX ADMIN — PROPOFOL 75 MCG/KG/MIN: 10 INJECTION, EMULSION INTRAVENOUS at 08:24

## 2021-06-22 NOTE — ANESTHESIA POSTPROCEDURE EVALUATION
Post-Op Assessment Note    CV Status:  Stable  Pain Score: 0    Pain management: adequate     Mental Status:  Arousable   Hydration Status:  Stable   PONV Controlled:  None   Airway Patency:  Patent      Post Op Vitals Reviewed: Yes      Staff: CRNA         No complications documented      /71 (06/22/21 0927)    Temp     Pulse (!) 52 (06/22/21 0927)   Resp 16 (06/22/21 0927)    SpO2 100 % (06/22/21 0927)

## 2021-06-22 NOTE — INTERVAL H&P NOTE
H&P reviewed  After examining the patient I find no changes in the patients condition since the H&P had been written      Vitals:    06/22/21 0750   BP: 157/81   Pulse: 78   Resp: 18   Temp: (!) 97 °F (36 1 °C)   SpO2: 98%

## 2021-06-22 NOTE — RESULT ENCOUNTER NOTE
Please notify patient, MRI of the brain was reviewed, And negative for intracranial lesion or hearing nerve pathology  Please arrange VNG

## 2021-06-22 NOTE — ANESTHESIA PREPROCEDURE EVALUATION
Procedure:  MRI BRAIN IAC WO CONTRAST    Relevant Problems   CARDIO   (+) Essential hypertension   (+) Pure hypercholesterolemia      GI/HEPATIC   (+) Esophageal reflux      MUSCULOSKELETAL   (+) Arthritis      NEURO/PSYCH   (+) Anxiety   (+) Personal history of adenocarcinoma of breast   ONGOING HEARTBURN   CLAUSTROPHOBIC  Physical Exam    Airway    Mallampati score: II  TM Distance: >3 FB  Neck ROM: full     Dental   No notable dental hx     Cardiovascular      Pulmonary      Other Findings        Anesthesia Plan  ASA Score- 2     Anesthesia Type- IV sedation with anesthesia with ASA Monitors  Additional Monitors:   Airway Plan:           Plan Factors-Exercise tolerance (METS): >4 METS  Chart reviewed  Patient summary reviewed  Patient is not a current smoker  Patient not instructed to abstain from smoking on day of procedure  Patient did not smoke on day of surgery  Induction- intravenous  Postoperative Plan-     Informed Consent- Anesthetic plan and risks discussed with patient and spouse  I personally reviewed this patient with the CRNA  Discussed and agreed on the Anesthesia Plan with the CRNA  Ana Maria Hauser

## 2021-07-09 ENCOUNTER — OFFICE VISIT (OUTPATIENT)
Dept: URGENT CARE | Facility: MEDICAL CENTER | Age: 71
End: 2021-07-09
Payer: MEDICARE

## 2021-07-09 VITALS
HEIGHT: 64 IN | WEIGHT: 155 LBS | HEART RATE: 73 BPM | RESPIRATION RATE: 18 BRPM | TEMPERATURE: 97.9 F | BODY MASS INDEX: 26.46 KG/M2 | OXYGEN SATURATION: 96 % | DIASTOLIC BLOOD PRESSURE: 82 MMHG | SYSTOLIC BLOOD PRESSURE: 130 MMHG

## 2021-07-09 DIAGNOSIS — R30.0 DYSURIA: Primary | ICD-10-CM

## 2021-07-09 LAB
SL AMB  POCT GLUCOSE, UA: ABNORMAL
SL AMB LEUKOCYTE ESTERASE,UA: ABNORMAL
SL AMB POCT BILIRUBIN,UA: ABNORMAL
SL AMB POCT BLOOD,UA: ABNORMAL
SL AMB POCT CLARITY,UA: CLEAR
SL AMB POCT COLOR,UA: YELLOW
SL AMB POCT KETONES,UA: ABNORMAL
SL AMB POCT NITRITE,UA: ABNORMAL
SL AMB POCT PH,UA: 8
SL AMB POCT SPECIFIC GRAVITY,UA: 1
SL AMB POCT URINE PROTEIN: ABNORMAL
SL AMB POCT UROBILINOGEN: 0.2

## 2021-07-09 PROCEDURE — 87086 URINE CULTURE/COLONY COUNT: CPT | Performed by: PHYSICIAN ASSISTANT

## 2021-07-09 PROCEDURE — 87077 CULTURE AEROBIC IDENTIFY: CPT | Performed by: PHYSICIAN ASSISTANT

## 2021-07-09 PROCEDURE — G0463 HOSPITAL OUTPT CLINIC VISIT: HCPCS | Performed by: PHYSICIAN ASSISTANT

## 2021-07-09 PROCEDURE — 87186 SC STD MICRODIL/AGAR DIL: CPT | Performed by: PHYSICIAN ASSISTANT

## 2021-07-09 PROCEDURE — 99213 OFFICE O/P EST LOW 20 MIN: CPT | Performed by: PHYSICIAN ASSISTANT

## 2021-07-09 RX ORDER — NITROFURANTOIN 25; 75 MG/1; MG/1
100 CAPSULE ORAL 2 TIMES DAILY
Qty: 14 CAPSULE | Refills: 0 | Status: SHIPPED | OUTPATIENT
Start: 2021-07-09 | End: 2021-08-27 | Stop reason: ALTCHOICE

## 2021-07-09 RX ORDER — DILTIAZEM HYDROCHLORIDE 180 MG/1
CAPSULE, EXTENDED RELEASE ORAL DAILY
COMMUNITY
End: 2021-08-27 | Stop reason: ALTCHOICE

## 2021-07-09 RX ORDER — PHENAZOPYRIDINE HYDROCHLORIDE 200 MG/1
200 TABLET, FILM COATED ORAL
Qty: 10 TABLET | Refills: 0 | Status: SHIPPED | OUTPATIENT
Start: 2021-07-09 | End: 2021-08-27 | Stop reason: ALTCHOICE

## 2021-07-09 NOTE — PATIENT INSTRUCTIONS
Urinary Tract Infection in Older Adults   WHAT YOU NEED TO KNOW:   A urinary tract infection (UTI) is caused by bacteria that get inside your urinary tract  Your urinary tract includes your kidneys, ureters, bladder, and urethra  Urine is made in your kidneys, and it flows from the ureters to the bladder  Urine leaves the bladder through the urethra  A UTI is more common in your lower urinary tract, which includes your bladder and urethra  DISCHARGE INSTRUCTIONS:   Return to the emergency department if:   · You are urinating very little or not at all  · You are vomiting  · You have a high fever with shaking chills  · You have side or back pain that gets worse  Call your doctor if:   · You have a fever  · You are a woman and you have increased white or yellow discharge from your vagina  · You do not feel better after 2 days of taking antibiotics  · You have questions or concerns about your condition or care  Medicines:   · Medicines  help treat the bacterial infection or decrease pain and burning when you urinate  You may also need medicines to decrease the urge to urinate often  If you have UTIs often (called recurrent UTIs), you may be given antibiotics to take regularly  You will be given directions for when and how to use antibiotics  The goal is to prevent UTIs but not cause antibiotic resistance by using antibiotics too often  · Take your medicine as directed  Contact your healthcare provider if you think your medicine is not helping or if you have side effects  Tell him or her if you are allergic to any medicine  Keep a list of the medicines, vitamins, and herbs you take  Include the amounts, and when and why you take them  Bring the list or the pill bottles to follow-up visits  Carry your medicine list with you in case of an emergency  Self-care:   · Drink liquids as directed  Liquids can help flush bacteria from your urinary tract   Ask how much liquid to drink each day and which liquids are best for you  You may need to drink more liquids than usual to help flush out the bacteria  Do not drink alcohol, caffeine, and citrus juices  These can irritate your bladder and increase your symptoms  · Apply heat  on your abdomen for 20 to 30 minutes every 2 hours for as many days as directed  Heat helps decrease discomfort and pressure in your bladder  Prevent a UTI:   · Urinate when you feel the urge  Do not hold your urine  Bacteria can grow if urine stays in the bladder too long  It may be helpful to urinate at least every 3 to 4 hours  · Urinate after you have sex  to flush away bacteria that can enter your urinary tract during sex  · Wear cotton underwear and clothes that are loose  Tight pants and nylon underwear can trap moisture and cause bacteria to grow  · Cranberry juice or cranberry supplements  may help prevent UTIs  Your healthcare provider can recommend the right juice or supplement for you  · Women should wipe front to back  after urinating or having a bowel movement  This may prevent germs from getting into the urinary tract  Do not douche or use feminine deodorants  These can change the chemical balance in your vagina  You may also be given vaginal estrogen medicine  This medicine helps prevent recurrent UTIs in women who have gone through menopause or are in itz-menopause  Follow up with your healthcare provider as directed:  Write down your questions so you remember to ask them during your visits  © Copyright [address] Information is for End User's use only and may not be sold, redistributed or otherwise used for commercial purposes  All illustrations and images included in CareNotes® are the copyrighted property of A D A M , Inc  or [facility] Arabella Maradiaga   The above information is an  only  It is not intended as medical advice for individual conditions or treatments   Talk to your doctor, nurse or pharmacist before following any medical regimen to see if it is safe and effective for you

## 2021-07-09 NOTE — PROGRESS NOTES
[facility]        NAME: Hood Agrawal is a 80 y o  female  : 1950    MRN: [id number]  DATE: 2021  TIME: 11:08 AM    Assessment and Plan   Dysuria [R30 0]  1  Dysuria  POCT urine dip auto non-scope    Urine culture    phenazopyridine (PYRIDIUM) 200 mg tablet    nitrofurantoin (MACROBID) 100 mg capsule         Patient Instructions      increase fluids  Finish antibiotic    Chief Complaint     Chief Complaint   Patient presents with    Possible UTI     Patient c/o dysuria x 1 week          History of Present Illness       Difficulty Urinating   This is a new problem  The current episode started in the past 7 days  The problem occurs every urination  The problem has been gradually worsening  The quality of the pain is described as burning  The pain is mild  There has been no fever  She is sexually active  There is no history of pyelonephritis  Associated symptoms include frequency, hesitancy and urgency  Pertinent negatives include no chills, discharge, flank pain, hematuria, nausea, possible pregnancy, sweats or vomiting  She has tried increased fluids for the symptoms  The treatment provided no relief  There is no history of catheterization, kidney stones, recurrent UTIs, a single kidney, urinary stasis or a urological procedure  Review of Systems   Review of Systems   Constitutional: Negative for chills  Gastrointestinal: Negative for nausea and vomiting  Genitourinary: Positive for dysuria, frequency, hesitancy and urgency  Negative for flank pain and hematuria           Current Medications       Current Outpatient Medications:     Ascorbic Acid (VITAMIN C) 250 MG CHEW, Chew 1,000 mg daily , Disp: , Rfl:     Calcium Carbonate-Vit D-Min (CALCIUM 1200 PO), Take by mouth, Disp: , Rfl:     Cartia  MG 24 hr capsule, TAKE 1 CAPSULE BY MOUTH DAILY , Disp: 90 capsule, Rfl: 3    Cholecalciferol (VITAMIN D3) 5000 units CAPS, Take 1 tablet by mouth daily, Disp: , Rfl:    Cyanocobalamin (B-12) 5000 MCG CAPS, Take by mouth, Disp: , Rfl:     Diclofenac Sodium (VOLTAREN) 1 %, Apply 2 g topically 4 (four) times a day, Disp: 100 g, Rfl: 3    diltiazem (TIAZAC) 180 MG 24 hr capsule, Daily, Disp: , Rfl:     famotidine (PEPCID) 40 MG tablet, TAKE ONE TABLET BY MOUTH DAILY AS NEEDED FOR HEARTBURN, Disp: 30 tablet, Rfl: 1    fluticasone (FLONASE) 50 mcg/act nasal spray, 2 sprays into each nostril daily, Disp: 16 g, Rfl: 0    meloxicam (MOBIC) 15 mg tablet, Take 1 tablet (15 mg total) by mouth daily, Disp: 30 tablet, Rfl: 1    Misc Natural Products (GLUCOSAMINE CHONDROITIN ADV PO), Take 2 capsules by mouth daily , Disp: , Rfl:     Multiple Vitamins-Minerals (MULTI COMPLETE) CAPS, Take 1 capsule by mouth daily, Disp: , Rfl:     nitrofurantoin (MACROBID) 100 mg capsule, Take 1 capsule (100 mg total) by mouth 2 (two) times a day, Disp: 14 capsule, Rfl: 0    phenazopyridine (PYRIDIUM) 200 mg tablet, Take 1 tablet (200 mg total) by mouth 3 (three) times a day with meals, Disp: 10 tablet, Rfl: 0    Probiotic Product (VSL#3) CAPS, Take 1 capsule by mouth daily, Disp: 90 capsule, Rfl: 3    rosuvastatin (CRESTOR) 10 MG tablet, Take 1 tablet (10 mg total) by mouth daily, Disp: 90 tablet, Rfl: 3    Turmeric 500 MG CAPS, Take 1,000 mg by mouth daily , Disp: , Rfl:     Zinc 50 MG CAPS, Take by mouth daily, Disp: , Rfl:     Current Allergies     Allergies as of 07/09/2021 - Reviewed 07/09/2021   Allergen Reaction Noted    Ciprofloxacin Anaphylaxis 10/30/2003    Celecoxib Swelling 12/11/2012    Amitriptyline GI Intolerance 05/30/2017    Bactrim [sulfamethoxazole-trimethoprim] Rash 10/30/2003    Mesalamine Dizziness and Headache 12/08/2020    Other Vomiting 10/02/2014    Sulfa antibiotics Rash 12/11/2012    Wound dressing adhesive Rash 12/11/2012            The following portions of the patient's history were reviewed and updated as appropriate: allergies, current medications, past family history, past medical history, past social history, past surgical history and problem list      Past Medical History:   Diagnosis Date    Arthritis     Gastric ulcer     GERD (gastroesophageal reflux disease)     Heart murmur     History of radiation therapy     PBRT    IBS (irritable bowel syndrome)     Malignant neoplasm (HCC)     Migraine     Skin cancer     Use of letrozole (Femara)     took for 2 years D/john due to side effects       Past Surgical History:   Procedure Laterality Date    ABDOMINOPLASTY      ADENOIDECTOMY      BREAST BIOPSY Right 10/12/2007    [facility]    BREAST EXCISIONAL BIOPSY Right 10/07/2014    benign    BREAST SURGERY      lumpectomy, resolved 2007     SECTION      CHOLECYSTECTOMY      COLON SURGERY      COLONOSCOPY      COSMETIC SURGERY      forehead    FOOT SURGERY      MOHS SURGERY      REDUCTION MAMMAPLASTY  2000    SENTINEL LYMPH NODE BIOPSY Right 2007    TOE SURGERY      left toe surgery    TONSILLECTOMY         Family History   Problem Relation Age of Onset    Hypertension Mother     Melanoma Mother     Heart disease Mother     Heart attack Father         acute MI, CABG    Hypertension Father     Heart disease Father     Other Brother         CABG    Lymphoma Brother         non-Hodgkin's    Hypertension Brother     Heart disease Brother     Other Family         back disorder    Stroke Family         CVA    Diabetes Family     Cancer Family          Medications have been verified  Objective   /82   Pulse 73   Temp 97 9 °F (36 6 °C)   Resp 18   Ht 5' 4 4" (1 636 m)   Wt 70 3 kg (155 lb)   SpO2 96%   BMI 26 28 kg/m²   No LMP recorded  Patient is postmenopausal        Physical Exam     Physical Exam  Vitals and nursing note reviewed  Constitutional:       Appearance: Normal appearance  She is normal weight     HENT:      Mouth/Throat:      Mouth: Mucous membranes are moist    Cardiovascular:      Rate and Rhythm: Normal rate and regular rhythm  Pulmonary:      Effort: Pulmonary effort is normal       Breath sounds: Normal breath sounds  Abdominal:      General: Abdomen is flat  Bowel sounds are normal  There is no distension  Tenderness: There is no abdominal tenderness  Neurological:      Mental Status: She is alert

## 2021-07-11 LAB — BACTERIA UR CULT: ABNORMAL

## 2021-07-13 ENCOUNTER — OFFICE VISIT (OUTPATIENT)
Dept: AUDIOLOGY | Age: 71
End: 2021-07-13
Payer: MEDICARE

## 2021-07-13 DIAGNOSIS — R42 DIZZINESS AND GIDDINESS: Primary | ICD-10-CM

## 2021-07-13 PROCEDURE — 92567 TYMPANOMETRY: CPT | Performed by: AUDIOLOGIST

## 2021-07-13 PROCEDURE — 92540 BASIC VESTIBULAR EVALUATION: CPT | Performed by: AUDIOLOGIST

## 2021-07-13 PROCEDURE — 92537 CALORIC VSTBLR TEST W/REC: CPT | Performed by: AUDIOLOGIST

## 2021-07-13 NOTE — PROGRESS NOTES
Videonystagmography (VNG) Evaluation    Name:  Miles Reynoso  :  1950  Age:  80 y o  Date of Evaluation: 21     History: Dizziness  Reason for visit: Miles Reynoso is seen today at the request of Dr Basilia Birmingham for an evaluation of balance  Patient complains of an embalance when walking without dizziness, and dizziness, stated as a feeling of being drunk in association with migraines       Tympanometry:   Right: Type A - normal middle ear pressure and compliance   Left: Type A - normal middle ear pressure and compliance    Oculomotor battery:    Gaze:          Right: Normal        Left: Normal         Up: Normal        Down: Normal      Saccades: Normal     Tracking: Normal     Optokinetic: Abnormal at 40dps - Right only    Positioning/Positionals:     [employer]:    Right: Negative      Left: Negative        Positionals:     Sitting: Normal    Supine: Normal    Head Right: Normal    Head Left: Normal    Body Right: Normal    Body Left[de-identified] Normal      Calorics:     Bithermal Caloric Irrigation: Normal    Recommendations: Consider vestibular physcial therapy evaluation and rehabilitation through [facility]  Follow up with managing physician        Shaun Heller, Judit GUO , CCC-A  Clinical Audiologist

## 2021-08-18 DIAGNOSIS — K21.9 GASTROESOPHAGEAL REFLUX DISEASE WITHOUT ESOPHAGITIS: ICD-10-CM

## 2021-08-18 RX ORDER — FAMOTIDINE 40 MG/1
TABLET, FILM COATED ORAL
Qty: 30 TABLET | Refills: 0 | Status: SHIPPED | OUTPATIENT
Start: 2021-08-18 | End: 2022-02-11

## 2021-08-23 ENCOUNTER — HOSPITAL ENCOUNTER (OUTPATIENT)
Dept: MAMMOGRAPHY | Facility: CLINIC | Age: 71
Discharge: HOME | End: 2021-08-23
Payer: MEDICARE

## 2021-08-23 VITALS — BODY MASS INDEX: 25.83 KG/M2 | WEIGHT: 155 LBS | HEIGHT: 65 IN

## 2021-08-23 DIAGNOSIS — R92.1 BREAST CALCIFICATIONS: ICD-10-CM

## 2021-08-23 PROCEDURE — 77065 DX MAMMO INCL CAD UNI: CPT

## 2021-08-27 ENCOUNTER — OFFICE VISIT (OUTPATIENT)
Dept: FAMILY MEDICINE CLINIC | Facility: CLINIC | Age: 71
End: 2021-08-27
Payer: MEDICARE

## 2021-08-27 VITALS
WEIGHT: 161 LBS | BODY MASS INDEX: 26.82 KG/M2 | OXYGEN SATURATION: 100 % | HEART RATE: 69 BPM | HEIGHT: 65 IN | SYSTOLIC BLOOD PRESSURE: 120 MMHG | DIASTOLIC BLOOD PRESSURE: 76 MMHG | TEMPERATURE: 97.5 F

## 2021-08-27 DIAGNOSIS — G60.9 IDIOPATHIC PERIPHERAL NEUROPATHY: ICD-10-CM

## 2021-08-27 DIAGNOSIS — G43.909 MIGRAINE WITHOUT STATUS MIGRAINOSUS, NOT INTRACTABLE, UNSPECIFIED MIGRAINE TYPE: ICD-10-CM

## 2021-08-27 DIAGNOSIS — Z00.00 MEDICARE ANNUAL WELLNESS VISIT, SUBSEQUENT: Primary | ICD-10-CM

## 2021-08-27 DIAGNOSIS — K51.00 ULCERATIVE PANCOLITIS WITHOUT COMPLICATION (HCC): ICD-10-CM

## 2021-08-27 DIAGNOSIS — E78.00 PURE HYPERCHOLESTEROLEMIA: ICD-10-CM

## 2021-08-27 DIAGNOSIS — K21.9 GASTROESOPHAGEAL REFLUX DISEASE WITHOUT ESOPHAGITIS: ICD-10-CM

## 2021-08-27 DIAGNOSIS — I10 ESSENTIAL HYPERTENSION: ICD-10-CM

## 2021-08-27 PROBLEM — K51.90 ULCERATIVE COLITIS (HCC): Status: ACTIVE | Noted: 2021-01-14

## 2021-08-27 PROBLEM — F41.9 ANXIETY: Status: RESOLVED | Noted: 2018-09-18 | Resolved: 2021-08-27

## 2021-08-27 PROCEDURE — 99214 OFFICE O/P EST MOD 30 MIN: CPT | Performed by: FAMILY MEDICINE

## 2021-08-27 PROCEDURE — 1123F ACP DISCUSS/DSCN MKR DOCD: CPT | Performed by: FAMILY MEDICINE

## 2021-08-27 PROCEDURE — G0439 PPPS, SUBSEQ VISIT: HCPCS | Performed by: FAMILY MEDICINE

## 2021-08-27 NOTE — PROGRESS NOTES
Assessment and Plan:     Problem List Items Addressed This Visit        Digestive    Esophageal reflux     Reflux stable this time with use of Pepcid         Ulcerative colitis ([address] )     She is having no significant issues at this time  Continue routine follow-up with GI             Cardiovascular and Mediastinum    Migraine headache     Fortunately, headaches have improved  Continue routine monitoring  Essential hypertension     Blood pressure is well controlled  Continue routine follow-up         Relevant Orders    CBC and differential    Comprehensive metabolic panel    Lipid Panel with Direct LDL reflex       Nervous and Auditory    Idiopathic peripheral neuropathy     She is having some persistent neuropathic symptoms in her right 3rd and 4th toes  She is now having some symptoms in her right 4th ring finger tip  I do not believe she has any significant neuropathic issue going on in light of the sporadic and diffuse symptoms  She declines EMG at this time  Continue with routine follow-up            Other    Pure hypercholesterolemia     Lipids are much improved with rosuvastatin  Other Visit Diagnoses     Medicare annual wellness visit, subsequent    -  Primary        BMI Counseling: Body mass index is 27 21 kg/m²  The BMI is above normal  Nutrition recommendations include encouraging healthy choices of fruits and vegetables  Exercise recommendations include moderate physical activity 150 minutes/week  Preventive health issues were discussed with patient, and age appropriate screening tests were ordered as noted in patient's After Visit Summary  Personalized health advice and appropriate referrals for health education or preventive services given if needed, as noted in patient's After Visit Summary       History of Present Illness:     Patient presents for Medicare Annual Wellness visit    Patient Care Team:  Raúl Gonsales MD as PCP - General Casimiro Eli DO Haidee Sever, MD Stephen Baas, DO     Problem List:     Patient Active Problem List   Diagnosis    Personal history of adenocarcinoma of breast    Arthritis    Carcinoma, basal cell, skin    Esophageal reflux    Pure hypercholesterolemia    Lumbar disc herniation    Migraine headache    Osteopenia    Malignant neoplasm of skin    Idiopathic peripheral neuropathy    Neutropenia (HCC)    Dupuytren's contracture    Hip flexor tendinitis, right    Essential hypertension    Vertigo    Neurologic gait dysfunction    Tinnitus of both ears    Breast calcifications    Ulcerative [address] [address] )      Past Medical and Surgical History:     Past Medical History:   Diagnosis Date    Arthritis     Breast cancer ([facility] [address] )     right    Gastric ulcer     GERD (gastroesophageal reflux disease)     Heart murmur     History of radiation therapy     PBRT    IBS (irritable bowel syndrome)     Irritable bowel syndrome 2012    Malignant neoplasm (HCC)     Migraine     Skin cancer     Use of letrozole (Femara)     took for 2 years D/john due to side effects     Past Surgical History:   Procedure Laterality Date    ABDOMINOPLASTY      ADENOIDECTOMY      BREAST BIOPSY Right 10/12/2007    [facility]    BREAST SURGERY      lumpectomy, resolved 2007     SECTION      CHOLECYSTECTOMY      COLON SURGERY      COLONOSCOPY      COSMETIC SURGERY  1975    forehead    FOOT SURGERY      MOHS SURGERY      REDUCTION MAMMAPLASTY Bilateral 2015    reduction on left/lift on right    SENTINEL LYMPH NODE BIOPSY Right 2007    TOE SURGERY      left toe surgery    TONSILLECTOMY        Family History:     Family History   Problem Relation Age of Onset    Hypertension Mother     Melanoma Mother     Heart disease Mother     Heart attack Father         acute MI, CABG    Hypertension Father     Heart disease Father     Other Brother         CABG    Lymphoma Brother non-Hodgkin's    Hypertension Brother     Heart disease Brother     Other Family         back disorder    Stroke Family         CVA    Diabetes Family     Cancer Family       Social History:     Social History     Socioeconomic History    Marital status:      Spouse name: None    Number of children: None    Years of education: None    Highest education level: None   Occupational History    None   Tobacco Use    Smoking status: Former Smoker    Smokeless tobacco: Never Used    Tobacco comment: Smoker in teenage years  Quit at 22  One pack per week  Vaping Use    Vaping Use: Never used   Substance and Sexual Activity    Alcohol use: Not Currently     Comment: May have alcohol but is not drinking it currently    Drug use: No    Sexual activity: None   Other Topics Concern    None   Social History Narrative    Daily caffeine use- 2 cups of green tea and chocolate     Social Determinants of Health     Financial Resource Strain:     Difficulty of Paying Living Expenses:    Food Insecurity:     Worried About Running Out of Food in the Last Year:     Ran Out of Food in the Last Year:    Transportation Needs:     Lack of Transportation (Medical):      Lack of Transportation (Non-Medical):    Physical Activity:     Days of Exercise per Week:     Minutes of Exercise per Session:    Stress:     Feeling of Stress :    Social Connections:     Frequency of Communication with Friends and Family:     Frequency of Social Gatherings with Friends and Family:     Attends [employer] Services:     Active Member of Clubs or Organizations:     Attends Club or Organization Meetings:     Marital Status:    Intimate Partner Violence:     Fear of Current or Ex-Partner:     Emotionally Abused:     Physically Abused:     Sexually Abused:       Medications and Allergies:     Current Outpatient Medications   Medication Sig Dispense Refill    Ascorbic Acid (VITAMIN C) 250 MG CHEW Chew 1,000 mg daily  Calcium Carbonate-Vit D-Min (CALCIUM 1200 PO) Take by mouth      Cartia  MG 24 hr capsule TAKE 1 CAPSULE BY MOUTH DAILY  90 capsule 3    Cholecalciferol (VITAMIN D3) 5000 units CAPS Take 1 tablet by mouth daily      Cyanocobalamin (B-12) 5000 MCG CAPS Take by mouth      Diclofenac Sodium (VOLTAREN) 1 % Apply 2 g topically 4 (four) times a day 100 g 3    famotidine (PEPCID) 40 MG tablet TAKE ONE TABLET BY MOUTH DAILY AS NEEDED FOR  HEARTBURN 30 tablet 0    fluticasone (FLONASE) 50 mcg/act nasal spray 2 sprays into each nostril daily 16 g 0    Misc Natural Products (GLUCOSAMINE CHONDROITIN ADV PO) Take 2 capsules by mouth daily       Multiple Vitamins-Minerals (MULTI COMPLETE) CAPS Take 1 capsule by mouth daily      Probiotic Product (VSL#3) CAPS Take 1 capsule by mouth daily 90 capsule 3    rosuvastatin (CRESTOR) 10 MG tablet Take 1 tablet (10 mg total) by mouth daily 90 tablet 3    Turmeric 500 MG CAPS Take 1,000 mg by mouth daily       Zinc 50 MG CAPS Take by mouth daily       No current facility-administered medications for this visit       Allergies   Allergen Reactions    Ciprofloxacin Anaphylaxis     Anaphylaxis    Celecoxib Swelling     Edema of legs    Amitriptyline GI Intolerance     Action Taken: bloating,GI problems;     Bactrim [Sulfamethoxazole-Trimethoprim] Rash    Mesalamine Dizziness and Headache    Other Vomiting     "black olives->GI upset"    Sulfa Antibiotics Rash    Wound Dressing Adhesive Rash      Immunizations:     Immunization History   Administered Date(s) Administered    H1N1, All Formulations 10/26/2009    INFLUENZA 11/18/2005, 12/01/2006, 10/19/2007, 11/01/2015, 10/05/2020    Influenza Quadrivalent, 6-35 Months IM 11/01/2015    Influenza Split High Dose Preservative Free IM 10/14/2016, 10/09/2017    Influenza, high dose seasonal 0 7 mL 09/18/2018, 09/27/2019    Influenza, seasonal, injectable 09/14/1997, 10/09/2013    Pneumococcal Conjugate 13-Valent 11/16/2015    Pneumococcal Polysaccharide PPV23 12/19/2016    SARS-CoV-2 / COVID-19 mRNA IM (Moderna) 02/19/2021, 03/19/2021    Tdap 11/16/2015, 07/04/2016, 04/03/2017    Zoster 01/06/2014      Health Maintenance:         Topic Date Due    Cervical Cancer Screening  Never done    Colorectal Cancer Screening  10/07/2021    Breast Cancer Screening: Mammogram  08/23/2022    DXA SCAN  02/05/2026    Hepatitis C Screening  Completed         Topic Date Due    Influenza Vaccine (1) 09/01/2021      Medicare Health Risk Assessment:     /76 (BP Location: Right arm, Patient Position: Sitting, Cuff Size: Standard)   Pulse 69   Temp 97 5 °F (36 4 °C)   Ht 5' 4 5" (1 638 m)   Wt 73 kg (161 lb)   SpO2 100%   BMI 27 21 kg/m²      Khoa Schultz is here for her Subsequent Wellness visit  Health Risk Assessment:   Patient rates overall health as excellent  Patient feels that their physical health rating is same  Patient is very satisfied with their life  Eyesight was rated as same  Hearing was rated as same  Patient feels that their emotional and mental health rating is same  Patients states they are never, rarely angry  Patient states they are sometimes unusually tired/fatigued  Pain experienced in the last 7 days has been some  Patient's pain rating has been 6/10  Patient states that she has experienced no weight loss or gain in last 6 months  Depression Screening:   PHQ-2 Score: 0      Fall Risk Screening: In the past year, patient has experienced: no history of falling in past year      Urinary Incontinence Screening:   Patient has not leaked urine accidently in the last six months  Home Safety:  Patient does not have trouble with stairs inside or outside of their home  Patient has working smoke alarms and has working carbon monoxide detector  Home safety hazards include: none  Nutrition:   Current diet is Regular  Medications:   Patient is currently taking over-the-counter supplements   OTC medications include: see medication list  Patient is able to manage medications  Activities of Daily Living (ADLs)/Instrumental Activities of Daily Living (IADLs):   Walk and transfer into and out of bed and chair?: Yes  Dress and groom yourself?: Yes    Bathe or shower yourself?: Yes    Feed yourself?  Yes  Do your laundry/housekeeping?: Yes  Manage your money, pay your bills and track your expenses?: Yes  Make your own meals?: Yes    Do your own shopping?: Yes    Previous Hospitalizations:   Any hospitalizations or ED visits within the last 12 months?: No      Advance Care Planning:   Living will: No    Durable POA for healthcare: No    Advanced directive: No    Five wishes given: Yes      Cognitive Screening:   Provider or family/friend/caregiver concerned regarding cognition?: No    PREVENTIVE SCREENINGS      Cardiovascular Screening:    General: Screening Not Indicated and History Lipid Disorder      Diabetes Screening:     General: Screening Current      Colorectal Cancer Screening:     General: Screening Current      Breast Cancer Screening:     General: History Breast Cancer      Cervical Cancer Screening:    General: Screening Not Indicated      Osteoporosis Screening:    General: Screening Not Indicated and History Osteoporosis      Abdominal Aortic Aneurysm (AAA) Screening:        General: Screening Not Indicated      Lung Cancer Screening:     General: Screening Not Indicated      Hepatitis C Screening:    General: Screening Current    Screening, Brief Intervention, and Referral to Treatment (SBIRT)    Screening    Typical number of drinks in a week: 3    Single Item Drug Screening:  How often have you used an illegal drug (including marijuana) or a prescription medication for non-medical reasons in the past year? never    Single Item Drug Screen Score: 0  Interpretation: Negative screen for possible drug use disorder      Ari Rcoa MD

## 2021-08-30 NOTE — PROGRESS NOTES
Assessment/Plan:       Problem List Items Addressed This Visit        Digestive    Esophageal reflux     Reflux stable this time with use of Pepcid         Ulcerative colitis ([address] )     She is having no significant issues at this time  Continue routine follow-up with GI             Cardiovascular and Mediastinum    Migraine headache     Fortunately, headaches have improved  Continue routine monitoring  Essential hypertension     Blood pressure is well controlled  Continue routine follow-up         Relevant Orders    CBC and differential    Comprehensive metabolic panel    Lipid Panel with Direct LDL reflex       Nervous and Auditory    Idiopathic peripheral neuropathy     She is having some persistent neuropathic symptoms in her right 3rd and 4th toes  She is now having some symptoms in her right 4th ring finger tip  I do not believe she has any significant neuropathic issue going on in light of the sporadic and diffuse symptoms  She declines EMG at this time  Continue with routine follow-up            Other    Pure hypercholesterolemia     Lipids are much improved with rosuvastatin  Other Visit Diagnoses     Medicare annual wellness visit, subsequent    -  Primary            Subjective:      Patient ID: Carie Curtis is a 80 y o  female  HPI patient presents today for follow-up for chronic health issues in addition to Medicare wellness visit  Overall, she is feeling well  She does complain of occasional numbness at the tip of for right 4th ring finger  She has some ongoing tingling and numbness in her right 3rd and 4th toes as well  She has had no trauma  She has history of reflux and denies heartburn dysphagia  She has history of also close which remains stable and she is having no excessive diarrhea    She has no rectal bleeding or unintentional weight loss    The following portions of the patient's history were reviewed and updated as appropriate: allergies, current Genitourinary: Negative for difficulty urinating and flank pain  Musculoskeletal: Positive for back pain  Negative for arthralgias and myalgias  Skin: Negative for rash  Neurological: Negative for dizziness and light-headedness  Hematological: Negative for adenopathy  Does not bruise/bleed easily  Psychiatric/Behavioral: Negative for dysphoric mood and sleep disturbance  The patient is not nervous/anxious  Objective:      /76 (BP Location: Right arm, Patient Position: Sitting, Cuff Size: Standard)   Pulse 69   Temp 97 5 °F (36 4 °C)   Ht 5' 4 5" (1 638 m)   Wt 73 kg (161 lb)   SpO2 100%   BMI 27 21 kg/m²          Physical Exam  Constitutional:       General: She is not in acute distress  Appearance: She is well-developed  She is not diaphoretic  HENT:      Head: Normocephalic  Eyes:      General:         Right eye: No discharge  Left eye: No discharge  Pupils: Pupils are equal, round, and reactive to light  Neck:      Thyroid: No thyromegaly  Trachea: No tracheal deviation  Cardiovascular:      Rate and Rhythm: Normal rate and regular rhythm  Heart sounds: Normal heart sounds  No murmur heard  Pulmonary:      Effort: Pulmonary effort is normal  No respiratory distress  Breath sounds: No wheezing or rales  Abdominal:      General: There is no distension  Palpations: Abdomen is soft  Tenderness: There is no abdominal tenderness  Musculoskeletal:         General: Normal range of motion  Lymphadenopathy:      Cervical: No cervical adenopathy  Skin:     General: Skin is warm  Findings: No erythema  Neurological:      Mental Status: She is alert and oriented to person, place, and time  Cranial Nerves: No cranial nerve deficit  Psychiatric:         Thought Content:  Thought content normal          Judgment: Judgment normal            Belle Sommer MD

## 2021-08-30 NOTE — ASSESSMENT & PLAN NOTE
She is having some persistent neuropathic symptoms in her right 3rd and 4th toes  She is now having some symptoms in her right 4th ring finger tip  I do not believe she has any significant neuropathic issue going on in light of the sporadic and diffuse symptoms  She declines EMG at this time    Continue with routine follow-up

## 2021-09-03 DIAGNOSIS — E78.5 HYPERLIPIDEMIA, UNSPECIFIED HYPERLIPIDEMIA TYPE: ICD-10-CM

## 2021-09-03 RX ORDER — ROSUVASTATIN CALCIUM 10 MG/1
TABLET, COATED ORAL
Qty: 90 TABLET | Refills: 0 | Status: SHIPPED | OUTPATIENT
Start: 2021-09-03 | End: 2021-12-02

## 2021-09-27 DIAGNOSIS — I10 ESSENTIAL HYPERTENSION: ICD-10-CM

## 2021-09-27 RX ORDER — DILTIAZEM HYDROCHLORIDE 180 MG/1
180 CAPSULE, COATED, EXTENDED RELEASE ORAL DAILY
Qty: 90 CAPSULE | Refills: 3 | Status: SHIPPED | OUTPATIENT
Start: 2021-09-27 | End: 2022-06-25

## 2021-10-20 ENCOUNTER — IMMUNIZATIONS (OUTPATIENT)
Dept: FAMILY MEDICINE CLINIC | Facility: CLINIC | Age: 71
End: 2021-10-20
Payer: MEDICARE

## 2021-10-20 ENCOUNTER — VBI (OUTPATIENT)
Dept: ADMINISTRATIVE | Facility: OTHER | Age: 71
End: 2021-10-20

## 2021-10-20 ENCOUNTER — APPOINTMENT (OUTPATIENT)
Dept: LAB | Facility: MEDICAL CENTER | Age: 71
End: 2021-10-20
Payer: MEDICARE

## 2021-10-20 VITALS — TEMPERATURE: 97.6 F

## 2021-10-20 DIAGNOSIS — C50.911 ADENOCARCINOMA OF RIGHT BREAST (HCC): ICD-10-CM

## 2021-10-20 DIAGNOSIS — Z23 NEED FOR INFLUENZA VACCINATION: Primary | ICD-10-CM

## 2021-10-20 LAB
ALBUMIN SERPL BCP-MCNC: 3.8 G/DL (ref 3.5–5)
ALP SERPL-CCNC: 74 U/L (ref 46–116)
ALT SERPL W P-5'-P-CCNC: 29 U/L (ref 12–78)
ANION GAP SERPL CALCULATED.3IONS-SCNC: 1 MMOL/L (ref 4–13)
AST SERPL W P-5'-P-CCNC: 17 U/L (ref 5–45)
BASOPHILS # BLD AUTO: 0.08 THOUSANDS/ΜL (ref 0–0.1)
BASOPHILS NFR BLD AUTO: 2 % (ref 0–1)
BILIRUB SERPL-MCNC: 1.33 MG/DL (ref 0.2–1)
BUN SERPL-MCNC: 11 MG/DL (ref 5–25)
CALCIUM SERPL-MCNC: 9.5 MG/DL (ref 8.3–10.1)
CANCER AG27-29 SERPL-ACNC: 15.1 U/ML (ref 0–42.3)
CHLORIDE SERPL-SCNC: 105 MMOL/L (ref 100–108)
CO2 SERPL-SCNC: 31 MMOL/L (ref 21–32)
CREAT SERPL-MCNC: 0.83 MG/DL (ref 0.6–1.3)
EOSINOPHIL # BLD AUTO: 0.14 THOUSAND/ΜL (ref 0–0.61)
EOSINOPHIL NFR BLD AUTO: 3 % (ref 0–6)
ERYTHROCYTE [DISTWIDTH] IN BLOOD BY AUTOMATED COUNT: 13.5 % (ref 11.6–15.1)
GFR SERPL CREATININE-BSD FRML MDRD: 72 ML/MIN/1.73SQ M
GLUCOSE P FAST SERPL-MCNC: 95 MG/DL (ref 65–99)
HCT VFR BLD AUTO: 42.5 % (ref 34.8–46.1)
HGB BLD-MCNC: 13.5 G/DL (ref 11.5–15.4)
IMM GRANULOCYTES # BLD AUTO: 0.01 THOUSAND/UL (ref 0–0.2)
IMM GRANULOCYTES NFR BLD AUTO: 0 % (ref 0–2)
LYMPHOCYTES # BLD AUTO: 2 THOUSANDS/ΜL (ref 0.6–4.47)
LYMPHOCYTES NFR BLD AUTO: 37 % (ref 14–44)
MCH RBC QN AUTO: 29 PG (ref 26.8–34.3)
MCHC RBC AUTO-ENTMCNC: 31.8 G/DL (ref 31.4–37.4)
MCV RBC AUTO: 91 FL (ref 82–98)
MONOCYTES # BLD AUTO: 0.53 THOUSAND/ΜL (ref 0.17–1.22)
MONOCYTES NFR BLD AUTO: 10 % (ref 4–12)
NEUTROPHILS # BLD AUTO: 2.68 THOUSANDS/ΜL (ref 1.85–7.62)
NEUTS SEG NFR BLD AUTO: 48 % (ref 43–75)
NRBC BLD AUTO-RTO: 0 /100 WBCS
PLATELET # BLD AUTO: 281 THOUSANDS/UL (ref 149–390)
PMV BLD AUTO: 10.2 FL (ref 8.9–12.7)
POTASSIUM SERPL-SCNC: 3.9 MMOL/L (ref 3.5–5.3)
PROT SERPL-MCNC: 6.9 G/DL (ref 6.4–8.2)
RBC # BLD AUTO: 4.66 MILLION/UL (ref 3.81–5.12)
SODIUM SERPL-SCNC: 137 MMOL/L (ref 136–145)
WBC # BLD AUTO: 5.44 THOUSAND/UL (ref 4.31–10.16)

## 2021-10-20 PROCEDURE — 86300 IMMUNOASSAY TUMOR CA 15-3: CPT

## 2021-10-20 PROCEDURE — 85025 COMPLETE CBC W/AUTO DIFF WBC: CPT

## 2021-10-20 PROCEDURE — 36415 COLL VENOUS BLD VENIPUNCTURE: CPT

## 2021-10-20 PROCEDURE — 80053 COMPREHEN METABOLIC PANEL: CPT

## 2021-10-20 PROCEDURE — G0008 ADMIN INFLUENZA VIRUS VAC: HCPCS

## 2021-10-20 PROCEDURE — 90662 IIV NO PRSV INCREASED AG IM: CPT

## 2021-11-01 ENCOUNTER — TELEPHONE (OUTPATIENT)
Dept: HEMATOLOGY ONCOLOGY | Facility: CLINIC | Age: 71
End: 2021-11-01

## 2021-11-10 ENCOUNTER — OFFICE VISIT (OUTPATIENT)
Dept: HEMATOLOGY ONCOLOGY | Facility: CLINIC | Age: 71
End: 2021-11-10
Payer: MEDICARE

## 2021-11-10 ENCOUNTER — APPOINTMENT (OUTPATIENT)
Dept: LAB | Facility: MEDICAL CENTER | Age: 71
End: 2021-11-10
Payer: MEDICARE

## 2021-11-10 VITALS
HEART RATE: 69 BPM | WEIGHT: 156 LBS | SYSTOLIC BLOOD PRESSURE: 118 MMHG | OXYGEN SATURATION: 97 % | TEMPERATURE: 97.2 F | RESPIRATION RATE: 16 BRPM | HEIGHT: 65 IN | DIASTOLIC BLOOD PRESSURE: 78 MMHG | BODY MASS INDEX: 25.99 KG/M2

## 2021-11-10 DIAGNOSIS — M19.90 ARTHRITIS: ICD-10-CM

## 2021-11-10 DIAGNOSIS — I10 ESSENTIAL HYPERTENSION: ICD-10-CM

## 2021-11-10 DIAGNOSIS — E80.6 HYPERBILIRUBINEMIA: ICD-10-CM

## 2021-11-10 DIAGNOSIS — Z78.9 NEED FOR FOLLOW-UP BY SOCIAL WORKER: ICD-10-CM

## 2021-11-10 DIAGNOSIS — Z85.3 PERSONAL HISTORY OF ADENOCARCINOMA OF BREAST: Primary | ICD-10-CM

## 2021-11-10 LAB
ALBUMIN SERPL BCP-MCNC: 4.1 G/DL (ref 3.5–5)
ALP SERPL-CCNC: 85 U/L (ref 46–116)
ALT SERPL W P-5'-P-CCNC: 29 U/L (ref 12–78)
ANION GAP SERPL CALCULATED.3IONS-SCNC: 10 MMOL/L (ref 4–13)
AST SERPL W P-5'-P-CCNC: 17 U/L (ref 5–45)
BASOPHILS # BLD AUTO: 0.08 THOUSANDS/ΜL (ref 0–0.1)
BASOPHILS NFR BLD AUTO: 2 % (ref 0–1)
BILIRUB DIRECT SERPL-MCNC: 0.24 MG/DL (ref 0–0.2)
BILIRUB SERPL-MCNC: 0.97 MG/DL (ref 0.2–1)
BUN SERPL-MCNC: 12 MG/DL (ref 5–25)
CALCIUM SERPL-MCNC: 10 MG/DL (ref 8.3–10.1)
CHLORIDE SERPL-SCNC: 103 MMOL/L (ref 100–108)
CHOLEST SERPL-MCNC: 135 MG/DL (ref 50–200)
CO2 SERPL-SCNC: 29 MMOL/L (ref 21–32)
CREAT SERPL-MCNC: 0.96 MG/DL (ref 0.6–1.3)
EOSINOPHIL # BLD AUTO: 0.12 THOUSAND/ΜL (ref 0–0.61)
EOSINOPHIL NFR BLD AUTO: 2 % (ref 0–6)
ERYTHROCYTE [DISTWIDTH] IN BLOOD BY AUTOMATED COUNT: 13.4 % (ref 11.6–15.1)
GFR SERPL CREATININE-BSD FRML MDRD: 60 ML/MIN/1.73SQ M
GLUCOSE P FAST SERPL-MCNC: 100 MG/DL (ref 65–99)
HCT VFR BLD AUTO: 43.7 % (ref 34.8–46.1)
HDLC SERPL-MCNC: 58 MG/DL
HGB BLD-MCNC: 14.2 G/DL (ref 11.5–15.4)
IMM GRANULOCYTES # BLD AUTO: 0.01 THOUSAND/UL (ref 0–0.2)
IMM GRANULOCYTES NFR BLD AUTO: 0 % (ref 0–2)
LDLC SERPL CALC-MCNC: 63 MG/DL (ref 0–100)
LYMPHOCYTES # BLD AUTO: 1.48 THOUSANDS/ΜL (ref 0.6–4.47)
LYMPHOCYTES NFR BLD AUTO: 29 % (ref 14–44)
MCH RBC QN AUTO: 29.3 PG (ref 26.8–34.3)
MCHC RBC AUTO-ENTMCNC: 32.5 G/DL (ref 31.4–37.4)
MCV RBC AUTO: 90 FL (ref 82–98)
MONOCYTES # BLD AUTO: 0.55 THOUSAND/ΜL (ref 0.17–1.22)
MONOCYTES NFR BLD AUTO: 11 % (ref 4–12)
NEUTROPHILS # BLD AUTO: 2.8 THOUSANDS/ΜL (ref 1.85–7.62)
NEUTS SEG NFR BLD AUTO: 56 % (ref 43–75)
NRBC BLD AUTO-RTO: 0 /100 WBCS
PLATELET # BLD AUTO: 271 THOUSANDS/UL (ref 149–390)
PMV BLD AUTO: 10 FL (ref 8.9–12.7)
POTASSIUM SERPL-SCNC: 4.6 MMOL/L (ref 3.5–5.3)
PROT SERPL-MCNC: 7.3 G/DL (ref 6.4–8.2)
RBC # BLD AUTO: 4.85 MILLION/UL (ref 3.81–5.12)
SODIUM SERPL-SCNC: 142 MMOL/L (ref 136–145)
TRIGL SERPL-MCNC: 69 MG/DL
WBC # BLD AUTO: 5.04 THOUSAND/UL (ref 4.31–10.16)

## 2021-11-10 PROCEDURE — 82248 BILIRUBIN DIRECT: CPT

## 2021-11-10 PROCEDURE — 99214 OFFICE O/P EST MOD 30 MIN: CPT | Performed by: INTERNAL MEDICINE

## 2021-11-10 PROCEDURE — 36415 COLL VENOUS BLD VENIPUNCTURE: CPT

## 2021-11-10 PROCEDURE — 85025 COMPLETE CBC W/AUTO DIFF WBC: CPT

## 2021-11-10 PROCEDURE — 80061 LIPID PANEL: CPT

## 2021-11-10 PROCEDURE — 80053 COMPREHEN METABOLIC PANEL: CPT

## 2021-11-11 ENCOUNTER — PATIENT OUTREACH (OUTPATIENT)
Dept: CASE MANAGEMENT | Facility: OTHER | Age: 71
End: 2021-11-11

## 2021-11-15 ENCOUNTER — TELEPHONE (OUTPATIENT)
Dept: FAMILY MEDICINE CLINIC | Facility: CLINIC | Age: 71
End: 2021-11-15

## 2021-11-15 DIAGNOSIS — M51.26 LUMBAR DISC HERNIATION: Primary | ICD-10-CM

## 2021-11-15 DIAGNOSIS — M76.891 HIP FLEXOR TENDINITIS, RIGHT: ICD-10-CM

## 2021-11-19 ENCOUNTER — EVALUATION (OUTPATIENT)
Dept: PHYSICAL THERAPY | Facility: CLINIC | Age: 71
End: 2021-11-19
Payer: MEDICARE

## 2021-11-19 DIAGNOSIS — M54.50 CHRONIC LOW BACK PAIN, UNSPECIFIED BACK PAIN LATERALITY, UNSPECIFIED WHETHER SCIATICA PRESENT: Primary | ICD-10-CM

## 2021-11-19 DIAGNOSIS — R42 VERTIGO: ICD-10-CM

## 2021-11-19 DIAGNOSIS — R26.89 IMBALANCE: ICD-10-CM

## 2021-11-19 DIAGNOSIS — G89.29 CHRONIC LOW BACK PAIN, UNSPECIFIED BACK PAIN LATERALITY, UNSPECIFIED WHETHER SCIATICA PRESENT: Primary | ICD-10-CM

## 2021-11-19 PROCEDURE — 97162 PT EVAL MOD COMPLEX 30 MIN: CPT | Performed by: PHYSICAL THERAPIST

## 2021-11-19 PROCEDURE — 97112 NEUROMUSCULAR REEDUCATION: CPT | Performed by: PHYSICAL THERAPIST

## 2021-11-23 ENCOUNTER — OFFICE VISIT (OUTPATIENT)
Dept: PHYSICAL THERAPY | Facility: CLINIC | Age: 71
End: 2021-11-23
Payer: MEDICARE

## 2021-11-23 DIAGNOSIS — R26.89 IMBALANCE: ICD-10-CM

## 2021-11-23 DIAGNOSIS — R42 VERTIGO: Primary | ICD-10-CM

## 2021-11-23 DIAGNOSIS — M54.50 CHRONIC LOW BACK PAIN, UNSPECIFIED BACK PAIN LATERALITY, UNSPECIFIED WHETHER SCIATICA PRESENT: ICD-10-CM

## 2021-11-23 DIAGNOSIS — G89.29 CHRONIC LOW BACK PAIN, UNSPECIFIED BACK PAIN LATERALITY, UNSPECIFIED WHETHER SCIATICA PRESENT: ICD-10-CM

## 2021-11-23 PROCEDURE — 97112 NEUROMUSCULAR REEDUCATION: CPT

## 2021-11-23 PROCEDURE — 97110 THERAPEUTIC EXERCISES: CPT

## 2021-11-30 ENCOUNTER — OFFICE VISIT (OUTPATIENT)
Dept: PHYSICAL THERAPY | Facility: CLINIC | Age: 71
End: 2021-11-30
Payer: MEDICARE

## 2021-11-30 DIAGNOSIS — R26.89 IMBALANCE: ICD-10-CM

## 2021-11-30 DIAGNOSIS — G89.29 CHRONIC LOW BACK PAIN, UNSPECIFIED BACK PAIN LATERALITY, UNSPECIFIED WHETHER SCIATICA PRESENT: ICD-10-CM

## 2021-11-30 DIAGNOSIS — M54.50 CHRONIC LOW BACK PAIN, UNSPECIFIED BACK PAIN LATERALITY, UNSPECIFIED WHETHER SCIATICA PRESENT: ICD-10-CM

## 2021-11-30 DIAGNOSIS — R42 VERTIGO: Primary | ICD-10-CM

## 2021-11-30 PROCEDURE — 97110 THERAPEUTIC EXERCISES: CPT

## 2021-11-30 PROCEDURE — 97112 NEUROMUSCULAR REEDUCATION: CPT

## 2021-11-30 PROCEDURE — 97530 THERAPEUTIC ACTIVITIES: CPT

## 2021-12-02 ENCOUNTER — OFFICE VISIT (OUTPATIENT)
Dept: PHYSICAL THERAPY | Facility: CLINIC | Age: 71
End: 2021-12-02
Payer: MEDICARE

## 2021-12-02 DIAGNOSIS — E78.5 HYPERLIPIDEMIA, UNSPECIFIED HYPERLIPIDEMIA TYPE: ICD-10-CM

## 2021-12-02 DIAGNOSIS — G89.29 CHRONIC LOW BACK PAIN, UNSPECIFIED BACK PAIN LATERALITY, UNSPECIFIED WHETHER SCIATICA PRESENT: ICD-10-CM

## 2021-12-02 DIAGNOSIS — M54.50 CHRONIC LOW BACK PAIN, UNSPECIFIED BACK PAIN LATERALITY, UNSPECIFIED WHETHER SCIATICA PRESENT: ICD-10-CM

## 2021-12-02 DIAGNOSIS — R26.89 IMBALANCE: ICD-10-CM

## 2021-12-02 DIAGNOSIS — R42 VERTIGO: Primary | ICD-10-CM

## 2021-12-02 PROCEDURE — 97110 THERAPEUTIC EXERCISES: CPT

## 2021-12-02 PROCEDURE — 97112 NEUROMUSCULAR REEDUCATION: CPT

## 2021-12-02 RX ORDER — ROSUVASTATIN CALCIUM 10 MG/1
TABLET, COATED ORAL
Qty: 90 TABLET | Refills: 0 | Status: SHIPPED | OUTPATIENT
Start: 2021-12-02 | End: 2022-03-01

## 2021-12-07 ENCOUNTER — OFFICE VISIT (OUTPATIENT)
Dept: PHYSICAL THERAPY | Facility: CLINIC | Age: 71
End: 2021-12-07
Payer: MEDICARE

## 2021-12-07 DIAGNOSIS — R26.89 IMBALANCE: ICD-10-CM

## 2021-12-07 DIAGNOSIS — G89.29 CHRONIC LOW BACK PAIN, UNSPECIFIED BACK PAIN LATERALITY, UNSPECIFIED WHETHER SCIATICA PRESENT: ICD-10-CM

## 2021-12-07 DIAGNOSIS — M54.50 CHRONIC LOW BACK PAIN, UNSPECIFIED BACK PAIN LATERALITY, UNSPECIFIED WHETHER SCIATICA PRESENT: ICD-10-CM

## 2021-12-07 DIAGNOSIS — R42 VERTIGO: Primary | ICD-10-CM

## 2021-12-07 PROCEDURE — 97110 THERAPEUTIC EXERCISES: CPT

## 2021-12-07 PROCEDURE — 97530 THERAPEUTIC ACTIVITIES: CPT

## 2021-12-07 PROCEDURE — 97112 NEUROMUSCULAR REEDUCATION: CPT

## 2021-12-09 ENCOUNTER — OFFICE VISIT (OUTPATIENT)
Dept: PHYSICAL THERAPY | Facility: CLINIC | Age: 71
End: 2021-12-09
Payer: MEDICARE

## 2021-12-09 DIAGNOSIS — R42 VERTIGO: Primary | ICD-10-CM

## 2021-12-09 DIAGNOSIS — M54.50 CHRONIC LOW BACK PAIN, UNSPECIFIED BACK PAIN LATERALITY, UNSPECIFIED WHETHER SCIATICA PRESENT: ICD-10-CM

## 2021-12-09 DIAGNOSIS — G89.29 CHRONIC LOW BACK PAIN, UNSPECIFIED BACK PAIN LATERALITY, UNSPECIFIED WHETHER SCIATICA PRESENT: ICD-10-CM

## 2021-12-09 DIAGNOSIS — R26.89 IMBALANCE: ICD-10-CM

## 2021-12-09 PROCEDURE — 97110 THERAPEUTIC EXERCISES: CPT | Performed by: PHYSICAL THERAPIST

## 2021-12-09 PROCEDURE — 97530 THERAPEUTIC ACTIVITIES: CPT | Performed by: PHYSICAL THERAPIST

## 2021-12-09 PROCEDURE — 97112 NEUROMUSCULAR REEDUCATION: CPT | Performed by: PHYSICAL THERAPIST

## 2021-12-17 ENCOUNTER — OFFICE VISIT (OUTPATIENT)
Dept: PHYSICAL THERAPY | Facility: CLINIC | Age: 71
End: 2021-12-17
Payer: MEDICARE

## 2021-12-17 DIAGNOSIS — G89.29 CHRONIC LOW BACK PAIN, UNSPECIFIED BACK PAIN LATERALITY, UNSPECIFIED WHETHER SCIATICA PRESENT: Primary | ICD-10-CM

## 2021-12-17 DIAGNOSIS — M54.50 CHRONIC LOW BACK PAIN, UNSPECIFIED BACK PAIN LATERALITY, UNSPECIFIED WHETHER SCIATICA PRESENT: Primary | ICD-10-CM

## 2021-12-17 PROCEDURE — 97530 THERAPEUTIC ACTIVITIES: CPT

## 2021-12-17 PROCEDURE — 97112 NEUROMUSCULAR REEDUCATION: CPT

## 2021-12-17 PROCEDURE — 97110 THERAPEUTIC EXERCISES: CPT

## 2021-12-20 ENCOUNTER — OFFICE VISIT (OUTPATIENT)
Dept: PHYSICAL THERAPY | Facility: CLINIC | Age: 71
End: 2021-12-20
Payer: MEDICARE

## 2021-12-20 DIAGNOSIS — R42 VERTIGO: ICD-10-CM

## 2021-12-20 DIAGNOSIS — M54.50 CHRONIC LOW BACK PAIN, UNSPECIFIED BACK PAIN LATERALITY, UNSPECIFIED WHETHER SCIATICA PRESENT: Primary | ICD-10-CM

## 2021-12-20 DIAGNOSIS — R26.89 IMBALANCE: ICD-10-CM

## 2021-12-20 DIAGNOSIS — G89.29 CHRONIC LOW BACK PAIN, UNSPECIFIED BACK PAIN LATERALITY, UNSPECIFIED WHETHER SCIATICA PRESENT: Primary | ICD-10-CM

## 2021-12-20 PROCEDURE — 97110 THERAPEUTIC EXERCISES: CPT

## 2021-12-20 PROCEDURE — 97112 NEUROMUSCULAR REEDUCATION: CPT

## 2021-12-20 PROCEDURE — 97530 THERAPEUTIC ACTIVITIES: CPT

## 2021-12-23 ENCOUNTER — OFFICE VISIT (OUTPATIENT)
Dept: PHYSICAL THERAPY | Facility: CLINIC | Age: 71
End: 2021-12-23
Payer: MEDICARE

## 2021-12-23 DIAGNOSIS — M54.50 CHRONIC LOW BACK PAIN, UNSPECIFIED BACK PAIN LATERALITY, UNSPECIFIED WHETHER SCIATICA PRESENT: Primary | ICD-10-CM

## 2021-12-23 DIAGNOSIS — R42 VERTIGO: ICD-10-CM

## 2021-12-23 DIAGNOSIS — R26.89 IMBALANCE: ICD-10-CM

## 2021-12-23 DIAGNOSIS — G89.29 CHRONIC LOW BACK PAIN, UNSPECIFIED BACK PAIN LATERALITY, UNSPECIFIED WHETHER SCIATICA PRESENT: Primary | ICD-10-CM

## 2021-12-23 PROCEDURE — 97110 THERAPEUTIC EXERCISES: CPT

## 2021-12-23 PROCEDURE — 97530 THERAPEUTIC ACTIVITIES: CPT

## 2021-12-23 PROCEDURE — 97112 NEUROMUSCULAR REEDUCATION: CPT

## 2021-12-28 ENCOUNTER — OFFICE VISIT (OUTPATIENT)
Dept: PHYSICAL THERAPY | Facility: CLINIC | Age: 71
End: 2021-12-28
Payer: MEDICARE

## 2021-12-28 DIAGNOSIS — M54.50 CHRONIC LOW BACK PAIN, UNSPECIFIED BACK PAIN LATERALITY, UNSPECIFIED WHETHER SCIATICA PRESENT: Primary | ICD-10-CM

## 2021-12-28 DIAGNOSIS — R26.89 IMBALANCE: ICD-10-CM

## 2021-12-28 DIAGNOSIS — R42 VERTIGO: ICD-10-CM

## 2021-12-28 DIAGNOSIS — G89.29 CHRONIC LOW BACK PAIN, UNSPECIFIED BACK PAIN LATERALITY, UNSPECIFIED WHETHER SCIATICA PRESENT: Primary | ICD-10-CM

## 2021-12-28 PROCEDURE — 97530 THERAPEUTIC ACTIVITIES: CPT

## 2021-12-28 PROCEDURE — 97110 THERAPEUTIC EXERCISES: CPT

## 2021-12-28 PROCEDURE — 97112 NEUROMUSCULAR REEDUCATION: CPT

## 2022-01-04 ENCOUNTER — OFFICE VISIT (OUTPATIENT)
Dept: PHYSICAL THERAPY | Facility: CLINIC | Age: 72
End: 2022-01-04
Payer: MEDICARE

## 2022-01-04 DIAGNOSIS — M54.50 CHRONIC LOW BACK PAIN, UNSPECIFIED BACK PAIN LATERALITY, UNSPECIFIED WHETHER SCIATICA PRESENT: Primary | ICD-10-CM

## 2022-01-04 DIAGNOSIS — G89.29 CHRONIC LOW BACK PAIN, UNSPECIFIED BACK PAIN LATERALITY, UNSPECIFIED WHETHER SCIATICA PRESENT: Primary | ICD-10-CM

## 2022-01-04 PROCEDURE — 97112 NEUROMUSCULAR REEDUCATION: CPT | Performed by: PHYSICAL THERAPIST

## 2022-01-04 PROCEDURE — 97530 THERAPEUTIC ACTIVITIES: CPT | Performed by: PHYSICAL THERAPIST

## 2022-01-04 PROCEDURE — 97110 THERAPEUTIC EXERCISES: CPT | Performed by: PHYSICAL THERAPIST

## 2022-01-04 NOTE — PROGRESS NOTES
Daily Note     Today's date: 2022  Patient name: Mellisa Hollingsworth  : 1950  MRN: [id number]  Referring provider: Eric Estes PA-C  Dx:   Encounter Diagnosis     ICD-10-CM    1  Chronic low back pain, unspecified back pain laterality, unspecified whether sciatica present  M54 50     G89 29                   Subjective:  Pt reports continued back pain and reports continued dizziness and vertigo that she would like to work on more in the future  Objective: See treatment diary below      Assessment: Pt does well w continued strengthening and stability program and is challenged w addition of BOSU SLS and resuming SL RDLs  Pt has dynamic knee instability present on R LE w trial of SL RDL's and requires UE assistance w/o weight for R LE   demonstrated fatigue post treatment and would benefit from continued PT  Plan: Continue per plan of care  Progress treatment as tolerated         Precautions:     Date    Visit # 11  3 4 5 6 7 8 9 10   FOTO              Re-eval               Manuals                                                        Neuro Re-Ed    TA Bracing    15x10"          Bridge w March 2x15 SB  2x10 2x10 5" hold pball 2x10 5" hold pball 2x10 SB 2x10 SB nv 2x15 SB 2x15 SB   Side plank w clam 15x GTB  GTB 10x ea   15x GTB 15x GTB nv nv nv   Bird Dog 15x  10x ea          VORx1 NV  2x30" 2x30" 2x30" busy background   2x1' ea nv nv   Saccades NV  2x30" 2x30" 2x30" busy background   2x1' ea nv nv   Tandem Walking NV w head turns  3 laps          SB Ham Curl      10x 3 x 5 nv 2x5 2x5   BOSU 2x30" ea SLS                         Ther Ex    Bike 10'  8' 8' 10' 6' 10' 10' 10' 10'   Leg Press 3x10 105#  95# 2x10   3x10 95# 3x10 95# nv 3x10 95# 3x10 95#   SLR   2x10 2x15 each side 2x15 each side 2x15 1# 15x2  2# ea nv 2 5# 2x15 2 5# 2x15   UTR   5# 2x10                                                              Ther Activity 1/4 11/30 12/2 12/7 12/9 12/17 12/20 12/23 12/28   Squat 2x10 26#  2x10  18# 2x10 18# 2x10 18# 2x10 18# 2x10 18# 2x10 18# 2x10   Lunge 3x20' walking w BMB  10x  10x ea        Lateral Walk Purp TB 5 laps  GTB 3 laps   GTB 5 laps GTB 5 laps GTB 5 laps GTB 5 laps GTB 5 laps   Suitcase marches    4x28' 8# 4x28' 8#  4x28' 8# 4x28' 8# 4x28' 8# 4x28' 8#   Lateral step downs    1x10 each side 2R 1x10 each side 2R 15x 1R 10x2 1R 10x2 1R 10x2 1R 10x2 1R   Step Up w March      15x 18# KB 15x18# KB 15x18# KB 15x18# KB 15x18# KB   SL RDL 10x ea 9# KB on LLE  0# on RLE     10x ea                    Gait Training   11/30 12/2 12/7 12/17 12/20 12/23 12/28                             Modalities

## 2022-01-06 ENCOUNTER — APPOINTMENT (OUTPATIENT)
Dept: PHYSICAL THERAPY | Facility: CLINIC | Age: 72
End: 2022-01-06
Payer: MEDICARE

## 2022-01-11 ENCOUNTER — OFFICE VISIT (OUTPATIENT)
Dept: PHYSICAL THERAPY | Facility: CLINIC | Age: 72
End: 2022-01-11
Payer: MEDICARE

## 2022-01-11 DIAGNOSIS — R42 VERTIGO: ICD-10-CM

## 2022-01-11 DIAGNOSIS — M54.50 CHRONIC LOW BACK PAIN, UNSPECIFIED BACK PAIN LATERALITY, UNSPECIFIED WHETHER SCIATICA PRESENT: Primary | ICD-10-CM

## 2022-01-11 DIAGNOSIS — R26.89 IMBALANCE: ICD-10-CM

## 2022-01-11 DIAGNOSIS — G89.29 CHRONIC LOW BACK PAIN, UNSPECIFIED BACK PAIN LATERALITY, UNSPECIFIED WHETHER SCIATICA PRESENT: Primary | ICD-10-CM

## 2022-01-11 PROCEDURE — 97112 NEUROMUSCULAR REEDUCATION: CPT

## 2022-01-11 PROCEDURE — 97530 THERAPEUTIC ACTIVITIES: CPT

## 2022-01-11 PROCEDURE — 97110 THERAPEUTIC EXERCISES: CPT

## 2022-01-11 NOTE — PROGRESS NOTES
Daily Note     Today's date: 2022  Patient name: Milta Merlin  : 1950  MRN: [id number]  Referring provider: Judee Osgood, PA-C  Dx:   Encounter Diagnosis     ICD-10-CM    1  Chronic low back pain, unspecified back pain laterality, unspecified whether sciatica present  M54 50     G89 29    2  Vertigo  R42    3  Imbalance  R26 89        Start Time: 1435  Stop Time: 1520  Total time in clinic (min): 45 minutes    Subjective:  Pt reports she has a bout of her Ulcerative Colitis over the last week and was unable to make it into therapy  Pt states she is feeling better but may have to limit what she does in PT  Objective: See treatment diary below      Assessment: Pt tolerated today's session well with minimal symptoms reproduced with VOR standing exercises  Pt performed new exercises as noted with minimal symptoms  Pt needed VCing for form and technique with SL RDLs  Pt  demonstrated fatigue post treatment and would benefit from continued PT  Plan: Continue per plan of care  Progress treatment as tolerated         Precautions:     Date    Visit # 11 12    6 7 8 9 10   FOTO             Re-eval               Manuals                                                        Neuro Re-Ed    TA Bracing              Bridge w March 2x15 SB nv    2x10 SB 2x10 SB nv 2x15 SB 2x15 SB   Side plank w clam 15x GTB nv    15x GTB 15x GTB nv nv nv   Bird Dog 15x nv           VORx1 NV 2x1" ea      2x1' ea nv nv   Saccades NV 2x1" ea standing on foam      2x1' ea nv nv   Tandem Walking NV w head turns 3 laps           SB Ham Curl      10x 3 x 5 nv 2x5 2x5   BOSU 2x30" ea SLS 2x30" ea SLS                        Ther Ex    Bike 10' 10'    6' 10' 10' 10' 10'   Leg Press 3x10 105# 3x10 105#    3x10 95# 3x10 95# nv 3x10 95# 3x10 95#   SLR      2x15 1# 15x2  2# ea nv 2 5# 2x15 2 5# 2x15   UTR                                                                 Ther Activity 1/4 1/11 12/9 12/17 12/20 12/23 12/28   Squat 2x10 26# 2x10 26#    18# 2x10 18# 2x10 18# 2x10 18# 2x10 18# 2x10   Lunge 3x20' walking w BMB 3x20' walking w BMB           Lateral Walk Purp TB 5 laps Purp TB 5 laps    GTB 5 laps GTB 5 laps GTB 5 laps GTB 5 laps GTB 5 laps   Suitcase marches       4x28' 8# 4x28' 8# 4x28' 8# 4x28' 8#   Lateral step downs      15x 1R 10x2 1R 10x2 1R 10x2 1R 10x2 1R   Step Up w March      15x 18# KB 15x18# KB 15x18# KB 15x18# KB 15x18# KB   SL RDL 10x ea 9# KB on LLE  0# on RLE 10x ea 9# KB    10x ea                    Gait Training       12/17 12/20 12/23 12/28                             Modalities

## 2022-01-13 ENCOUNTER — APPOINTMENT (OUTPATIENT)
Dept: PHYSICAL THERAPY | Facility: CLINIC | Age: 72
End: 2022-01-13
Payer: MEDICARE

## 2022-02-03 ENCOUNTER — OFFICE VISIT (OUTPATIENT)
Dept: PHYSICAL THERAPY | Facility: CLINIC | Age: 72
End: 2022-02-03
Payer: MEDICARE

## 2022-02-03 DIAGNOSIS — M54.50 CHRONIC LOW BACK PAIN, UNSPECIFIED BACK PAIN LATERALITY, UNSPECIFIED WHETHER SCIATICA PRESENT: Primary | ICD-10-CM

## 2022-02-03 DIAGNOSIS — G89.29 CHRONIC LOW BACK PAIN, UNSPECIFIED BACK PAIN LATERALITY, UNSPECIFIED WHETHER SCIATICA PRESENT: Primary | ICD-10-CM

## 2022-02-03 DIAGNOSIS — R42 VERTIGO: ICD-10-CM

## 2022-02-03 DIAGNOSIS — R26.89 IMBALANCE: ICD-10-CM

## 2022-02-03 PROCEDURE — 97112 NEUROMUSCULAR REEDUCATION: CPT

## 2022-02-03 PROCEDURE — 97110 THERAPEUTIC EXERCISES: CPT

## 2022-02-03 PROCEDURE — 97530 THERAPEUTIC ACTIVITIES: CPT

## 2022-02-03 NOTE — PROGRESS NOTES
Daily Note     Today's date: 2/3/2022  Patient name: Des Mendez  : 1950  MRN: [id number]  Referring provider: Jeramie Viera PA-C  Dx:   Encounter Diagnosis     ICD-10-CM    1  Chronic low back pain, unspecified back pain laterality, unspecified whether sciatica present  M54 50     G89 29    2  Vertigo  R42    3  Imbalance  R26 89        Start Time: 1430  Stop Time: 1520  Total time in clinic (min): 50 minutes    Subjective:  Pt reports she is feeling ok today, states she has been away in [place] for a while and has not been able to do her HEP  Pt states she has been doing a lot of cleaning and getting her house ready to sell and has had a low grade headache due to all of her moving  Objective: See treatment diary below      Assessment: Pt tolerated today's session well with minimal symptoms reproduced with VOR standing exercises  Pt performed vestibular exercises with minimal symptoms reproduction  Pt continues to work towards goals of increased LE strength and function  Pt  demonstrated fatigue post treatment and would benefit from continued PT  Plan: Continue per plan of care  Progress treatment as tolerated         Precautions:     Date 1/4 1/11 2/3   12/9 12/17 12/20 12/23 12/28   Visit # 11 12 13   6 7 8 9 10   FOTO             Re-eval               Manuals 1/4 1/11 2/3   12/9 12/17 12/20 12/23 12/28                                                       Neuro Re-Ed 1/4 1/11 2/3   12/9 12/17 12/20 12/23 12/28   TA Bracing              Bridge w March 2x15 SB nv    2x10 SB 2x10 SB nv 2x15 SB 2x15 SB   Side plank w clam 15x GTB nv    15x GTB 15x GTB nv nv nv   Bird Dog 15x nv           VORx1 NV 2x1" ea 2x1" ea     2x1' ea nv nv   Saccades NV 2x1" ea standing on foam 2x1" ea standing on foam     2x1' ea nv nv   Tandem Walking NV w head turns 3 laps 3 laps          SB Ham Curl      10x 3 x 5 nv 2x5 2x5   BOSU 2x30" ea SLS 2x30" ea SLS nv                       Ther Ex 1/4 1/11 2/3   12/9 12/17 12/20 12/23 12/28   Bike 10' 10' 10'   6' 10' 10' 10' 10'   Leg Press 3x10 105# 3x10 105# 3x10 105#   3x10 95# 3x10 95# nv 3x10 95# 3x10 95#   SLR      2x15 1# 15x2  2# ea nv 2 5# 2x15 2 5# 2x15   UTR                                                                 Ther Activity 1/4 1/11 2/3   12/9 12/17 12/20 12/23 12/28   Squat 2x10 26# 2x10 26# 2x10 26#   18# 2x10 18# 2x10 18# 2x10 18# 2x10 18# 2x10   Lunge 3x20' walking w BMB 3x20' walking w BMB 3x20' walking w BMB          Lateral Walk Purp TB 5 laps Purp TB 5 laps Purp TB 5 laps   GTB 5 laps GTB 5 laps GTB 5 laps GTB 5 laps GTB 5 laps   Suitcase marches       4x28' 8# 4x28' 8# 4x28' 8# 4x28' 8#   Lateral step downs      15x 1R 10x2 1R 10x2 1R 10x2 1R 10x2 1R   Step Up w March      15x 18# KB 15x18# KB 15x18# KB 15x18# KB 15x18# KB   SL RDL 10x ea 9# KB on LLE  0# on RLE 10x ea 9# KB 10x ea 9# KB   10x ea                    Gait Training       12/17 12/20 12/23 12/28                             Modalities

## 2022-02-08 ENCOUNTER — OFFICE VISIT (OUTPATIENT)
Dept: PHYSICAL THERAPY | Facility: CLINIC | Age: 72
End: 2022-02-08
Payer: MEDICARE

## 2022-02-08 DIAGNOSIS — G89.29 CHRONIC LOW BACK PAIN, UNSPECIFIED BACK PAIN LATERALITY, UNSPECIFIED WHETHER SCIATICA PRESENT: Primary | ICD-10-CM

## 2022-02-08 DIAGNOSIS — R26.89 IMBALANCE: ICD-10-CM

## 2022-02-08 DIAGNOSIS — M54.50 CHRONIC LOW BACK PAIN, UNSPECIFIED BACK PAIN LATERALITY, UNSPECIFIED WHETHER SCIATICA PRESENT: Primary | ICD-10-CM

## 2022-02-08 DIAGNOSIS — R42 VERTIGO: ICD-10-CM

## 2022-02-08 PROCEDURE — 97110 THERAPEUTIC EXERCISES: CPT

## 2022-02-08 PROCEDURE — 97530 THERAPEUTIC ACTIVITIES: CPT

## 2022-02-08 PROCEDURE — 97112 NEUROMUSCULAR REEDUCATION: CPT

## 2022-02-08 NOTE — PROGRESS NOTES
Daily Note     Today's date: 2022  Patient name: Pari Cobian  : 1950  MRN: [id number]  Referring provider: Angel Willis PA-C  Dx:   Encounter Diagnosis     ICD-10-CM    1  Chronic low back pain, unspecified back pain laterality, unspecified whether sciatica present  M54 50     G89 29    2  Vertigo  R42    3  Imbalance  R26 89        Start Time: 1505  Stop Time: 1545  Total time in clinic (min): 40 minutes    Subjective:  Pt reports she is feeling ok today, states she has been doing a lot of packing for her move  Objective: See treatment diary below      Assessment: Pt tolerated today's session well  Pt performed vestibular exercises with minimal symptoms reproduction  Pt continues to work towards goals of increased LE strength and function  Pt needed minimal VC/TCing for SL RDL to avoid pelvic rotation  Pt  demonstrated fatigue post treatment and would benefit from continued PT  Continue to progress as able  Plan: Continue per plan of care  Progress treatment as tolerated         Precautions:     Date 1/4 1/11 2/3 2/8  12/9 12/17 12/20 12/23 12/28   Visit # 11 12 13 14  6 7 8 9 10   FOTO             Re-eval               Manuals 1/4 1/11 2/3 2/8  12/9 12/17 12/20 12/23 12/28                                                       Neuro Re-Ed 1/4 1/11 2/3 2/8  12/9 12/17 12/20 12/23 12/28   TA Bracing              Bridge w March 2x15 SB nv    2x10 SB 2x10 SB nv 2x15 SB 2x15 SB   Side plank w clam 15x GTB nv    15x GTB 15x GTB nv nv nv   Bird Dog 15x nv           VORx1 NV 2x1" ea 2x1" ea 2x1'    2x1' ea nv nv   Saccades NV 2x1" ea standing on foam 2x1" ea standing on foam 2x1'    2x1' ea nv nv   Tandem Walking NV w head turns 3 laps 3 laps 3 laps         SB Ham Curl      10x 3 x 5 nv 2x5 2x5   BOSU 2x30" ea SLS 2x30" ea SLS nv                       Ther Ex 1/4 1/11 2/3 2/8  12/9 12/17 12/20 12/23 12/28   Bike 10' 10' 10' 10'  6' 10' 10' 10' 10'   Leg Press 3x10 105# 3x10 105# 3x10 105# 3x10 105# ^nv  3x10 95# 3x10 95# nv 3x10 95# 3x10 95#   SLR      2x15 1# 15x2  2# ea nv 2 5# 2x15 2 5# 2x15   UTR                                                                 Ther Activity 1/4 1/11 2/3 2/8  12/9 12/17 12/20 12/23 12/28   Squat 2x10 26# 2x10 26# 2x10 26# 2x10 26#  18# 2x10 18# 2x10 18# 2x10 18# 2x10 18# 2x10   Lunge 3x20' walking w BMB 3x20' walking w BMB 3x20' walking w BMB 3x20' walking w BMB         Lateral Walk Purp TB 5 laps Purp TB 5 laps Purp TB 5 laps Purp TB 5 laps  GTB 5 laps GTB 5 laps GTB 5 laps GTB 5 laps GTB 5 laps   Suitcase marches       4x28' 8# 4x28' 8# 4x28' 8# 4x28' 8#   Lateral step downs      15x 1R 10x2 1R 10x2 1R 10x2 1R 10x2 1R   Step Up w March      15x 18# KB 15x18# KB 15x18# KB 15x18# KB 15x18# KB   SL RDL 10x ea 9# KB on LLE  0# on RLE 10x ea 9# KB 10x ea 9# KB 10x ea 9# KB  10x ea                    Gait Training       12/17 12/20 12/23 12/28                             Modalities

## 2022-02-10 ENCOUNTER — OFFICE VISIT (OUTPATIENT)
Dept: PHYSICAL THERAPY | Facility: CLINIC | Age: 72
End: 2022-02-10
Payer: MEDICARE

## 2022-02-10 DIAGNOSIS — R42 VERTIGO: ICD-10-CM

## 2022-02-10 DIAGNOSIS — G89.29 CHRONIC LOW BACK PAIN, UNSPECIFIED BACK PAIN LATERALITY, UNSPECIFIED WHETHER SCIATICA PRESENT: Primary | ICD-10-CM

## 2022-02-10 DIAGNOSIS — R26.89 IMBALANCE: ICD-10-CM

## 2022-02-10 DIAGNOSIS — M54.50 CHRONIC LOW BACK PAIN, UNSPECIFIED BACK PAIN LATERALITY, UNSPECIFIED WHETHER SCIATICA PRESENT: Primary | ICD-10-CM

## 2022-02-10 PROCEDURE — 97112 NEUROMUSCULAR REEDUCATION: CPT

## 2022-02-10 PROCEDURE — 97110 THERAPEUTIC EXERCISES: CPT

## 2022-02-10 PROCEDURE — 97530 THERAPEUTIC ACTIVITIES: CPT

## 2022-02-10 NOTE — PROGRESS NOTES
Daily Note     Today's date: 2/10/2022  Patient name: Flower Sandoval  : 1950  MRN: [id number]  Referring provider: Virgen Yao PA-C  Dx:   Encounter Diagnosis     ICD-10-CM    1  Chronic low back pain, unspecified back pain laterality, unspecified whether sciatica present  M54 50     G89 29    2  Vertigo  R42    3  Imbalance  R26 89        Start Time: 1200  Stop Time: 1250  Total time in clinic (min): 50 minutes    Subjective:  Pt reports she is feeling ok today, no new c/o pain or symptoms  Objective: See treatment diary below      Assessment: Pt tolerated today's session well  Pt continues to work towards goals of increased LE strength and function  Pt shows improved strength with ability to perform increased weight on LP with no signs of increased pain or adverse symptoms    Pt  demonstrated fatigue post treatment and would benefit from continued PT  Continue to progress as able  Plan: Continue per plan of care  Progress treatment as tolerated         Precautions:     Date 1/4 1/11 2/3 2/8 2/10   12/20 12/23 12/28   Visit # 11 12 13 14 15   8 9 10   FOTO             Re-eval               Manuals 1/4 1/11 2/3 2/8 2/10   12/20 12/23 12/28                                                       Neuro Re-Ed 1/4 1/11 2/3 2/8 2/10   12/20 12/23 12/28   TA Bracing              Bridge w March 2x15 SB nv      nv 2x15 SB 2x15 SB   Side plank w clam 15x GTB nv      nv nv nv   Bird Dog 15x nv           VORx1 NV 2x1" ea 2x1" ea 2x1' 2x1'   2x1' ea nv nv   Saccades NV 2x1" ea standing on foam 2x1" ea standing on foam 2x1' 2x1'   2x1' ea nv nv   Tandem Walking NV w head turns 3 laps 3 laps 3 laps 3 laps        SB Ham Curl        nv 2x5 2x5   BOSU 2x30" ea SLS 2x30" ea SLS nv                       Ther Ex 1/4 1/11 2/3 2/8 2/10   12/20 12/23 12/28   Bike 10' 10' 10' 10' 10'   10' 10' 10'   Leg Press 3x10 105# 3x10 105# 3x10 105# 3x10 105# ^nv 3x10 115#   nv 3x10 95# 3x10 95#   SLR        nv 2 5# 2x15 2 5# 2x15   UTR                                                                 Ther Activity 1/4 1/11 2/3 2/8 2/10   12/20 12/23 12/28   Squat 2x10 26# 2x10 26# 2x10 26# 2x10 26# 2x10 26#   18# 2x10 18# 2x10 18# 2x10   Lunge 3x20' walking w BMB 3x20' walking w BMB 3x20' walking w BMB 3x20' walking w BMB 3x20' walking w BMB        Lateral Walk Purp TB 5 laps Purp TB 5 laps Purp TB 5 laps Purp TB 5 laps Purp TB 5 laps   GTB 5 laps GTB 5 laps GTB 5 laps   Suitcase marches     4x28' 8#   4x28' 8# 4x28' 8# 4x28' 8#   Lateral step downs        10x2 1R 10x2 1R 10x2 1R   Step Up w March        15x18# KB 15x18# KB 15x18# KB   SL RDL 10x ea 9# KB on LLE  0# on RLE 10x ea 9# KB 10x ea 9# KB 10x ea 9# KB 2x10 ea 9# KB                     Gait Training        12/20 12/23 12/28                             Modalities

## 2022-02-11 DIAGNOSIS — K21.9 GASTROESOPHAGEAL REFLUX DISEASE WITHOUT ESOPHAGITIS: ICD-10-CM

## 2022-02-11 RX ORDER — FAMOTIDINE 40 MG/1
TABLET, FILM COATED ORAL
Qty: 30 TABLET | Refills: 0 | Status: SHIPPED | OUTPATIENT
Start: 2022-02-11

## 2022-02-15 ENCOUNTER — OFFICE VISIT (OUTPATIENT)
Dept: PHYSICAL THERAPY | Facility: CLINIC | Age: 72
End: 2022-02-15
Payer: MEDICARE

## 2022-02-15 DIAGNOSIS — R42 VERTIGO: ICD-10-CM

## 2022-02-15 DIAGNOSIS — G89.29 CHRONIC LOW BACK PAIN, UNSPECIFIED BACK PAIN LATERALITY, UNSPECIFIED WHETHER SCIATICA PRESENT: Primary | ICD-10-CM

## 2022-02-15 DIAGNOSIS — R26.89 IMBALANCE: ICD-10-CM

## 2022-02-15 DIAGNOSIS — M54.50 CHRONIC LOW BACK PAIN, UNSPECIFIED BACK PAIN LATERALITY, UNSPECIFIED WHETHER SCIATICA PRESENT: Primary | ICD-10-CM

## 2022-02-15 PROCEDURE — 97112 NEUROMUSCULAR REEDUCATION: CPT

## 2022-02-15 PROCEDURE — 97110 THERAPEUTIC EXERCISES: CPT

## 2022-02-15 PROCEDURE — 97530 THERAPEUTIC ACTIVITIES: CPT

## 2022-02-15 NOTE — PROGRESS NOTES
Daily Note     Today's date: 2/15/2022  Patient name: Lila Carballo  : 1950  MRN: [id number]  Referring provider: Cherise Rhodes PA-C  Dx:   Encounter Diagnosis     ICD-10-CM    1  Chronic low back pain, unspecified back pain laterality, unspecified whether sciatica present  M54 50     G89 29    2  Vertigo  R42    3  Imbalance  R26 89        Start Time: 1430  Stop Time: 1510  Total time in clinic (min): 40 minutes    Subjective:  Pt reports she is feeling ok today, no new c/o pain or symptoms  Pt states she has noticed moderate improvement with vestibular activities  Pt states no dizziness over the past couple days  Objective: See treatment diary below      Assessment: Pt tolerated today's session well  Pt continues to work towards goals of increased LE strength and function  Pt shows no dizziness post vestibular exercises, may benefit from performing on unstable surface next session  Pt  demonstrated fatigue post treatment and would benefit from continued PT  Continue to progress as able  Plan: Continue per plan of care  Progress treatment as tolerated         Precautions:     Date 1/4 1/11 2/3 2/8 2/10 2/15  12/20 12/23 12/28   Visit # 11 12 13 14 15 16  8 9 10   FOTO             Re-eval               Manuals 1/4 1/11 2/3 2/8 2/10 2/15  12/20 12/23 12/28                                                       Neuro Re-Ed 1/4 1/11 2/3 2/8 2/10 2/15  12/20 12/23 12/28   TA Bracing              Bridge w March 2x15 SB nv      nv 2x15 SB 2x15 SB   Side plank w clam 15x GTB nv      nv nv nv   Bird Dog 15x nv           VORx1 NV 2x1" ea 2x1" ea 2x1' 2x1' 2x1'  2x1' ea nv nv   Saccades NV 2x1" ea standing on foam 2x1" ea standing on foam 2x1' 2x1' 2x1'  2x1' ea nv nv   Tandem Walking NV w head turns 3 laps 3 laps 3 laps 3 laps 3 laps       SB Ham Curl        nv 2x5 2x5   BOSU 2x30" ea SLS 2x30" ea SLS nv                       Ther Ex  2/3 2/8 2/10 2/15  12/20 12/23 12/28   Bike 10' 10' 10' 10' 10' 10'  10' 10' 10'   Leg Press 3x10 105# 3x10 105# 3x10 105# 3x10 105# ^nv 3x10 115# 3x10 115#  nv 3x10 95# 3x10 95#   SLR        nv 2 5# 2x15 2 5# 2x15   UTR                                                                 Ther Activity 1/4 1/11 2/3 2/8 2/10 2/15  12/20 12/23 12/28   Squat 2x10 26# 2x10 26# 2x10 26# 2x10 26# 2x10 26# 2x10 26#  18# 2x10 18# 2x10 18# 2x10   Lunge 3x20' walking w BMB 3x20' walking w BMB 3x20' walking w BMB 3x20' walking w BMB 3x20' walking w BMB 3x20' walking w BMB       Lateral Walk Purp TB 5 laps Purp TB 5 laps Purp TB 5 laps Purp TB 5 laps Purp TB 5 laps Purp TB 5 laps  GTB 5 laps GTB 5 laps GTB 5 laps   Suitcase marches     4x28' 8# 4x28' 8#  4x28' 8# 4x28' 8# 4x28' 8#   Lateral step downs        10x2 1R 10x2 1R 10x2 1R   Step Up w March        15x18# KB 15x18# KB 15x18# KB   SL RDL 10x ea 9# KB on LLE  0# on RLE 10x ea 9# KB 10x ea 9# KB 10x ea 9# KB 2x10 ea 9# KB 2x10 ea 9# KB                    Gait Training        12/20 12/23 12/28                             Modalities

## 2022-02-17 ENCOUNTER — APPOINTMENT (OUTPATIENT)
Dept: PHYSICAL THERAPY | Facility: CLINIC | Age: 72
End: 2022-02-17
Payer: MEDICARE

## 2022-02-22 ENCOUNTER — OFFICE VISIT (OUTPATIENT)
Dept: PHYSICAL THERAPY | Facility: CLINIC | Age: 72
End: 2022-02-22
Payer: MEDICARE

## 2022-02-22 DIAGNOSIS — M54.50 CHRONIC LOW BACK PAIN, UNSPECIFIED BACK PAIN LATERALITY, UNSPECIFIED WHETHER SCIATICA PRESENT: Primary | ICD-10-CM

## 2022-02-22 DIAGNOSIS — G89.29 CHRONIC LOW BACK PAIN, UNSPECIFIED BACK PAIN LATERALITY, UNSPECIFIED WHETHER SCIATICA PRESENT: Primary | ICD-10-CM

## 2022-02-22 DIAGNOSIS — R26.89 IMBALANCE: ICD-10-CM

## 2022-02-22 DIAGNOSIS — R42 VERTIGO: ICD-10-CM

## 2022-02-22 PROCEDURE — 97140 MANUAL THERAPY 1/> REGIONS: CPT

## 2022-02-22 PROCEDURE — 97112 NEUROMUSCULAR REEDUCATION: CPT

## 2022-02-22 PROCEDURE — 97110 THERAPEUTIC EXERCISES: CPT

## 2022-02-22 NOTE — PROGRESS NOTES
Daily Note     Today's date: 2022  Patient name: Edouard Mcguire  : 1950  MRN: [id number]  Referring provider: Jose Roberto Nguyen PA-C  Dx:   Encounter Diagnosis     ICD-10-CM    1  Chronic low back pain, unspecified back pain laterality, unspecified whether sciatica present  M54 50     G89 29    2  Vertigo  R42    3  Imbalance  R26 89        Start Time: 1435  Stop Time: 1515  Total time in clinic (min): 40 minutes    Subjective:  Pt reports she is having a lot of LBP and sacral pain  Pt states she was getting out of her car and felt a stabbing pain in the R hip and LB  Objective: See treatment diary below      Assessment: Pt tolerated today's session well  Pt presents to PT with antalgic gait and positive SIJ, corrected by MET  Pt shows reduction of symptoms post manual therapy  Limited today's session due to presentation of SIJ rotation, resume exercises next session  Pt continues to work towards goals of increased LE strength and function  Pt shows no dizziness post vestibular exercises, may benefit from performing on unstable surface next session  Pt  demonstrated fatigue post treatment and would benefit from continued PT  Continue to progress as able  Plan: Continue per plan of care  Progress treatment as tolerated         Precautions:     Date 1/4 1/11 2/3 2/8 2/10 2/15 2/22      Visit # 11 12 13 14 15 16 17      FOTO             Re-eval               Manuals 1/4 1/11 2/3 2/8 2/10 2/15 2/22             SIJ MET                                             Neuro Re-Ed 1/4 1/11 2/3 2/8 2/10 2/15 2/22      TA Bracing              Bridge w March 2x15 SB nv           Side plank w clam 15x GTB nv           Bird Dog 15x nv           VORx1 NV 2x1" ea 2x1" ea 2x1' 2x1' 2x1' 2x1'      Saccades NV 2x1" ea standing on foam 2x1" ea standing on foam 2x1' 2x1' 2x1' 2x1'      Tandem Walking NV w head turns 3 laps 3 laps 3 laps 3 laps 3 laps 3 laps      SB Ham Curl             BOSU 2x30" ea SLS 2x30" ea SLS nv                       Ther Ex 1/4 1/11 2/3 2/8 2/10 2/15 2/22      Bike 10' 10' 10' 10' 10' 10' 10'      Leg Press 3x10 105# 3x10 105# 3x10 105# 3x10 105# ^nv 3x10 115# 3x10 115# nv      SLR             UTR                                                                 Ther Activity 1/4 1/11 2/3 2/8 2/10 2/15 2/22      Squat 2x10 26# 2x10 26# 2x10 26# 2x10 26# 2x10 26# 2x10 26# 2x10      Lunge 3x20' walking w BMB 3x20' walking w BMB 3x20' walking w BMB 3x20' walking w BMB 3x20' walking w BMB 3x20' walking w BMB nv      Lateral Walk Purp TB 5 laps Purp TB 5 laps Purp TB 5 laps Purp TB 5 laps Purp TB 5 laps Purp TB 5 laps NV      Suitcase marches     4x28' 8# 4x28' 8# nv      Lateral step downs             Step Up w March             SL RDL 10x ea 9# KB on LLE  0# on RLE 10x ea 9# KB 10x ea 9# KB 10x ea 9# KB 2x10 ea 9# KB 2x10 ea 9# KB nv                   Gait Training                                       Modalities

## 2022-02-24 ENCOUNTER — OFFICE VISIT (OUTPATIENT)
Dept: PHYSICAL THERAPY | Facility: CLINIC | Age: 72
End: 2022-02-24
Payer: MEDICARE

## 2022-02-24 DIAGNOSIS — R26.89 IMBALANCE: ICD-10-CM

## 2022-02-24 DIAGNOSIS — R42 VERTIGO: ICD-10-CM

## 2022-02-24 DIAGNOSIS — M54.50 CHRONIC LOW BACK PAIN, UNSPECIFIED BACK PAIN LATERALITY, UNSPECIFIED WHETHER SCIATICA PRESENT: Primary | ICD-10-CM

## 2022-02-24 DIAGNOSIS — G89.29 CHRONIC LOW BACK PAIN, UNSPECIFIED BACK PAIN LATERALITY, UNSPECIFIED WHETHER SCIATICA PRESENT: Primary | ICD-10-CM

## 2022-02-24 PROCEDURE — 97140 MANUAL THERAPY 1/> REGIONS: CPT

## 2022-02-24 PROCEDURE — 97112 NEUROMUSCULAR REEDUCATION: CPT

## 2022-02-24 PROCEDURE — 97110 THERAPEUTIC EXERCISES: CPT

## 2022-02-24 NOTE — PROGRESS NOTES
Daily Note     Today's date: 2022  Patient name: Katelynn Esquivel  : 1950  MRN: [id number]  Referring provider: Jacqueline Duran PA-C  Dx:   Encounter Diagnosis     ICD-10-CM    1  Chronic low back pain, unspecified back pain laterality, unspecified whether sciatica present  M54 50     G89 29    2  Vertigo  R42    3  Imbalance  R26 89        Start Time: 1430  Stop Time: 1515  Total time in clinic (min): 45 minutes    Subjective:  Pt reports she is feeling better overall as compared to last session, states she still has some soreness in the sacral region  Pt states she feels decreased endurance in the thighs  Objective: See treatment diary below      Assessment: Pt tolerated today's session well  Performed exercises previously held at last session with no exacerbation of symptoms  Pt will benefit from further skilled PT to increase strength, flexibility an function  Pt  demonstrated fatigue post treatment and would benefit from continued PT  Continue to progress as able  Plan: Continue per plan of care  Progress treatment as tolerated         Precautions:     Date 1/4 1/11 2/3 2/8 2/10 2/15 2/22 2/24     Visit # 11 12 13 14 15 16 17 18     FOTO             Re-eval               Manuals 1/4 1/11 2/3 2/8 2/10 2/15 2/22 2/24            SIJ MET (-)                                            Neuro Re-Ed 1/4 1/11 2/3 2/8 2/10 2/15 2/22 2/24     TA Bracing              Bridge w March 2x15 SB nv           Side plank w clam 15x GTB nv           Bird Dog 15x nv           VORx1 NV 2x1" ea 2x1" ea 2x1' 2x1' 2x1' 2x1' 2x1'     Saccades NV 2x1" ea standing on foam 2x1" ea standing on foam 2x1' 2x1' 2x1' 2x1' 2x1'     Tandem Walking NV w head turns 3 laps 3 laps 3 laps 3 laps 3 laps 3 laps 3 laps     SB Ham Curl        15x     BOSU 2x30" ea SLS 2x30" ea SLS nv                       Ther Ex 1/4 1/11 2/3 2/8 2/10 2/15 2/22 2/24     Bike 10' 10' 10' 10' 10' 10' 10' 10'     Leg Press 3x10 105# 3x10 105# 3x10 105# 3x10 105# ^nv 3x10 115# 3x10 115# nv 3x10 115#     SLR             UTR                                                                 Ther Activity 1/4 1/11 2/3 2/8 2/10 2/15 2/22 2/24     Squat 2x10 26# 2x10 26# 2x10 26# 2x10 26# 2x10 26# 2x10 26# 2x10 2x10 9#     Lunge 3x20' walking w BMB 3x20' walking w BMB 3x20' walking w BMB 3x20' walking w BMB 3x20' walking w BMB 3x20' walking w BMB nv 3x20' walking      Lateral Walk Purp TB 5 laps Purp TB 5 laps Purp TB 5 laps Purp TB 5 laps Purp TB 5 laps Purp TB 5 laps NV nv     Suitcase marches     4x28' 8# 4x28' 8# nv nv     Lateral step downs             Step Up w March             SL RDL 10x ea 9# KB on LLE  0# on RLE 10x ea 9# KB 10x ea 9# KB 10x ea 9# KB 2x10 ea 9# KB 2x10 ea 9# KB nv np                  Gait Training                                       Modalities

## 2022-03-01 ENCOUNTER — OFFICE VISIT (OUTPATIENT)
Dept: PHYSICAL THERAPY | Facility: CLINIC | Age: 72
End: 2022-03-01
Payer: MEDICARE

## 2022-03-01 DIAGNOSIS — G89.29 CHRONIC LOW BACK PAIN, UNSPECIFIED BACK PAIN LATERALITY, UNSPECIFIED WHETHER SCIATICA PRESENT: Primary | ICD-10-CM

## 2022-03-01 DIAGNOSIS — R26.89 IMBALANCE: ICD-10-CM

## 2022-03-01 DIAGNOSIS — E78.5 HYPERLIPIDEMIA, UNSPECIFIED HYPERLIPIDEMIA TYPE: ICD-10-CM

## 2022-03-01 DIAGNOSIS — R42 VERTIGO: ICD-10-CM

## 2022-03-01 DIAGNOSIS — M54.50 CHRONIC LOW BACK PAIN, UNSPECIFIED BACK PAIN LATERALITY, UNSPECIFIED WHETHER SCIATICA PRESENT: Primary | ICD-10-CM

## 2022-03-01 PROCEDURE — 97112 NEUROMUSCULAR REEDUCATION: CPT

## 2022-03-01 PROCEDURE — 97110 THERAPEUTIC EXERCISES: CPT

## 2022-03-01 RX ORDER — ROSUVASTATIN CALCIUM 10 MG/1
TABLET, COATED ORAL
Qty: 90 TABLET | Refills: 0 | Status: SHIPPED | OUTPATIENT
Start: 2022-03-01 | End: 2022-05-31

## 2022-03-01 NOTE — PROGRESS NOTES
Daily Note     Today's date: 3/1/2022  Patient name: Jimmy Timmons  : 1950  MRN: [id number]  Referring provider: Shaun Mckeon PA-C  Dx:   Encounter Diagnosis     ICD-10-CM    1  Chronic low back pain, unspecified back pain laterality, unspecified whether sciatica present  M54 50     G89 29    2  Vertigo  R42    3  Imbalance  R26 89        Start Time: 1435  Stop Time: 1535  Total time in clinic (min): 60 minutes    Subjective:  Pt reports she is feeling better overall  Pt states she has been noticing her knee and RLE have been sore lately  Objective: See treatment diary below      Assessment: Pt tolerated today's session well  Pt continues to work towards goals of increased LE endurance and strength  Pt will benefit from further skilled PT to increase strength, flexibility an function  Pt  demonstrated fatigue post treatment and would benefit from continued PT  Continue to progress as able  Plan: Continue per plan of care  Progress treatment as tolerated         Precautions:     Date 1/4 1/11 2/3 2/8 2/10 2/15 2/22 2/24 3/1    Visit # 11 12 13 14 15 16 17 18 19    FOTO             Re-eval               Manuals 1/4 1/11 2/3 2/8 2/10 2/15 2/22 2/24 3/1           SIJ MET (-)                                            Neuro Re-Ed 1/4 1/11 2/3 2/8 2/10 2/15 2/22 2/24 3/1    TA Bracing              Bridge w March 2x15 SB nv           Side plank w clam 15x GTB nv           Bird Dog 15x nv           VORx1 NV 2x1" ea 2x1" ea 2x1' 2x1' 2x1' 2x1' 2x1' 2x1'    Saccades NV 2x1" ea standing on foam 2x1" ea standing on foam 2x1' 2x1' 2x1' 2x1' 2x1' 2x1'    Tandem Walking NV w head turns 3 laps 3 laps 3 laps 3 laps 3 laps 3 laps 3 laps 3 laps    SB Ham Curl        15x 15x    BOSU 2x30" ea SLS 2x30" ea SLS nv                       Ther Ex  2/3 2/8 2/10 2/15 2/22 2/24 3/1    Bike 10' 10' 10' 10' 10' 10' 10' 10' 10'    Leg Press 3x10 105# 3x10 105# 3x10 105# 3x10 105# ^nv 3x10 115# 3x10 115# nv 3x10 115# 3x10 115#    SLR             UTR             Piriformis st         3x30"    HS st         3x30"    Quad St         3x30"    HF st         3x30"    Ther Activity 1/4 1/11 2/3 2/8 2/10 2/15 2/22 2/24 3/1    Squat 2x10 26# 2x10 26# 2x10 26# 2x10 26# 2x10 26# 2x10 26# 2x10 2x10 9# 2x10 9#    Lunge 3x20' walking w BMB 3x20' walking w BMB 3x20' walking w BMB 3x20' walking w BMB 3x20' walking w BMB 3x20' walking w BMB nv 3x20' walking  3x20' walking     Lateral Walk Purp TB 5 laps Purp TB 5 laps Purp TB 5 laps Purp TB 5 laps Purp TB 5 laps Purp TB 5 laps NV nv nv    Suitcase marches     4x28' 8# 4x28' 8# nv nv 4x28' 8#    Lateral step downs             Step Up w March             SL RDL 10x ea 9# KB on LLE  0# on RLE 10x ea 9# KB 10x ea 9# KB 10x ea 9# KB 2x10 ea 9# KB 2x10 ea 9# KB nv np                  Gait Training                                       Modalities

## 2022-03-04 ENCOUNTER — APPOINTMENT (OUTPATIENT)
Dept: PHYSICAL THERAPY | Facility: CLINIC | Age: 72
End: 2022-03-04
Payer: MEDICARE

## 2022-03-08 ENCOUNTER — OFFICE VISIT (OUTPATIENT)
Dept: PHYSICAL THERAPY | Facility: CLINIC | Age: 72
End: 2022-03-08
Payer: MEDICARE

## 2022-03-08 DIAGNOSIS — M54.50 CHRONIC LOW BACK PAIN, UNSPECIFIED BACK PAIN LATERALITY, UNSPECIFIED WHETHER SCIATICA PRESENT: Primary | ICD-10-CM

## 2022-03-08 DIAGNOSIS — R42 VERTIGO: ICD-10-CM

## 2022-03-08 DIAGNOSIS — G89.29 CHRONIC LOW BACK PAIN, UNSPECIFIED BACK PAIN LATERALITY, UNSPECIFIED WHETHER SCIATICA PRESENT: Primary | ICD-10-CM

## 2022-03-08 DIAGNOSIS — R26.89 IMBALANCE: ICD-10-CM

## 2022-03-08 PROCEDURE — 97530 THERAPEUTIC ACTIVITIES: CPT | Performed by: PHYSICAL THERAPIST

## 2022-03-08 PROCEDURE — 97112 NEUROMUSCULAR REEDUCATION: CPT | Performed by: PHYSICAL THERAPIST

## 2022-03-08 PROCEDURE — 97110 THERAPEUTIC EXERCISES: CPT | Performed by: PHYSICAL THERAPIST

## 2022-03-08 NOTE — PROGRESS NOTES
Daily Note     Today's date: 3/8/2022  Patient name: Pari Cobian  : 1950  MRN: [id number]  Referring provider: Angel Willis PA-C  Dx:   Encounter Diagnosis     ICD-10-CM    1  Chronic low back pain, unspecified back pain laterality, unspecified whether sciatica present  M54 50     G89 29    2  Vertigo  R42    3  Imbalance  R26 89                   Subjective: Pt reports increased back pain with unloading boxes after moving  She does note improvements in her dizziness recently  Objective: See treatment diary below      Assessment: Pt does well w vestibular exercises, but has some onset of dizziness with horizontal VOR x1 and horizontal saccades  She does well w trial of UTR and woodchop w some fatigue and R lower QL soreness present  She does well w supine exercises for core stability  Patient demonstrated fatigue post treatment, exhibited good technique with therapeutic exercises and would benefit from continued PT  Plan: Progress treatment as tolerated         Precautions:     Date 1/4 1/11 2/3 2/8 2/10 2/15 2/22 2/24 3/1 3/8   Visit # 11 14 13 14 15 16 17 18 19 20   FOTO             Re-eval               Manuals 1/4 1/11 2/3 2/8 2/10 2/15 2/22 2/24 3/1 3/8          SIJ MET (-)                                            Neuro Re-Ed 1/4 1/11 2/3 2/8 2/10 2/15 2/22 2/24 3/1 3/8   TA Bracing              Bridge w March 2x15 SB nv        20x ea   Side plank w clam 15x GTB nv        2x10 GTB   Bird Dog 15x nv        10x   VORx1 NV 2x1" ea 2x1" ea 2x1' 2x1' 2x1' 2x1' 2x1' 2x1' 2x1'   Saccades NV 2x1" ea standing on foam 2x1" ea standing on foam 2x1' 2x1' 2x1' 2x1' 2x1' 2x1' 2x1'   Tandem Walking NV w head turns 3 laps 3 laps 3 laps 3 laps 3 laps 3 laps 3 laps 3 laps    SB Ham Curl        15x 15x    BOSU 2x30" ea SLS 2x30" ea SLS nv       2x30" SLS   Curl Ups          15x5" YMB   Ther Ex 1/4 1/11 2/3 2/8 2/10 2/15 2/22 2/24 3/1 3/8   Bike 10' 10' 10' 10' 10' 10' 10' 10' 10' 10'   Leg Press 3x10 105# 3x10 105# 3x10 105# 3x10 105# ^nv 3x10 115# 3x10 115# nv 3x10 115# 3x10 115#    SLR             UTR             Piriformis st         3x30"    HS st         3x30"    Quad St         3x30"    HF st         3x30"    Ther Activity 1/4 1/11 2/3 2/8 2/10 2/15 2/22 2/24 3/1 3/8   Squat 2x10 26# 2x10 26# 2x10 26# 2x10 26# 2x10 26# 2x10 26# 2x10 2x10 9# 2x10 9# 2x10 18# KB   Lunge 3x20' walking w BMB 3x20' walking w BMB 3x20' walking w BMB 3x20' walking w BMB 3x20' walking w BMB 3x20' walking w BMB nv 3x20' walking  3x20' walking     Lateral Walk Purp TB 5 laps Purp TB 5 laps Purp TB 5 laps Purp TB 5 laps Purp TB 5 laps Purp TB 5 laps NV nv nv    Suitcase marches     4x28' 8# 4x28' 8# nv nv 4x28' 8#    SL RDL 10x ea 9# KB on LLE  0# on RLE 10x ea 9# KB 10x ea 9# KB 10x ea 9# KB 2x10 ea 9# KB 2x10 ea 9# KB nv np     UTR          20x Black TB   Wood Chops          15x Black TB                Gait Training                                       Modalities

## 2022-03-10 ENCOUNTER — OFFICE VISIT (OUTPATIENT)
Dept: PHYSICAL THERAPY | Facility: CLINIC | Age: 72
End: 2022-03-10
Payer: MEDICARE

## 2022-03-10 DIAGNOSIS — R42 VERTIGO: ICD-10-CM

## 2022-03-10 DIAGNOSIS — R26.89 IMBALANCE: ICD-10-CM

## 2022-03-10 DIAGNOSIS — M54.50 CHRONIC LOW BACK PAIN, UNSPECIFIED BACK PAIN LATERALITY, UNSPECIFIED WHETHER SCIATICA PRESENT: Primary | ICD-10-CM

## 2022-03-10 DIAGNOSIS — G89.29 CHRONIC LOW BACK PAIN, UNSPECIFIED BACK PAIN LATERALITY, UNSPECIFIED WHETHER SCIATICA PRESENT: Primary | ICD-10-CM

## 2022-03-10 PROCEDURE — 97112 NEUROMUSCULAR REEDUCATION: CPT

## 2022-03-10 PROCEDURE — 97110 THERAPEUTIC EXERCISES: CPT

## 2022-03-10 NOTE — PROGRESS NOTES
Daily Note     Today's date: 3/10/2022  Patient name: Lila Carballo  : 1950  MRN: [id number]  Referring provider: Cherise Rhodes PA-C  Dx:   Encounter Diagnosis     ICD-10-CM    1  Chronic low back pain, unspecified back pain laterality, unspecified whether sciatica present  M54 50     G89 29    2  Vertigo  R42    3  Imbalance  R26 89        Start Time: 1130  Stop Time: 1240  Total time in clinic (min): 70 minutes    Subjective: Pt reports she is feeling a little bit of symptoms in the hip and sacrum  Pt states she is feeling better overall  Objective: See treatment diary below      Assessment: Pt tolerated today's session well with no adverse symptoms  Pt continues to show appropriate challenge with current exercise program  Patient demonstrated fatigue post treatment, exhibited good technique with therapeutic exercises and would benefit from continued PT  Continue to progress as able  Plan: Progress treatment as tolerated         Precautions:     Date 3/10    2/10 2/15 2/22 2/24 3/1 3/8   Visit # 21    15 16 17 18 19 20   FOTO             Re-eval               Manuals 3/10    2/10 2/15 2/22 2/24 3/1 3/8          SIJ MET (-)                                            Neuro Re-Ed 3/10    2/10 2/15 2/22 2/24 3/1 3/8   TA Bracing              Bridge w March 20x ea         20x ea   Side plank w clam GTB 2x10         GTB 2x10   Bird Dog 2x10         10x   VORx1 2x1'    2x1' 2x1' 2x1' 2x1' 2x1' 2x1'   Saccades 2x1'    2x1' 2x1' 2x1' 2x1' 2x1' 2x1'   Tandem Walking     3 laps 3 laps 3 laps 3 laps 3 laps    SB Ham Curl        15x 15x    BOSU 2x30" SLS         2x30" SLS   Curl Ups 15x5" YMB         15x5" YMB   Ther Ex 3/10    2/10 2/15 2/22 2/24 3/1 3/8   Bike 10'    10' 10' 10' 10' 10' 10'   Leg Press 3x10 115#    3x10 115# 3x10 115# nv 3x10 115# 3x10 115#    SLR             UTR             Piriformis st         3x30"    HS st         3x30"    Quad St         3x30"    HF st         3x30"    Ther Activity 3/10    2/10 2/15 2/22 2/24 3/1 3/8   Squat 2x10 18# KB    2x10 26# 2x10 26# 2x10 2x10 9# 2x10 9# 2x10 18# KB   Lunge 3x20' walking w BMB    3x20' walking w BMB 3x20' walking w BMB nv 3x20' walking  3x20' walking     Lateral Walk     Purp TB 5 laps Purp TB 5 laps NV nv nv    Suitcase marches 4x28' 8#    4x28' 8# 4x28' 8# nv nv 4x28' 8#    SL RDL     2x10 ea 9# KB 2x10 ea 9# KB nv np     UTR 20x Black TB         20x Black TB   Wood Chops 20x Black TB         15x Black TB                Gait Training                                       Modalities

## 2022-03-15 ENCOUNTER — OFFICE VISIT (OUTPATIENT)
Dept: PHYSICAL THERAPY | Facility: CLINIC | Age: 72
End: 2022-03-15
Payer: MEDICARE

## 2022-03-15 DIAGNOSIS — G89.29 CHRONIC LOW BACK PAIN, UNSPECIFIED BACK PAIN LATERALITY, UNSPECIFIED WHETHER SCIATICA PRESENT: Primary | ICD-10-CM

## 2022-03-15 DIAGNOSIS — R26.89 IMBALANCE: ICD-10-CM

## 2022-03-15 DIAGNOSIS — M54.50 CHRONIC LOW BACK PAIN, UNSPECIFIED BACK PAIN LATERALITY, UNSPECIFIED WHETHER SCIATICA PRESENT: Primary | ICD-10-CM

## 2022-03-15 DIAGNOSIS — R42 VERTIGO: ICD-10-CM

## 2022-03-15 PROCEDURE — 97110 THERAPEUTIC EXERCISES: CPT

## 2022-03-15 PROCEDURE — 97112 NEUROMUSCULAR REEDUCATION: CPT

## 2022-03-15 NOTE — PROGRESS NOTES
Daily Note     Today's date: 3/15/2022  Patient name: Michel Kemp  : 1950  MRN: [id number]  Referring provider: Jia Pizarro PA-C  Dx:   Encounter Diagnosis     ICD-10-CM    1  Chronic low back pain, unspecified back pain laterality, unspecified whether sciatica present  M54 50     G89 29    2  Vertigo  R42    3  Imbalance  R26 89        Start Time: 1440  Stop Time: 1540  Total time in clinic (min): 60 minutes    Subjective: Pt reports she continues to have symptoms in the R hip/thigh  Pt states she was doing a lot of driving over the weekend and noticed some increased fatigue and weakness in the thigh  Objective: See treatment diary below      Assessment: Pt tolerated today's session well with no adverse symptoms  Pt continues to show appropriate challenge with current exercise program  Performed exercises to encourage hip strength and decreased valgus  Patient demonstrated fatigue post treatment, exhibited good technique with therapeutic exercises and would benefit from continued PT  Continue to progress as able  Plan: Progress treatment as tolerated         Precautions:     Date 3/10 3/15   2/10 2/15 2/22 2/24 3/1 3/8   Visit # 21 22   15 16 17 18 19 20   FOTO             Re-eval               Manuals 3/10 3/15   2/10 2/15 2/22 2/24 3/1 3/8          SIJ MET (-)                                            Neuro Re-Ed 3/10 3/15   2/10 2/15 2/22 2/24 3/1 3/8   TA Bracing              Bridge w March 20x ea GTB 20x        20x ea   Side plank w clam GTB 2x10 GTB 2x10        GTB 2x10   Bird Dog 2x10 2x10        10x   VORx1 2x1' nv   2x1' 2x1' 2x1' 2x1' 2x1' 2x1'   Saccades 2x1' nv   2x1' 2x1' 2x1' 2x1' 2x1' 2x1'   Tandem Walking     3 laps 3 laps 3 laps 3 laps 3 laps    SB Ham Curl        15x 15x    BOSU 2x30" SLS 2x30" SLS        2x30" SLS   Curl Ups 15x5" YMB 15x5" YMB        15x5" YMB   Ther Ex 3/10 3/15   2/10 2/15 2/22 2/24 3/1 3/8   Bike 10' 10'   10' 10' 10' 10' 10' 10'   Leg Press 3x10 115# 3x10 115#   3x10 115# 3x10 115# nv 3x10 115# 3x10 115#    SLR             UTR             Piriformis st         3x30"    HS st         3x30"    Quad St         3x30"    HF st         3x30"    Ther Activity 3/10 3/15   2/10 2/15 2/22 2/24 3/1 3/8   Squat 2x10 18# KB 2x10 18# KB   2x10 26# 2x10 26# 2x10 2x10 9# 2x10 9# 2x10 18# KB   Lunge 3x20' walking w BMB 3x20' walking w BMB   3x20' walking w BMB 3x20' walking w BMB nv 3x20' walking  3x20' walking     Lateral Walk     Purp TB 5 laps Purp TB 5 laps NV nv nv    Suitcase marches 4x28' 8# 4x28' 8#   4x28' 8# 4x28' 8# nv nv 4x28' 8#    SL RDL     2x10 ea 9# KB 2x10 ea 9# KB nv np     UTR 20x Black TB 20x Black TB        20x Black TB   Wood Chops 20x Black TB 20x Black TB        15x Black TB                Gait Training                                       Modalities

## 2022-03-17 ENCOUNTER — OFFICE VISIT (OUTPATIENT)
Dept: PHYSICAL THERAPY | Facility: CLINIC | Age: 72
End: 2022-03-17
Payer: MEDICARE

## 2022-03-17 DIAGNOSIS — G89.29 CHRONIC LOW BACK PAIN, UNSPECIFIED BACK PAIN LATERALITY, UNSPECIFIED WHETHER SCIATICA PRESENT: Primary | ICD-10-CM

## 2022-03-17 DIAGNOSIS — R26.89 IMBALANCE: ICD-10-CM

## 2022-03-17 DIAGNOSIS — R42 VERTIGO: ICD-10-CM

## 2022-03-17 DIAGNOSIS — M54.50 CHRONIC LOW BACK PAIN, UNSPECIFIED BACK PAIN LATERALITY, UNSPECIFIED WHETHER SCIATICA PRESENT: Primary | ICD-10-CM

## 2022-03-17 PROCEDURE — 97110 THERAPEUTIC EXERCISES: CPT

## 2022-03-17 PROCEDURE — 97112 NEUROMUSCULAR REEDUCATION: CPT

## 2022-03-17 NOTE — PROGRESS NOTES
Daily Note     Today's date: 3/17/2022  Patient name: Rancho Saucedo  : 1950  MRN: [id number]  Referring provider: Chica Saavedra PA-C  Dx:   Encounter Diagnosis     ICD-10-CM    1  Chronic low back pain, unspecified back pain laterality, unspecified whether sciatica present  M54 50     G89 29    2  Vertigo  R42    3  Imbalance  R26 89        Start Time: 1430  Stop Time: 1535  Total time in clinic (min): 65 minutes    Subjective: Pt reports she is feeling better today, denies any issues in the hip/pelvis at the moment  Objective: See treatment diary below      Assessment: Pt tolerated today's session well with no adverse symptoms  Pt performed increased tension with exercises as noted with no signs of increased pain or adverse symptoms  Patient demonstrated fatigue post treatment, exhibited good technique with therapeutic exercises and would benefit from continued PT  Continue to progress as able  Plan: Progress treatment as tolerated         Precautions:     Date 3/10 3/15 3/17    2/22 2/24 3/1 3/8   Visit # 21 22 23    17 18 19 20   FOTO             Re-eval               Manuals 3/10 3/15 3/17    2/22 2/24 3/1 3/8          SIJ MET (-)                                            Neuro Re-Ed 3/10 3/15 3/17    2/22 2/24 3/1 3/8   TA Bracing              Bridge w March 20x ea GTB 20x GTB 20x       20x ea   Side plank w clam GTB 2x10 GTB 2x10 GTB 2x10       GTB 2x10   Bird Dog 2x10 2x10 PTB 2x10       10x   VORx1 2x1' nv 2x1'    2x1' 2x1' 2x1' 2x1'   Saccades 2x1' nv 2x1'    2x1' 2x1' 2x1' 2x1'   Tandem Walking       3 laps 3 laps 3 laps    SB Ham Curl        15x 15x    BOSU 2x30" SLS 2x30" SLS 2x30" SLS       2x30" SLS   Curl Ups 15x5" YMB 15x5" YMB        15x5" YMB   Ther Ex 3/10 3/15 3/17    2/22 2/24 3/1 3/8   Bike 10' 10' 10'    10' 10' 10' 10'   Leg Press 3x10 115# 3x10 115# 3x10 115#    nv 3x10 115# 3x10 115#    SLR             UTR             Piriformis st         3x30"    HS st 3x30"    Quad St         3x30"    HF st         3x30"    Ther Activity 3/10 3/15 3/17    2/22 2/24 3/1 3/8   Squat 2x10 18# KB 2x10 18# KB BOSU squat 2x10    2x10 2x10 9# 2x10 9# 2x10 18# KB   Lunge 3x20' walking w BMB 3x20' walking w BMB 3x20' walking w BMB    nv 3x20' walking  3x20' walking     Lateral Walk       NV nv nv    Suitcase enoch 4x28' 8# 4x28' 8# 4x28' 8#    nv nv 4x28' 8#    SL RDL       nv np     UTR 20x Black TB 20x Black TB        20x Black TB   Wood Chops 20x Black TB 20x Black TB        15x Black TB                Gait Training                                       Modalities

## 2022-03-22 ENCOUNTER — APPOINTMENT (OUTPATIENT)
Dept: PHYSICAL THERAPY | Facility: CLINIC | Age: 72
End: 2022-03-22
Payer: MEDICARE

## 2022-03-29 ENCOUNTER — OFFICE VISIT (OUTPATIENT)
Dept: PHYSICAL THERAPY | Facility: CLINIC | Age: 72
End: 2022-03-29
Payer: MEDICARE

## 2022-03-29 DIAGNOSIS — M54.50 CHRONIC LOW BACK PAIN, UNSPECIFIED BACK PAIN LATERALITY, UNSPECIFIED WHETHER SCIATICA PRESENT: Primary | ICD-10-CM

## 2022-03-29 DIAGNOSIS — G89.29 CHRONIC LOW BACK PAIN, UNSPECIFIED BACK PAIN LATERALITY, UNSPECIFIED WHETHER SCIATICA PRESENT: Primary | ICD-10-CM

## 2022-03-29 DIAGNOSIS — R42 VERTIGO: ICD-10-CM

## 2022-03-29 DIAGNOSIS — R26.89 IMBALANCE: ICD-10-CM

## 2022-03-29 PROCEDURE — 97112 NEUROMUSCULAR REEDUCATION: CPT

## 2022-03-29 PROCEDURE — 97110 THERAPEUTIC EXERCISES: CPT

## 2022-03-29 NOTE — PROGRESS NOTES
Daily Note     Today's date: 3/29/2022  Patient name: Jake Zarate  : 1950  MRN: [id number]  Referring provider: Reyna Srivastava PA-C  Dx:   Encounter Diagnosis     ICD-10-CM    1  Chronic low back pain, unspecified back pain laterality, unspecified whether sciatica present  M54 50     G89 29    2  Vertigo  R42    3  Imbalance  R26 89        Start Time: 1430  Stop Time: 1540  Total time in clinic (min): 70 minutes    Subjective: Pt reports she is feeling better today, Pt states she had been doing a lot of work around her new home and has noticed less dizziness and symptoms while doing the work around her home  Objective: See treatment diary below      Assessment: Pt tolerated today's session well with no adverse symptoms  Pt continues to work towards goals of increased ROM and reduction of symptoms with Vestibular exercises  Pt will benefit from further skilled PT to increase strength, flexibility and function  Patient demonstrated fatigue post treatment, exhibited good technique with therapeutic exercises and would benefit from continued PT  Continue to progress as able  Plan: Progress treatment as tolerated         Precautions:     Date 3/10 3/15 3/17 3/29   2/22 2/24 3/1 3/8   Visit # 21 22 23 24   17 18 19 20   FOTO             Re-eval               Manuals 3/10 3/15 3/17 3/29   2/22 2/24 3/1 3/8          SIJ MET (-)                                            Neuro Re-Ed 3/10 3/15 3/17 3/29   2/22 2/24 3/1 3/8   TA Bracing              Bridge w March 20x ea GTB 20x GTB 20x GTB 20x      20x ea   Side plank w clam GTB 2x10 GTB 2x10 GTB 2x10 GTB 2x10      GTB 2x10   Bird Dog 2x10 2x10 PTB 2x10 GTB 2x10      10x   VORx1 2x1' nv 2x1' 2x1'   2x1' 2x1' 2x1' 2x1'   Saccades 2x1' nv 2x1' 2x1'   2x1' 2x1' 2x1' 2x1'   Tandem Walking       3 laps 3 laps 3 laps    SB Ham Curl        15x 15x    BOSU 2x30" SLS 2x30" SLS 2x30" SLS 2x30" SLS      2x30" SLS   Curl Ups 15x5" YMB 15x5" YMB        15x5" YMB Ther Ex 3/10 3/15 3/17 3/29   2/22 2/24 3/1 3/8   Bike 10' 8' 10' 10'   10' 10' 10' 10'   Leg Press 3x10 115# 3x10 115# 3x10 115# 3x10 115#   nv 3x10 115# 3x10 115#    SLR             UTR             Piriformis st         3x30"    HS st         3x30"    Quad St         3x30"    HF st         3x30"    Ther Activity 3/10 3/15 3/17 3/29   2/22 2/24 3/1 3/8   Squat 2x10 18# KB 2x10 18# KB BOSU squat 2x10 BOSU squat 2x10   2x10 2x10 9# 2x10 9# 2x10 18# KB   Lunge 3x20' walking w BMB 3x20' walking w BMB 3x20' walking w BMB 3x20' walking w BMB   nv 3x20' walking  3x20' walking     Lateral Walk       NV nv nv    Suitcase marchcass 4x28' 8# 4x28' 8# 4x28' 8# 4x28' 8#   nv nv 4x28' 8#    SL RDL       nv np     UTR 20x Black TB 20x Black TB        20x Black TB   Wood Chops 20x Black TB 20x Black TB        15x Black TB                Gait Training                                       Modalities

## 2022-03-31 ENCOUNTER — OFFICE VISIT (OUTPATIENT)
Dept: PHYSICAL THERAPY | Facility: CLINIC | Age: 72
End: 2022-03-31
Payer: MEDICARE

## 2022-03-31 DIAGNOSIS — R26.89 IMBALANCE: ICD-10-CM

## 2022-03-31 DIAGNOSIS — R42 VERTIGO: ICD-10-CM

## 2022-03-31 DIAGNOSIS — G89.29 CHRONIC LOW BACK PAIN, UNSPECIFIED BACK PAIN LATERALITY, UNSPECIFIED WHETHER SCIATICA PRESENT: Primary | ICD-10-CM

## 2022-03-31 DIAGNOSIS — M54.50 CHRONIC LOW BACK PAIN, UNSPECIFIED BACK PAIN LATERALITY, UNSPECIFIED WHETHER SCIATICA PRESENT: Primary | ICD-10-CM

## 2022-03-31 PROCEDURE — 97110 THERAPEUTIC EXERCISES: CPT | Performed by: PHYSICAL THERAPIST

## 2022-03-31 PROCEDURE — 97530 THERAPEUTIC ACTIVITIES: CPT | Performed by: PHYSICAL THERAPIST

## 2022-03-31 NOTE — PROGRESS NOTES
Daily Note     Today's date: 3/31/2022  Patient name: Kamlesh Velasquez  : 1950  MRN: [id number]  Referring provider: Stephanie Reyes PA-C  Dx:   Encounter Diagnosis     ICD-10-CM    1  Chronic low back pain, unspecified back pain laterality, unspecified whether sciatica present  M54 50     G89 29    2  Vertigo  R42    3  Imbalance  R26 89                   Subjective: Pt reports she is feeling well today  Objective: See treatment diary below      Assessment: Pt is challenged w trial of Y balance and requires frequent cueing for proper form and activating core with UTR, bird dogs, fire hydrants, and lateral walking  She is sore post session and has some back pain resuming SB ham curls  Patient demonstrated fatigue post treatment, exhibited good technique with therapeutic exercises and would benefit from continued PT  Plan: Continue per plan of care  Progress treatment as tolerated         Precautions:     Date 3/10 3/15 3/17 3/29 3/31  2/22 2/24 3/1 3/8   Visit # 21 22 23 24 25  17 18 19 20   FOTO             Re-eval               Manuals 3/10 3/15 3/17 3/29 3/31  2/22 2/24 3/1 3/8          SIJ MET (-)                                            Neuro Re-Ed 3/10 3/15 3/17 3/29 3/31  2/22 2/24 3/1 3/8   TA Bracing              Bridge w March 20x ea GTB 20x GTB 20x GTB 20x      20x ea   Side plank w clam GTB 2x10 GTB 2x10 GTB 2x10 GTB 2x10      GTB 2x10   Bird Dog 2x10 2x10 PTB 2x10 GTB 2x10 GTB 2x10     10x   VORx1 2x1' nv 2x1' 2x1'   2x1' 2x1' 2x1' 2x1'   Saccades 2x1' nv 2x1' 2x1'   2x1' 2x1' 2x1' 2x1'   Tandem Walking       3 laps 3 laps 3 laps    SB Ham Curl     2x10   15x 15x    BOSU 2x30" SLS 2x30" SLS 2x30" SLS 2x30" SLS      2x30" SLS   Curl Ups 15x5" YMB 15x5" YMB        15x5" YMB   Y Balance     5x ea        Fire Hydrants     15x PTB        Ther Ex 3/10 3/15 3/17 3/29 3/31  2/22 2/24 3/1 3/8   Bike 10' 10' 10' 10' 6' Elliptical  10' 10' 10' 10'   Leg Press 3x10 115# 3x10 115# 3x10 115# 3x10 115# 3x10 125#  nv 3x10 115# 3x10 115#    SLR             UTR             Piriformis st         3x30"    HS st         3x30"    Quad St         3x30"    HF st         3x30"    Ther Activity 3/10 3/15 3/17 3/29 3/31  2/22 2/24 3/1 3/8   Squat 2x10 18# KB 2x10 18# KB BOSU squat 2x10 BOSU squat 2x10 2x10 35# KB  2x10 2x10 9# 2x10 9# 2x10 18# KB   Lunge 3x20' walking w BMB 3x20' walking w BMB 3x20' walking w BMB 3x20' walking w BMB   nv 3x20' walking  3x20' walking     Lateral Walk     4 laps BTB  NV nv nv    Suitcase marches 4x28' 8# 4x28' 8# 4x28' 8# 4x28' 8# 4x28' 8# & 18#  nv nv 4x28' 8#    SL RDL       nv np     UTR 20x Black TB 20x Black TB   2x10 7#     20x Black TB   Wood Chops 20x Black TB 20x Black TB        15x Black TB                Gait Training                                       Modalities

## 2022-04-12 ENCOUNTER — OFFICE VISIT (OUTPATIENT)
Dept: PHYSICAL THERAPY | Facility: CLINIC | Age: 72
End: 2022-04-12
Payer: MEDICARE

## 2022-04-12 DIAGNOSIS — M76.891 HIP FLEXOR TENDINITIS, RIGHT: ICD-10-CM

## 2022-04-12 DIAGNOSIS — R26.89 IMBALANCE: ICD-10-CM

## 2022-04-12 DIAGNOSIS — R42 VERTIGO: ICD-10-CM

## 2022-04-12 DIAGNOSIS — M54.50 CHRONIC LOW BACK PAIN, UNSPECIFIED BACK PAIN LATERALITY, UNSPECIFIED WHETHER SCIATICA PRESENT: Primary | ICD-10-CM

## 2022-04-12 DIAGNOSIS — M51.26 LUMBAR DISC HERNIATION: ICD-10-CM

## 2022-04-12 DIAGNOSIS — G89.29 CHRONIC LOW BACK PAIN, UNSPECIFIED BACK PAIN LATERALITY, UNSPECIFIED WHETHER SCIATICA PRESENT: Primary | ICD-10-CM

## 2022-04-12 PROCEDURE — 97110 THERAPEUTIC EXERCISES: CPT | Performed by: PHYSICAL THERAPIST

## 2022-04-12 PROCEDURE — 97530 THERAPEUTIC ACTIVITIES: CPT

## 2022-04-12 PROCEDURE — 97112 NEUROMUSCULAR REEDUCATION: CPT | Performed by: PHYSICAL THERAPIST

## 2022-04-12 NOTE — PROGRESS NOTES
Daily Note     Today's date: 2022  Patient name: Roxana Muñoz  : 1950  MRN: [id number]  Referring provider: Yumiko Bell PA-C  Dx:   Encounter Diagnosis     ICD-10-CM    1  Chronic low back pain, unspecified back pain laterality, unspecified whether sciatica present  M54 50     G89 29    2  Vertigo  R42    3  Imbalance  R26 89                   Subjective: Pt reports some good days and bad days with her back pain  She had some pain earlier today but was doing better with continued walking and staying busy  Objective: See treatment diary below      Assessment: Pt is challenged w progression to split squats and demonstrates improved form and core activation with UTR activity  She continues to be challenged w leg press and lateral walking    Patient demonstrated fatigue post treatment and would benefit from continued PT  Plan: Continue per plan of care  Progress treatment as tolerated  PTA, Lois Luevano assisted with last 25 minutes of treatment       Precautions:     Date 3/10 3/15 3/17 3/29 3/31 4/12    3/8   Visit # 21 22 23 24 25 26    20   FOTO             Re-eval               Manuals 3/10 3/15 3/17 3/29 3/31 4/12    3/8                                                       Neuro Re-Ed 3/10 3/15 3/17 3/29 3/31 4/12    3/8   TA Bracing              Bridge w March 20x ea GTB 20x GTB 20x GTB 20x      20x ea   Side plank w clam GTB 2x10 GTB 2x10 GTB 2x10 GTB 2x10      GTB 2x10   Bird Dog 2x10 2x10 PTB 2x10 GTB 2x10 GTB 2x10     10x   VORx1 2x1' nv 2x1' 2x1'      2x1'   Saccades 2x1' nv 2x1' 2x1'      2x1'   Tandem Walking             SB Ham Curl     2x10        BOSU 2x30" SLS 2x30" SLS 2x30" SLS 2x30" SLS  2x30" SLS    2x30" SLS   Curl Ups 15x5" YMB 15x5" YMB        15x5" YMB   Y Balance     5x ea 5x ea       Fire Hydrants     15x PTB        Ther Ex 3/10 3/15 3/17 3/29 3/31 4/12    3/8   Bike 10' 10' 10' 10' 6' Elliptical 10' bike    10'   Leg Press 3x10 115# 3x10 115# 3x10 115# 3x10 115# 3x10 125# 3x10 125#       SLR             UTR             Piriformis st             HS st             Quad St             HF st             Ther Activity 3/10 3/15 3/17 3/29 3/31 4/12    3/8   Squat 2x10 18# KB 2x10 18# KB BOSU squat 2x10 BOSU squat 2x10 2x10 35# KB 2x15 35# KB    2x10 18# KB   Lunge 3x20' walking w BMB 3x20' walking w BMB 3x20' walking w BMB 3x20' walking w BMB  15x ea split squat       Lateral Walk     4 laps BTB 4 laps BTB       Suitcase marches 4x28' 8# 4x28' 8# 4x28' 8# 4x28' 8# 4x28' 8# & 18#        SL RDL             UTR 20x Black TB 20x Black TB   2x10 7# 2x10 7 5#    20x Black TB   Wood Chops 20x Black TB 20x Black TB        15x Black TB                Gait Training                                       Modalities

## 2022-04-14 ENCOUNTER — OFFICE VISIT (OUTPATIENT)
Dept: PHYSICAL THERAPY | Facility: CLINIC | Age: 72
End: 2022-04-14
Payer: MEDICARE

## 2022-04-14 DIAGNOSIS — R42 VERTIGO: ICD-10-CM

## 2022-04-14 DIAGNOSIS — R26.89 IMBALANCE: ICD-10-CM

## 2022-04-14 DIAGNOSIS — M54.50 CHRONIC LOW BACK PAIN, UNSPECIFIED BACK PAIN LATERALITY, UNSPECIFIED WHETHER SCIATICA PRESENT: Primary | ICD-10-CM

## 2022-04-14 DIAGNOSIS — G89.29 CHRONIC LOW BACK PAIN, UNSPECIFIED BACK PAIN LATERALITY, UNSPECIFIED WHETHER SCIATICA PRESENT: Primary | ICD-10-CM

## 2022-04-14 PROCEDURE — 97112 NEUROMUSCULAR REEDUCATION: CPT

## 2022-04-14 PROCEDURE — 97110 THERAPEUTIC EXERCISES: CPT

## 2022-04-14 PROCEDURE — 97530 THERAPEUTIC ACTIVITIES: CPT

## 2022-04-14 NOTE — PROGRESS NOTES
Daily Note     Today's date: 2022  Patient name: Kassidy Saravia  : 1950  MRN: [id number]  Referring provider: Aryan Johnson PA-C  Dx:   Encounter Diagnosis     ICD-10-CM    1  Chronic low back pain, unspecified back pain laterality, unspecified whether sciatica present  M54 50     G89 29    2  Vertigo  R42    3  Imbalance  R26 89        Start Time: 1405  Stop Time: 1450  Total time in clinic (min): 45 minutes    Subjective: Pt reports she is feeling ok today, states she has been feeling good other than some dizziness with going up and down her ladder while painting  Objective: See treatment diary below      Assessment: Pt tolerated today's session well with no adverse symptoms  Pt continues to be challenged w leg press and lateral walking  Pt performed X walks and step up and over on BOSU with no adverse symptoms  Patient demonstrated fatigue post treatment and would benefit from continued PT  Plan: Continue per plan of care  Progress treatment as tolerated              Precautions:     Date 3/10 3/15 3/17 3/29 3/31 4/12 4/14   3/8   Visit # 21 22 23 24 25 26 27   20   FOTO             Re-eval               Manuals 3/10 3/15 3/17 3/29 3/31 4/12 4/14   3/8                                                       Neuro Re-Ed 3/10 3/15 3/17 3/29 3/31 4/12 4/14   3/8   TA Bracing              Bridge w March 20x ea GTB 20x GTB 20x GTB 20x      20x ea   Side plank w clam GTB 2x10 GTB 2x10 GTB 2x10 GTB 2x10      GTB 2x10   Bird Dog 2x10 2x10 PTB 2x10 GTB 2x10 GTB 2x10     10x   VORx1 2x1' nv 2x1' 2x1'      2x1'   Saccades 2x1' nv 2x1' 2x1'      2x1'   Tandem Walking             SB Ham Curl     2x10        BOSU 2x30" SLS 2x30" SLS 2x30" SLS 2x30" SLS  2x30" SLS 2x30" SLS   2x30" SLS   Curl Ups 15x5" YMB 15x5" YMB        15x5" YMB   Y Balance     5x ea 5x ea 5x ea      Fire Hydrants     15x PTB        Ther Ex 3/10 3/15 3/17 3/29 3/31 4/12 4/14   3/8   Bike 10' 10' 10' 10' 6' Elliptical 10' bike 10' bike   10'   Leg Press 3x10 115# 3x10 115# 3x10 115# 3x10 115# 3x10 125# 3x10 125# 3x10 125#      SLR             UTR             Piriformis st             HS st             Quad St             HF st             Ther Activity 3/10 3/15 3/17 3/29 3/31 4/12 4/14   3/8   Squat 2x10 18# KB 2x10 18# KB BOSU squat 2x10 BOSU squat 2x10 2x10 35# KB 2x15 35# KB 2x15 35# KB   2x10 18# KB   Lunge 3x20' walking w BMB 3x20' walking w BMB 3x20' walking w BMB 3x20' walking w BMB  15x ea split squat 15x ea split squat      Lateral Walk     4 laps BTB 4 laps BTB GX walks 3 laps      Suitcase enoch 4x28' 8# 4x28' 8# 4x28' 8# 4x28' 8# 4x28' 8# & 18#        SL RDL             UTR 20x Black TB 20x Black TB   2x10 7# 2x10 7 5# 2x10 7 5#   20x Black TB   Wood Chops 20x Black TB 20x Black TB        15x Black TB                Gait Training                                       Modalities

## 2022-04-19 ENCOUNTER — OFFICE VISIT (OUTPATIENT)
Dept: PHYSICAL THERAPY | Facility: CLINIC | Age: 72
End: 2022-04-19
Payer: MEDICARE

## 2022-04-19 DIAGNOSIS — G89.29 CHRONIC LOW BACK PAIN, UNSPECIFIED BACK PAIN LATERALITY, UNSPECIFIED WHETHER SCIATICA PRESENT: Primary | ICD-10-CM

## 2022-04-19 DIAGNOSIS — M54.50 CHRONIC LOW BACK PAIN, UNSPECIFIED BACK PAIN LATERALITY, UNSPECIFIED WHETHER SCIATICA PRESENT: Primary | ICD-10-CM

## 2022-04-19 DIAGNOSIS — R26.89 IMBALANCE: ICD-10-CM

## 2022-04-19 DIAGNOSIS — R42 VERTIGO: ICD-10-CM

## 2022-04-19 PROCEDURE — 97112 NEUROMUSCULAR REEDUCATION: CPT

## 2022-04-19 PROCEDURE — 97110 THERAPEUTIC EXERCISES: CPT

## 2022-04-19 NOTE — PROGRESS NOTES
Daily Note     Today's date: 2022  Patient name: Bienvenido Buchanan  : 1950  MRN: [id number]  Referring provider: Emir Pendleton PA-C  Dx:   Encounter Diagnosis     ICD-10-CM    1  Chronic low back pain, unspecified back pain laterality, unspecified whether sciatica present  M54 50     G89 29    2  Vertigo  R42    3  Imbalance  R26 89        Start Time: 1130  Stop Time: 1205  Total time in clinic (min): 35 minutes    Subjective: Pt reports she is feeling ok today, states she has been feeling good over the past couple of days  Objective: See treatment diary below      Assessment: Pt tolerated today's session well with no adverse symptoms  Pt shows good response to current exercises  Pt shows initiative and drive to rehabilitate  Patient demonstrated fatigue post treatment and would benefit from continued PT  Continue to progress as able  Plan: Continue per plan of care  Progress treatment as tolerated              Precautions:     Date 3/10 3/15 3/17 3/29 3/31 4/12 4/14 4/19     Visit # 21 22 23 24 25 26 27 28     FOTO             Re-eval               Manuals 3/10 3/15 3/17 3/29 3/31 4/12 4/14 4/19                                                         Neuro Re-Ed 3/10 3/15 3/17 3/29 3/31 4/12 4/14 4/19     TA Bracing              Bridge w March 20x ea GTB 20x GTB 20x GTB 20x         Side plank w clam GTB 2x10 GTB 2x10 GTB 2x10 GTB 2x10         Bird Dog 2x10 2x10 PTB 2x10 GTB 2x10 GTB 2x10        VORx1 2x1' nv 2x1' 2x1'         Saccades 2x1' nv 2x1' 2x1'         Tandem Walking             SB Ham Curl     2x10        BOSU 2x30" SLS 2x30" SLS 2x30" SLS 2x30" SLS  2x30" SLS 2x30" SLS 2x30" SLS     Curl Ups 15x5" YMB 15x5" YMB           Y Balance     5x ea 5x ea 5x ea 5x     Fire Hydrants     15x PTB        Ther Ex 3/10 3/15 3/17 3/29 3/31 4/12 4/14 4/19     Bike 10' 10' 10' 10' 6' Elliptical 10' bike 10' bike 10' bike     Leg Press 3x10 115# 3x10 115# 3x10 115# 3x10 115# 3x10 125# 3x10 125# 3x10 125# 3x10 125#     SLR             UTR             Piriformis st             HS st             Quad St             HF st             Ther Activity 3/10 3/15 3/17 3/29 3/31 4/12 4/14 4/19     Squat 2x10 18# KB 2x10 18# KB BOSU squat 2x10 BOSU squat 2x10 2x10 35# KB 2x15 35# KB 2x15 35# KB 2x15 35# KB     Lunge 3x20' walking w BMB 3x20' walking w BMB 3x20' walking w BMB 3x20' walking w BMB  15x ea split squat 15x ea split squat 20x ea split squat     Lateral Walk     4 laps BTB 4 laps BTB GX walks 3 laps GX walks 3 laps     Suitcase marches 4x28' 8# 4x28' 8# 4x28' 8# 4x28' 8# 4x28' 8# & 18#        SL RDL             UTR 20x Black TB 20x Black TB   2x10 7# 2x10 7 5# 2x10 7 5# 2x10 7 5#     Wood Chops 20x Black TB 20x Black TB                        Gait Training                                       Modalities

## 2022-04-21 ENCOUNTER — OFFICE VISIT (OUTPATIENT)
Dept: PHYSICAL THERAPY | Facility: CLINIC | Age: 72
End: 2022-04-21
Payer: MEDICARE

## 2022-04-21 DIAGNOSIS — M54.50 CHRONIC LOW BACK PAIN, UNSPECIFIED BACK PAIN LATERALITY, UNSPECIFIED WHETHER SCIATICA PRESENT: Primary | ICD-10-CM

## 2022-04-21 DIAGNOSIS — R42 VERTIGO: ICD-10-CM

## 2022-04-21 DIAGNOSIS — G89.29 CHRONIC LOW BACK PAIN, UNSPECIFIED BACK PAIN LATERALITY, UNSPECIFIED WHETHER SCIATICA PRESENT: Primary | ICD-10-CM

## 2022-04-21 DIAGNOSIS — R26.89 IMBALANCE: ICD-10-CM

## 2022-04-21 PROCEDURE — 97110 THERAPEUTIC EXERCISES: CPT

## 2022-04-21 PROCEDURE — 97112 NEUROMUSCULAR REEDUCATION: CPT

## 2022-04-21 NOTE — PROGRESS NOTES
Daily Note     Today's date: 2022  Patient name: Nora Wilks  : 1950  MRN: [id number]  Referring provider: Faith Saha PA-C  Dx:   Encounter Diagnosis     ICD-10-CM    1  Chronic low back pain, unspecified back pain laterality, unspecified whether sciatica present  M54 50     G89 29    2  Vertigo  R42    3  Imbalance  R26 89        Start Time: 1200  Stop Time: 1235  Total time in clinic (min): 35 minutes    Subjective: Pt reports she is feeling ok today, states she was doing some painting and tried to move a certain way and feels like she pulled a muscle in her side  Pt requested to hold on exercises involving UEs  Pt states she also had a moment while she was painting on the ladder where she had dizziness, but resolved once she climbed off the ladder and stood for a minute  Objective: See treatment diary below      Assessment: Pt tolerated today's session well with no adverse symptoms  Held on exercises as requested and adapted to exercises not involving UEs  Pt will benefit from further skilled PT to increase strength, flexibility and function  Patient demonstrated fatigue post treatment and would benefit from continued PT  Continue to progress as able  Plan: Continue per plan of care  Progress treatment as tolerated              Precautions:     Date 3/10 3/15 3/17 3/29 3/31 4/12 4/14 4/19 4/21    Visit # 21 22 23 24 25 26 27 28 29    FOTO             Re-eval               Manuals 3/10 3/15 3/17 3/29 3/31 4/12 4/14 4/19 4/21                                                        Neuro Re-Ed 3/10 3/15 3/17 3/29 3/31 4/12 4/14 4/19 4/21    TA Bracing              Bridge w March 20x ea GTB 20x GTB 20x GTB 20x         Side plank w clam GTB 2x10 GTB 2x10 GTB 2x10 GTB 2x10         Bird Dog 2x10 2x10 PTB 2x10 GTB 2x10 GTB 2x10        VORx1 2x1' nv 2x1' 2x1'         Saccades 2x1' nv 2x1' 2x1'         Tandem Walking             SB Ham Curl     2x10        BOSU 2x30" SLS 2x30" SLS 2x30" SLS 2x30" SLS  2x30" SLS 2x30" SLS 2x30" SLS 2x30" SLS    Curl Ups 15x5" YMB 15x5" YMB           Y Balance     5x ea 5x ea 5x ea 5x 10x    Fire Hydrants     15x PTB        Ther Ex 3/10 3/15 3/17 3/29 3/31 4/12 4/14 4/19 4/21    Bike 10' 10' 10' 10' 6' Elliptical 10' bike 10' bike 10' bike 10'    Leg Press 3x10 115# 3x10 115# 3x10 115# 3x10 115# 3x10 125# 3x10 125# 3x10 125# 3x10 125# 3x10 125#    SLR             UTR             Piriformis st             HS st             Quad St             HF st             Ther Activity 3/10 3/15 3/17 3/29 3/31 4/12 4/14 4/19 4/21    Squat 2x10 18# KB 2x10 18# KB BOSU squat 2x10 BOSU squat 2x10 2x10 35# KB 2x15 35# KB 2x15 35# KB 2x15 35# KB BOSU 2x10    Lunge 3x20' walking w BMB 3x20' walking w BMB 3x20' walking w BMB 3x20' walking w BMB  15x ea split squat 15x ea split squat 20x ea split squat 20x ea split squat    Lateral Walk     4 laps BTB 4 laps BTB GX walks 3 laps GX walks 3 laps BTB 3 laps & monster    Suitcase marches 4x28' 8# 4x28' 8# 4x28' 8# 4x28' 8# 4x28' 8# & 18#        SL RDL             UTR 20x Black TB 20x Black TB   2x10 7# 2x10 7 5# 2x10 7 5# 2x10 7 5# nv    Wood Chops 20x Black TB 20x Black TB                        Gait Training                                       Modalities

## 2022-04-26 ENCOUNTER — OFFICE VISIT (OUTPATIENT)
Dept: PHYSICAL THERAPY | Facility: CLINIC | Age: 72
End: 2022-04-26
Payer: MEDICARE

## 2022-04-26 DIAGNOSIS — G89.29 CHRONIC LOW BACK PAIN, UNSPECIFIED BACK PAIN LATERALITY, UNSPECIFIED WHETHER SCIATICA PRESENT: Primary | ICD-10-CM

## 2022-04-26 DIAGNOSIS — R42 VERTIGO: ICD-10-CM

## 2022-04-26 DIAGNOSIS — M54.50 CHRONIC LOW BACK PAIN, UNSPECIFIED BACK PAIN LATERALITY, UNSPECIFIED WHETHER SCIATICA PRESENT: Primary | ICD-10-CM

## 2022-04-26 DIAGNOSIS — R26.89 IMBALANCE: ICD-10-CM

## 2022-04-26 PROCEDURE — 97110 THERAPEUTIC EXERCISES: CPT

## 2022-04-26 PROCEDURE — 97112 NEUROMUSCULAR REEDUCATION: CPT

## 2022-04-26 NOTE — PROGRESS NOTES
Daily Note     Today's date: 2022  Patient name: Elias Maza  : 1950  MRN: [id number]  Referring provider: Dayna Wallace PA-C  Dx:   Encounter Diagnosis     ICD-10-CM    1  Chronic low back pain, unspecified back pain laterality, unspecified whether sciatica present  M54 50     G89 29    2  Vertigo  R42    3  Imbalance  R26 89        Start Time: 1500  Stop Time: 1550  Total time in clinic (min): 50 minutes    Subjective: Pt reports she is feeling ok today, states she had a lot of commotion over the past 24 hours  Pt states her significant other is in the hospital and has been focusing on that more so than her symptoms  Objective: See treatment diary below      Assessment: Pt tolerated today's session well with no adverse symptoms  Pt continues to work towards goals of increased LE and core strength  Pt will benefit from further skilled PT to increase strength, flexibility and function  Patient demonstrated fatigue post treatment and would benefit from continued PT  Continue to progress as able  Plan: Continue per plan of care  Progress treatment as tolerated              Precautions:     Date 3/10 3/15 3/17 3/29 3/31 4/12 4/14 4/19 4/21 4/26   Visit # 21 22 23 24 25 26 27 28 29 30   FOTO             Re-eval               Manuals 3/10 3/15 3/17 3/29 3/31 4/12 4/14 4/19 4/21 4/26                                                       Neuro Re-Ed 3/10 3/15 3/17 3/29 3/31 4/12 4/14 4/19 4/21 4/26   TA Bracing              Bridge w March 20x ea GTB 20x GTB 20x GTB 20x         Side plank w clam GTB 2x10 GTB 2x10 GTB 2x10 GTB 2x10         Bird Dog 2x10 2x10 PTB 2x10 GTB 2x10 GTB 2x10        VORx1 2x1' nv 2x1' 2x1'         Saccades 2x1' nv 2x1' 2x1'         Tandem Walking             SB Ham Curl     2x10        BOSU 2x30" SLS 2x30" SLS 2x30" SLS 2x30" SLS  2x30" SLS 2x30" SLS 2x30" SLS 2x30" SLS 2x30" SLS   Curl Ups 15x5" YMB 15x5" YMB           Y Balance     5x ea 5x ea 5x ea 5x 10x 10x Fire Hydrants     15x PTB        Ther Ex 3/10 3/15 3/17 3/29 3/31 4/12 4/14 4/19 4/21 4/26   Bike 10' 10' 10' 10' 6' Elliptical 10' bike 10' bike 10' bike 10' 10'   Leg Press 3x10 115# 3x10 115# 3x10 115# 3x10 115# 3x10 125# 3x10 125# 3x10 125# 3x10 125# 3x10 125# 15x 20x 125#    SLR             UTR             Piriformis st             HS st             Quad St             HF st             Ther Activity 3/10 3/15 3/17 3/29 3/31 4/12 4/14 4/19 4/21 4/26   Squat 2x10 18# KB 2x10 18# KB BOSU squat 2x10 BOSU squat 2x10 2x10 35# KB 2x15 35# KB 2x15 35# KB 2x15 35# KB BOSU 2x10 BOSU 2x10   Lunge 3x20' walking w BMB 3x20' walking w BMB 3x20' walking w BMB 3x20' walking w BMB  15x ea split squat 15x ea split squat 20x ea split squat 20x ea split squat 20x ea split squat   Lateral Walk     4 laps BTB 4 laps BTB GX walks 3 laps GX walks 3 laps BTB 3 laps & monster BTB 3 laps & monster   Suitcase marches 4x28' 8# 4x28' 8# 4x28' 8# 4x28' 8# 4x28' 8# & 18#        SL RDL             UTR 20x Black TB 20x Black TB   2x10 7# 2x10 7 5# 2x10 7 5# 2x10 7 5# nv    Wood Chops 20x Black TB 20x Black TB                        Gait Training                                       Modalities

## 2022-04-28 ENCOUNTER — APPOINTMENT (OUTPATIENT)
Dept: PHYSICAL THERAPY | Facility: CLINIC | Age: 72
End: 2022-04-28
Payer: MEDICARE

## 2022-05-03 ENCOUNTER — OFFICE VISIT (OUTPATIENT)
Dept: PHYSICAL THERAPY | Facility: CLINIC | Age: 72
End: 2022-05-03
Payer: MEDICARE

## 2022-05-03 DIAGNOSIS — R42 VERTIGO: ICD-10-CM

## 2022-05-03 DIAGNOSIS — M76.891 HIP FLEXOR TENDINITIS, RIGHT: ICD-10-CM

## 2022-05-03 DIAGNOSIS — G89.29 CHRONIC LOW BACK PAIN, UNSPECIFIED BACK PAIN LATERALITY, UNSPECIFIED WHETHER SCIATICA PRESENT: Primary | ICD-10-CM

## 2022-05-03 DIAGNOSIS — M51.26 LUMBAR DISC HERNIATION: ICD-10-CM

## 2022-05-03 DIAGNOSIS — M54.50 CHRONIC LOW BACK PAIN, UNSPECIFIED BACK PAIN LATERALITY, UNSPECIFIED WHETHER SCIATICA PRESENT: Primary | ICD-10-CM

## 2022-05-03 DIAGNOSIS — R26.89 IMBALANCE: ICD-10-CM

## 2022-05-03 PROCEDURE — 97110 THERAPEUTIC EXERCISES: CPT

## 2022-05-03 PROCEDURE — 97530 THERAPEUTIC ACTIVITIES: CPT

## 2022-05-03 PROCEDURE — 97112 NEUROMUSCULAR REEDUCATION: CPT

## 2022-05-03 NOTE — PROGRESS NOTES
Daily Note     Today's date: 5/3/2022  Patient name: Lyssa Gloria  : 1950  MRN: [id number]  Referring provider: Dash Garcia PA-C  Dx:   Encounter Diagnosis     ICD-10-CM    1  Chronic low back pain, unspecified back pain laterality, unspecified whether sciatica present  M54 50     G89 29    2  Vertigo  R42    3  Imbalance  R26 89    4  Lumbar disc herniation  M51 26    5  Hip flexor tendinitis, right  M76 891        Start Time: 1400  Stop Time: 1500  Total time in clinic (min): 60 minutes    Subjective: Pt reports she is feeling good today, no c/o pain or symptoms  Objective: See treatment diary below      Assessment: Pt tolerated today's session well with no adverse symptoms  Pt continues to work towards goals of increased LE and core strength  Pt performed SL squat to elevated surface with no signs of increased pain or adverse symptoms  Pt will benefit from further skilled PT to increase strength, flexibility and function  Patient demonstrated fatigue post treatment and would benefit from continued PT  Continue to progress as able  Plan: Continue per plan of care  Progress treatment as tolerated              Precautions:     Date 5/3    3/31 4/12 4/14 4/19 4/21 4/26   Visit # 31    25 26 27 28 29 30   FOTO             Re-eval               Manuals 5/3    3/31 4/12 4/14 4/19 4/21 4/26                                                       Neuro Re-Ed 5/3    3/31 4/12 4/14 4/19 4/21 4/26   TA Bracing              Bridge w March             Side plank w clam             Bird Dog     GTB 2x10        VORx1             Saccades             Tandem Walking             SB Ham Curl 2x10    2x10        BOSU 2x30" SLS     2x30" SLS 2x30" SLS 2x30" SLS 2x30" SLS 2x30" SLS   Curl Ups             Y Balance 10x    5x ea 5x ea 5x ea 5x 10x 10x   Fire Hydrants 2x10   ea    15x PTB        Ther Ex 5/3    3/31 4/12 4/14 4/19 4/21 4/26   Bike 10'    6' Elliptical 10' bike 10' bike 10' bike 10' 10'   Leg Press 155# 2x10    3x10 125# 3x10 125# 3x10 125# 3x10 125# 3x10 125# 15x 20x 125#    SLR             UTR             Piriformis st             HS st             Quad St             HF st             Ther Activity 5/3    3/31 4/12 4/14 4/19 4/21 4/26   Squat BOSU 2x10    2x10 35# KB 2x15 35# KB 2x15 35# KB 2x15 35# KB BOSU 2x10 BOSU 2x10   Lunge SL Squat 15x ea     15x ea split squat 15x ea split squat 20x ea split squat 20x ea split squat 20x ea split squat   Lateral Walk BTB 3 laps & monster    4 laps BTB 4 laps BTB GX walks 3 laps GX walks 3 laps BTB 3 laps & monster BTB 3 laps & monster   Suitcase marches     4x28' 8# & 18#        SL RDL             UTR     2x10 7# 2x10 7 5# 2x10 7 5# 2x10 7 5# nv    Praxair

## 2022-05-05 ENCOUNTER — OFFICE VISIT (OUTPATIENT)
Dept: PHYSICAL THERAPY | Facility: CLINIC | Age: 72
End: 2022-05-05
Payer: MEDICARE

## 2022-05-05 DIAGNOSIS — M76.891 HIP FLEXOR TENDINITIS, RIGHT: ICD-10-CM

## 2022-05-05 DIAGNOSIS — G89.29 CHRONIC LOW BACK PAIN, UNSPECIFIED BACK PAIN LATERALITY, UNSPECIFIED WHETHER SCIATICA PRESENT: Primary | ICD-10-CM

## 2022-05-05 DIAGNOSIS — R42 VERTIGO: ICD-10-CM

## 2022-05-05 DIAGNOSIS — M51.26 LUMBAR DISC HERNIATION: ICD-10-CM

## 2022-05-05 DIAGNOSIS — R26.89 IMBALANCE: ICD-10-CM

## 2022-05-05 DIAGNOSIS — M54.50 CHRONIC LOW BACK PAIN, UNSPECIFIED BACK PAIN LATERALITY, UNSPECIFIED WHETHER SCIATICA PRESENT: Primary | ICD-10-CM

## 2022-05-05 PROCEDURE — 97112 NEUROMUSCULAR REEDUCATION: CPT

## 2022-05-05 PROCEDURE — 97530 THERAPEUTIC ACTIVITIES: CPT

## 2022-05-05 PROCEDURE — 97110 THERAPEUTIC EXERCISES: CPT

## 2022-05-05 NOTE — PROGRESS NOTES
Daily Note     Today's date: 2022  Patient name: Phu Acosta  : 1950  MRN: [id number]  Referring provider: Oriana Rico PA-C  Dx:   Encounter Diagnosis     ICD-10-CM    1  Chronic low back pain, unspecified back pain laterality, unspecified whether sciatica present  M54 50     G89 29    2  Vertigo  R42    3  Imbalance  R26 89    4  Lumbar disc herniation  M51 26    5  Hip flexor tendinitis, right  M76 891        Start Time: 930  Stop Time: 1015  Total time in clinic (min): 45 minutes    Subjective: Pt reports she is feeling good today, no c/o pain or symptoms  Objective: See treatment diary below      Assessment: Pt tolerated today's session well with no adverse symptoms  Pt continues to work towards goals of increased LE and core strength  Pt performed exercises with increased independence today, showing good knowledge of HEP  Pt will benefit from further skilled PT to increase strength, flexibility and function  Patient demonstrated fatigue post treatment and would benefit from continued PT  Continue to progress as able  Plan: Continue per plan of care  Progress treatment as tolerated              Precautions:     Date 5/3 5/5   3/31 4/12 4/14 4/19 4/21 4/26   Visit # 31 32   25 26 27 28 29 30   FOTO             Re-eval               Manuals 5/3 5/5   3/31 4/12 4/14 4/19 4/21 4/26                                                       Neuro Re-Ed 5/3 5/5   3/31 4/12 4/14 4/19 4/21 4/26   TA Bracing              Bridge w March             Side plank w clam             Bird Dog     GTB 2x10        VORx1             Saccades             Tandem Walking             SB Ham Curl 2x10 2x10   2x10        BOSU 2x30" SLS 2x30" SLS    2x30" SLS 2x30" SLS 2x30" SLS 2x30" SLS 2x30" SLS   Curl Ups             Y Balance 10x 10x   5x ea 5x ea 5x ea 5x 10x 10x   Fire Hydrants 2x10   ea 2x10 ea   15x PTB        Ther Ex 5/3 5/5   3/31 4/12 4/14 4/19 4/21 4/26   Bike 10' 10'   6' Elliptical 10' bike 10' bike 10' bike 10' 10'   Leg Press 155# 2x10 155# 2x10   3x10 125# 3x10 125# 3x10 125# 3x10 125# 3x10 125# 15x 20x 125#    SLR             UTR             Piriformis st             HS st             Quad St             HF st             Ther Activity 5/3 5/5   3/31 4/12 4/14 4/19 4/21 4/26   Squat BOSU 2x10 BOSU 2x10   2x10 35# KB 2x15 35# KB 2x15 35# KB 2x15 35# KB BOSU 2x10 BOSU 2x10   Lunge SL Squat 15x ea SL Squat 15x ea    15x ea split squat 15x ea split squat 20x ea split squat 20x ea split squat 20x ea split squat   Lateral Walk BTB 3 laps & monster BTB 3 laps & monster   4 laps BTB 4 laps BTB GX walks 3 laps GX walks 3 laps BTB 3 laps & monster BTB 3 laps & monster   Suitcase marches     4x28' 8# & 18#        SL RDL             UTR     2x10 7# 2x10 7 5# 2x10 7 5# 2x10 7 5# nv    Praxair

## 2022-05-10 ENCOUNTER — OFFICE VISIT (OUTPATIENT)
Dept: PHYSICAL THERAPY | Facility: CLINIC | Age: 72
End: 2022-05-10
Payer: MEDICARE

## 2022-05-10 DIAGNOSIS — R42 VERTIGO: ICD-10-CM

## 2022-05-10 DIAGNOSIS — R26.89 IMBALANCE: ICD-10-CM

## 2022-05-10 DIAGNOSIS — M51.26 LUMBAR DISC HERNIATION: ICD-10-CM

## 2022-05-10 DIAGNOSIS — M54.50 CHRONIC LOW BACK PAIN, UNSPECIFIED BACK PAIN LATERALITY, UNSPECIFIED WHETHER SCIATICA PRESENT: Primary | ICD-10-CM

## 2022-05-10 DIAGNOSIS — M76.891 HIP FLEXOR TENDINITIS, RIGHT: ICD-10-CM

## 2022-05-10 DIAGNOSIS — G89.29 CHRONIC LOW BACK PAIN, UNSPECIFIED BACK PAIN LATERALITY, UNSPECIFIED WHETHER SCIATICA PRESENT: Primary | ICD-10-CM

## 2022-05-10 PROCEDURE — 97110 THERAPEUTIC EXERCISES: CPT

## 2022-05-10 PROCEDURE — 97112 NEUROMUSCULAR REEDUCATION: CPT

## 2022-05-10 NOTE — PROGRESS NOTES
Daily Note     Today's date: 5/10/2022  Patient name: Casi Sargent  : 1950  MRN: [id number]  Referring provider: Zonia Hernandez PA-C  Dx:   Encounter Diagnosis     ICD-10-CM    1  Chronic low back pain, unspecified back pain laterality, unspecified whether sciatica present  M54 50     G89 29    2  Vertigo  R42    3  Imbalance  R26 89    4  Lumbar disc herniation  M51 26    5  Hip flexor tendinitis, right  M76 891                   Subjective: Patient stated that she's doing well  Single leg squat was too difficult, she was sore for 2 days aft LV  Objective: See treatment diary below      Assessment: Tolerated treatment well  Patient is appropriately challenged with current set of interventions  Good balance on pliable surfaces  Generalized muscle fatigue noted, more so with hip abd and ext strengthening  Single bout of dizziness noted when transitioning from supine to sitting, resolved within 45 sec  Progress as able  Plan: Continue per plan of care         Precautions:     Date 5/3 5/5 5/10  3/31 4/12 4/14 4/19 4/21 4/26   Visit # 31 28 33  25 26 27 28 29 30   FOTO             Re-eval               Manuals 5/3 5/5 5/10  3/31 4/12 4/14 4/19 4/21 4/26                                                       Neuro Re-Ed 5/3 5/5 5/10  3/31 4/12 4/14 4/19 4/21 4/26   TA Bracing              Bridge w March             Side plank w clam             Bird Dog     GTB 2x10        VORx1             Saccades             Tandem Walking             SB Ham Curl 2x10 2x10 2x10  2x10        BOSU 2x30" SLS 2x30" SLS 2x30" SLS   2x30" SLS 2x30" SLS 2x30" SLS 2x30" SLS 2x30" SLS   Curl Ups             Y Balance 10x 10x 10x  5x ea 5x ea 5x ea 5x 10x 10x   Fire Hydrants 2x10   ea 2x10 ea 2x10 ea  15x PTB        Ther Ex 5/3 5/5 5/10  3/31 4/12 4/14 4/19 4/21 4/26   Bike 10' 10' 10'  6' Elliptical 10' bike 10' bike 10' bike 10' 10'   Leg Press 155# 2x10 155# 2x10 155# 2x10  3x10 125# 3x10 125# 3x10 125# 3x10 125# 3x10 125# 15x 20x 125#    SLR             UTR             Piriformis st             HS st             Quad St             HF st             Ther Activity 5/3 5/5 5/10  3/31 4/12 4/14 4/19 4/21 4/26   Squat BOSU 2x10 BOSU 2x10 BOSU 30x  2x10 35# KB 2x15 35# KB 2x15 35# KB 2x15 35# KB BOSU 2x10 BOSU 2x10   Lunge SL Squat 15x ea SL Squat 15x ea SL Squat 15x ea 24" box   15x ea split squat 15x ea split squat 20x ea split squat 20x ea split squat 20x ea split squat   Lateral Walk BTB 3 laps & monster BTB 3 laps & monster BTB 3 laps & monster  4 laps BTB 4 laps BTB GX walks 3 laps GX walks 3 laps BTB 3 laps & monster BTB 3 laps & monster   Suitcase marches     4x28' 8# & 18#        SL RDL             UTR   2x10 7 5#  2x10 7# 2x10 7 5# 2x10 7 5# 2x10 7 5# nv    Praxair

## 2022-05-12 ENCOUNTER — APPOINTMENT (OUTPATIENT)
Dept: PHYSICAL THERAPY | Facility: CLINIC | Age: 72
End: 2022-05-12
Payer: MEDICARE

## 2022-05-17 ENCOUNTER — APPOINTMENT (OUTPATIENT)
Dept: PHYSICAL THERAPY | Facility: CLINIC | Age: 72
End: 2022-05-17
Payer: MEDICARE

## 2022-05-19 ENCOUNTER — APPOINTMENT (OUTPATIENT)
Dept: PHYSICAL THERAPY | Facility: CLINIC | Age: 72
End: 2022-05-19
Payer: MEDICARE

## 2022-05-20 ENCOUNTER — OFFICE VISIT (OUTPATIENT)
Dept: PHYSICAL THERAPY | Facility: CLINIC | Age: 72
End: 2022-05-20
Payer: MEDICARE

## 2022-05-20 DIAGNOSIS — M51.26 LUMBAR DISC HERNIATION: ICD-10-CM

## 2022-05-20 DIAGNOSIS — G89.29 CHRONIC LOW BACK PAIN, UNSPECIFIED BACK PAIN LATERALITY, UNSPECIFIED WHETHER SCIATICA PRESENT: Primary | ICD-10-CM

## 2022-05-20 DIAGNOSIS — M76.891 HIP FLEXOR TENDINITIS, RIGHT: ICD-10-CM

## 2022-05-20 DIAGNOSIS — M54.50 CHRONIC LOW BACK PAIN, UNSPECIFIED BACK PAIN LATERALITY, UNSPECIFIED WHETHER SCIATICA PRESENT: Primary | ICD-10-CM

## 2022-05-20 PROCEDURE — 97535 SELF CARE MNGMENT TRAINING: CPT

## 2022-05-20 PROCEDURE — 97110 THERAPEUTIC EXERCISES: CPT

## 2022-05-20 NOTE — PROGRESS NOTES
Daily Note     Today's date: 2022  Patient name: Bienvenido Buchanan  : 1950  MRN: [id number]  Referring provider: Emir Pendleton PA-C  Dx:   Encounter Diagnosis     ICD-10-CM    1  Chronic low back pain, unspecified back pain laterality, unspecified whether sciatica present  M54 50     G89 29    2  Lumbar disc herniation  M51 26    3  Hip flexor tendinitis, right  M76 891                   Subjective: Pt presents to PT reporting R knee "giving out" darren the last few days  She states she continues to have back pain but has improved and exercises are helping  Objective: See treatment diary below      Assessment: Tolerated treatment well  Noted some difficulty with increased weight on LP secondary to R knee pain; reduced weight for last set  Reviewed HEP with patient and issued appropriate bands for use at home  She demonstrates good recall of TE with proper technique  Plan: Discharged to HEP       Precautions:     Date 5/3 5/5 5/10 5/20   4/14 4/19 4/21 4/26   Visit # 31 28 33 34   27 28 29 30   FOTO             Re-eval               Manuals 5/3 5/5 5/10 5/20   4/14 4/19 4/21 4/26                                                       Neuro Re-Ed 5/3 5/5 5/10 5/20   4/14 4/19 4/21 4/26   TA Bracing              Bridge w March             Side plank w clam             Bird Dog             VORx1             Saccades             Tandem Walking             SB Ham Curl 2x10 2x10 2x10          BOSU 2x30" SLS 2x30" SLS 2x30" SLS    2x30" SLS 2x30" SLS 2x30" SLS 2x30" SLS   Curl Ups             Y Balance 10x 10x 10x    5x ea 5x 10x 10x   Fire Hydrants 2x10   ea 2x10 ea 2x10 ea          Ther Ex 5/3 5/5 5/10    4/14 4/19 4/21 4/26   Bike 10' 10' 10'    10' bike 10' bike 10' 10'   Leg Press 155# 2x10 155# 2x10 155# 2x10 165# x 10 155# x10   3x10 125# 3x10 125# 3x10 125# 15x 20x 125#    SLR             UTR             Piriformis st             HS st             Quad St             HF st             Ther Activity 5/3 5/5 5/10 5/20   4/14 4/19 4/21 4/26   Squat BOSU 2x10 BOSU 2x10 BOSU 30x BOSU 30x   2x15 35# KB 2x15 35# KB BOSU 2x10 BOSU 2x10   Lunge SL Squat 15x ea SL Squat 15x ea SL Squat 15x ea 24" box SL Squat 15x ea 24" box   15x ea split squat 20x ea split squat 20x ea split squat 20x ea split squat   Lateral Walk BTB 3 laps & monster BTB 3 laps & monster BTB 3 laps & monster    GX walks 3 laps GX walks 3 laps BTB 3 laps & monster BTB 3 laps & monster   Suitcase marches              RDL             UTR   2x10 7 5#    2x10 7 5# 2x10 7 5# nv    Praxair

## 2022-05-23 ENCOUNTER — APPOINTMENT (OUTPATIENT)
Dept: PHYSICAL THERAPY | Facility: CLINIC | Age: 72
End: 2022-05-23
Payer: MEDICARE

## 2022-05-24 ENCOUNTER — APPOINTMENT (OUTPATIENT)
Dept: PHYSICAL THERAPY | Facility: CLINIC | Age: 72
End: 2022-05-24
Payer: MEDICARE

## 2022-05-26 ENCOUNTER — APPOINTMENT (OUTPATIENT)
Dept: PHYSICAL THERAPY | Facility: CLINIC | Age: 72
End: 2022-05-26
Payer: MEDICARE

## 2022-05-31 DIAGNOSIS — E78.5 HYPERLIPIDEMIA, UNSPECIFIED HYPERLIPIDEMIA TYPE: ICD-10-CM

## 2022-05-31 RX ORDER — ROSUVASTATIN CALCIUM 10 MG/1
TABLET, COATED ORAL
Qty: 90 TABLET | Refills: 0 | Status: SHIPPED | OUTPATIENT
Start: 2022-05-31

## 2022-06-17 ENCOUNTER — TELEPHONE (OUTPATIENT)
Dept: HEMATOLOGY ONCOLOGY | Facility: CLINIC | Age: 72
End: 2022-06-17

## 2022-06-17 NOTE — TELEPHONE ENCOUNTER
Appointment Cancellation Or Reschedule     Person calling in Patient    Provider Dr Akbar Rodriguez   Office Visit Date and Time 6/20 10AM   Office Visit Location [place]   Did patient want to reschedule their office appointment? If so, when was it scheduled to? Yes, 9/7 3:15PM   Is this patient calling to reschedule an infusion appointment? no   When is their next infusion appointment? no   Is this patient a Chemo patient? no   Reason for Cancellation or Reschedule Schedule conflict with work     If the patient is a treatment patient, please route this to the office nurse  If the patient is not on treatment, please route to the office MA  If the patient is a surgical oncology patient, please route to surg/onc clinical pool

## 2022-08-09 ENCOUNTER — RA CDI HCC (OUTPATIENT)
Dept: OTHER | Facility: HOSPITAL | Age: 72
End: 2022-08-09

## 2022-08-09 NOTE — PROGRESS NOTES
[address]  coding opportunities       Chart reviewed, no opportunity found: CHART REVIEWED, NO OPPORTUNITY FOUND        Patients Insurance     Medicare Insurance: Medicare

## 2022-08-12 DIAGNOSIS — K21.9 GASTROESOPHAGEAL REFLUX DISEASE WITHOUT ESOPHAGITIS: ICD-10-CM

## 2022-08-12 RX ORDER — FAMOTIDINE 40 MG/1
TABLET, FILM COATED ORAL
Qty: 30 TABLET | Refills: 0 | Status: SHIPPED | OUTPATIENT
Start: 2022-08-12 | End: 2022-09-09

## 2022-08-26 ENCOUNTER — TELEPHONE (OUTPATIENT)
Dept: FAMILY MEDICINE CLINIC | Facility: CLINIC | Age: 72
End: 2022-08-26

## 2022-08-26 NOTE — TELEPHONE ENCOUNTER
Patient canceled 8/29/2022 visit due to other arrangement  Please review your schedule so we can reschedule her      Thank you

## 2022-08-27 ENCOUNTER — APPOINTMENT (EMERGENCY)
Dept: CT IMAGING | Facility: HOSPITAL | Age: 72
End: 2022-08-27
Payer: MEDICARE

## 2022-08-27 ENCOUNTER — HOSPITAL ENCOUNTER (EMERGENCY)
Facility: HOSPITAL | Age: 72
Discharge: HOME | End: 2022-08-27
Attending: EMERGENCY MEDICINE
Payer: MEDICARE

## 2022-08-27 VITALS
BODY MASS INDEX: 26.94 KG/M2 | TEMPERATURE: 98.3 F | WEIGHT: 159.39 LBS | OXYGEN SATURATION: 98 % | HEART RATE: 69 BPM | RESPIRATION RATE: 18 BRPM | SYSTOLIC BLOOD PRESSURE: 152 MMHG | DIASTOLIC BLOOD PRESSURE: 81 MMHG

## 2022-08-27 DIAGNOSIS — M54.31 SCIATICA OF RIGHT SIDE: Primary | ICD-10-CM

## 2022-08-27 LAB
BACTERIA UR QL AUTO: ABNORMAL /HPF
BILIRUB UR QL STRIP: NEGATIVE
CLARITY UR: CLEAR
COLOR UR: YELLOW
GLUCOSE UR STRIP-MCNC: NEGATIVE MG/DL
HGB UR QL STRIP.AUTO: ABNORMAL
KETONES UR STRIP-MCNC: NEGATIVE MG/DL
LEUKOCYTE ESTERASE UR QL STRIP: NEGATIVE
NITRITE UR QL STRIP: NEGATIVE
NON-SQ EPI CELLS URNS QL MICRO: ABNORMAL /HPF
PH UR STRIP.AUTO: 8.5 [PH] (ref 4.5–8)
PROT UR STRIP-MCNC: NEGATIVE MG/DL
RBC #/AREA URNS AUTO: ABNORMAL /HPF
SP GR UR STRIP.AUTO: 1.01 (ref 1–1.03)
UROBILINOGEN UR QL STRIP.AUTO: 0.2 E.U./DL
WBC #/AREA URNS AUTO: ABNORMAL /HPF

## 2022-08-27 PROCEDURE — 81001 URINALYSIS AUTO W/SCOPE: CPT

## 2022-08-27 PROCEDURE — 72131 CT LUMBAR SPINE W/O DYE: CPT

## 2022-08-27 PROCEDURE — 99284 EMERGENCY DEPT VISIT MOD MDM: CPT

## 2022-08-27 PROCEDURE — 72128 CT CHEST SPINE W/O DYE: CPT

## 2022-08-27 PROCEDURE — 99284 EMERGENCY DEPT VISIT MOD MDM: CPT | Performed by: EMERGENCY MEDICINE

## 2022-08-27 PROCEDURE — 96372 THER/PROPH/DIAG INJ SC/IM: CPT

## 2022-08-27 RX ORDER — KETOROLAC TROMETHAMINE 30 MG/ML
15 INJECTION, SOLUTION INTRAMUSCULAR; INTRAVENOUS ONCE
Status: COMPLETED | OUTPATIENT
Start: 2022-08-27 | End: 2022-08-27

## 2022-08-27 RX ORDER — METHOCARBAMOL 500 MG/1
500 TABLET, FILM COATED ORAL 2 TIMES DAILY
Qty: 20 TABLET | Refills: 0 | Status: SHIPPED | OUTPATIENT
Start: 2022-08-27 | End: 2022-09-06

## 2022-08-27 RX ORDER — LIDOCAINE 50 MG/G
1 PATCH TOPICAL DAILY
Qty: 5 PATCH | Refills: 0 | Status: SHIPPED | OUTPATIENT
Start: 2022-08-27 | End: 2022-08-31 | Stop reason: ALTCHOICE

## 2022-08-27 RX ORDER — LIDOCAINE 50 MG/G
1 PATCH TOPICAL ONCE
Status: DISCONTINUED | OUTPATIENT
Start: 2022-08-27 | End: 2022-08-27 | Stop reason: HOSPADM

## 2022-08-27 RX ORDER — METHOCARBAMOL 500 MG/1
500 TABLET, FILM COATED ORAL ONCE
Status: COMPLETED | OUTPATIENT
Start: 2022-08-27 | End: 2022-08-27

## 2022-08-27 RX ORDER — LIDOCAINE 50 MG/G
1 PATCH TOPICAL DAILY
Qty: 5 PATCH | Refills: 0 | Status: SHIPPED | OUTPATIENT
Start: 2022-08-27 | End: 2022-08-27 | Stop reason: SDUPTHER

## 2022-08-27 RX ORDER — ACETAMINOPHEN 325 MG/1
975 TABLET ORAL ONCE
Status: COMPLETED | OUTPATIENT
Start: 2022-08-27 | End: 2022-08-27

## 2022-08-27 RX ADMIN — METHOCARBAMOL 500 MG: 500 TABLET ORAL at 11:55

## 2022-08-27 RX ADMIN — LIDOCAINE 1 PATCH: 50 PATCH CUTANEOUS at 11:55

## 2022-08-27 RX ADMIN — ACETAMINOPHEN 975 MG: 325 TABLET ORAL at 11:55

## 2022-08-27 RX ADMIN — KETOROLAC TROMETHAMINE 15 MG: 30 INJECTION, SOLUTION INTRAMUSCULAR at 12:01

## 2022-08-27 NOTE — ED PROVIDER NOTES
History  Chief Complaint   Patient presents with    Back Pain     Pt c/o lower back pain that travels down her right leg  Pt has a Hx of back pain  Pt states the pain started x 2 days ago  Pt denies any known triggers  Pt denies cp/sob       History provided by:  Patient   used: No    Back Pain  Location:  Thoracic spine and lumbar spine  Quality:  Aching  Radiates to:  R posterior upper leg  Pain severity:  Moderate  Pain is:  Same all the time  Onset quality:  Gradual  Duration:  2 days  Timing:  Intermittent  Progression:  Waxing and waning  Chronicity:  Recurrent  Context: physical stress    Context comment:  Patient is very active at work and home, however not sure if she did something to aggrevate the chronic back pain  Relieved by:  Nothing  Worsened by:  Nothing  Ineffective treatments:  None tried  Associated symptoms: no abdominal pain, no bladder incontinence, no bowel incontinence, no chest pain, no dysuria, no fever, no headaches and no weakness    Risk factors comment:  Chronic back pain      Prior to Admission Medications   Prescriptions Last Dose Informant Patient Reported? Taking?    Ascorbic Acid (VITAMIN C) 250 MG CHEW  Self Yes No   Sig: Chew 1,000 mg daily    Calcium Carbonate-Vit D-Min (CALCIUM 1200 PO)  Self Yes No   Sig: Take by mouth   Cholecalciferol (VITAMIN D3) 5000 units CAPS  Self Yes No   Sig: Take 1 tablet by mouth daily   Cyanocobalamin (B-12) 5000 MCG CAPS  Self Yes No   Sig: Take by mouth   Diclofenac Sodium (VOLTAREN) 1 %  Self No No   Sig: Apply 2 g topically 4 (four) times a day   Misc Natural Products (GLUCOSAMINE CHONDROITIN ADV PO)  Self Yes No   Sig: Take 2 capsules by mouth daily    Multiple Vitamins-Minerals (MULTI COMPLETE) CAPS  Self Yes No   Sig: Take 1 capsule by mouth daily   Probiotic Product (VSL#3) CAPS  Self No No   Sig: Take 1 capsule by mouth daily   Turmeric 500 MG CAPS  Self Yes No   Sig: Take 1,000 mg by mouth daily    Zinc 50 MG CAPS Self Yes No   Sig: Take by mouth daily   diltiazem (CARDIZEM CD) 180 mg 24 hr capsule   No No   Sig: TAKE 1 CAPSULE(180 MG) BY MOUTH DAILY   famotidine (PEPCID) 40 MG tablet   No No   Sig: TAKE ONE TABLET BY MOUTH DAILY AS NEEDED FOR HEARTBURN   fluticasone (FLONASE) 50 mcg/act nasal spray  Self No No   Si sprays into each nostril daily   rosuvastatin (CRESTOR) 10 MG tablet   No No   Sig: TAKE 1 TABLET BY MOUTH DAILY      Facility-Administered Medications: None       Past Medical History:   Diagnosis Date    Arthritis     Breast cancer ([facility] [address] ) 2007    right    Gastric ulcer     GERD (gastroesophageal reflux disease)     Heart murmur     History of radiation therapy     PBRT    IBS (irritable bowel syndrome)     Irritable bowel syndrome 2012    Malignant neoplasm (HCC)     Migraine     Skin cancer     Use of letrozole (Femara)     took for 2 years D/john due to side effects       Past Surgical History:   Procedure Laterality Date    ABDOMINOPLASTY      ADENOIDECTOMY      BREAST BIOPSY Right 10/12/2007    [facility]    BREAST SURGERY      lumpectomy, resolved 2007     SECTION      CHOLECYSTECTOMY      COLON SURGERY      COLONOSCOPY      COSMETIC SURGERY  1975    forehead    FOOT SURGERY      MOHS SURGERY      REDUCTION MAMMAPLASTY Bilateral 2015    reduction on left/lift on right    SENTINEL LYMPH NODE BIOPSY Right 2007    TOE SURGERY      left toe surgery    TONSILLECTOMY         Family History   Problem Relation Age of Onset    Hypertension Mother     Melanoma Mother     Heart disease Mother     Heart attack Father         acute MI, CABG    Hypertension Father     Heart disease Father     Other Brother         CABG    Lymphoma Brother         non-Hodgkin's    Hypertension Brother     Heart disease Brother     Other Family         back disorder    Stroke Family         CVA    Diabetes Family     Cancer Family      I have reviewed and agree with the history as documented  E-Cigarette/Vaping    E-Cigarette Use Never User      E-Cigarette/Vaping Substances    Nicotine No     THC No     CBD No     Flavoring No     Other No     Unknown No      Social History     Tobacco Use    Smoking status: Former Smoker    Smokeless tobacco: Never Used    Tobacco comment: Smoker in teenage years  Quit at 22  One pack per week  Vaping Use    Vaping Use: Never used   Substance Use Topics    Alcohol use: Not Currently     Comment: May have alcohol but is not drinking it currently    Drug use: No       Review of Systems   Constitutional: Negative for chills and fever  HENT: Negative for facial swelling, sore throat and trouble swallowing  Eyes: Negative for pain and visual disturbance  Respiratory: Negative for cough and shortness of breath  Cardiovascular: Negative for chest pain and leg swelling  Gastrointestinal: Negative for abdominal pain, bowel incontinence, diarrhea, nausea and vomiting  Genitourinary: Negative for bladder incontinence, dysuria and flank pain  Musculoskeletal: Positive for back pain  Negative for neck pain and neck stiffness  Skin: Negative for pallor and rash  Allergic/Immunologic: Negative for environmental allergies and immunocompromised state  Neurological: Negative for dizziness, weakness and headaches  Hematological: Negative for adenopathy  Does not bruise/bleed easily  Psychiatric/Behavioral: Negative for agitation and behavioral problems  All other systems reviewed and are negative  Physical Exam  Physical Exam  Vitals and nursing note reviewed  Constitutional:       General: She is not in acute distress  Appearance: She is well-developed  HENT:      Head: Normocephalic and atraumatic  Eyes:      Extraocular Movements: Extraocular movements intact  Cardiovascular:      Rate and Rhythm: Normal rate and regular rhythm     Pulmonary:      Effort: Pulmonary effort is normal  No respiratory distress  Abdominal:      Palpations: Abdomen is soft  Tenderness: There is no abdominal tenderness  There is no guarding or rebound  Musculoskeletal:         General: Normal range of motion  Cervical back: Normal range of motion and neck supple  Comments: No midline thoracic or lumbar spine tenderness, patient does report mild tenderness to right lower thoracic and upper lumbar region, no CVA tenderness, SLR negative bilaterally, neurovascular intact distally in bilateral lower extremities   Skin:     General: Skin is warm and dry  Neurological:      General: No focal deficit present  Mental Status: She is alert and oriented to person, place, and time     Psychiatric:         Mood and Affect: Mood normal          Behavior: Behavior normal          Vital Signs  ED Triage Vitals [08/27/22 0950]   Temperature Pulse Respirations Blood Pressure SpO2   98 3 °F (36 8 °C) 90 18 (!) 192/116 98 %      Temp Source Heart Rate Source Patient Position - Orthostatic VS BP Location FiO2 (%)   Oral Monitor Sitting Right arm --      Pain Score       9           Vitals:    08/27/22 1048 08/27/22 1054 08/27/22 1127 08/27/22 1336   BP:  165/79 165/80 152/81   Pulse: 70  63 69   Patient Position - Orthostatic VS: Lying Lying Lying Lying         Visual Acuity      ED Medications  Medications   methocarbamol (ROBAXIN) tablet 500 mg (500 mg Oral Given 8/27/22 1155)   acetaminophen (TYLENOL) tablet 975 mg (975 mg Oral Given 8/27/22 1155)   ketorolac (TORADOL) injection 15 mg (15 mg Intramuscular Given 8/27/22 1201)       Diagnostic Studies  Results Reviewed     Procedure Component Value Units Date/Time    Urine Microscopic [[id number]]  (Abnormal) Collected: 08/27/22 1325    Lab Status: Final result Specimen: Urine Updated: 08/27/22 1343     RBC, UA None Seen /hpf      WBC, UA 0-1 /hpf      Epithelial Cells None Seen /hpf      Bacteria, UA Occasional /hpf     Urine Macroscopic, POC [[id number]]  (Abnormal) Collected: 08/27/22 1325    Lab Status: Final result Specimen: Urine Updated: 08/27/22 1327     Color, UA Yellow     Clarity, UA Clear     pH, UA 8 5     Leukocytes, UA Negative     Nitrite, UA Negative     Protein, UA Negative mg/dl      Glucose, UA Negative mg/dl      Ketones, UA Negative mg/dl      Urobilinogen, UA 0 2 E U /dl      Bilirubin, UA Negative     Occult Blood, UA Trace     Specific [place], UA 1 015    Narrative:      CLINITEK RESULT                 CT spine thoracic & lumbar wo contrast   Final Result by Rodrick Calderon MD (08/27 1337)      No acute fractures identified in the thoracolumbar spine  Degenerative changes as described above and most notable at L4-L5  Workstation performed: [id number]                    Procedures  Procedures         ED Course  ED Course as of 08/28/22 1311   Sat Aug 27, 2022   1339 CT scan results reviewed:    IMPRESSION:     No acute fractures identified in the thoracolumbar spine      Degenerative changes as described above and most notable at L4-L5    1351 Patient re-evaluated, feeling better after meds in the ER, CT scan results discussed  Patient is stable for discharge, will advise Robaxin, lidocaine patch, Tylenol, outpatient follow-up with PCP, comprehensive spine program, neurosurgery, discussed about outpatient MRI  MDM  Number of Diagnoses or Management Options  Sciatica of right side: new and requires workup  Diagnosis management comments: Impression:  Acute on chronic back pain, radiation down to right posterior leg, sciatica, due to patient's age we will check CT scan to rule out acute abnormality/ compression fracture, will check urine for possible UTI, we will give pain meds         Amount and/or Complexity of Data Reviewed  Tests in the radiology section of CPT®: reviewed and ordered  Independent visualization of images, tracings, or specimens: yes        Disposition  Final diagnoses:   Sciatica of right side     Time reflects when diagnosis was documented in both MDM as applicable and the Disposition within this note     Time User Action Codes Description Comment    8/27/2022  1:53 PM Miguel Area Add [A50 16] Sciatica of right side       ED Disposition     ED Disposition   Discharge    Condition   Stable    Date/Time   Sat Aug 27, 2022  1:53 PM    Comment   Edouard Mcguire discharge to home/self care                 Follow-up Information     Follow up With Specialties Details Why Contact Info Additional Information    Laurie Chase MD Family Medicine Schedule an appointment as soon as possible for a visit   [address]  [address] [facility] [address] [phone]       [facility] Physical Therapy Schedule an appointment as soon as possible for a visit   [address] [facility] Schedule an appointment as soon as possible for a visit   [address] [facility] [address] [facility] [address]  [facility] [address] [place], [place], [address] [phone]          Discharge Medication List as of 8/27/2022  1:57 PM      START taking these medications    Details   methocarbamol (ROBAXIN) 500 mg tablet Take 1 tablet (500 mg total) by mouth 2 (two) times a day for 10 days, Starting Sat 8/27/2022, Until Tue 9/6/2022, Normal      lidocaine (Lidoderm) 5 % Apply 1 patch topically daily for 5 days Remove & Discard patch within 12 hours or as directed by MD, Starting Sat 8/27/2022, Until u 9/1/2022, Normal         CONTINUE these medications which have NOT CHANGED    Details   Ascorbic Acid (VITAMIN C) 250 MG CHEW Chew 1,000 mg daily , Historical Med      Calcium Carbonate-Vit D-Min (CALCIUM 1200 PO) Take by mouth, Historical Med      Cholecalciferol (VITAMIN D3) 5000 units CAPS Take 1 tablet by mouth daily, Historical Med      Cyanocobalamin (B-12) 5000 MCG CAPS Take by mouth, Historical Med      Diclofenac Sodium (VOLTAREN) 1 % Apply 2 g topically 4 (four) times a day, Starting Tue 5/11/2021, Normal      diltiazem (CARDIZEM CD) 180 mg 24 hr capsule TAKE 1 CAPSULE(180 MG) BY MOUTH DAILY, Normal      famotidine (PEPCID) 40 MG tablet TAKE ONE TABLET BY MOUTH DAILY AS NEEDED FOR HEARTBURN, Normal      fluticasone (FLONASE) 50 mcg/act nasal spray 2 sprays into each nostril daily, Starting Tue 5/26/2020, Print      Misc Natural Products (GLUCOSAMINE CHONDROITIN ADV PO) Take 2 capsules by mouth daily , Historical Med      Multiple Vitamins-Minerals (MULTI COMPLETE) CAPS Take 1 capsule by mouth daily, Historical Med      Probiotic Product (VSL#3) CAPS Take 1 capsule by mouth daily, Starting Wed 1/6/2021, Normal      rosuvastatin (CRESTOR) 10 MG tablet TAKE 1 TABLET BY MOUTH DAILY, Normal      Turmeric 500 MG CAPS Take 1,000 mg by mouth daily , Historical Med      Zinc 50 MG CAPS Take by mouth daily, Historical Med             No discharge procedures on file      PDMP Review     None          ED Provider  Electronically Signed by           Sun Dunn MD  08/28/22 4194

## 2022-08-31 ENCOUNTER — OFFICE VISIT (OUTPATIENT)
Dept: FAMILY MEDICINE CLINIC | Facility: CLINIC | Age: 72
End: 2022-08-31
Payer: MEDICARE

## 2022-08-31 VITALS
HEIGHT: 65 IN | BODY MASS INDEX: 26.82 KG/M2 | SYSTOLIC BLOOD PRESSURE: 118 MMHG | HEART RATE: 70 BPM | OXYGEN SATURATION: 97 % | DIASTOLIC BLOOD PRESSURE: 68 MMHG | WEIGHT: 161 LBS

## 2022-08-31 DIAGNOSIS — E80.6 HYPERBILIRUBINEMIA: ICD-10-CM

## 2022-08-31 DIAGNOSIS — M51.26 LUMBAR DISC HERNIATION: ICD-10-CM

## 2022-08-31 DIAGNOSIS — I10 ESSENTIAL HYPERTENSION: Primary | ICD-10-CM

## 2022-08-31 DIAGNOSIS — G43.709 CHRONIC MIGRAINE WITHOUT AURA WITHOUT STATUS MIGRAINOSUS, NOT INTRACTABLE: ICD-10-CM

## 2022-08-31 DIAGNOSIS — C50.911 ADENOCARCINOMA OF RIGHT BREAST (HCC): ICD-10-CM

## 2022-08-31 DIAGNOSIS — R42 VERTIGO: ICD-10-CM

## 2022-08-31 DIAGNOSIS — K51.00 ULCERATIVE PANCOLITIS WITHOUT COMPLICATION (HCC): ICD-10-CM

## 2022-08-31 DIAGNOSIS — D70.9 NEUTROPENIA, UNSPECIFIED TYPE (HCC): ICD-10-CM

## 2022-08-31 DIAGNOSIS — E78.00 PURE HYPERCHOLESTEROLEMIA: ICD-10-CM

## 2022-08-31 PROCEDURE — 99214 OFFICE O/P EST MOD 30 MIN: CPT | Performed by: FAMILY MEDICINE

## 2022-08-31 RX ORDER — METHYLPREDNISOLONE 4 MG/1
TABLET ORAL
Qty: 21 TABLET | Refills: 1 | Status: SHIPPED | OUTPATIENT
Start: 2022-08-31

## 2022-08-31 NOTE — PROGRESS NOTES
Assessment/Plan:       Problem List Items Addressed This Visit        Digestive    Ulcerative colitis ([address] )    Relevant Medications    methylPREDNISolone 4 MG tablet therapy pack    Other Relevant Orders    CBC and differential    Comprehensive metabolic panel       Cardiovascular and Mediastinum    Essential hypertension - Primary    Relevant Orders    CBC and differential    Comprehensive metabolic panel    Lipid Panel with Direct LDL reflex    Chronic migraine without aura without status migrainosus, not intractable     She is doing quite well with diltiazem  Musculoskeletal and Integument    Lumbar disc herniation     Symptoms are consistent with an L4 lumbar radiculopathy  CT seems to confirm this  She does not need an MRI currently but certainly may need 1 if she decides to follow-up with pain management or CR surgical team   Fortunately, she is feeling much better  I did place her on a Medrol Dosepak to help resolve the issue  I gave her refill in case she has a significant issue in the future  She is doing a lot of exercising at home and does not seem to need physical therapy at this time but I am certainly happy to send her for should symptoms recur  Relevant Medications    methylPREDNISolone 4 MG tablet therapy pack       Other    Pure hypercholesterolemia    Neutropenia (HCC)    Vertigo     Much improved after physical therapy  She was ventrally diagnosed with vestibular migraines  Hyperbilirubinemia    Relevant Orders    Lipid Panel with Direct LDL reflex    Adenocarcinoma of right breast (HCC)            Subjective:      Patient ID: Prashanth Dominguez is a 71 y o  female  HPI patient presents today after recent ER visit for severe low back pain  This occurred 4 days ago  She was given IM Toradol  CT did show a vacuum disc phenomenon at L4-5  Her symptoms are consistent with an L4 radiculopathy with pain radiating down the lateral aspect of her right leg  Fortunately, the pain has improved with some Robaxin  She had a significant allergic reaction to the topical Lidoderm  She has had similar pain in the past but this was the most severe episode  She had no effect on strength or bowel or bladder control  She is tolerating statin for her lipids and diltiazem for migraines    The following portions of the patient's history were reviewed and updated as appropriate: allergies, current medications, past family history, past medical history, past social history, past surgical history and problem list       Current Outpatient Medications:     Ascorbic Acid (VITAMIN C) 250 MG CHEW, Chew 1,000 mg daily , Disp: , Rfl:     Calcium Carbonate-Vit D-Min (CALCIUM 1200 PO), Take by mouth, Disp: , Rfl:     Cholecalciferol (VITAMIN D3) 5000 units CAPS, Take 1 tablet by mouth daily, Disp: , Rfl:     diltiazem (CARDIZEM CD) 180 mg 24 hr capsule, TAKE 1 CAPSULE(180 MG) BY MOUTH DAILY, Disp: 90 capsule, Rfl: 0    famotidine (PEPCID) 40 MG tablet, TAKE ONE TABLET BY MOUTH DAILY AS NEEDED FOR HEARTBURN, Disp: 30 tablet, Rfl: 0    methocarbamol (ROBAXIN) 500 mg tablet, Take 1 tablet (500 mg total) by mouth 2 (two) times a day for 10 days, Disp: 20 tablet, Rfl: 0    methylPREDNISolone 4 MG tablet therapy pack, Use as directed on package, Disp: 21 tablet, Rfl: 1    Misc Natural Products (GLUCOSAMINE CHONDROITIN ADV PO), Take 2 capsules by mouth daily , Disp: , Rfl:     Multiple Vitamins-Minerals (MULTI COMPLETE) CAPS, Take 1 capsule by mouth daily, Disp: , Rfl:     Probiotic Product (VSL#3) CAPS, Take 1 capsule by mouth daily, Disp: 90 capsule, Rfl: 3    rosuvastatin (CRESTOR) 10 MG tablet, TAKE 1 TABLET BY MOUTH DAILY, Disp: 90 tablet, Rfl: 0    Turmeric 500 MG CAPS, Take 1,000 mg by mouth daily , Disp: , Rfl:     Zinc 50 MG CAPS, Take by mouth daily, Disp: , Rfl:      Review of Systems   Constitutional: Negative for chills, fatigue and fever     Respiratory: Negative for cough and shortness of breath  Cardiovascular: Negative for chest pain, palpitations and leg swelling  Gastrointestinal: Negative for abdominal pain, constipation and diarrhea  Musculoskeletal: Positive for back pain  Negative for gait problem  Skin: Negative for rash  Neurological: Negative for dizziness  Objective:      /68 (BP Location: Left arm, Patient Position: Sitting, Cuff Size: Standard)   Pulse 70   Ht 5' 4 5" (1 638 m)   Wt 73 kg (161 lb)   SpO2 97%   BMI 27 21 kg/m²          Physical Exam  Vitals reviewed  Constitutional:       Appearance: She is well-developed  HENT:      Head: Normocephalic  Cardiovascular:      Rate and Rhythm: Regular rhythm  Heart sounds: Normal heart sounds  No murmur heard  Pulmonary:      Effort: No respiratory distress  Breath sounds: No wheezing or rales  Abdominal:      General: There is no distension  Tenderness: There is no abdominal tenderness  Musculoskeletal:      Right lower leg: No edema  Left lower leg: No edema  Comments: No Decreased strength  She has a negative seated root bilaterally   Skin:     Findings: No erythema or rash  Neurological:      Mental Status: She is alert and oriented to person, place, and time             Zelda Abdi MD

## 2022-08-31 NOTE — PROGRESS NOTES
Assessment and Plan:     Problem List Items Addressed This Visit        Digestive    Ulcerative colitis ([address] )    Relevant Medications    methylPREDNISolone 4 MG tablet therapy pack    Other Relevant Orders    CBC and differential    Comprehensive metabolic panel       Cardiovascular and Mediastinum    Essential hypertension - Primary    Relevant Orders    CBC and differential    Comprehensive metabolic panel    Lipid Panel with Direct LDL reflex    Chronic migraine without aura without status migrainosus, not intractable     She is doing quite well with diltiazem  Musculoskeletal and Integument    Lumbar disc herniation     Symptoms are consistent with an L4 lumbar radiculopathy  CT seems to confirm this  She does not need an MRI currently but certainly may need 1 if she decides to follow-up with pain management or CR surgical team   Fortunately, she is feeling much better  I did place her on a Medrol Dosepak to help resolve the issue  I gave her refill in case she has a significant issue in the future  She is doing a lot of exercising at home and does not seem to need physical therapy at this time but I am certainly happy to send her for should symptoms recur  Relevant Medications    methylPREDNISolone 4 MG tablet therapy pack       Other    Pure hypercholesterolemia    Neutropenia (HCC)    Vertigo     Much improved after physical therapy  She was ventrally diagnosed with vestibular migraines  Hyperbilirubinemia    Relevant Orders    Lipid Panel with Direct LDL reflex    Adenocarcinoma of right breast [facility])           Preventive health issues were discussed with patient, and age appropriate screening tests were ordered as noted in patient's After Visit Summary  Personalized health advice and appropriate referrals for health education or preventive services given if needed, as noted in patient's After Visit Summary       History of Present Illness:     Patient presents for a Medicare Wellness Visit    HPI   Patient Care Team:  Tu Navarrete MD as PCP - General  DO Kahlil Noble MD Twilla Oats, DO     Review of Systems:     Review of Systems     Problem List:     Patient Active Problem List   Diagnosis    Personal history of adenocarcinoma of breast    Arthritis    Carcinoma, basal cell, skin    Esophageal reflux    Pure hypercholesterolemia    Lumbar disc herniation    Migraine headache    Osteopenia    Malignant neoplasm of skin    Idiopathic peripheral neuropathy    Neutropenia ([facility] )    Dupuytren's contracture    Hip flexor tendinitis, right    Essential hypertension    Vertigo    Neurologic gait dysfunction    Tinnitus of both ears    Breast calcifications    Ulcerative colitis ([facility] )    Hyperbilirubinemia    Adenocarcinoma of right breast ([facility] )    Chronic migraine without aura without status migrainosus, not intractable      Past Medical and Surgical History:     Past Medical History:   Diagnosis Date    Arthritis     Breast cancer ([facility] )     right    Gastric ulcer     GERD (gastroesophageal reflux disease)     Heart murmur     History of radiation therapy     PBRT    IBS (irritable bowel syndrome)     Irritable bowel syndrome 2012    Malignant neoplasm ([facility] )     Migraine     Skin cancer     Use of letrozole (Femara)     took for 2 years D/john due to side effects     Past Surgical History:   Procedure Laterality Date    ABDOMINOPLASTY      ADENOIDECTOMY      BREAST BIOPSY Right 10/12/2007    [facility]    BREAST SURGERY      lumpectomy, resolved 2007     SECTION      CHOLECYSTECTOMY      COLON SURGERY      COLONOSCOPY      COSMETIC SURGERY  1975    forehead    FOOT SURGERY      MOHS SURGERY      REDUCTION MAMMAPLASTY Bilateral 2015    reduction on left/lift on right    SENTINEL LYMPH NODE BIOPSY Right 2007    TOE SURGERY      left toe surgery    TONSILLECTOMY        Family History: Family History   Problem Relation Age of Onset   Karen Lovell Hypertension Mother     Melanoma Mother     Heart disease Mother     Heart attack Father         acute MI, CABG    Hypertension Father     Heart disease Father     Other Brother         CABG    Lymphoma Brother         non-Hodgkin's    Hypertension Brother     Heart disease Brother     Other Family         back disorder    Stroke Family         CVA    Diabetes Family     Cancer Family       Social History:     Social History     Socioeconomic History    Marital status:      Spouse name: None    Number of children: None    Years of education: None    Highest education level: None   Occupational History    None   Tobacco Use    Smoking status: Former Smoker    Smokeless tobacco: Never Used    Tobacco comment: Smoker in teenage years  Quit at 22  One pack per week     Vaping Use    Vaping Use: Never used   Substance and Sexual Activity    Alcohol use: Not Currently     Comment: May have alcohol but is not drinking it currently    Drug use: No    Sexual activity: None   Other Topics Concern    None   Social History Narrative    Daily caffeine use- 2 cups of green tea and chocolate     Social Determinants of Health     Financial Resource Strain: Not on file   Food Insecurity: Not on file   Transportation Needs: Not on file   Physical Activity: Not on file   Stress: Not on file   Social Connections: Not on file   Intimate Partner Violence: Not on file   Housing Stability: Not on file      Medications and Allergies:     Current Outpatient Medications   Medication Sig Dispense Refill    Ascorbic Acid (VITAMIN C) 250 MG CHEW Chew 1,000 mg daily       Calcium Carbonate-Vit D-Min (CALCIUM 1200 PO) Take by mouth      Cholecalciferol (VITAMIN D3) 5000 units CAPS Take 1 tablet by mouth daily      diltiazem (CARDIZEM CD) 180 mg 24 hr capsule TAKE 1 CAPSULE(180 MG) BY MOUTH DAILY 90 capsule 0    famotidine (PEPCID) 40 MG tablet TAKE ONE TABLET BY MOUTH DAILY AS NEEDED FOR HEARTBURN 30 tablet 0    methocarbamol (ROBAXIN) 500 mg tablet Take 1 tablet (500 mg total) by mouth 2 (two) times a day for 10 days 20 tablet 0    methylPREDNISolone 4 MG tablet therapy pack Use as directed on package 21 tablet 1    Misc Natural Products (GLUCOSAMINE CHONDROITIN ADV PO) Take 2 capsules by mouth daily       Multiple Vitamins-Minerals (MULTI COMPLETE) CAPS Take 1 capsule by mouth daily      Probiotic Product (VSL#3) CAPS Take 1 capsule by mouth daily 90 capsule 3    rosuvastatin (CRESTOR) 10 MG tablet TAKE 1 TABLET BY MOUTH DAILY 90 tablet 0    Turmeric 500 MG CAPS Take 1,000 mg by mouth daily       Zinc 50 MG CAPS Take by mouth daily       No current facility-administered medications for this visit       Allergies   Allergen Reactions    Ciprofloxacin Anaphylaxis     Anaphylaxis    Celecoxib Swelling     Edema of legs    Amitriptyline GI Intolerance     Action Taken: bloating,GI problems;     Bactrim [Sulfamethoxazole-Trimethoprim] Rash    Lidoderm [Lidocaine] Rash     Rash from lidoderm patch    Mesalamine Dizziness and Headache    Other Vomiting     "black olives->GI upset"    Sulfa Antibiotics Rash    Wound Dressing Adhesive Rash      Immunizations:     Immunization History   Administered Date(s) Administered    COVID-19 MODERNA VACC 0 5 ML IM 02/19/2021, 03/19/2021    H1N1, All Formulations 10/26/2009    INFLUENZA 11/18/2005, 12/01/2006, 10/19/2007, 11/01/2015, 10/05/2020    Influenza Quadrivalent, 6-35 Months IM 11/01/2015    Influenza Split High Dose Preservative Free IM 10/14/2016, 10/09/2017    Influenza, high dose seasonal 0 7 mL 09/18/2018, 09/27/2019, 10/20/2021    Influenza, seasonal, injectable 09/14/1997, 10/09/2013    Pneumococcal Conjugate 13-Valent 11/16/2015    Pneumococcal Polysaccharide PPV23 12/19/2016    Tdap 11/16/2015, 07/04/2016, 04/03/2017    Zoster 01/06/2014      Health Maintenance:         Topic Date Due    Cervical Cancer Screening  Never done    Colorectal Cancer Screening  10/07/2021    Breast Cancer Screening: Mammogram  08/23/2022    DXA SCAN  02/05/2026    Hepatitis C Screening  Completed         Topic Date Due    COVID-19 Vaccine (3 - Booster for Moderna series) 08/19/2021    Influenza Vaccine (1) 09/01/2022      Medicare Screening Tests and Risk Assessments:     Jonathan Garduno is here for her Subsequent Wellness visit  Health Risk Assessment:   Patient rates overall health as very good  Patient feels that their physical health rating is same  Patient is very satisfied with their life  Eyesight was rated as same  Hearing was rated as same  Patient feels that their emotional and mental health rating is slightly better  Patients states they are never, rarely angry  Patient states they are never, rarely unusually tired/fatigued  Pain experienced in the last 7 days has been a lot  Patient's pain rating has been 10/10  Patient states that she has experienced no weight loss or gain in last 6 months  Depression Screening:   PHQ-2 Score: 0      Fall Risk Screening: In the past year, patient has experienced: history of falling in past year    Number of falls: 1  Injured during fall?: Yes    Feels unsteady when standing or walking?: No    Worried about falling?: No      Urinary Incontinence Screening:   Patient has not leaked urine accidently in the last six months  Home Safety:  Patient does not have trouble with stairs inside or outside of their home  Patient has working smoke alarms and has working carbon monoxide detector  Home safety hazards include: none  Nutrition:   Current diet is Regular  Medications:   Patient is currently taking over-the-counter supplements  OTC medications include: see medication list  Patient is able to manage medications       Activities of Daily Living (ADLs)/Instrumental Activities of Daily Living (IADLs):   Walk and transfer into and out of bed and chair?: Yes  Dress and groom yourself?: Yes    Bathe or shower yourself?: Yes    Feed yourself? Yes  Do your laundry/housekeeping?: Yes  Manage your money, pay your bills and track your expenses?: Yes  Make your own meals?: Yes    Do your own shopping?: Yes    Previous Hospitalizations:   Any hospitalizations or ED visits within the last 12 months?: No      Advance Care Planning:   Living will: Yes    Durable POA for healthcare:  Yes    Advanced directive: Yes      PREVENTIVE SCREENINGS      Cardiovascular Screening:    General: Screening Not Indicated and History Lipid Disorder      Diabetes Screening:     General: Screening Current      Colorectal Cancer Screening:     General: Screening Current      Breast Cancer Screening:     General: History Breast Cancer      Cervical Cancer Screening:    General: Screening Not Indicated      Osteoporosis Screening:    General: Screening Current      Lung Cancer Screening:     General: Screening Not Indicated      Hepatitis C Screening:    General: Screening Current    Screening, Brief Intervention, and Referral to Treatment (SBIRT)    Screening    Typical number of drinks in a week: 4    Single Item Drug Screening:  How often have you used an illegal drug (including marijuana) or a prescription medication for non-medical reasons in the past year? never    Single Item Drug Screen Score: 0  Interpretation: Negative screen for possible drug use disorder    No exam data present     Physical Exam:     /68 (BP Location: Left arm, Patient Position: Sitting, Cuff Size: Standard)   Pulse 70   Ht 5' 4 5" (1 638 m)   Wt 73 kg (161 lb)   SpO2 97%   BMI 27 21 kg/m²     Physical Exam     Genesis Middleton MD

## 2022-08-31 NOTE — ASSESSMENT & PLAN NOTE
Symptoms are consistent with an L4 lumbar radiculopathy  CT seems to confirm this  She does not need an MRI currently but certainly may need 1 if she decides to follow-up with pain management or CR surgical team   Fortunately, she is feeling much better  I did place her on a Medrol Dosepak to help resolve the issue  I gave her refill in case she has a significant issue in the future  She is doing a lot of exercising at home and does not seem to need physical therapy at this time but I am certainly happy to send her for should symptoms recur

## 2022-09-07 ENCOUNTER — OFFICE VISIT (OUTPATIENT)
Dept: SURGICAL ONCOLOGY | Facility: CLINIC | Age: 72
End: 2022-09-07
Payer: MEDICARE

## 2022-09-07 VITALS
TEMPERATURE: 97.9 F | WEIGHT: 161 LBS | HEIGHT: 65 IN | DIASTOLIC BLOOD PRESSURE: 80 MMHG | SYSTOLIC BLOOD PRESSURE: 126 MMHG | HEART RATE: 67 BPM | OXYGEN SATURATION: 98 % | RESPIRATION RATE: 18 BRPM | BODY MASS INDEX: 26.82 KG/M2

## 2022-09-07 DIAGNOSIS — Z85.3 PERSONAL HISTORY OF ADENOCARCINOMA OF BREAST: ICD-10-CM

## 2022-09-07 DIAGNOSIS — Z12.31 SCREENING MAMMOGRAM FOR BREAST CANCER: ICD-10-CM

## 2022-09-07 DIAGNOSIS — Z85.3 ENCOUNTER FOR FOLLOW-UP SURVEILLANCE OF BREAST CANCER: Primary | ICD-10-CM

## 2022-09-07 DIAGNOSIS — Z08 ENCOUNTER FOR FOLLOW-UP SURVEILLANCE OF BREAST CANCER: Primary | ICD-10-CM

## 2022-09-07 DIAGNOSIS — R92.1 BREAST CALCIFICATIONS: ICD-10-CM

## 2022-09-07 PROCEDURE — 99213 OFFICE O/P EST LOW 20 MIN: CPT | Performed by: SURGERY

## 2022-09-07 RX ORDER — DIAZEPAM 5 MG/1
5 TABLET ORAL
Qty: 1 TABLET | Refills: 0 | Status: SHIPPED | OUTPATIENT
Start: 2022-09-07

## 2022-09-07 NOTE — PROGRESS NOTES
Surgical Oncology Follow Up       [facility] SURGICAL ONCOLOGY [facility] [id number]    Grecia Christina  1950  [id number]  [facility] SURGICAL ONCOLOGY [place]  [place] [id number]    Chief Complaint   Patient presents with    Follow-up       Assessment/Plan   Diagnoses and all orders for this visit:    Encounter for follow-up surveillance of breast cancer    Personal history of adenocarcinoma of breast  -     MRI breast bilateral w and wo contrast w cad; Future  -     BUN; Future  -     Creatinine, serum; Future  -     diazepam (VALIUM) 5 mg tablet; Take 1 tablet (5 mg total) by mouth 60 minutes pre-procedure    Breast calcifications    Screening mammogram for breast cancer  -     Mammo screening bilateral w 3d & cad; Future        Advance Care Planning/Advance Directives:  Discussed disease status, cancer treatment plans and/or cancer treatment goals with the patient  Oncology History:    Oncology History    No history exists  History of Present Illness:  Breast cancer follow-up, no breast referable concerns  -Interval History:  Follow-up mammogram    Review of Systems:  Review of Systems   Constitutional: Negative  Negative for appetite change and fever  Eyes: Negative  Respiratory: Negative for shortness of breath  Cardiovascular: Negative  Gastrointestinal: Negative  Endocrine: Negative  Genitourinary: Negative  Musculoskeletal: Negative  Negative for arthralgias and myalgias  Skin: Negative  Allergic/Immunologic: Negative  Neurological: Negative  Hematological: Negative  Negative for adenopathy  Does not bruise/bleed easily  Psychiatric/Behavioral: Negative          Patient Active Problem List   Diagnosis    Personal history of adenocarcinoma of breast    Arthritis    Carcinoma, basal cell, skin    Esophageal reflux    Pure hypercholesterolemia    Lumbar disc herniation    Migraine headache    Osteopenia    Malignant neoplasm of skin    Idiopathic peripheral neuropathy    Neutropenia (HCC)    Dupuytren's contracture    Hip flexor tendinitis, right    Essential hypertension    Vertigo    Neurologic gait dysfunction    Tinnitus of both ears    Breast calcifications    Ulcerative colitis (HCC)    Hyperbilirubinemia    Chronic migraine without aura without status migrainosus, not intractable    Encounter for follow-up surveillance of breast cancer    Screening mammogram for breast cancer     Past Medical History:   Diagnosis Date    Arthritis     Gastric ulcer     GERD (gastroesophageal reflux disease)     Heart murmur     History of radiation therapy     PBRT    IBS (irritable bowel syndrome)     Irritable bowel syndrome 2012    Migraine     Skin cancer     Use of letrozole (Femara)     took for 2 years D/john due to side effects     Past Surgical History:   Procedure Laterality Date    ABDOMINOPLASTY      ADENOIDECTOMY      BREAST BIOPSY Right 10/12/2007    [facility]    BREAST SURGERY      lumpectomy, resolved 2007     SECTION      CHOLECYSTECTOMY      COLON SURGERY      COLONOSCOPY      COSMETIC SURGERY  1975    forehead    FOOT SURGERY      MOHS SURGERY      REDUCTION MAMMAPLASTY Bilateral 2015    reduction on left/lift on right    SENTINEL LYMPH NODE BIOPSY Right 2007    TOE SURGERY      left toe surgery    TONSILLECTOMY       Family History   Problem Relation Age of Onset    Hypertension Mother     Melanoma Mother     Heart disease Mother     Heart attack Father         acute MI, CABG    Hypertension Father     Heart disease Father     Other Brother         CABG    Lymphoma Brother         non-Hodgkin's    Hypertension Brother     Heart disease Brother     Other Family         back disorder    Stroke Family         CVA    Diabetes Family     Cancer Family Social History     Socioeconomic History    Marital status:      Spouse name: Not on file    Number of children: Not on file    Years of education: Not on file    Highest education level: Not on file   Occupational History    Not on file   Tobacco Use    Smoking status: Former Smoker    Smokeless tobacco: Never Used    Tobacco comment: Smoker in teenage years  Quit at 22  One pack per week     Vaping Use    Vaping Use: Never used   Substance and Sexual Activity    Alcohol use: Not Currently     Comment: May have alcohol but is not drinking it currently    Drug use: No    Sexual activity: Not on file   Other Topics Concern    Not on file   Social History Narrative    Daily caffeine use- 2 cups of green tea and chocolate     Social Determinants of Health     Financial Resource Strain: Not on file   Food Insecurity: Not on file   Transportation Needs: Not on file   Physical Activity: Not on file   Stress: Not on file   Social Connections: Not on file   Intimate Partner Violence: Not on file   Housing Stability: Not on file       Current Outpatient Medications:     Ascorbic Acid (VITAMIN C) 250 MG CHEW, Chew 1,000 mg daily , Disp: , Rfl:     Calcium Carbonate-Vit D-Min (CALCIUM 1200 PO), Take by mouth, Disp: , Rfl:     Cholecalciferol (VITAMIN D3) 5000 units CAPS, Take 1 tablet by mouth daily, Disp: , Rfl:     diltiazem (CARDIZEM CD) 180 mg 24 hr capsule, TAKE 1 CAPSULE(180 MG) BY MOUTH DAILY, Disp: 90 capsule, Rfl: 0    famotidine (PEPCID) 40 MG tablet, TAKE ONE TABLET BY MOUTH DAILY AS NEEDED FOR HEARTBURN, Disp: 30 tablet, Rfl: 0    methylPREDNISolone 4 MG tablet therapy pack, Use as directed on package, Disp: 21 tablet, Rfl: 1    Misc Natural Products (GLUCOSAMINE CHONDROITIN ADV PO), Take 2 capsules by mouth daily , Disp: , Rfl:     Multiple Vitamins-Minerals (MULTI COMPLETE) CAPS, Take 1 capsule by mouth daily, Disp: , Rfl:     Probiotic Product (VSL#3) CAPS, Take 1 capsule by mouth daily, Disp: 90 capsule, Rfl: 3    rosuvastatin (CRESTOR) 10 MG tablet, TAKE 1 TABLET BY MOUTH DAILY, Disp: 90 tablet, Rfl: 0    Turmeric 500 MG CAPS, Take 1,000 mg by mouth daily , Disp: , Rfl:     Zinc 50 MG CAPS, Take by mouth daily, Disp: , Rfl:     diazepam (VALIUM) 5 mg tablet, Take 1 tablet (5 mg total) by mouth 60 minutes pre-procedure, Disp: 1 tablet, Rfl: 0    methocarbamol (ROBAXIN) 500 mg tablet, Take 1 tablet (500 mg total) by mouth 2 (two) times a day for 10 days, Disp: 20 tablet, Rfl: 0  Allergies   Allergen Reactions    Ciprofloxacin Anaphylaxis     Anaphylaxis    Celecoxib Swelling     Edema of legs    Amitriptyline GI Intolerance     Action Taken: bloating,GI problems;     Bactrim [Sulfamethoxazole-Trimethoprim] Rash    Lidoderm [Lidocaine] Rash     Rash from lidoderm patch    Mesalamine Dizziness and Headache    Other Vomiting     "black olives->GI upset"    Sulfa Antibiotics Rash    Wound Dressing Adhesive Rash       The following portions of the patient's history were reviewed and updated as appropriate: allergies, current medications, past family history, past medical history, past social history, past surgical history and problem list         Vitals:    09/07/22 1517   BP: 126/80   Pulse: 67   Resp: 18   Temp: 97 9 °F (36 6 °C)   SpO2: 98%       Physical Exam  Constitutional:       General: She is not in acute distress  Appearance: Normal appearance  She is well-developed  HENT:      Head: Normocephalic and atraumatic  Cardiovascular:      Heart sounds: Normal heart sounds  Pulmonary:      Breath sounds: Normal breath sounds  Chest:   Breasts:      Right: Skin change (lumpectomy scar, reduction scar) present  No inverted nipple, mass, nipple discharge, tenderness, axillary adenopathy or supraclavicular adenopathy  Left: Skin change ( reduction scar) present   No inverted nipple, mass, nipple discharge, tenderness, axillary adenopathy or supraclavicular adenopathy  Abdominal:      Palpations: Abdomen is soft  Lymphadenopathy:      Upper Body:      Right upper body: No supraclavicular, axillary or pectoral adenopathy  Left upper body: No supraclavicular, axillary or pectoral adenopathy  Neurological:      Mental Status: She is alert and oriented to person, place, and time  Psychiatric:         Mood and Affect: Mood normal            Results:  Labs:      Imaging  08/23/2021 right diagnostic mammogram was benign with dystrophic calcifications, recommendation is to return to screening mammography    I reviewed the above imaging data  Discussion/Summary:  23-year-old female post right breast conservation for stage I invasive ductal carcinoma  She had radiation and hormone therapy  There is no evidence of disease based on exam today  She was followed for calcifications on the right side which were benign on most recent imaging  She is due now for her bilateral mammogram   She prefers to continue with breast MRI as well  I will make these arrangements for her  Provided there are no concerns, I will see her again in one year or sooner should the need arise

## 2022-09-09 DIAGNOSIS — K21.9 GASTROESOPHAGEAL REFLUX DISEASE WITHOUT ESOPHAGITIS: ICD-10-CM

## 2022-09-09 RX ORDER — FAMOTIDINE 40 MG/1
TABLET, FILM COATED ORAL
Qty: 30 TABLET | Refills: 0 | Status: SHIPPED | OUTPATIENT
Start: 2022-09-09

## 2022-09-10 DIAGNOSIS — E78.5 HYPERLIPIDEMIA, UNSPECIFIED HYPERLIPIDEMIA TYPE: ICD-10-CM

## 2022-09-10 RX ORDER — ROSUVASTATIN CALCIUM 10 MG/1
TABLET, COATED ORAL
Qty: 90 TABLET | Refills: 0 | Status: SHIPPED | OUTPATIENT
Start: 2022-09-10

## 2022-09-19 ENCOUNTER — APPOINTMENT (OUTPATIENT)
Dept: LAB | Facility: MEDICAL CENTER | Age: 72
End: 2022-09-19
Payer: MEDICARE

## 2022-09-19 DIAGNOSIS — Z85.3 PERSONAL HISTORY OF ADENOCARCINOMA OF BREAST: ICD-10-CM

## 2022-09-19 LAB
BUN SERPL-MCNC: 16 MG/DL (ref 5–25)
CREAT SERPL-MCNC: 0.9 MG/DL (ref 0.6–1.3)
GFR SERPL CREATININE-BSD FRML MDRD: 64 ML/MIN/1.73SQ M

## 2022-09-19 PROCEDURE — 36415 COLL VENOUS BLD VENIPUNCTURE: CPT

## 2022-09-19 PROCEDURE — 84520 ASSAY OF UREA NITROGEN: CPT

## 2022-09-19 PROCEDURE — 82565 ASSAY OF CREATININE: CPT

## 2022-10-10 ENCOUNTER — HOSPITAL ENCOUNTER (OUTPATIENT)
Dept: RADIOLOGY | Facility: HOSPITAL | Age: 72
Discharge: HOME | End: 2022-10-10
Payer: MEDICARE

## 2022-10-10 VITALS — WEIGHT: 161 LBS | BODY MASS INDEX: 26.82 KG/M2 | HEIGHT: 65 IN

## 2022-10-10 DIAGNOSIS — Z12.31 SCREENING MAMMOGRAM FOR BREAST CANCER: ICD-10-CM

## 2022-10-10 DIAGNOSIS — Z85.3 PERSONAL HISTORY OF ADENOCARCINOMA OF BREAST: ICD-10-CM

## 2022-10-10 PROCEDURE — A9585 GADOBUTROL INJECTION: HCPCS | Performed by: SURGERY

## 2022-10-10 PROCEDURE — G1004 CDSM NDSC: HCPCS

## 2022-10-10 PROCEDURE — 77063 BREAST TOMOSYNTHESIS BI: CPT

## 2022-10-10 PROCEDURE — 77067 SCR MAMMO BI INCL CAD: CPT

## 2022-10-10 PROCEDURE — C8937 CAD BREAST MRI: HCPCS

## 2022-10-10 PROCEDURE — C8908 MRI W/O FOL W/CONT, BREAST,: HCPCS

## 2022-10-10 RX ADMIN — GADOBUTROL 7 ML: 604.72 INJECTION INTRAVENOUS at 14:37

## 2022-10-31 ENCOUNTER — CLINICAL SUPPORT (OUTPATIENT)
Dept: FAMILY MEDICINE CLINIC | Facility: CLINIC | Age: 72
End: 2022-10-31

## 2022-10-31 DIAGNOSIS — Z23 NEED FOR INFLUENZA VACCINATION: Primary | ICD-10-CM

## 2022-12-04 ENCOUNTER — HOSPITAL ENCOUNTER (EMERGENCY)
Facility: HOSPITAL | Age: 72
Discharge: HOME | End: 2022-12-04
Attending: EMERGENCY MEDICINE | Admitting: EMERGENCY MEDICINE

## 2022-12-04 ENCOUNTER — APPOINTMENT (EMERGENCY)
Dept: CT IMAGING | Facility: HOSPITAL | Age: 72
End: 2022-12-04

## 2022-12-04 ENCOUNTER — APPOINTMENT (EMERGENCY)
Dept: RADIOLOGY | Facility: HOSPITAL | Age: 72
End: 2022-12-04

## 2022-12-04 VITALS
DIASTOLIC BLOOD PRESSURE: 81 MMHG | SYSTOLIC BLOOD PRESSURE: 164 MMHG | HEART RATE: 86 BPM | TEMPERATURE: 98.5 F | OXYGEN SATURATION: 96 % | RESPIRATION RATE: 18 BRPM

## 2022-12-04 DIAGNOSIS — S32.030A COMPRESSION FRACTURE OF L3 VERTEBRA, INITIAL ENCOUNTER (HCC): Primary | ICD-10-CM

## 2022-12-04 DIAGNOSIS — M54.31 SCIATICA OF RIGHT SIDE: ICD-10-CM

## 2022-12-04 LAB
ANION GAP SERPL CALCULATED.3IONS-SCNC: 11 MMOL/L (ref 4–13)
BASOPHILS # BLD AUTO: 0.07 THOUSANDS/ÂΜL (ref 0–0.1)
BASOPHILS NFR BLD AUTO: 1 % (ref 0–1)
BUN SERPL-MCNC: 17 MG/DL (ref 5–25)
CALCIUM SERPL-MCNC: 9 MG/DL (ref 8.3–10.1)
CARDIAC TROPONIN I PNL SERPL HS: 3 NG/L
CHLORIDE SERPL-SCNC: 103 MMOL/L (ref 96–108)
CO2 SERPL-SCNC: 28 MMOL/L (ref 21–32)
CREAT SERPL-MCNC: 0.75 MG/DL (ref 0.6–1.3)
EOSINOPHIL # BLD AUTO: 0.06 THOUSAND/ÂΜL (ref 0–0.61)
EOSINOPHIL NFR BLD AUTO: 1 % (ref 0–6)
ERYTHROCYTE [DISTWIDTH] IN BLOOD BY AUTOMATED COUNT: 13.2 % (ref 11.6–15.1)
GFR SERPL CREATININE-BSD FRML MDRD: 80 ML/MIN/1.73SQ M
GLUCOSE SERPL-MCNC: 110 MG/DL (ref 65–140)
HCT VFR BLD AUTO: 42.6 % (ref 34.8–46.1)
HGB BLD-MCNC: 14.2 G/DL (ref 11.5–15.4)
IMM GRANULOCYTES # BLD AUTO: 0.07 THOUSAND/UL (ref 0–0.2)
IMM GRANULOCYTES NFR BLD AUTO: 1 % (ref 0–2)
LYMPHOCYTES # BLD AUTO: 0.97 THOUSANDS/ÂΜL (ref 0.6–4.47)
LYMPHOCYTES NFR BLD AUTO: 12 % (ref 14–44)
MCH RBC QN AUTO: 29.2 PG (ref 26.8–34.3)
MCHC RBC AUTO-ENTMCNC: 33.3 G/DL (ref 31.4–37.4)
MCV RBC AUTO: 88 FL (ref 82–98)
MONOCYTES # BLD AUTO: 0.45 THOUSAND/ÂΜL (ref 0.17–1.22)
MONOCYTES NFR BLD AUTO: 6 % (ref 4–12)
NEUTROPHILS # BLD AUTO: 6.35 THOUSANDS/ÂΜL (ref 1.85–7.62)
NEUTS SEG NFR BLD AUTO: 79 % (ref 43–75)
NRBC BLD AUTO-RTO: 0 /100 WBCS
PLATELET # BLD AUTO: 239 THOUSANDS/UL (ref 149–390)
PMV BLD AUTO: 9.7 FL (ref 8.9–12.7)
POTASSIUM SERPL-SCNC: 3.3 MMOL/L (ref 3.5–5.3)
RBC # BLD AUTO: 4.86 MILLION/UL (ref 3.81–5.12)
SODIUM SERPL-SCNC: 142 MMOL/L (ref 135–147)
WBC # BLD AUTO: 7.97 THOUSAND/UL (ref 4.31–10.16)

## 2022-12-04 RX ORDER — ONDANSETRON 2 MG/ML
4 INJECTION INTRAMUSCULAR; INTRAVENOUS ONCE
Status: COMPLETED | OUTPATIENT
Start: 2022-12-04 | End: 2022-12-04

## 2022-12-04 RX ORDER — DIPHENHYDRAMINE HYDROCHLORIDE 50 MG/ML
25 INJECTION INTRAMUSCULAR; INTRAVENOUS ONCE
Status: COMPLETED | OUTPATIENT
Start: 2022-12-04 | End: 2022-12-04

## 2022-12-04 RX ORDER — METHOCARBAMOL 500 MG/1
500 TABLET, FILM COATED ORAL 2 TIMES DAILY PRN
Qty: 20 TABLET | Refills: 0 | Status: SHIPPED | OUTPATIENT
Start: 2022-12-04 | End: 2022-12-15 | Stop reason: SDUPTHER

## 2022-12-04 RX ORDER — OXYCODONE HYDROCHLORIDE AND ACETAMINOPHEN 5; 325 MG/1; MG/1
1 TABLET ORAL EVERY 4 HOURS PRN
Qty: 10 TABLET | Refills: 0 | Status: SHIPPED | OUTPATIENT
Start: 2022-12-04 | End: 2022-12-15 | Stop reason: SDUPTHER

## 2022-12-04 RX ORDER — ACETAMINOPHEN 325 MG/1
975 TABLET ORAL ONCE
Status: COMPLETED | OUTPATIENT
Start: 2022-12-04 | End: 2022-12-04

## 2022-12-04 RX ORDER — METHOCARBAMOL 500 MG/1
500 TABLET, FILM COATED ORAL ONCE
Status: COMPLETED | OUTPATIENT
Start: 2022-12-04 | End: 2022-12-04

## 2022-12-04 RX ADMIN — ACETAMINOPHEN 975 MG: 325 TABLET, FILM COATED ORAL at 18:28

## 2022-12-04 RX ADMIN — IOHEXOL 100 ML: 350 INJECTION, SOLUTION INTRAVENOUS at 15:57

## 2022-12-04 RX ADMIN — MORPHINE SULFATE 2 MG: 2 INJECTION, SOLUTION INTRAMUSCULAR; INTRAVENOUS at 15:02

## 2022-12-04 RX ADMIN — DIPHENHYDRAMINE HYDROCHLORIDE 25 MG: 50 INJECTION, SOLUTION INTRAMUSCULAR; INTRAVENOUS at 16:19

## 2022-12-04 RX ADMIN — ONDANSETRON 4 MG: 2 INJECTION INTRAMUSCULAR; INTRAVENOUS at 16:23

## 2022-12-04 RX ADMIN — SODIUM CHLORIDE 1000 ML: 0.9 INJECTION, SOLUTION INTRAVENOUS at 16:19

## 2022-12-04 RX ADMIN — METHOCARBAMOL 500 MG: 500 TABLET ORAL at 18:28

## 2022-12-04 NOTE — ED NOTES
Rn called to pt's room with pt c/o CP and nausea  Ally-PA ware of same via TT       Gurpreet Hart RN  12/04/22 [id number]

## 2022-12-04 NOTE — ED PROVIDER NOTES
History  Chief Complaint   Patient presents with   • Back Pain     Pt states that she was decorating her gerald tree and fell down off the last two rungs of the ladder, estimates she fell from about 3 feet  States she landed on her left side, did strike head, denies LOC  Reports pre existing back problems  Also reports left lower abdominal pain  Patient is a 72 y/o female hx of arthritis, breast cancer (15 years in remission), GERD, gastric ulcer, IBS, presenting to the ED for evaluation of fall  Pt states PTA she was decorating her [employer]erald tree when she was on a ladder when she missed a step and fell 3 feet onto her lower back, hitting back of her head  No LOC  Does not take blood thinning medication  No ETOH  Pt with hx of back pain, states her pain worse now from the fall  Denies bladder/bowel incontinence, urinary retention, perianal numbness  Prior to Admission Medications   Prescriptions Last Dose Informant Patient Reported? Taking?    Ascorbic Acid (VITAMIN C) 250 MG CHEW   Yes No   Sig: Chew 1,000 mg daily    Calcium Carbonate-Vit D-Min (CALCIUM 1200 PO)   Yes No   Sig: Take by mouth   Cholecalciferol (VITAMIN D3) 5000 units CAPS   Yes No   Sig: Take 1 tablet by mouth daily   Misc Natural Products (GLUCOSAMINE CHONDROITIN ADV PO)   Yes No   Sig: Take 2 capsules by mouth daily    Multiple Vitamins-Minerals (MULTI COMPLETE) CAPS   Yes No   Sig: Take 1 capsule by mouth daily   Probiotic Product (VSL#3) CAPS   No No   Sig: Take 1 capsule by mouth daily   Turmeric 500 MG CAPS   Yes No   Sig: Take 1,000 mg by mouth daily    Zinc 50 MG CAPS   Yes No   Sig: Take by mouth daily   diazepam (VALIUM) 5 mg tablet   No No   Sig: Take 1 tablet (5 mg total) by mouth 60 minutes pre-procedure   diltiazem (CARDIZEM CD) 180 mg 24 hr capsule   No No   Sig: TAKE 1 CAPSULE(180 MG) BY MOUTH DAILY   famotidine (PEPCID) 40 MG tablet   No No   Sig: TAKE ONE TABLET BY MOUTH DAILY AS NEEDED for heartburn methocarbamol (ROBAXIN) 500 mg tablet   No No   Sig: Take 1 tablet (500 mg total) by mouth 2 (two) times a day for 10 days   methocarbamol (ROBAXIN) 500 mg tablet   No Yes   Sig: Take 1 tablet (500 mg total) by mouth 2 (two) times a day as needed for muscle spasms   methylPREDNISolone 4 MG tablet therapy pack   No No   Sig: Use as directed on package   rosuvastatin (CRESTOR) 10 MG tablet   No No   Sig: TAKE 1 TABLET BY MOUTH DAILY      Facility-Administered Medications: None       Past Medical History:   Diagnosis Date   • Arthritis    • Breast cancer ([facility] [address] )    • Gastric ulcer    • GERD (gastroesophageal reflux disease)    • Heart murmur    • History of radiation therapy     PBRT   • IBS (irritable bowel syndrome)    • Irritable bowel syndrome 2012   • Migraine    • Skin cancer    • Use of letrozole (Femara)     took for 2 years D/john due to side effects       Past Surgical History:   Procedure Laterality Date   • ABDOMINOPLASTY     • ADENOIDECTOMY     • BREAST BIOPSY Right 10/12/2007    IDC   • BREAST LUMPECTOMY Right    • BREAST SURGERY      lumpectomy, resolved 2007   •  SECTION     • CHOLECYSTECTOMY     • COLON SURGERY     • COLONOSCOPY     • COSMETIC SURGERY  1975    forehead   • FOOT SURGERY     • MOHS SURGERY     • REDUCTION MAMMAPLASTY Bilateral 2015    reduction on left/lift on right   • SENTINEL LYMPH NODE BIOPSY Right 2007   • TOE SURGERY      left toe surgery   • TONSILLECTOMY         Family History   Problem Relation Age of Onset   • Hypertension Mother    • Melanoma Mother    • Heart disease Mother    • Heart attack Father         acute MI, CABG   • Hypertension Father    • Heart disease Father    • Other Brother         CABG   • Lymphoma Brother         non-Hodgkin's   • Hypertension Brother    • Heart disease Brother    • Other Family         back disorder   • Stroke Family         CVA   • Diabetes Family    • Cancer Family      I have reviewed and agree with the history as documented  E-Cigarette/Vaping   • E-Cigarette Use Never User      E-Cigarette/Vaping Substances   • Nicotine No    • THC No    • CBD No    • Flavoring No    • Other No    • Unknown No      Social History     Tobacco Use   • Smoking status: Former   • Smokeless tobacco: Never   • Tobacco comments:     Smoker in teenage years  Quit at 22  One pack per week  Vaping Use   • Vaping Use: Never used   Substance Use Topics   • Alcohol use: Not Currently     Comment: May have alcohol but is not drinking it currently   • Drug use: No       Review of Systems   Constitutional: Negative for chills and fever  HENT: Negative for ear pain and sore throat  Eyes: Negative for visual disturbance  Respiratory: Negative for cough and shortness of breath  Cardiovascular: Negative for chest pain, palpitations and leg swelling  Gastrointestinal: Negative for abdominal pain, diarrhea, nausea and vomiting  Genitourinary: Negative for dysuria and hematuria  Musculoskeletal: Positive for back pain and neck pain  Skin: Negative for rash  Neurological: Negative for speech difficulty and headaches  Psychiatric/Behavioral: Negative for confusion  Physical Exam  Physical Exam  Constitutional:       General: She is not in acute distress  Appearance: She is well-developed  She is not ill-appearing or toxic-appearing  HENT:      Head: Normocephalic and atraumatic  No raccoon eyes or Rocha's sign  Right Ear: External ear normal       Left Ear: External ear normal       Nose: Nose normal       Mouth/Throat:      Lips: Pink  Mouth: Mucous membranes are moist    Eyes:      General: Vision grossly intact  Extraocular Movements: Extraocular movements intact  Conjunctiva/sclera: Conjunctivae normal       Pupils: Pupils are equal, round, and reactive to light  Cardiovascular:      Rate and Rhythm: Normal rate and regular rhythm     Pulmonary:      Effort: Pulmonary effort is normal  Breath sounds: Normal breath sounds  No decreased breath sounds, wheezing, rhonchi or rales  Musculoskeletal:      Cervical back: Tenderness and bony tenderness present  Spinous process tenderness and muscular tenderness present  Thoracic back: Normal       Lumbar back: Tenderness and bony tenderness present  Decreased range of motion  Back:       Comments: Full ROM of bilateral upper and lower extremities  Pulses intact   Skin:     General: Skin is warm and dry  Capillary Refill: Capillary refill takes less than 2 seconds  Neurological:      General: No focal deficit present  Mental Status: She is alert and oriented to person, place, and time  GCS: GCS eye subscore is 4  GCS verbal subscore is 5  GCS motor subscore is 6  Sensory: Sensation is intact  Motor: Motor function is intact  Gait: Gait is intact     Psychiatric:         Mood and Affect: Mood and affect normal          Vital Signs  ED Triage Vitals   Temperature Pulse Respirations Blood Pressure SpO2   12/04/22 1417 12/04/22 1417 12/04/22 1417 12/04/22 1417 12/04/22 1417   98 5 °F (36 9 °C) 64 18 (!) 196/90 100 %      Temp Source Heart Rate Source Patient Position - Orthostatic VS BP Location FiO2 (%)   12/04/22 1417 12/04/22 1417 12/04/22 1613 12/04/22 1613 --   Oral Monitor Lying Right arm       Pain Score       12/04/22 1417       10 - Worst Possible Pain           Vitals:    12/04/22 1626 12/04/22 1656 12/04/22 1703 12/04/22 1829   BP: 155/72  156/71 164/81   Pulse: (!) 50 62  86   Patient Position - Orthostatic VS: Lying   Sitting         Visual Acuity      ED Medications  Medications   morphine injection 2 mg (2 mg Intravenous Given 12/4/22 1502)   iohexol (OMNIPAQUE) 350 MG/ML injection (SINGLE-DOSE) 100 mL (100 mL Intravenous Given 12/4/22 1557)   ondansetron (ZOFRAN) injection 4 mg (4 mg Intravenous Given 12/4/22 1623)   diphenhydrAMINE (BENADRYL) injection 25 mg (25 mg Intravenous Given 12/4/22 1619) sodium chloride 0 9 % bolus 1,000 mL (0 mL Intravenous Stopped 12/4/22 1713)   acetaminophen (TYLENOL) tablet 975 mg (975 mg Oral Given 12/4/22 1828)   methocarbamol (ROBAXIN) tablet 500 mg (500 mg Oral Given 12/4/22 1828)       Diagnostic Studies  Results Reviewed     Procedure Component Value Units Date/Time    HS Troponin 0hr (reflex protocol) [[id number]]  (Normal) Collected: 12/04/22 1714    Lab Status: Final result Specimen: Blood from Arm, Left Updated: 12/04/22 1743     hs TnI 0hr 3 ng/L     Basic metabolic panel [[id number]]  (Abnormal) Collected: 12/04/22 1501    Lab Status: Final result Specimen: Blood from Arm, Left Updated: 12/04/22 1531     Sodium 142 mmol/L      Potassium 3 3 mmol/L      Chloride 103 mmol/L      CO2 28 mmol/L      ANION GAP 11 mmol/L      BUN 17 mg/dL      Creatinine 0 75 mg/dL      Glucose 110 mg/dL      Calcium 9 0 mg/dL      eGFR 80 ml/min/1 73sq m     Narrative:      [facility] guidelines for Chronic Kidney Disease (CKD):   •  Stage 1 with normal or high GFR (GFR > 90 mL/min/1 73 square meters)  •  Stage 2 Mild CKD (GFR = 60-89 mL/min/1 73 square meters)  •  Stage 3A Moderate CKD (GFR = 45-59 mL/min/1 73 square meters)  •  Stage 3B Moderate CKD (GFR = 30-44 mL/min/1 73 square meters)  •  Stage 4 Severe CKD (GFR = 15-29 mL/min/1 73 square meters)  •  Stage 5 End Stage CKD (GFR <15 mL/min/1 73 square meters)  Note: GFR calculation is accurate only with a steady state creatinine    CBC and differential [[id number]]  (Abnormal) Collected: 12/04/22 1501    Lab Status: Final result Specimen: Blood from Arm, Left Updated: 12/04/22 1517     WBC 7 97 Thousand/uL      RBC 4 86 Million/uL      Hemoglobin 14 2 g/dL      Hematocrit 42 6 %      MCV 88 fL      MCH 29 2 pg      MCHC 33 3 g/dL      RDW 13 2 %      MPV 9 7 fL      Platelets 079 Thousands/uL      nRBC 0 /100 WBCs      Neutrophils Relative 79 %      Immat GRANS % 1 %      Lymphocytes Relative 12 % Monocytes Relative 6 %      Eosinophils Relative 1 %      Basophils Relative 1 %      Neutrophils Absolute 6 35 Thousands/µL      Immature Grans Absolute 0 07 Thousand/uL      Lymphocytes Absolute 0 97 Thousands/µL      Monocytes Absolute 0 45 Thousand/µL      Eosinophils Absolute 0 06 Thousand/µL      Basophils Absolute 0 07 Thousands/µL                  CT head without contrast   Final Result by Jim Mcneil MD (12/04 1646)      No acute intracranial abnormality  Workstation performed: [id number]         CT spine cervical without contrast   Final Result by Jim Mcneil MD (12/04 1649)      No cervical spine fracture or traumatic malalignment  Workstation performed: [id number]         CT chest abdomen pelvis w contrast   Final Result by Jim Mcneil MD (12/04 1704)      Acute compression deformity involving the superior endplate of L3 with approximately 15% loss of height  Workstation performed: [id number]         CT recon only lumbar spine   Final Result by Jim Mcneil MD (12/04 1707)      Acute compression deformity involving the superior endplate of L3 with approximately 15% loss of height  No fragment retropulsion into the spinal canal             Workstation performed: [id number]         XR lumbar spine 2 or 3 views    (Results Pending)              Procedures  Procedures         ED Course  ED Course as of 12/05/22 0623   Sun Dec 04, 2022   1611 Called to room by RN, patient with chest pressure/heaviness, SOB and nausea when she got back from CT scan w/contrast  No hx of allergic reaction, has had contrast in past  BP stable  Oxygen 100% on RA  Obtaining EKG  No difficulty swallowing  Will medicate  Symptoms improving now  1657 Per RN, after benadryl, patient states she started falling asleep and had episode of hypoxia to 83% on RA, placed on 2L NC for 5 minutes  Patient reports no CP or SOB   Oxygen saturation now back up to 99% on RA     1726 CT recon only lumbar spine  Acute compression deformity involving the superior endplate of L3 with approximately 15% loss of height  No fragment retropulsion into the spinal canal    1726 TT to Neurosurgery    2 [facility] Neurosurgery, Dr Mitchell Letters, recommends TLSO, upright films with brace on and outpatient f/u in his office  SBIRT 22yo+    Flowsheet Row Most Recent Value   SBIRT (25 yo +)    In order to provide better care to our patients, we are screening all of our patients for alcohol and drug use  Would it be okay to ask you these screening questions? No Filed at: 12/04/2022 1507                    MDM  Number of Diagnoses or Management Options  Compression fracture of L3 vertebra, initial encounter ([facility] [address] )  Sciatica of right side  Diagnosis management comments: Patient is a 72 y/o female hx of arthritis, breast cancer (15 years in remission), GERD, gastric ulcer, IBS, presenting to the ED for evaluation of fall  CT head and neck negative  CT c/a/p/lumbar shows Acute compression deformity involving the superior endplate of L3 with approximately 15% loss of height    No fragment retropulsion into the spinal canal    Spoke with Neurosurgery on-call   Patient placed in TLSO brace, upright films pending and dispo if patient can walk/tolerate pain  Pain meds sent to pharmacy  F/u with Neurosurgery   Sign out to [facility] PA-C      Disposition  Final diagnoses:   Sciatica of right side   Compression fracture of L3 vertebra, initial encounter [facility])     Time reflects when diagnosis was documented in both MDM as applicable and the Disposition within this note     Time User Action Codes Description Comment    12/4/2022  5:56 PM Eileen Bloomch Add [M54 31] Sciatica of right side     12/4/2022  5:59 PM Arellano Porch Add [S32 030A] Compression fracture of L3 vertebra, initial encounter [facility])       ED Disposition     ED Disposition   Discharge    Condition   Stable    Date/Time   Sun Dec 4, 2022  6:01 PM    Comment   Marilu Christina discharge to home/self care                 Follow-up Information     Follow up With Specialties Details Why Contact Info    Mykel Pantoja MD Neurosurgery Schedule an appointment as soon as possible for a visit   [place]  [phone]            Discharge Medication List as of 12/4/2022  6:18 PM      START taking these medications    Details   oxyCODONE-acetaminophen (Percocet) 5-325 mg per tablet Take 1 tablet by mouth every 4 (four) hours as needed for severe pain Max Daily Amount: 6 tablets, Starting Sun 12/4/2022, Normal         CONTINUE these medications which have CHANGED    Details   methocarbamol (ROBAXIN) 500 mg tablet Take 1 tablet (500 mg total) by mouth 2 (two) times a day as needed for muscle spasms, Starting Sun 12/4/2022, Normal         CONTINUE these medications which have NOT CHANGED    Details   Ascorbic Acid (VITAMIN C) 250 MG CHEW Chew 1,000 mg daily , Historical Med      Calcium Carbonate-Vit D-Min (CALCIUM 1200 PO) Take by mouth, Historical Med      Cholecalciferol (VITAMIN D3) 5000 units CAPS Take 1 tablet by mouth daily, Historical Med      diazepam (VALIUM) 5 mg tablet Take 1 tablet (5 mg total) by mouth 60 minutes pre-procedure, Starting Wed 9/7/2022, Normal      diltiazem (CARDIZEM CD) 180 mg 24 hr capsule TAKE 1 CAPSULE(180 MG) BY MOUTH DAILY, Normal      famotidine (PEPCID) 40 MG tablet TAKE ONE TABLET BY MOUTH DAILY AS NEEDED for heartburn, Normal      methylPREDNISolone 4 MG tablet therapy pack Use as directed on package, Normal      Misc Natural Products (GLUCOSAMINE CHONDROITIN ADV PO) Take 2 capsules by mouth daily , Historical Med      Multiple Vitamins-Minerals (MULTI COMPLETE) CAPS Take 1 capsule by mouth daily, Historical Med      Probiotic Product (VSL#3) CAPS Take 1 capsule by mouth daily, Starting Wed 1/6/2021, Normal      rosuvastatin (CRESTOR) 10 MG tablet TAKE 1 TABLET BY MOUTH DAILY, Normal Turmeric 500 MG CAPS Take 1,000 mg by mouth daily , Historical Med      Zinc 50 MG CAPS Take by mouth daily, Historical Med             No discharge procedures on file      PDMP Review       Value Time User    PDMP Reviewed  Yes 9/7/2022  3:50 PM Krish Miller MD          ED Provider  Electronically Signed by           Phuc Ace PA-C  12/05/22 6440

## 2022-12-04 NOTE — ED NOTES
Pt reports to be feeling better after medications as given  Pt continues on cardiac monitor and pulse ox        Marium Yusuf RN  12/04/22 4578

## 2022-12-04 NOTE — ED NOTES
Pt noted to desat to 83-84% on RA  RN in to see pt  Pt placed on 2L NC  Pt in no acute distress, good work of breathing, no wheezing, and reports CP and nausea to be resolving  Ally-PA ware of same via TT        Parviz Garzon RN  12/04/22 1144

## 2022-12-05 ENCOUNTER — TELEPHONE (OUTPATIENT)
Dept: NEUROSURGERY | Facility: CLINIC | Age: 72
End: 2022-12-05

## 2022-12-05 LAB
ATRIAL RATE: 54 BPM
P AXIS: 69 DEGREES
PR INTERVAL: 174 MS
QRS AXIS: 66 DEGREES
QRSD INTERVAL: 92 MS
QT INTERVAL: 456 MS
QTC INTERVAL: 432 MS
T WAVE AXIS: 63 DEGREES
VENTRICULAR RATE: 54 BPM

## 2022-12-05 NOTE — TELEPHONE ENCOUNTER
12/5/22 - PT  CALLED TO MAKE NP APPT   Luan Dutta - LSPINE - NO REFERRAL IN CHART      12/4/22 - ED - SAVANNA SIU -  BACK PAIN - COMPRESSION FRACTURE L3 VERTEBRA      12/4/22 - CT HEAD, CT SPINE CERVICAL, CT CHEST, CT LSPINE, XR LSPINE    INFORMED PT  AN  WILL BE IN CONTACT WITH HER

## 2022-12-06 DIAGNOSIS — E78.5 HYPERLIPIDEMIA, UNSPECIFIED HYPERLIPIDEMIA TYPE: ICD-10-CM

## 2022-12-06 RX ORDER — ROSUVASTATIN CALCIUM 10 MG/1
TABLET, COATED ORAL
Qty: 90 TABLET | Refills: 0 | Status: SHIPPED | OUTPATIENT
Start: 2022-12-06

## 2022-12-08 DIAGNOSIS — I10 ESSENTIAL HYPERTENSION: ICD-10-CM

## 2022-12-08 RX ORDER — DILTIAZEM HYDROCHLORIDE 180 MG/1
CAPSULE, COATED, EXTENDED RELEASE ORAL
Qty: 90 CAPSULE | Refills: 0 | Status: SHIPPED | OUTPATIENT
Start: 2022-12-08 | End: 2022-12-14

## 2022-12-15 ENCOUNTER — OFFICE VISIT (OUTPATIENT)
Dept: FAMILY MEDICINE CLINIC | Facility: CLINIC | Age: 72
End: 2022-12-15

## 2022-12-15 VITALS
RESPIRATION RATE: 12 BRPM | HEART RATE: 70 BPM | SYSTOLIC BLOOD PRESSURE: 138 MMHG | WEIGHT: 162 LBS | BODY MASS INDEX: 27.38 KG/M2 | DIASTOLIC BLOOD PRESSURE: 88 MMHG | OXYGEN SATURATION: 98 %

## 2022-12-15 DIAGNOSIS — D70.9 NEUTROPENIA, UNSPECIFIED TYPE (HCC): ICD-10-CM

## 2022-12-15 DIAGNOSIS — M51.26 LUMBAR DISC HERNIATION: ICD-10-CM

## 2022-12-15 DIAGNOSIS — E78.00 PURE HYPERCHOLESTEROLEMIA: ICD-10-CM

## 2022-12-15 DIAGNOSIS — S32.030A CLOSED COMPRESSION FRACTURE OF L3 LUMBAR VERTEBRA, INITIAL ENCOUNTER (HCC): Primary | ICD-10-CM

## 2022-12-15 DIAGNOSIS — M54.31 SCIATICA OF RIGHT SIDE: ICD-10-CM

## 2022-12-15 DIAGNOSIS — S32.030A COMPRESSION FRACTURE OF L3 VERTEBRA, INITIAL ENCOUNTER (HCC): ICD-10-CM

## 2022-12-15 DIAGNOSIS — K51.00 ULCERATIVE PANCOLITIS WITHOUT COMPLICATION (HCC): ICD-10-CM

## 2022-12-15 DIAGNOSIS — I10 ESSENTIAL HYPERTENSION: ICD-10-CM

## 2022-12-15 RX ORDER — OXYCODONE HYDROCHLORIDE AND ACETAMINOPHEN 5; 325 MG/1; MG/1
1 TABLET ORAL EVERY 4 HOURS PRN
Qty: 10 TABLET | Refills: 0 | Status: SHIPPED | OUTPATIENT
Start: 2022-12-15

## 2022-12-15 RX ORDER — NALOXONE HYDROCHLORIDE 4 MG/.1ML
SPRAY NASAL
Qty: 1 EACH | Refills: 1 | Status: SHIPPED | OUTPATIENT
Start: 2022-12-15

## 2022-12-15 RX ORDER — METHOCARBAMOL 500 MG/1
500 TABLET, FILM COATED ORAL 2 TIMES DAILY PRN
Qty: 60 TABLET | Refills: 1 | Status: SHIPPED | OUTPATIENT
Start: 2022-12-15

## 2022-12-15 NOTE — PROGRESS NOTES
Assessment/Plan:       Problem List Items Addressed This Visit        Digestive    [facility]     Symptoms are stable at this time  Cardiovascular and Mediastinum    Essential hypertension       Musculoskeletal and Integument    Lumbar disc herniation    Relevant Medications    naloxone (NARCAN) 4 mg/0 1 mL nasal spray    Closed compression fracture of L3 lumbar vertebra, initial encounter ([facility] ) - Primary     The patient for follow-up for her recent L3 compression fracture  She is having significant pain so I refilled her Robaxin as well as Percocet  I did reach out to neurosurgery to see if we can expedite her consultation for consideration of she is very interested in  She will continue supportive brace  PDMP was reviewed there is no red flags  I did prescribe her Narcan as well  Relevant Medications    naloxone (NARCAN) 4 mg/0 1 mL nasal spray    Other Relevant Orders    Ambulatory referral to Physical Therapy       Other    Pure hypercholesterolemia    Neutropenia ([facility] )   Other Visit Diagnoses     Compression fracture of L3 vertebra, initial encounter ([place] [address] )        Relevant Medications    oxyCODONE-acetaminophen (Percocet) 5-325 mg per tablet    naloxone (NARCAN) 4 mg/0 1 mL nasal spray    Sciatica of right side        Relevant Medications    methocarbamol (ROBAXIN) 500 mg tablet    naloxone (NARCAN) 4 mg/0 1 mL nasal spray            Subjective:      Patient ID: Amalia Her is a 71 y o  female  HPI   She sustained a fall from a ladder while decorating her [employer] tree and landed on her backside  She presented to the emergency room with significant low back pain  She was diagnosed with an L3 compression fracture  She is wearing a back brace  Unfortunately, she continues to have significant pain  She really has been limiting oxycodone and has not taken it in 5 days  She did have some significant constipation since taking it    She is seeing neurosurgery in the hopes of having a kyphoplasty done  The following portions of the patient's history were reviewed and updated as appropriate: allergies, current medications, past family history, past medical history, past social history, past surgical history and problem list       Current Outpatient Medications:   •  Ascorbic Acid (VITAMIN C) 250 MG CHEW, Chew 1,000 mg daily , Disp: , Rfl:   •  Calcium Carbonate-Vit D-Min (CALCIUM 1200 PO), Take by mouth, Disp: , Rfl:   •  Cholecalciferol (VITAMIN D3) 5000 units CAPS, Take 1 tablet by mouth daily, Disp: , Rfl:   •  diltiazem (CARDIZEM CD) 180 mg 24 hr capsule, TAKE 1 CAPSULE(180 MG) BY MOUTH DAILY, Disp: 90 capsule, Rfl: 3  •  famotidine (PEPCID) 40 MG tablet, TAKE ONE TABLET BY MOUTH DAILY AS NEEDED for heartburn, Disp: 30 tablet, Rfl: 0  •  methocarbamol (ROBAXIN) 500 mg tablet, Take 1 tablet (500 mg total) by mouth 2 (two) times a day as needed for muscle spasms, Disp: 60 tablet, Rfl: 1  •  Misc Natural Products (GLUCOSAMINE CHONDROITIN ADV PO), Take 2 capsules by mouth daily , Disp: , Rfl:   •  Multiple Vitamins-Minerals (MULTI COMPLETE) CAPS, Take 1 capsule by mouth daily, Disp: , Rfl:   •  naloxone (NARCAN) 4 mg/0 1 mL nasal spray, Administer 1 spray into a nostril  If no response after 2-3 minutes, give another dose in the other nostril using a new spray , Disp: 1 each, Rfl: 1  •  oxyCODONE-acetaminophen (Percocet) 5-325 mg per tablet, Take 1 tablet by mouth every 4 (four) hours as needed for severe pain Max Daily Amount: 6 tablets, Disp: 10 tablet, Rfl: 0  •  Probiotic Product (VSL#3) CAPS, Take 1 capsule by mouth daily, Disp: 90 capsule, Rfl: 3  •  rosuvastatin (CRESTOR) 10 MG tablet, TAKE 1 TABLET BY MOUTH DAILY, Disp: 90 tablet, Rfl: 0  •  Turmeric 500 MG CAPS, Take 1,000 mg by mouth daily , Disp: , Rfl:   •  Zinc 50 MG CAPS, Take by mouth daily, Disp: , Rfl:      Review of Systems   Constitutional: Negative for fatigue  HENT: Negative for trouble swallowing  Respiratory: Negative for chest tightness, shortness of breath and wheezing  Cardiovascular: Negative for chest pain, palpitations and leg swelling  Gastrointestinal: Negative for abdominal pain, constipation and diarrhea  Endocrine: Negative for polyuria  Genitourinary: Negative for difficulty urinating and flank pain  Musculoskeletal: Positive for gait problem  Negative for arthralgias and myalgias  Skin: Negative for rash  Psychiatric/Behavioral: Negative for sleep disturbance  Objective:      /88 (BP Location: Left arm, Patient Position: Sitting, Cuff Size: Standard)   Pulse 70   Resp 12   Wt 73 5 kg (162 lb)   SpO2 98%   BMI 27 38 kg/m²          Physical Exam  Vitals reviewed  Constitutional:       Appearance: She is well-developed  She is not toxic-appearing  HENT:      Head: Normocephalic  Cardiovascular:      Rate and Rhythm: Regular rhythm  Heart sounds: Normal heart sounds  No murmur heard  Pulmonary:      Effort: No respiratory distress  Breath sounds: No wheezing or rales  Abdominal:      General: There is no distension  Tenderness: There is no abdominal tenderness  Skin:     Findings: No erythema or rash  Neurological:      Mental Status: She is alert and oriented to person, place, and time             Jaylin Abebe MD

## 2022-12-15 NOTE — ASSESSMENT & PLAN NOTE
The patient for follow-up for her recent L3 compression fracture  She is having significant pain so I refilled her Robaxin as well as Percocet  I did reach out to neurosurgery to see if we can expedite her consultation for consideration of she is very interested in  She will continue supportive brace  PDMP was reviewed there is no red flags  I did prescribe her Narcan as well

## 2022-12-19 ENCOUNTER — TELEPHONE (OUTPATIENT)
Dept: FAMILY MEDICINE CLINIC | Facility: CLINIC | Age: 72
End: 2022-12-19

## 2022-12-19 NOTE — TELEPHONE ENCOUNTER
----- Message from Alma Bauer MD sent at 12/15/2022 11:32 PM EST -----  Please let patient know that the neurosurgeon cannot see her sooner and also feels that she may not qualify for kyphoplasty from an insurance perspective  I would still like her to see neurosurgery as planned and continue wearing her brace and exercising as tolerated for now  Continue with as needed pain medications as we discussed today  Follow-up as needed with myself   ----- Message -----  From: Kacey Rock MD  Sent: 12/15/2022  10:50 AM EST  To: MD Katie Lane,    At this point there is no way for me to get her in any sooner than that  If I had known about her earlier then I could have expedited things, sorry  There will be some challenges with getting her a kyphoplasty  First, its traumatic with a fall from height  In the [facility], most insurance companies will not approve this, but sometimes certain plans do  Second she actually has a DXA scan in the past 2 years demonstrating normal density  So she has 2 strikes against this being an osteoporotic fracture (the only approved indication for kypho)  Third, she will require an MRI as this is now part of the criteria  She has a better chance of getting an immediate kypho (and Lorenza Goodell obtaining reimbursement) if she comes through the ED with complaints of intractable pain and ambulatory dysfunction  Otherwise the best chances for her getting approved is if she fails conservative treatment by the 6 week lakesha and MRI shows continued edema  That prob was not the response you were looking for  I hope I can help her  If you decide to direct her to the ER, [facility] is the only option  Happy holidays to you as well  Lambert Spangler    ----- Message -----  From: Alma Bauer MD  Sent: 12/15/2022   8:50 AM EST  To: Kacey Rock MD    Good morning,    I hope all is well  I saw this patient this morning    Unfortunate, she is suffered an L3 compression fracture  She is due to see you for a questionable kyphoplasty but not until 12/23  Her fracture occurred after a fall on December 4  I was hoping you could expedite seeing her as she is in considerable pain  Have a great holiday      Thank you,     Claudell Meadow

## 2022-12-19 NOTE — TELEPHONE ENCOUNTER
Spoke with patient and she will keep her appointment with neurosurgeon on Friday  Patient is schedule with Dr Malvin Lowe on 3/1/22, she will call sooner if need to see Dr Malvin Lowe sooner

## 2022-12-23 ENCOUNTER — CONSULT (OUTPATIENT)
Dept: NEUROSURGERY | Facility: CLINIC | Age: 72
End: 2022-12-23

## 2022-12-23 VITALS
HEART RATE: 74 BPM | HEIGHT: 65 IN | BODY MASS INDEX: 27.49 KG/M2 | TEMPERATURE: 97.9 F | DIASTOLIC BLOOD PRESSURE: 82 MMHG | SYSTOLIC BLOOD PRESSURE: 162 MMHG | RESPIRATION RATE: 16 BRPM | WEIGHT: 165 LBS

## 2022-12-23 DIAGNOSIS — S32.030A WEDGE COMPRESSION FRACTURE OF THIRD LUMBAR VERTEBRA, INITIAL ENCOUNTER FOR CLOSED FRACTURE (HCC): ICD-10-CM

## 2022-12-23 DIAGNOSIS — S32.030A CLOSED COMPRESSION FRACTURE OF L3 LUMBAR VERTEBRA, INITIAL ENCOUNTER (HCC): Primary | ICD-10-CM

## 2022-12-23 RX ORDER — SENNOSIDES 8.6 MG
1300 CAPSULE ORAL EVERY 6 HOURS PRN
COMMUNITY

## 2022-12-23 NOTE — LETTER
December 25, 2022    Re: Nikky Rudd  YOB: 1950    To Whom it May Concern,    For medical reasons, I have instructed my patient, Nikky Rudd, to refrain from lifting greater than 10 pounds and to use caution when doing extreme twisting and turning and bending beginning 12/7/22  Additionally I have advised her against traveling especially by airplane as this may worsen her fracture  Her restrictions will be in effect at least until her next follow-up appointment which is approximately 1 month from now  Should you have any questions, please feel free to contact our office at Dept: [phone]  Sincerely,    Sam Cruz MD    CC:Fabiola Ramon

## 2022-12-23 NOTE — PROGRESS NOTES
Assessment/Plan:     Diagnoses and all orders for this visit:    Closed compression fracture of L3 lumbar vertebra, initial encounter ([facility] [address] )  -     MRI lumbar spine without contrast; Future  -     ALPRAZolam (XANAX) 0 25 mg tablet; Take 4 tablets (1 mg total) by mouth as needed for anxiety for up to 3 doses Take 1 tablet 2 hours before MRI  Take additional tablet 30 minutes MRI if needed  Wedge compression fracture of third lumbar vertebra, initial encounter for closed fracture [facility])  -     Ambulatory Referral to Neurosurgery  -     X-ray lumbar spine 2 or 3 views; Future    Other orders  -     acetaminophen (TYLENOL) 650 mg CR tablet; Take 1,300 mg by mouth every 6 (six) hours as needed for mild pain          Discussion Summary:   Thejoyce Dobbs has a very symptomatic L3 compression fracture  With a fall etiology as well as a DXA scan that is with normal bone density within the last 2 years, it is clear this is a nonosteoporotic traumatic compression fracture  As such I have explained that kyphoplasty is not currently approved for that indication although I do believe it may ultimately be of benefit  She will obtain a new set of plain films to monitor for fracture progression  She will continue wearing her LSO brace, I have given her new instructions on when to wear which is specifically whenever upright and out of bed  She may go to physical therapy, which she was previously referred to  She will return in 1 month with a new MRI (Xanax will be prescribed for claustrophobia)  If there is continued edema and she is in severe pain we may consider kyphoplasty at that time  Until then she will wear her brace and avoid lifting more than 10 pounds as well as avoiding major traveling  Chief Complaint: Consult      Patient ID: Amina Posey is a 71 y  o right handed female    HPI  Ms Weinbergon Meagan presents for evaluation of a compression fracture    While decorating a [employer] tree in early December she fell off of a ladder and landed on her back  She fell down at least from 3 steps above the ground, but could have been more  Pain was severe 10/10 initially and had difficulty walking and immediately presented to the ER  Imaging revealed an L3 fracture and she was given an LSO brace  She has been wearing this constantly even when sleeping in bed  She has been taking Percocet as needed and this has been very helpful but she is trying to avoid this medication  Her back pain has continued but somewhat improved but has developed new involvement of bilateral hip and thigh pain  No weakness or numbness  Although her pain is improved she still rates it as high as a 9 out of 10 in severity when it flares  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  Her pain is severely affecting her ambulation and quality of life  Write note stating no traveling in the next 4 weeks  Review of Systems   HENT: Positive for tinnitus (years )  Eyes: Negative for visual disturbance  Respiratory: Negative for shortness of breath and wheezing  Cardiovascular: Negative for chest pain  Gastrointestinal: Negative  Genitourinary: Negative  Musculoskeletal: Positive for back pain (lower half of her back pain radiates down bilateral leg and left hip  Feel the pain mainly on the tops of her legs  ), gait problem (mainly in the morning, has a walker to help her ) and myalgias (muscle pain/tightness in her back and legs  )  Wearing LSO brace     Heating pad  Tylenol/muscle relaxers /percocet      Neurological: Positive for weakness (in her back ), numbness (only once in her left foot in her toes, over in 20 minutes  ) and headaches (vascular migraines )  Negative for tremors, seizures and syncope  I have personally reviewed the MA's review of systems and made changes as necessary      The following portions of the patient's history were reviewed and updated as appropriate: allergies, current medications, past family history, past medical history, past social history, past surgical history and problem list       Active Ambulatory Problems     Diagnosis Date Noted   • Personal history of adenocarcinoma of breast 06/07/2013   • Arthritis 09/18/2018   • Carcinoma, basal cell, skin 06/07/2013   • Esophageal reflux 12/31/2013   • Pure hypercholesterolemia 12/20/2012   • Lumbar disc herniation 05/27/2014   • Migraine headache 12/11/2012   • Osteopenia 10/23/2017   • Malignant neoplasm of skin 11/08/2018   • Idiopathic peripheral neuropathy 08/14/2019   • Neutropenia ([facility] [address] ) 11/04/2019   • Dupuytren's contracture 08/24/2020   • Hip flexor tendinitis, right 08/24/2020   • Essential hypertension 08/24/2020   • Vertigo 05/21/2021   • Neurologic gait dysfunction 05/21/2021   • Tinnitus of both ears 05/21/2021   • Breast calcifications 06/21/2021   • Ulcerative colitis ([facility] ) 01/14/2021   • Hyperbilirubinemia 11/10/2021   • Chronic migraine without aura without status migrainosus, not intractable 08/31/2022   • Encounter for follow-up surveillance of breast cancer 09/07/2022   • Screening mammogram for breast cancer 09/07/2022   • Closed compression fracture of L3 lumbar vertebra, initial encounter ([place] [address] ) 12/15/2022     Resolved Ambulatory Problems     Diagnosis Date Noted   • Anxiety 09/18/2018   • Insomnia 12/11/2012   • Irritable bowel syndrome 12/11/2012   • Preoperative clearance 09/18/2018   • Hallux hammertoe, left 09/18/2018   • Encounter for screening colonoscopy 11/18/2019   • Frequent falls 06/10/2021   • History of radiation therapy 2007   • Use of letrozole (Femara)      Past Medical History:   Diagnosis Date   • Breast cancer ([facility] )    • Gastric ulcer    • GERD (gastroesophageal reflux disease)    • Heart murmur    • IBS (irritable bowel syndrome)    • Migraine    • Skin cancer        Past Surgical History:   Procedure Laterality Date   • ABDOMINOPLASTY     • ADENOIDECTOMY     • BREAST BIOPSY Right 10/12/2007    IDC   • BREAST LUMPECTOMY Right    • BREAST SURGERY      lumpectomy, resolved 2007   •  SECTION     • CHOLECYSTECTOMY     • COLON SURGERY     • COLONOSCOPY     • COSMETIC SURGERY      forehead   • FOOT SURGERY     • MOHS SURGERY     • REDUCTION MAMMAPLASTY Bilateral 2015    reduction on left/lift on right   • SENTINEL LYMPH NODE BIOPSY Right 2007   • TOE SURGERY      left toe surgery   • TONSILLECTOMY         Current Outpatient Medications   Medication Sig Dispense Refill   • acetaminophen (TYLENOL) 650 mg CR tablet Take 1,300 mg by mouth every 6 (six) hours as needed for mild pain     • Ascorbic Acid (VITAMIN C) 250 MG CHEW Chew 1,000 mg daily      • Calcium Carbonate-Vit D-Min (CALCIUM 1200 PO) Take by mouth     • Cholecalciferol (VITAMIN D3) 5000 units CAPS Take 1 tablet by mouth daily     • diltiazem (CARDIZEM CD) 180 mg 24 hr capsule TAKE 1 CAPSULE(180 MG) BY MOUTH DAILY 90 capsule 3   • famotidine (PEPCID) 40 MG tablet TAKE ONE TABLET BY MOUTH DAILY AS NEEDED for heartburn 30 tablet 0   • methocarbamol (ROBAXIN) 500 mg tablet Take 1 tablet (500 mg total) by mouth 2 (two) times a day as needed for muscle spasms 60 tablet 1   • Multiple Vitamins-Minerals (MULTI COMPLETE) CAPS Take 1 capsule by mouth daily     • oxyCODONE-acetaminophen (Percocet) 5-325 mg per tablet Take 1 tablet by mouth every 4 (four) hours as needed for severe pain Max Daily Amount: 6 tablets 10 tablet 0   • Probiotic Product (VSL#3) CAPS Take 1 capsule by mouth daily 90 capsule 3   • rosuvastatin (CRESTOR) 10 MG tablet TAKE 1 TABLET BY MOUTH DAILY 90 tablet 0   • Zinc 50 MG CAPS Take by mouth daily     • Misc Natural Products (GLUCOSAMINE CHONDROITIN ADV PO) Take 2 capsules by mouth daily  (Patient not taking: Reported on 2022)     • naloxone (NARCAN) 4 mg/0 1 mL nasal spray Administer 1 spray into a nostril   If no response after 2-3 minutes, give another dose in the other nostril using a new spray  1 each 1   • Turmeric 500 MG CAPS Take 1,000 mg by mouth daily  (Patient not taking: Reported on 12/23/2022)       No current facility-administered medications for this visit  Vitals:    12/23/22 1407   BP: 162/82   Pulse: 74   Resp: 16   Temp: 97 9 °F (36 6 °C)         Objective:    Physical Exam  Constitutional:       Appearance: Normal appearance  HENT:      Head: Normocephalic and atraumatic  Eyes:      Extraocular Movements: Extraocular movements intact  Pupils: Pupils are equal, round, and reactive to light  Pulmonary:      Effort: Pulmonary effort is normal    Musculoskeletal:         General: Tenderness present  Normal range of motion  Neurological:      General: No focal deficit present  Mental Status: She is alert and oriented to person, place, and time  Cranial Nerves: No cranial nerve deficit  Motor: No weakness  Gait: Gait normal    Psychiatric:         Mood and Affect: Mood normal          Behavior: Behavior normal          Thought Content: Thought content normal          Judgment: Judgment normal        Neurologic Exam     Mental Status   Oriented to person, place, and time  Cranial Nerves     CN III, IV, VI   Pupils are equal, round, and reactive to light  Results/Data:  I have reviewed the results and images from the CT in detail with the patient

## 2022-12-25 RX ORDER — ALPRAZOLAM 0.25 MG/1
1 TABLET ORAL AS NEEDED
Qty: 3 TABLET | Refills: 0 | Status: SHIPPED | OUTPATIENT
Start: 2022-12-25 | End: 2022-12-28 | Stop reason: CLARIF

## 2022-12-27 ENCOUNTER — TELEPHONE (OUTPATIENT)
Dept: NEUROSURGERY | Facility: CLINIC | Age: 72
End: 2022-12-27

## 2022-12-27 NOTE — TELEPHONE ENCOUNTER
Patient left a message on my line in regards to receiving a medical letter so she can be reimbursed for her traveling expenses  Called patient and spoke to her in regards to letter  She stated that she only received a letter from the RN on 12/23/22  When asked if she received the letter from [facility] on 12/25/22 she stated she had not  After reading the letter to patient she asked if her diganosised could be listed along with the cruise listed as her traveling restrictions  Patient gave me her verbal consent to list her diagnosis in letter  New letter placed into her chart sent via [employer] and email at [email or web address]    Patient was appreciative for the call

## 2022-12-28 DIAGNOSIS — F40.240 CLAUSTROPHOBIA: Primary | ICD-10-CM

## 2022-12-28 RX ORDER — ALPRAZOLAM 0.25 MG/1
TABLET ORAL
Qty: 2 TABLET | Refills: 0 | Status: CANCELLED | OUTPATIENT
Start: 2022-12-28

## 2022-12-28 RX ORDER — ALPRAZOLAM 0.5 MG/1
TABLET ORAL
Qty: 2 TABLET | Refills: 0 | Status: SHIPPED | OUTPATIENT
Start: 2022-12-28

## 2022-12-28 NOTE — TELEPHONE ENCOUNTER
Received a call from patient's pharmacy looking for clarification and a new script for patient's xanax order  Advised this RN will place a new script for the xanax

## 2023-01-04 ENCOUNTER — EVALUATION (OUTPATIENT)
Dept: PHYSICAL THERAPY | Facility: CLINIC | Age: 73
End: 2023-01-04

## 2023-01-04 DIAGNOSIS — S32.030A CLOSED COMPRESSION FRACTURE OF L3 LUMBAR VERTEBRA, INITIAL ENCOUNTER (HCC): ICD-10-CM

## 2023-01-04 NOTE — PROGRESS NOTES
PT Evaluation     Today's date: 2023  Patient name: Krystyna Velasquez  : 1950  MRN: [id number]  Referring provider: Oren Alpers , *  Dx:   Encounter Diagnosis     ICD-10-CM    1  Closed compression fracture of L3 lumbar vertebra, initial encounter [facility])  S32 030A Ambulatory referral to Physical Therapy                     Assessment  Assessment details: Krystyna Velasquez is a pleasant 71 y o  female presents with L3 compression fx with 4 weeks of routine healing  Educated pt on core engagement exercises and will focus on neutral spine mechanics throughout PT sessions  No further referral appears necessary at this time  Skilled PT services appropriate to facilitate return to prior level of function with transition to home exercise program for independent management when appropriate  Impairments: abnormal muscle firing, abnormal muscle tone, abnormal or restricted ROM, impaired physical strength, lacks appropriate home exercise program and pain with function  Understanding of Dx/Px/POC: good   Prognosis: good    Goals  Patient will be able to manage symptoms independently  LT weeks  1  pt will reach foto predicted  2  pt will decrease worst pain 75%   ST weeks  1  Pt will decrease worst pain 50%  2   Patient will successfully manage HEP       Plan  Patient would benefit from: skilled PT  Referral necessary: No  Planned modality interventions: thermotherapy: hydrocollator packs  Planned therapy interventions: home exercise program, manual therapy, neuromuscular re-education, patient education, functional ROM exercises, strengthening, stretching, joint mobilization, graded activity, graded exercise, therapeutic exercise, body mechanics training, motor coordination training and activity modification  Frequency: 2x week  Duration in weeks: 12  Treatment plan discussed with: patient        Subjective Evaluation    History of Present Illness  Mechanism of injury: Per Sabrina Bowen , MD 12/15/22:  HPI: She sustained a fall from a ladder while decorating her [employer] tree and landed on her backside  She presented to the emergency room with significant low back pain  She was diagnosed with an L3 compression fracture  She is wearing a back brace  Unfortunately, she continues to have significant pain  She really has been limiting oxycodone and has not taken it in 5 days  She did have some significant constipation since taking it  She is seeing neurosurgery in the hopes of having a kyphoplasty done      PT IE 1/4/22:  Pt has MRI on the 1/24  Pain has been central low back, L hip and occasionally into anterior thighs  One episode of the L leg feeling like it was giving out  Pain has been less diffuse overall since the injury  Lifting restriction of 10# or less and she is wearing a back brace when active  She feels like her shoulders have been shifted to the right recently  Pt has been taking percocet at half dose  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  She has been caring for her grand daughter and would like to return to being able to carry her  Walking tends to help pain  She had PT in August for nontraumatic LBP  Treatments  Previous treatment: physical therapy  Current treatment: physical therapy        Objective     Static Posture     Comments  Lumbar sheared R with pain in shear correction L--> slight decrease in pain with hyperlordosis correction    General hypermobility b/l hips  Minimal pain with hip PROM    Limited L pelvic posterior depression  Pain on L LB with R pelvic depression  Minimal automatic core engagement with standing/supine activity--> improvement in pain with core activation education      Strength/Myotome Testing     Left Hip   Planes of Motion   Flexion: 4-  Extension: 4  Abduction: 4-    Right Hip   Planes of Motion   Flexion: 4-  Extension: 4  Abduction: 4-    Left Knee   Flexion: 5    Right Knee   Flexion: 5    Left Ankle/Foot   Dorsiflexion: 5  Plantar flexion: 5    Right Ankle/Foot   Dorsiflexion: 5  Plantar flexion: 5    Tests     Left Hip   Negative SHERMAN and FADIR    90/90 SLR: Negative  Right Hip   Negative SHERMAN and FADIR    90/90 SLR: Negative      General Comments:      Lumbar Comments  L QL restricted and moderate TTP             Precautions: 10# lifting restriction      Manuals 1/5            L QL STM CB                                                   Neuro Re-Ed             SLR flexion             bridge             VG DL squats             Weight shifts w/tband                                                    Ther Ex             SL clamshells             bike             tball iso hip flexion supine                                                                              Ther Activity                                       Gait Training                                       Modalities

## 2023-01-09 ENCOUNTER — OFFICE VISIT (OUTPATIENT)
Dept: PHYSICAL THERAPY | Facility: CLINIC | Age: 73
End: 2023-01-09

## 2023-01-09 ENCOUNTER — TELEPHONE (OUTPATIENT)
Dept: NEUROSURGERY | Facility: CLINIC | Age: 73
End: 2023-01-09

## 2023-01-09 DIAGNOSIS — S32.030A CLOSED COMPRESSION FRACTURE OF L3 LUMBAR VERTEBRA, INITIAL ENCOUNTER (HCC): Primary | ICD-10-CM

## 2023-01-09 NOTE — PROGRESS NOTES
Daily Note     Today's date: 2023  Patient name: Krystyna Velasquez  : 1950  MRN: [id number]  Referring provider: Oren Alpers , *  Dx:   Encounter Diagnosis     ICD-10-CM    1  Closed compression fracture of L3 lumbar vertebra, initial encounter ([facility] )  S32 142A                      Subjective: Pt reports some continued LBP  No increased complaints after HEP  Objective: See treatment diary below      Assessment: Tolerated treatment well  Patient would benefit from continued PT  Continue to strengthen as tolerated  Much improved left hip pain with activation of R glut during functional activity  R lumbar shear was also improved with this  Plan: Continue per plan of care        Precautions: 10# lifting restriction      Manuals            L QL STM CB                                                   Neuro Re-Ed             SLR flexion  5:x10           bridge  5:x10           VG DL squats  3' 45%           Weight shifts w/tband  nv           STS staggered  iso holds 5:x15                                     Ther Ex             SL clamshells  10x10: GTB           bike  7'           tball iso hip flexion supine  6x10: ea                                                                            Ther Activity                                       Gait Training                                       Modalities

## 2023-01-09 NOTE — TELEPHONE ENCOUNTER
Received a call from patient stating when picking up her xanax order for her MRI she saw the dosing and noted she's used this in the past for previous MRI's without success  She is requesting for a higher dose or different medication  Discussed with DEBBY RANDOLPH who stated she does not feel comfortable ordering a higher dose of medication since that is our office's protocol  Returned call to patient and advised of the above  Informed her she should reach out to her PCP for further medications/dose adjustments  Patient questioned if she would be able to have the scan done going in foot first rather than [facility] stating she thinks she would then be able to get through the whole MRI  Reached out to MRI department and spoke with Mady Goldberg who stated patient should go to [address] or the [address] [place] [address],[address] since they have newer machines she will be able to go in foot first     Reached back out to patient and made her aware of the above  Patient stated she will contact central scheduling to reschedule the appt

## 2023-01-11 ENCOUNTER — OFFICE VISIT (OUTPATIENT)
Dept: PHYSICAL THERAPY | Facility: CLINIC | Age: 73
End: 2023-01-11

## 2023-01-11 DIAGNOSIS — S32.030A CLOSED COMPRESSION FRACTURE OF L3 LUMBAR VERTEBRA, INITIAL ENCOUNTER (HCC): Primary | ICD-10-CM

## 2023-01-11 NOTE — PROGRESS NOTES
Daily Note     Today's date: 2023  Patient name: Dana Suarez  : 1950  MRN: [id number]  Referring provider: Andie Solorio , *  Dx:   Encounter Diagnosis     ICD-10-CM    1  Closed compression fracture of L3 lumbar vertebra, initial encounter ([facility] [address] )  S32 849A                      Subjective: Pt reports some increased pain last night  No major complaints after lv  Objective: See treatment diary below      Assessment: Tolerated treatment well  Patient would benefit from continued PT  Continue to strengthen as tolerated  Pt with R hip abductor weakness relative to L  Continue with postural corrections and majority isometric holds  Plan: Continue per plan of care        Precautions: 10# lifting restriction      Manuals           L QL STM CB                                                   Neuro Re-Ed             SLR flexion  5:x10 10:x10 ea          bridge  5:x10 5:x15          VG DL squats  3' 45% nv          Weight shifts w/tband  nv GTB 5'          STS staggered  iso holds 5:x15 nv          iso SL SLR   15:x6 ea          Functional glut set   10x10: ea          Ther Ex             SL clamshells  10x10: GTB           bike  7' CB          tball iso hip flexion supine  6x10: ea 6x10: ea MRE w/small in/out                                                                           Ther Activity                                       Gait Training                                       Modalities

## 2023-01-16 ENCOUNTER — OFFICE VISIT (OUTPATIENT)
Dept: PHYSICAL THERAPY | Facility: CLINIC | Age: 73
End: 2023-01-16

## 2023-01-16 DIAGNOSIS — S32.030A CLOSED COMPRESSION FRACTURE OF L3 LUMBAR VERTEBRA, INITIAL ENCOUNTER (HCC): Primary | ICD-10-CM

## 2023-01-16 NOTE — PROGRESS NOTES
Daily Note     Today's date: 2023  Patient name: Swetha Melton  : 1950  MRN: [id number]  Referring provider: Jeanacrlos Beard , *  Dx:   Encounter Diagnosis     ICD-10-CM    1  Closed compression fracture of L3 lumbar vertebra, initial encounter ([facility] [address] )  S32 030A                      Subjective: Pt reports some increased LB soreness today  No complaints after lv  Objective: See treatment diary below      Assessment: Tolerated treatment well  Patient would benefit from continued PT  Increased L paraspinal restriction  Continue to strengthen in neutral spine  Plan: Continue per plan of care        Precautions: 10# lifting restriction      Manuals          L QL STM CB   CB MWM                                                Neuro Re-Ed             SLR flexion  5:x10 10:x10 ea CB         bridge  5:x10 5:x15 10:x10         VG DL squats  3' 45% nv          Weight shifts w/tband  nv GTB 5'          STS staggered  iso holds 5:x15 nv          iso SL SLR   15:x6 ea CB         Functional glut set   10x10: ea CB          Ther Ex             SL clamshells  10x10: GTB           bike  7' CB          tball iso hip flexion supine  6x10: ea 6x10: ea MRE w/small in/out CB                                                                          Ther Activity                                       Gait Training                                       Modalities

## 2023-01-18 ENCOUNTER — OFFICE VISIT (OUTPATIENT)
Dept: PHYSICAL THERAPY | Facility: CLINIC | Age: 73
End: 2023-01-18

## 2023-01-18 DIAGNOSIS — S32.030A CLOSED COMPRESSION FRACTURE OF L3 LUMBAR VERTEBRA, INITIAL ENCOUNTER (HCC): Primary | ICD-10-CM

## 2023-01-18 NOTE — PROGRESS NOTES
Daily Note     Today's date: 2023  Patient name: Swetha Melton  : 1950  MRN: [id number]  Referring provider: Jeancarlos Beard , *  Dx:   Encounter Diagnosis     ICD-10-CM    1  Closed compression fracture of L3 lumbar vertebra, initial encounter ([facility] [address] )  S32 370C                      Subjective: Pt reports some continued increased pain along the LB  After lv she had some increased L hip discomfort for a few hours after  Objective: See treatment diary below      Assessment: Tolerated treatment well  Patient would benefit from continued PT  Pt with decreased LBP post STM for paraspinals, sacral border on L and piriformis  Plan: Continue per plan of care        Precautions: 10# lifting restriction      Manuals         L QL STM CB   CB MWM         Prone L paraspinals and piriformis release     CB                                  Neuro Re-Ed             SLR flexion  5:x10 10:x10 ea CB CB        bridge  5:x10 5:x15 10:x10 10x10:        VG DL squats  3' 45% nv          Weight shifts w/tband  nv GTB 5'          STS staggered  iso holds 5:x15 nv          iso SL SLR   15:x6 ea CB         Functional glut set   10x10: ea CB          Ther Ex             SL clamshells  10x10: GTB           bike  7' CB          tball iso hip flexion supine  6x10: ea 6x10: ea MRE w/small in/out CB CB                                                                         Ther Activity                                       Gait Training                                       Modalities             mhp     10'

## 2023-01-19 ENCOUNTER — TELEPHONE (OUTPATIENT)
Dept: FAMILY MEDICINE CLINIC | Facility: CLINIC | Age: 73
End: 2023-01-19

## 2023-01-19 NOTE — TELEPHONE ENCOUNTER
Patient states she is having MRI done on 1/23/2022 and is severely claustrophobic  Physician (Dr Ti Shook) who ordered MRI does not want to wait for her to be put under, so he prescribed 2 x 0 5mg alprazolam  Was advised that this will not "put her under"  Per their office protocol, They will not prescribe any stronger and patient needs to check with PCP  Patient requesting stronger dose of alprazolam - enough to "go under" for her MRI  Please advise

## 2023-01-20 NOTE — TELEPHONE ENCOUNTER
Message from PCP below relayed to patient  Patient reports she still feels the 0 5 mg doses of ALPRAZolam will not be strong enough for her  Patient states this dose was prescribed for a previous MRI and did not help  Patient states she was able to have her head out of the MRI machine for that imaging, however  Unable to do so for this MRI  Reiterated again that PCP reports this should be an appropriate dose to reduce anxiety  Patient is asking if this further info could be relayed to PCP for dose change

## 2023-01-23 ENCOUNTER — HOSPITAL ENCOUNTER (OUTPATIENT)
Dept: RADIOLOGY | Facility: HOSPITAL | Age: 73
Discharge: HOME | End: 2023-01-23
Attending: RADIOLOGY

## 2023-01-23 DIAGNOSIS — S32.030A CLOSED COMPRESSION FRACTURE OF L3 LUMBAR VERTEBRA, INITIAL ENCOUNTER (HCC): ICD-10-CM

## 2023-01-24 ENCOUNTER — APPOINTMENT (OUTPATIENT)
Dept: PHYSICAL THERAPY | Facility: CLINIC | Age: 73
End: 2023-01-24

## 2023-01-24 DIAGNOSIS — F40.240 CLAUSTROPHOBIA: ICD-10-CM

## 2023-01-24 RX ORDER — ALPRAZOLAM 0.5 MG/1
TABLET ORAL
Qty: 2 TABLET | Refills: 0 | Status: SHIPPED | OUTPATIENT
Start: 2023-01-24

## 2023-01-25 NOTE — TELEPHONE ENCOUNTER
MRI has already been completed on 1/23/23  Please advise if you would like this prescription cancelled as controlled substance will no longer be necessary

## 2023-01-25 NOTE — PROGRESS NOTES
The patient is adamant that she cannot get through an MRI with only taking alprazolam 0 5 mg an hour previous to the study and 0 5 mg 1/2-hour previous to the study as needed  I did give her 2 more tablets that she can double the dosing

## 2023-01-26 ENCOUNTER — OFFICE VISIT (OUTPATIENT)
Dept: PHYSICAL THERAPY | Facility: CLINIC | Age: 73
End: 2023-01-26

## 2023-01-26 DIAGNOSIS — S32.030A CLOSED COMPRESSION FRACTURE OF L3 LUMBAR VERTEBRA, INITIAL ENCOUNTER (HCC): Primary | ICD-10-CM

## 2023-01-26 NOTE — PROGRESS NOTES
Daily Note     Today's date: 2023  Patient name: Ines Brody  : 1950  MRN: [id number]  Referring provider: Verónica Gillette , *  Dx:   Encounter Diagnosis     ICD-10-CM    1  Closed compression fracture of L3 lumbar vertebra, initial encounter ([facility] [address] )  S35 055F                      Subjective: Pt reports continuing with some L sided LBP  She returns for physician f/u tomorrow  I asked if she could ask for updated PT script with updated motion/lifting restrictions  Objective: See treatment diary below      Assessment: Tolerated treatment well  Patient would benefit from continued PT  Continued R lumbar shift in standing and pain with shift out was addressed today with R heel wedge, noting improved posture and decreased L sided LBP  Focused on neutral spine strengthening as tolerated  Plan: Continue per plan of care  Precautions: 10# lifting restriction      Manuals        L QL STM CB   CB MWM         Prone L paraspinals and piriformis release     CB        R psoas release      CB                    Neuro Re-Ed             SLR flexion  5:x10 10:x10 ea CB CB 10x10 ea       bridge  5:x10 5:x15 10:x10 10x10: CB       VG DL squats  3' 45% nv          Weight shifts w/tband  nv GTB 5'          STS staggered  iso holds 5:x15 nv          iso SL SLR   15:x6 ea CB  nv       Functional glut set   10x10: ea CB   nv       Ther Ex             SL clamshells  10x10: GTB           bike  7' CB          tball iso hip flexion supine  6x10: ea 6x10: ea MRE w/small in/out CB CB CB       L SL lumbar shift correction w/iso LE holds      10x10:                                                            Ther Activity                                       Gait Training                                       Modalities             mhp     10' CB

## 2023-01-27 ENCOUNTER — OFFICE VISIT (OUTPATIENT)
Dept: NEUROSURGERY | Facility: CLINIC | Age: 73
End: 2023-01-27

## 2023-01-27 VITALS
WEIGHT: 154 LBS | SYSTOLIC BLOOD PRESSURE: 152 MMHG | DIASTOLIC BLOOD PRESSURE: 80 MMHG | HEART RATE: 67 BPM | BODY MASS INDEX: 25.66 KG/M2 | TEMPERATURE: 97.4 F | HEIGHT: 65 IN

## 2023-01-27 DIAGNOSIS — S32.030A CLOSED COMPRESSION FRACTURE OF L3 LUMBAR VERTEBRA, INITIAL ENCOUNTER (HCC): Primary | ICD-10-CM

## 2023-01-27 RX ORDER — CEFAZOLIN SODIUM 1 G/50ML
1000 SOLUTION INTRAVENOUS ONCE
OUTPATIENT
Start: 2023-01-27 | End: 2023-01-27

## 2023-01-27 RX ORDER — SODIUM CHLORIDE 9 MG/ML
75 INJECTION, SOLUTION INTRAVENOUS CONTINUOUS
OUTPATIENT
Start: 2023-01-27

## 2023-01-27 NOTE — PROGRESS NOTES
Assessment/Plan:     Diagnoses and all orders for this visit:    Closed compression fracture of L3 lumbar vertebra, initial encounter [facility])          Discussion Summary:     Mike Flood has continued severe pain from her L3 fracture limiting her ability to ambulate and quality of life  She has failed conservative therapy with bracing and physical therapy  The fracture has not healed after more than 8 weeks as evidenced by continued edema on MRI  Her only option for relief of pain and treatment of the fracture at this point is a kyphoplasty  She understands the risks and has elected to proceed  She will require medical clearance  Chief Complaint: Follow-up      Patient ID: Udell Koyanagi is a 71 y o  female    HPI  Ms  Johan List returns for follow-up of her L3 compression fracture  She reports mild improvement in pain, in particular when wearing the brace  Pain at rest or with light activities is 2-3/10  However pain with prolonged sitting or standing aggravates to 8/9-10  Centralized pain, no radiating pain or numbness  She has been in Physical therapy since our last visit  Review of Systems   HENT: Positive for tinnitus (years )  Eyes: Negative for visual disturbance  Respiratory: Negative for shortness of breath and wheezing  Cardiovascular: Negative for chest pain  Gastrointestinal: Negative  Genitourinary: Negative  Musculoskeletal: Positive for back pain (lower half of her back pain radiates down left hip/buttock ), gait problem (mainly in the morning, has a walker to help her ) and neck pain (stress related )  Negative for myalgias  Wearing LSO brace     Heating pad  Tylenol/muscle relaxers /percocet   Started PT for her back      Neurological: Positive for weakness (rigth knee gave out- nothing new ) and headaches (vascular migraines )  Negative for dizziness, tremors, seizures, syncope, light-headedness and numbness         I have personally reviewed the MA's review of systems and made changes as necessary      The following portions of the patient's history were reviewed and updated as appropriate: allergies, current medications, past family history, past medical history, past social history, past surgical history and problem list       Active Ambulatory Problems     Diagnosis Date Noted   • Personal history of adenocarcinoma of breast 06/07/2013   • Arthritis 09/18/2018   • Carcinoma, basal cell, skin 06/07/2013   • Esophageal reflux 12/31/2013   • Pure hypercholesterolemia 12/20/2012   • Lumbar disc herniation 05/27/2014   • Migraine headache 12/11/2012   • Osteopenia 10/23/2017   • Malignant neoplasm of skin 11/08/2018   • Idiopathic peripheral neuropathy 08/14/2019   • Neutropenia ([email or web address] ) 11/04/2019   • Dupuytren's contracture 08/24/2020   • Hip flexor tendinitis, right 08/24/2020   • Essential hypertension 08/24/2020   • Vertigo 05/21/2021   • Neurologic gait dysfunction 05/21/2021   • Tinnitus of both ears 05/21/2021   • Breast calcifications 06/21/2021   • Ulcerative colitis ([place] [address] ) 01/14/2021   • Hyperbilirubinemia 11/10/2021   • Chronic migraine without aura without status migrainosus, not intractable 08/31/2022   • Encounter for follow-up surveillance of breast cancer 09/07/2022   • Screening mammogram for breast cancer 09/07/2022   • Closed compression fracture of L3 lumbar vertebra, initial encounter ([place] [address] ) 12/15/2022     Resolved Ambulatory Problems     Diagnosis Date Noted   • Anxiety 09/18/2018   • Insomnia 12/11/2012   • Irritable bowel syndrome 12/11/2012   • Preoperative clearance 09/18/2018   • Hallux hammertoe, left 09/18/2018   • Encounter for screening colonoscopy 11/18/2019   • Frequent falls 06/10/2021   • History of radiation therapy 2007   • Use of letrozole (Femara)      Past Medical History:   Diagnosis Date   • Breast cancer ([place] [address] )    • Gastric ulcer    • GERD (gastroesophageal reflux disease)    • Heart murmur    • IBS (irritable bowel syndrome)    • Migraine    • Skin cancer        Past Surgical History:   Procedure Laterality Date   • ABDOMINOPLASTY     • ADENOIDECTOMY     • BREAST BIOPSY Right 10/12/2007    IDC   • BREAST LUMPECTOMY Right    • BREAST SURGERY      lumpectomy, resolved 2007   •  SECTION     • CHOLECYSTECTOMY     • COLON SURGERY     • COLONOSCOPY     • COSMETIC SURGERY      forehead   • FOOT SURGERY     • MOHS SURGERY     • REDUCTION MAMMAPLASTY Bilateral     reduction on left/lift on right   • SENTINEL LYMPH NODE BIOPSY Right 2007   • TOE SURGERY      left toe surgery   • TONSILLECTOMY         Current Outpatient Medications   Medication Sig Dispense Refill   • acetaminophen (TYLENOL) 650 mg CR tablet Take 1,300 mg by mouth every 6 (six) hours as needed for mild pain     • Calcium Carbonate-Vit D-Min (CALCIUM 1200 PO) Take by mouth     • Cholecalciferol (VITAMIN D3) 5000 units CAPS Take 1 tablet by mouth daily     • diltiazem (CARDIZEM CD) 180 mg 24 hr capsule TAKE 1 CAPSULE(180 MG) BY MOUTH DAILY 90 capsule 3   • famotidine (PEPCID) 40 MG tablet TAKE ONE TABLET BY MOUTH DAILY AS NEEDED for heartburn 30 tablet 0   • methocarbamol (ROBAXIN) 500 mg tablet Take 1 tablet (500 mg total) by mouth 2 (two) times a day as needed for muscle spasms 60 tablet 1   • Multiple Vitamins-Minerals (MULTI COMPLETE) CAPS Take 1 capsule by mouth daily     • naloxone (NARCAN) 4 mg/0 1 mL nasal spray Administer 1 spray into a nostril  If no response after 2-3 minutes, give another dose in the other nostril using a new spray  1 each 1   • Probiotic Product (VSL#3) CAPS Take 1 capsule by mouth daily 90 capsule 3   • rosuvastatin (CRESTOR) 10 MG tablet TAKE 1 TABLET BY MOUTH DAILY 90 tablet 0   • Turmeric 500 MG CAPS Take 1,000 mg by mouth daily     • Zinc 50 MG CAPS Take by mouth daily     • ALPRAZolam (XANAX) 0 5 mg tablet Take up to 2 tabs 1 hour prior to MRI  May take an additional 1-2 tabs 30 minutes prior to MRI as necessary   2 tablet 0   • Ascorbic Acid (VITAMIN C) 250 MG CHEW Chew 1,000 mg daily      • Misc Natural Products (GLUCOSAMINE CHONDROITIN ADV PO) Take 2 capsules by mouth daily  (Patient not taking: Reported on 12/23/2022)     • oxyCODONE-acetaminophen (Percocet) 5-325 mg per tablet Take 1 tablet by mouth every 4 (four) hours as needed for severe pain Max Daily Amount: 6 tablets 10 tablet 0     No current facility-administered medications for this visit  Vitals:    01/27/23 1114   BP: 152/80   Pulse: 67   Temp: (!) 97 4 °F (36 3 °C)         Objective:    Physical Exam  Constitutional:       Appearance: Normal appearance  Eyes:      Pupils: Pupils are equal, round, and reactive to light  Pulmonary:      Effort: Pulmonary effort is normal    Musculoskeletal:         General: Tenderness present  Neurological:      General: No focal deficit present  Mental Status: She is alert  Mental status is at baseline  Psychiatric:         Mood and Affect: Mood normal          Behavior: Behavior normal          Thought Content: Thought content normal          Judgment: Judgment normal        Neurologic Exam     Cranial Nerves     CN III, IV, VI   Pupils are equal, round, and reactive to light  Results/Data:  I have reviewed the results and images from the MRI in detail with the patient

## 2023-01-30 ENCOUNTER — OFFICE VISIT (OUTPATIENT)
Dept: PHYSICAL THERAPY | Facility: CLINIC | Age: 73
End: 2023-01-30

## 2023-01-30 DIAGNOSIS — S32.030A CLOSED COMPRESSION FRACTURE OF L3 LUMBAR VERTEBRA, INITIAL ENCOUNTER (HCC): Primary | ICD-10-CM

## 2023-01-30 NOTE — PROGRESS NOTES
Daily Note     Today's date: 2023  Patient name: Keely Mcguire  : 1950  MRN: [id number]  Referring provider: Pascual Lyman , *  Dx:   Encounter Diagnosis     ICD-10-CM    1  Closed compression fracture of L3 lumbar vertebra, initial encounter [facility])  S32 030A                      Subjective: Pt reports having f/u with surgeon who is now considering kyphoplasty as Meche Waddell continues with significant pain and minimal long term improvement through PT        Objective: See treatment diary below      Assessment: Tolerated treatment fair  Patient would benefit from continued PT  Pt with continued L sided paraspinal and QL restriction  Given imaging reports on limited healing of compression fx, will continue with prior recommendations in lifting restrictions and limiting bending/lifting/twisting  Plan: Continue per plan of care        Precautions: 10# lifting restriction      Manuals       L QL STM CB   CB MWM   CB      Prone L paraspinals and piriformis release     CB  CB      R psoas release      CB                    Neuro Re-Ed             SLR flexion  5:x10 10:x10 ea CB CB 10x10 ea CB      bridge  5:x10 5:x15 10:x10 10x10: CB CB      VG DL squats  3' 45% nv          Weight shifts w/tband  nv GTB 5'          STS staggered  iso holds 5:x15 nv          iso SL SLR   15:x6 ea CB  nv 15:x6 ea      Functional glut set   10x10: ea CB   nv nv      Ther Ex             SL clamshells  10x10: GTB           bike  7' CB          tball iso hip flexion supine  6x10: ea 6x10: ea MRE w/small in/out CB CB CB CB      L SL lumbar shift correction w/iso LE holds      10x10: np                                                          Ther Activity                                       Gait Training                                       Modalities             mhp     10' CB CB

## 2023-02-01 ENCOUNTER — APPOINTMENT (OUTPATIENT)
Dept: PHYSICAL THERAPY | Facility: CLINIC | Age: 73
End: 2023-02-01

## 2023-02-02 ENCOUNTER — OFFICE VISIT (OUTPATIENT)
Dept: PHYSICAL THERAPY | Facility: CLINIC | Age: 73
End: 2023-02-02

## 2023-02-02 DIAGNOSIS — S32.030A CLOSED COMPRESSION FRACTURE OF L3 LUMBAR VERTEBRA, INITIAL ENCOUNTER (HCC): Primary | ICD-10-CM

## 2023-02-02 NOTE — PROGRESS NOTES
Daily Note     Today's date: 2023  Patient name: Ashley Garcia  : 1950  MRN: [id number]  Referring provider: Fabian Koo , *  Dx:   Encounter Diagnosis     ICD-10-CM    1  Closed compression fracture of L3 lumbar vertebra, initial encounter ([facility] [address] )  S35 355Z                      Subjective: Pt notes reduced pain after being more careful about bending forward at work recently  Given lack of long term progress, lack of full healing in the spine and ability to perform exercises independeny, pt will be holding PT at this time  Objective: See treatment diary below      Assessment: Tolerated treatment well  Patient exhibited good technique with therapeutic exercises  Educated on continuing neutral spine strengthening for home and reiterated ways to unload her back during her work days    Pt understands she may call back with further questions/concerns until potential kyphoplasty date        Plan: Hold PT      Precautions: 10# lifting restriction      Manuals  2/2     L QL STM CB   CB MWM   CB CB     Prone L paraspinals and piriformis release     CB  CB CB     R psoas release      CB                    Neuro Re-Ed             SLR flexion  5:x10 10:x10 ea CB CB 10x10 ea CB      bridge  5:x10 5:x15 10:x10 10x10: CB CB CB     VG DL squats  3' 45% nv          Weight shifts w/tband  nv GTB 5'          STS staggered  iso holds 5:x15 nv          iso SL SLR   15:x6 ea CB  nv 15:x6 ea CB     Functional glut set   10x10: ea CB   nv nv      Ther Ex             SL clamshells  10x10: GTB           bike  7' CB          tball iso hip flexion supine  6x10: ea 6x10: ea MRE w/small in/out CB CB CB CB CB     L SL lumbar shift correction w/iso LE holds      10x10: np                                                          Ther Activity                                       Gait Training                                       Modalities             mhp     10' CB CB CB

## 2023-02-07 ENCOUNTER — OFFICE VISIT (OUTPATIENT)
Dept: URGENT CARE | Facility: MEDICAL CENTER | Age: 73
End: 2023-02-07

## 2023-02-07 VITALS
HEART RATE: 92 BPM | OXYGEN SATURATION: 98 % | DIASTOLIC BLOOD PRESSURE: 86 MMHG | TEMPERATURE: 100.2 F | SYSTOLIC BLOOD PRESSURE: 138 MMHG | RESPIRATION RATE: 18 BRPM

## 2023-02-07 DIAGNOSIS — J01.00 ACUTE MAXILLARY SINUSITIS, RECURRENCE NOT SPECIFIED: Primary | ICD-10-CM

## 2023-02-07 RX ORDER — AMOXICILLIN 875 MG/1
875 TABLET, COATED ORAL 2 TIMES DAILY
Qty: 20 TABLET | Refills: 0 | Status: SHIPPED | COMMUNITY
Start: 2023-02-07 | End: 2023-02-17

## 2023-02-08 NOTE — PATIENT INSTRUCTIONS
I prescribed amoxicillin 875 mg twice a day for 10 days  Advised patient to resume Flonase nasal spray-which she has at home, 2 sprays in each nostril once daily  Also recommended patient perform saline nasal flushes as needed  Rhinosinusitis   WHAT YOU NEED TO KNOW:   Rhinosinusitis (RS) is inflammation or infection of your nasal passages and sinuses  RS is most often caused by a virus but may be caused by bacteria  Acute RS lasts up to 12 weeks  Chronic RS lasts more than 12 weeks  Recurrent RS means you have 4 or more infections in 1 year  DISCHARGE INSTRUCTIONS:   Return to the emergency department if:   You have trouble breathing, or wheezing that is getting worse  You have a stiff neck, a fever, or a bad headache  You cannot open your eye  Your eyeball bulges out, or you cannot move your eye  You are more sleepy than usual, or you notice changes in your ability to think, move, or talk  You have swelling of your forehead or scalp  Call your doctor if:   You have vision changes, such as double vision  Your eye and eyelid are red, swollen, and painful  Your symptoms do not improve after 10 days  You have nausea and are vomiting  Your nose is bleeding  You have questions or concerns about your condition or care  Medicines: Your symptoms may go away on their own  Your healthcare provider may recommend watchful waiting for up to 10 days before starting antibiotics  Antibiotics will not help if the infection is caused by a virus  Check with your provider before you take any over-the-counter medicines  You may need any of the following:  Acetaminophen  decreases pain and fever  It is available without a doctor's order  Ask how much to take and how often to take it  Follow directions  Read the labels of all other medicines you are using to see if they also contain acetaminophen, or ask your doctor or pharmacist  Acetaminophen can cause liver damage if not taken correctly  Do not use more than 4 grams (4,000 milligrams) total of acetaminophen in one day  NSAIDs , such as ibuprofen, help decrease swelling, pain, and fever  This medicine is available with or without a doctor's order  NSAIDs can cause stomach bleeding or kidney problems in certain people  If you take blood thinner medicine, always ask your healthcare provider if NSAIDs are safe for you  Always read the medicine label and follow directions  Nasal steroid sprays  help decrease inflammation in your nose and sinuses  Decongestants  help reduce swelling and drain mucus in the nose and sinuses  They may help you breathe easier  Do not use decongestants for more than 3 days  Antihistamines  help dry mucus in the nose and relieve sneezing  Antibiotics  help treat or prevent a bacterial infection  Self-care:   Rinse your sinuses as directed  Use a sinus rinse device to rinse your nasal passages with a saline (salt water) solution or distilled water  Do not  use tap water  A sinus rinse will help thin the mucus in your nose and rinse away pollen and dirt  It will also help lower swelling so you can breathe normally  Use a humidifier  to increase air moisture in your home  Moist air may make it easier for you to breathe and help decrease your cough  Sleep with your head raised  Place an extra pillow under your head before you go to sleep to help your sinuses drain  Drink liquids as directed  Ask your healthcare provider how much liquid to drink each day and which liquids are best for you  Liquids will thin the mucus in your nose and help it drain  Do not have drinks that contain alcohol or caffeine  Do not smoke, and avoid secondhand smoke  Nicotine and other chemicals in cigarettes and cigars can make your symptoms worse  Ask your healthcare provider for information if you currently smoke and need help to quit  E-cigarettes or smokeless tobacco still contain nicotine   Talk to your healthcare provider before you use these products  Prevent the spread of germs:   Wash your hands often with soap and water  Wash your hands after you use the bathroom, change a child's diaper, or sneeze  Wash your hands before you prepare or eat food  Stay away from people who are sick  Some germs spread easily and quickly through contact  Follow up with your doctor as directed: You may be referred to an ear, nose, and throat specialist  Write down your questions so you remember to ask them during your visits  © Copyright [address] 2022 Information is for End User's use only and may not be sold, redistributed or otherwise used for commercial purposes  All illustrations and images included in CareNotes® are the copyrighted property of [employer]  or [facility] Arabella Yeboah  The above information is an  only  It is not intended as medical advice for individual conditions or treatments  Talk to your doctor, nurse or pharmacist before following any medical regimen to see if it is safe and effective for you

## 2023-02-08 NOTE — PROGRESS NOTES
[id number]        NAME: Ines Brody is a 71 y o  female  : 1950    MRN: [id number]  DATE: 2023  TIME: 7:37 PM    Assessment and Plan   Acute maxillary sinusitis, recurrence not specified [J01 00]  1  Acute maxillary sinusitis, recurrence not specified  amoxicillin (AMOXIL) 875 mg tablet            Patient Instructions       Follow up with PCP in 3-5 days  Proceed to  ER if symptoms worsen  Chief Complaint     Chief Complaint   Patient presents with   • Cough     Patient relates dry cough, severe congestion and sinus pressure  Body aches  Started two days ago         History of Present Illness       67-year-old female here today with complaint of nonproductive cough for the last 2 days  She has also developed nasal congestion sinus pain and pressure  Describes thick clear nasal discharge  She has been taking Tylenol for symptoms with no significant improvement  Describes mild to moderate body aches  Refers to low-grade fever  Fahrenheit at home  Review of Systems   Review of Systems   Constitutional: Positive for fever  HENT: Positive for congestion, postnasal drip, sinus pressure and sinus pain  Respiratory: Positive for cough            Current Medications       Current Outpatient Medications:   •  acetaminophen (TYLENOL) 650 mg CR tablet, Take 1,300 mg by mouth every 6 (six) hours as needed for mild pain, Disp: , Rfl:   •  amoxicillin (AMOXIL) 875 mg tablet, Take 1 tablet (875 mg total) by mouth 2 (two) times a day for 10 days, Disp: 20 tablet, Rfl: 0  •  Calcium Carbonate-Vit D-Min (CALCIUM 1200 PO), Take by mouth, Disp: , Rfl:   •  Cholecalciferol (VITAMIN D3) 5000 units CAPS, Take 1 tablet by mouth daily, Disp: , Rfl:   •  diltiazem (CARDIZEM CD) 180 mg 24 hr capsule, TAKE 1 CAPSULE(180 MG) BY MOUTH DAILY, Disp: 90 capsule, Rfl: 3  •  famotidine (PEPCID) 40 MG tablet, TAKE ONE TABLET BY MOUTH DAILY AS NEEDED for heartburn, Disp: 30 tablet, Rfl: 0  • methocarbamol (ROBAXIN) 500 mg tablet, Take 1 tablet (500 mg total) by mouth 2 (two) times a day as needed for muscle spasms, Disp: 60 tablet, Rfl: 1  •  Multiple Vitamins-Minerals (MULTI COMPLETE) CAPS, Take 1 capsule by mouth daily, Disp: , Rfl:   •  naloxone (NARCAN) 4 mg/0 1 mL nasal spray, Administer 1 spray into a nostril  If no response after 2-3 minutes, give another dose in the other nostril using a new spray , Disp: 1 each, Rfl: 1  •  Probiotic Product (VSL#3) CAPS, Take 1 capsule by mouth daily, Disp: 90 capsule, Rfl: 3  •  rosuvastatin (CRESTOR) 10 MG tablet, TAKE 1 TABLET BY MOUTH DAILY, Disp: 90 tablet, Rfl: 0  •  Turmeric 500 MG CAPS, Take 1,000 mg by mouth daily, Disp: , Rfl:   •  Zinc 50 MG CAPS, Take by mouth daily, Disp: , Rfl:   •  ALPRAZolam (XANAX) 0 5 mg tablet, Take up to 2 tabs 1 hour prior to MRI  May take an additional 1-2 tabs 30 minutes prior to MRI as necessary  , Disp: 2 tablet, Rfl: 0  •  Ascorbic Acid (VITAMIN C) 250 MG CHEW, Chew 1,000 mg daily , Disp: , Rfl:   •  Misc Natural Products (GLUCOSAMINE CHONDROITIN ADV PO), Take 2 capsules by mouth daily  (Patient not taking: Reported on 12/23/2022), Disp: , Rfl:   •  oxyCODONE-acetaminophen (Percocet) 5-325 mg per tablet, Take 1 tablet by mouth every 4 (four) hours as needed for severe pain Max Daily Amount: 6 tablets, Disp: 10 tablet, Rfl: 0    Current Allergies     Allergies as of 02/07/2023 - Reviewed 02/07/2023   Allergen Reaction Noted   • Ciprofloxacin Anaphylaxis 10/30/2003   • Celecoxib Swelling 12/11/2012   • Amitriptyline GI Intolerance 05/30/2017   • Bactrim [sulfamethoxazole-trimethoprim] Rash 10/30/2003   • Lidoderm [lidocaine] Rash 08/31/2022   • Mesalamine Dizziness and Headache 12/08/2020   • Other Vomiting 10/02/2014   • Sulfa antibiotics Rash 12/11/2012   • Wound dressing adhesive Rash 12/11/2012            The following portions of the patient's history were reviewed and updated as appropriate: allergies, current medications, past family history, past medical history, past social history, past surgical history and problem list      Past Medical History:   Diagnosis Date   • Arthritis    • Breast cancer ([address] )    • Gastric ulcer    • GERD (gastroesophageal reflux disease)    • Heart murmur    • History of radiation therapy     PBRT   • IBS (irritable bowel syndrome)    • Irritable bowel syndrome 2012   • Migraine    • Skin cancer    • Use of letrozole (Femara)     took for 2 years D/john due to side effects       Past Surgical History:   Procedure Laterality Date   • ABDOMINOPLASTY     • ADENOIDECTOMY     • BREAST BIOPSY Right 10/12/2007    [facility]   • BREAST LUMPECTOMY Right    • BREAST SURGERY      lumpectomy, resolved 2007   •  SECTION     • CHOLECYSTECTOMY     • COLON SURGERY     • COLONOSCOPY     • COSMETIC SURGERY  1975    forehead   • FOOT SURGERY     • MOHS SURGERY     • REDUCTION MAMMAPLASTY Bilateral 2015    reduction on left/lift on right   • SENTINEL LYMPH NODE BIOPSY Right 2007   • TOE SURGERY      left toe surgery   • TONSILLECTOMY         Family History   Problem Relation Age of Onset   • Hypertension Mother    • Melanoma Mother    • Heart disease Mother    • Heart attack Father         acute MI, CABG   • Hypertension Father    • Heart disease Father    • Other Brother         CABG   • Lymphoma Brother         non-Hodgkin's   • Hypertension Brother    • Heart disease Brother    • Other Family         back disorder   • Stroke Family         CVA   • Diabetes Family    • Cancer Family          Medications have been verified  Objective   /86   Pulse 92   Temp 100 2 °F (37 9 °C)   Resp 18   SpO2 98%   No LMP recorded  Patient is postmenopausal        Physical Exam     Physical Exam  Vitals and nursing note reviewed  Constitutional:       Appearance: Normal appearance     HENT:      Head:      Comments: Maxillary sinus tenderness     Nose:      Comments: Erythematous hypertrophic right turbinate     Mouth/Throat:      Mouth: Mucous membranes are moist    Pulmonary:      Effort: Pulmonary effort is normal       Breath sounds: Normal breath sounds  Musculoskeletal:      Cervical back: Normal range of motion and neck supple  Neurological:      Mental Status: She is alert

## 2023-02-14 ENCOUNTER — TELEPHONE (OUTPATIENT)
Dept: NEUROSURGERY | Facility: CLINIC | Age: 73
End: 2023-02-14

## 2023-02-14 DIAGNOSIS — M85.80 OSTEOPENIA: Primary | ICD-10-CM

## 2023-02-14 NOTE — TELEPHONE ENCOUNTER
Per cnidy message : "Orders placed as STAT  We will contact her to arrange the DXA "    Called patient and LM to help schedule DXA scan ASAP  Left my direct ext to call me back

## 2023-02-15 NOTE — TELEPHONE ENCOUNTER
Spoke to patient  Scheduled dxa for 2/16/23 at 11:30am    Told patient no calcium supplements 24 hours piror to dxa and wear clothing with no metals  Told her Dr Kyalee dahl surgery coordinator will be reaching out to her after imaging  She understood

## 2023-02-16 ENCOUNTER — HOSPITAL ENCOUNTER (OUTPATIENT)
Dept: BONE DENSITY | Facility: IMAGING CENTER | Age: 73
Discharge: HOME | End: 2023-02-16

## 2023-02-16 VITALS — BODY MASS INDEX: 26.39 KG/M2 | WEIGHT: 158.4 LBS | HEIGHT: 65 IN

## 2023-02-16 DIAGNOSIS — M85.80 OSTEOPENIA: ICD-10-CM

## 2023-02-20 DIAGNOSIS — K21.9 GASTROESOPHAGEAL REFLUX DISEASE WITHOUT ESOPHAGITIS: ICD-10-CM

## 2023-02-20 RX ORDER — FAMOTIDINE 40 MG/1
TABLET, FILM COATED ORAL
Qty: 30 TABLET | Refills: 0 | Status: SHIPPED | OUTPATIENT
Start: 2023-02-20

## 2023-02-21 PROBLEM — M80.08XA AGE-RELATED OSTEOPOROSIS WITH CURRENT PATHOLOGICAL FRACTURE OF VERTEBRA (HCC): Status: ACTIVE | Noted: 2023-02-21

## 2023-03-01 ENCOUNTER — TELEPHONE (OUTPATIENT)
Dept: RADIOLOGY | Facility: HOSPITAL | Age: 73
End: 2023-03-01

## 2023-03-01 DIAGNOSIS — M80.08XA AGE-RELATED OSTEOPOROSIS WITH CURRENT PATHOLOGICAL FRACTURE OF VERTEBRA (HCC): Primary | ICD-10-CM

## 2023-03-01 NOTE — PRE-PROCEDURE INSTRUCTIONS
Pre-procedure Instructions for Interventional Radiology  Jessee Robertson [address] [place], [place] [address] [phone]    You are scheduled for a/an Kyphoplasty L3   On Thursday 3/9/23  Your tentative arrival time is 0700  Short stay will notify you the day before your procedure with the exact arrival time and the location to arrive  To prepare for your procedure:  1  Please arrange for someone to drive you home after the procedure and stay with you until the next morning if you are instructed to do so  This is typically for patients receiving some type of sedative or anesthetic for the procedure  2  DO NOT EAT OR DRINK ANYTHING after midnight on the evening before your procedure including candy & gum   3  ONLY SIPS OF WATER with your medications are allowed on the morning of your procedure  4  TAKE ALL OF YOUR REGULAR MEDICATIONS THE MORNING OF YOUR PROCEDURE with sips of water! We may call you to stop some of your blood sugar, blood pressure and blood thinning medications depending on the procedure  Please take all of these medications unless we instruct you to stop them  5  If you have an allergy to x-ray dye, please contact Interventional Radiology for an x-ray dye preparation which usually consists of an oral steroid and Benadryl  The day of your procedure:  1  Bring a list of the medications you take at home  2  Bring medications you take for breathing problems (such as inhalers), medications for chest pain, or both  3  Bring a case for your glasses or contacts  4  Bring your insurance card and a form of photo ID   5  Please leave all valuables such as credit cards and jewelry at home  6  Report to the registration desk in the main lobby at the [address] [facility], [address]tr[address]  Ask to be directed to [facility]  7  While your procedure is being performed, your family may wait in the Radiology Waiting Room on the 1st floor in Radiology    if they need to leave, they may provide a number to be called following the procedure  8  Be prepared to stay overnight just in case  Sometimes procedures will indicate the need for further observation or treatment  9  If you are scheduled for a follow-up visit with the Interventional Radiologist after your procedure, you will be called with a date and time      Special Instructions (Medications to stop taking before your procedure etc ):

## 2023-03-02 ENCOUNTER — TELEPHONE (OUTPATIENT)
Dept: FAMILY MEDICINE CLINIC | Facility: CLINIC | Age: 73
End: 2023-03-02

## 2023-03-02 ENCOUNTER — APPOINTMENT (OUTPATIENT)
Dept: LAB | Facility: MEDICAL CENTER | Age: 73
End: 2023-03-02

## 2023-03-02 DIAGNOSIS — M80.08XA AGE-RELATED OSTEOPOROSIS WITH CURRENT PATHOLOGICAL FRACTURE OF VERTEBRA (HCC): ICD-10-CM

## 2023-03-02 LAB
ANION GAP SERPL CALCULATED.3IONS-SCNC: 3 MMOL/L (ref 4–13)
APTT PPP: 26 SECONDS (ref 23–37)
BACTERIA UR QL AUTO: ABNORMAL /HPF
BASOPHILS # BLD AUTO: 0.07 THOUSANDS/ÂΜL (ref 0–0.1)
BASOPHILS NFR BLD AUTO: 2 % (ref 0–1)
BILIRUB UR QL STRIP: NEGATIVE
BUN SERPL-MCNC: 10 MG/DL (ref 5–25)
CALCIUM SERPL-MCNC: 9.7 MG/DL (ref 8.3–10.1)
CHLORIDE SERPL-SCNC: 107 MMOL/L (ref 96–108)
CLARITY UR: CLEAR
CO2 SERPL-SCNC: 29 MMOL/L (ref 21–32)
COLOR UR: ABNORMAL
CREAT SERPL-MCNC: 0.72 MG/DL (ref 0.6–1.3)
EOSINOPHIL # BLD AUTO: 0.15 THOUSAND/ÂΜL (ref 0–0.61)
EOSINOPHIL NFR BLD AUTO: 4 % (ref 0–6)
ERYTHROCYTE [DISTWIDTH] IN BLOOD BY AUTOMATED COUNT: 14.6 % (ref 11.6–15.1)
EST. AVERAGE GLUCOSE BLD GHB EST-MCNC: 120 MG/DL
GFR SERPL CREATININE-BSD FRML MDRD: 84 ML/MIN/1.73SQ M
GLUCOSE P FAST SERPL-MCNC: 106 MG/DL (ref 65–99)
GLUCOSE UR STRIP-MCNC: NEGATIVE MG/DL
HBA1C MFR BLD: 5.8 %
HCT VFR BLD AUTO: 41.8 % (ref 34.8–46.1)
HGB BLD-MCNC: 13 G/DL (ref 11.5–15.4)
HGB UR QL STRIP.AUTO: NEGATIVE
IMM GRANULOCYTES # BLD AUTO: 0.02 THOUSAND/UL (ref 0–0.2)
IMM GRANULOCYTES NFR BLD AUTO: 1 % (ref 0–2)
INR PPP: 0.9 (ref 0.84–1.19)
KETONES UR STRIP-MCNC: NEGATIVE MG/DL
LEUKOCYTE ESTERASE UR QL STRIP: ABNORMAL
LYMPHOCYTES # BLD AUTO: 1.48 THOUSANDS/ÂΜL (ref 0.6–4.47)
LYMPHOCYTES NFR BLD AUTO: 38 % (ref 14–44)
MCH RBC QN AUTO: 28.2 PG (ref 26.8–34.3)
MCHC RBC AUTO-ENTMCNC: 31.1 G/DL (ref 31.4–37.4)
MCV RBC AUTO: 91 FL (ref 82–98)
MONOCYTES # BLD AUTO: 0.38 THOUSAND/ÂΜL (ref 0.17–1.22)
MONOCYTES NFR BLD AUTO: 10 % (ref 4–12)
MUCOUS THREADS UR QL AUTO: ABNORMAL
NEUTROPHILS # BLD AUTO: 1.77 THOUSANDS/ÂΜL (ref 1.85–7.62)
NEUTS SEG NFR BLD AUTO: 45 % (ref 43–75)
NITRITE UR QL STRIP: NEGATIVE
NON-SQ EPI CELLS URNS QL MICRO: ABNORMAL /HPF
NRBC BLD AUTO-RTO: 0 /100 WBCS
PH UR STRIP.AUTO: 7 [PH]
PLATELET # BLD AUTO: 334 THOUSANDS/UL (ref 149–390)
PMV BLD AUTO: 10.2 FL (ref 8.9–12.7)
POTASSIUM SERPL-SCNC: 4 MMOL/L (ref 3.5–5.3)
PROT UR STRIP-MCNC: NEGATIVE MG/DL
PROTHROMBIN TIME: 12.3 SECONDS (ref 11.6–14.5)
RBC # BLD AUTO: 4.61 MILLION/UL (ref 3.81–5.12)
RBC #/AREA URNS AUTO: ABNORMAL /HPF
RENAL EPI CELLS #/AREA URNS HPF: PRESENT /[HPF]
SODIUM SERPL-SCNC: 139 MMOL/L (ref 135–147)
SP GR UR STRIP.AUTO: 1.01 (ref 1–1.03)
TRANS CELLS #/AREA URNS HPF: PRESENT /[HPF]
UROBILINOGEN UR STRIP-ACNC: <2 MG/DL
WBC # BLD AUTO: 3.87 THOUSAND/UL (ref 4.31–10.16)
WBC #/AREA URNS AUTO: ABNORMAL /HPF

## 2023-03-02 NOTE — TELEPHONE ENCOUNTER
Pt asks if Dr Evans may put a note in her chart stating that she is clear for back surgery by the request of Dr Cervantes

## 2023-03-03 ENCOUNTER — OFFICE VISIT (OUTPATIENT)
Dept: OBGYN CLINIC | Facility: MEDICAL CENTER | Age: 73
End: 2023-03-03

## 2023-03-03 ENCOUNTER — APPOINTMENT (OUTPATIENT)
Dept: RADIOLOGY | Facility: MEDICAL CENTER | Age: 73
End: 2023-03-03

## 2023-03-03 VITALS
WEIGHT: 162 LBS | HEART RATE: 73 BPM | DIASTOLIC BLOOD PRESSURE: 79 MMHG | HEIGHT: 65 IN | SYSTOLIC BLOOD PRESSURE: 134 MMHG | BODY MASS INDEX: 26.99 KG/M2

## 2023-03-03 DIAGNOSIS — M23.91 INTERNAL DERANGEMENT OF RIGHT KNEE: Primary | ICD-10-CM

## 2023-03-03 DIAGNOSIS — M17.11 PRIMARY OSTEOARTHRITIS OF RIGHT KNEE: ICD-10-CM

## 2023-03-03 DIAGNOSIS — M25.361 INSTABILITY OF RIGHT KNEE JOINT: ICD-10-CM

## 2023-03-03 DIAGNOSIS — M25.561 ACUTE PAIN OF RIGHT KNEE: ICD-10-CM

## 2023-03-03 DIAGNOSIS — M13.161 INFLAMMATION OF JOINT OF RIGHT KNEE: ICD-10-CM

## 2023-03-03 RX ORDER — BETAMETHASONE SODIUM PHOSPHATE AND BETAMETHASONE ACETATE 3; 3 MG/ML; MG/ML
12 INJECTION, SUSPENSION INTRA-ARTICULAR; INTRALESIONAL; INTRAMUSCULAR; SOFT TISSUE
Status: COMPLETED | OUTPATIENT
Start: 2023-03-03 | End: 2023-03-03

## 2023-03-03 RX ORDER — BUPIVACAINE HYDROCHLORIDE 2.5 MG/ML
2 INJECTION, SOLUTION INFILTRATION; PERINEURAL
Status: COMPLETED | OUTPATIENT
Start: 2023-03-03 | End: 2023-03-03

## 2023-03-03 RX ADMIN — BUPIVACAINE HYDROCHLORIDE 2 ML: 2.5 INJECTION, SOLUTION INFILTRATION; PERINEURAL at 15:01

## 2023-03-03 RX ADMIN — BETAMETHASONE SODIUM PHOSPHATE AND BETAMETHASONE ACETATE 12 MG: 3; 3 INJECTION, SUSPENSION INTRA-ARTICULAR; INTRALESIONAL; INTRAMUSCULAR; SOFT TISSUE at 15:01

## 2023-03-03 NOTE — PATIENT INSTRUCTIONS
Diagnosis ICD-10-CM Associated Orders   1  Internal derangement of right knee  M23 91 Large joint arthrocentesis: R knee     MRI knee right  wo contrast      2  Primary osteoarthritis of right knee  M17 11 Large joint arthrocentesis: R knee      3  Acute pain of right knee  M25 561 XR knee 1 or 2 vw right     Large joint arthrocentesis: R knee     MRI knee right  wo contrast      4  Instability of right knee joint  M25 361 MRI knee right  wo contrast      5  Inflammation of joint of right knee  M13 161 Large joint arthrocentesis: R knee        Return for re-check after her right knee MRI   CORTICOSTEROID INJECTION  What is a corticosteroid? Injuries or disease such as arthritis, bursitis or tendonitis result in inflammation  In turn, this inflammation can cause swelling and pain  A local injection of a corticosteroid is provided to diminish inflammation  By doing so, it will also decrease pain and swelling which is making you uncomfortable  Is this the same thing as a Cortisone Injection? Cortisone® is a brand name of a corticosteroid used commonly in the past   Today I commonly use a more water-soluble corticosteroid named Celestone®  Will the injection hurt? As with any injection, you may feel pain at the time of the injection  Typically, I use a local anesthetic (Kim Scott) in addition to the corticosteroid to determine if the injection has been placed in the appropriate location  Hence it is important to monitor your symptoms 4-6 hours after the injection, as the area will be anesthetized (numb) while the local anesthetic is working  Once the local anesthetic wears off, the intensity of pain can be the same as it was prior to the injection, or even worse  This does not mean that the injection is not working  The corticosteroid may take 24-72 hours to begin having a positive effect      If you do experience an increase in pain, the use of an ice pack on the area for 20 minutes at a time should help   It is also helpful to take an oral anti-inflammatory such as Tylenol® or Motrin® if you are able to medically do so  For this reason it is best to avoid activities that put stress on the area the first 24 hours after the injection  How long will pain relief last?  This will vary according to the type and severity of the symptoms being treated and the severity of the condition  Symptom relief may last weeks to months  I typically couple injections with physical therapy so that the underlying problem causing the inflammation may be treated as the pain diminishes  If the combination is not successful, you may be a surgical candidate  I have read bad things about steroids  Will these things happen to me? Corticosteroids, when utilized properly, are safe and effective drugs  When used in a low dose, potential adverse reactions are very rare  Some patients may experience a sensation of flushing for several days  Very rarely, there can be a local reaction which may include increased discomfort for a period of time in the areas that has been injected  A steroid should not be used over and over again  Multiple injections in the same area can produce adverse effects such as tissue atrophy and degeneration of tendon or cartilage  A small percentage of patients (< 0 1%) may develop an infection in the joint after injection  This is a treatable problem, but if neglected, may result in permanent disability  Signs of infection include redness, swelling, discharge, fevers, increasing pain and drainage from the injection site  This represents an emergency and you should contact our office immediately or seek treatment in the ER if after hours  If I have diabetes, will this injection affect me? If you are diabetic, an injection of a corticosteroid can raise your blood sugar level, requiring more insulin for a brief period of time  This may necessitate careful blood sugar maintenance    If the elevated sugars are not able to be controlled, contact your diabetic doctor for guidance

## 2023-03-03 NOTE — PROGRESS NOTES
Assessment:   Diagnosis ICD-10-CM Associated Orders   1  Internal derangement of right knee  M23 91 Large joint arthrocentesis: R knee     MRI knee right  wo contrast      2  Primary osteoarthritis of right knee  M17 11 Large joint arthrocentesis: R knee      3  Acute pain of right knee  M25 561 XR knee 1 or 2 vw right     Large joint arthrocentesis: R knee     MRI knee right  wo contrast      4  Instability of right knee joint  M25 361 MRI knee right  wo contrast      5  Inflammation of joint of right knee  M13 161 Large joint arthrocentesis: R knee          Plan:  • Diagnostics reviewed and physical exam performed  Diagnosis, treatment options and associated risks were discussed with the patient including no treatment, nonsurgical treatment and potential for surgical intervention  The patient was given the opportunity to ask questions regarding each  • She was offered, excepted, performed an injection of cortisone to her right knee today for symptomatic relief  She tolerated the procedure well  Ice and postinjection protocol advised  Weightbearing activity as tolerated  • In the presence of her mechanical symptoms recommend obtaining an MRI for further evaluation to rule out possible medial meniscus tear  To do next visit:  Return for re-check after her right knee MRI   The above stated was discussed in layman's terms and the patient expressed understanding  All questions were answered to the patient's satisfaction  Scribe Attestation    I,:  Ly Brown am acting as a scribe while in the presence of the attending physician :       I,:  Alecia Larson MD personally performed the services described in this documentation    as scribed in my presence :             Subjective:   Arelis Paiz is a 71 y o  female who presents for evaluation of her right knee due to recently instability episodes  She had a cortisone injection just over 2 years ago with great relief    Over the past 2-3 months her knee "gave-out" on several occasions  She has increased medial knee pain with planting and twisting motions  She does describe mechanical symptoms such as locking and catching  She did have a fall off a ladder in December resulting in an L3 compression fracture and is in an LSO brace and is slated for a kyphoplasty on 2023  She cannot take oral NSAIDs due to gastric ulcer         Review of systems negative unless otherwise specified in HPI  Review of Systems    Past Medical History:   Diagnosis Date   • Arthritis    • Breast [address] ([facility] [address] )    • Gastric ulcer    • GERD (gastroesophageal reflux disease)    • Heart murmur    • History of radiation therapy     PBRT   • IBS (irritable bowel syndrome)    • Irritable bowel syndrome 2012   • Migraine    • Skin cancer    • Use of letrozole (Femara)     took for 2 years D/john due to side effects       Past Surgical History:   Procedure Laterality Date   • ABDOMINOPLASTY     • ADENOIDECTOMY     • BREAST BIOPSY Right 10/12/2007    [facility]   • BREAST LUMPECTOMY Right    • BREAST SURGERY      lumpectomy, resolved 2007   •  SECTION     • CHOLECYSTECTOMY     • COLON SURGERY     • COLONOSCOPY     • COSMETIC SURGERY  1975    forehead   • FOOT SURGERY     • MOHS SURGERY     • REDUCTION MAMMAPLASTY Bilateral 2015    reduction on left/lift on right   • SENTINEL LYMPH NODE BIOPSY Right 2007   • TOE SURGERY      left toe surgery   • TONSILLECTOMY         Family History   Problem Relation Age of Onset   • Hypertension Mother    • Melanoma Mother    • Heart disease Mother    • Heart attack Father         acute MI, CABG   • Hypertension Father    • Heart disease Father    • Other Brother         CABG   • Lymphoma Brother         non-Hodgkin's   • Hypertension Brother    • Heart disease Brother    • Other Family         back disorder   • Stroke Family         CVA   • Diabetes Family    • Cancer Family        Social History     Occupational History   • Not on file   Tobacco Use   • Smoking status: Former   • Smokeless tobacco: Never   • Tobacco comments:     Smoker in teenage years  Quit at 22  One pack per week  Vaping Use   • Vaping Use: Never used   Substance and Sexual Activity   • Alcohol use: Not Currently     Comment: May have alcohol but is not drinking it currently   • Drug use: No   • Sexual activity: Not on file         Current Outpatient Medications:   •  acetaminophen (TYLENOL) 650 mg CR tablet, Take 1,300 mg by mouth every 6 (six) hours as needed for mild pain, Disp: , Rfl:   •  Calcium Carbonate-Vit D-Min (CALCIUM 1200 PO), Take by mouth, Disp: , Rfl:   •  Cholecalciferol (VITAMIN D3) 5000 units CAPS, Take 1 tablet by mouth daily, Disp: , Rfl:   •  diltiazem (CARDIZEM CD) 180 mg 24 hr capsule, TAKE 1 CAPSULE(180 MG) BY MOUTH DAILY, Disp: 90 capsule, Rfl: 3  •  famotidine (PEPCID) 40 MG tablet, TAKE ONE TABLET BY MOUTH DAILY AS NEEDED for heartburn, Disp: 30 tablet, Rfl: 0  •  methocarbamol (ROBAXIN) 500 mg tablet, Take 1 tablet (500 mg total) by mouth 2 (two) times a day as needed for muscle spasms, Disp: 60 tablet, Rfl: 1  •  Multiple Vitamins-Minerals (MULTI COMPLETE) CAPS, Take 1 capsule by mouth daily, Disp: , Rfl:   •  naloxone (NARCAN) 4 mg/0 1 mL nasal spray, Administer 1 spray into a nostril  If no response after 2-3 minutes, give another dose in the other nostril using a new spray , Disp: 1 each, Rfl: 1  •  Probiotic Product (VSL#3) CAPS, Take 1 capsule by mouth daily, Disp: 90 capsule, Rfl: 3  •  rosuvastatin (CRESTOR) 10 MG tablet, TAKE 1 TABLET BY MOUTH DAILY, Disp: 90 tablet, Rfl: 0  •  Turmeric 500 MG CAPS, Take 1,000 mg by mouth daily, Disp: , Rfl:   •  Zinc 50 MG CAPS, Take by mouth daily, Disp: , Rfl:   •  ALPRAZolam (XANAX) 0 5 mg tablet, Take up to 2 tabs 1 hour prior to MRI  May take an additional 1-2 tabs 30 minutes prior to MRI as necessary  , Disp: 2 tablet, Rfl: 0  •  Ascorbic Acid (VITAMIN C) 250 MG CHEW, Chew 1,000 mg daily , Disp: , Rfl:   •  Misc Natural Products (GLUCOSAMINE CHONDROITIN ADV PO), Take 2 capsules by mouth daily  (Patient not taking: Reported on 12/23/2022), Disp: , Rfl:   •  oxyCODONE-acetaminophen (Percocet) 5-325 mg per tablet, Take 1 tablet by mouth every 4 (four) hours as needed for severe pain Max Daily Amount: 6 tablets, Disp: 10 tablet, Rfl: 0    Allergies   Allergen Reactions   • Ciprofloxacin Anaphylaxis     Anaphylaxis   • Celecoxib Swelling     Edema of legs   • Amitriptyline GI Intolerance     Action Taken: bloating,GI problems;    • Bactrim [Sulfamethoxazole-Trimethoprim] Rash   • Lidoderm [Lidocaine] Rash     Rash from lidoderm patch   • Mesalamine Dizziness and Headache   • Other Vomiting     "black olives->GI upset"   • Sulfa Antibiotics Rash   • Wound Dressing Adhesive Rash            Vitals:    03/03/23 1344   BP: 134/79   Pulse: 73       Objective:                    Right Knee Exam     Muscle Strength   The patient has normal right knee strength  Tenderness   The patient is experiencing tenderness in the medial joint line  Range of Motion   Extension: 0   Right knee flexion: 125 degrees  Increased pain medially at full flexion  Tests   Manuel:  Medial - positive   Varus: negative Valgus: negative    Other   Erythema: absent  Sensation: normal  Swelling: mild  Effusion: effusion present    Comments:    Slight valgus alignment  Intact extensor mechanism  No laxity with varus or valgus stressing  Hypermobile patella            Diagnostics, reviewed and taken today if performed as documented: The attending physician has personally reviewed the pertinent films in PACS and interpretation is as follows:    Right knee x-rays taken and reviewed in the office today show: Preservable knee with very modest medial joint space narrowing and very mild patellofemoral degeneration  Small osteophyte formation present medially with subchondral sclerosis noted        Procedures, if performed today:    Large joint arthrocentesis: R knee  Universal Protocol:  Consent: Verbal consent obtained  Risks and benefits: risks, benefits and alternatives were discussed  Consent given by: patient  Time out: Immediately prior to procedure a "time out" was called to verify the correct patient, procedure, equipment, support staff and site/side marked as required  Timeout called at: 3/3/2023 2:54 PM   Patient understanding: patient states understanding of the procedure being performed  Site marked: the operative site was marked  Patient identity confirmed: verbally with patient    Supporting Documentation  Indications: pain and diagnostic evaluation   Procedure Details  Location: knee - R knee  Preparation: Patient was prepped and draped in the usual sterile fashion  Needle size: 22 G  Ultrasound guidance: no  Approach: anteromedial  Medications administered: 12 mg betamethasone acetate-betamethasone sodium phosphate 6 (3-3) mg/mL; 2 mL bupivacaine 0 25 %    Patient tolerance: patient tolerated the procedure well with no immediate complications  Dressing:  Sterile dressing applied            Portions of the record may have been created with voice recognition software  Occasional wrong word or "sound a like" substitutions may have occurred due to the inherent limitations of voice recognition software  Read the chart carefully and recognize, using context, where substitutions have occurred

## 2023-03-08 ENCOUNTER — ANESTHESIA EVENT (OUTPATIENT)
Dept: ANESTHESIOLOGY | Facility: HOSPITAL | Age: 73
End: 2023-03-08

## 2023-03-08 ENCOUNTER — TELEPHONE (OUTPATIENT)
Dept: FAMILY MEDICINE CLINIC | Facility: CLINIC | Age: 73
End: 2023-03-08

## 2023-03-08 ENCOUNTER — ANESTHESIA (OUTPATIENT)
Dept: ANESTHESIOLOGY | Facility: HOSPITAL | Age: 73
End: 2023-03-08

## 2023-03-08 NOTE — TELEPHONE ENCOUNTER
Dr Betty Mccullough office called to say that they have not received clearance from the pt's doctor for her to get back surgery  They have been waiting since 3/2/23 to hear back from him  Please advise, thank you!

## 2023-03-08 NOTE — ANESTHESIA PREPROCEDURE EVALUATION
Procedure:  PRE-OP ONLY    Relevant Problems   CARDIO   (+) Chronic migraine without aura without status migrainosus, not intractable   (+) Essential hypertension   (+) Migraine headache   (+) Pure hypercholesterolemia      GI/HEPATIC   (+) Esophageal reflux      MUSCULOSKELETAL   (+) Arthritis      NEURO/PSYCH   (+) Chronic migraine without aura without status migrainosus, not intractable   (+) Encounter for follow-up surveillance of breast cancer   (+) Migraine headache   (+) Personal history of adenocarcinoma of breast      MRI Lumbar Spine 1/2023:  Subacute L3 superior endplate compression fracture with mild height loss  Diffuse bone marrow edema of L3 vertebral body with extension into left L3 pedicle and left L3 superior articular process      Focal bone marrow edema in right L5 pedicle extending into right L5 superior articular facet, may be stress reaction      Multilevel degenerative changes of lumbar spine with varying degrees of canal stenosis (moderate L4-L5) and foraminal narrowing (mild bilateral L4-L5), as detailed above      EKG 12/2022:  Sinus bradycardia  Otherwise normal ECG  When compared with ECG of 19-DEC-2020 15:51,  No significant change was found    Lab Results   Component Value Date    WBC 3 87 (L) 03/02/2023    HGB 13 0 03/02/2023    HCT 41 8 03/02/2023    MCV 91 03/02/2023     03/02/2023     Lab Results   Component Value Date    SODIUM 139 03/02/2023    K 4 0 03/02/2023     03/02/2023    CO2 29 03/02/2023    BUN 10 03/02/2023    CREATININE 0 72 03/02/2023    GLUC 110 12/04/2022    CALCIUM 9 7 03/02/2023     Lab Results   Component Value Date    INR 0 90 03/02/2023    PROTIME 12 3 03/02/2023     Lab Results   Component Value Date    HGBA1C 5 8 (H) 03/02/2023

## 2023-03-09 ENCOUNTER — ANESTHESIA EVENT (OUTPATIENT)
Dept: RADIOLOGY | Facility: HOSPITAL | Age: 73
End: 2023-03-09

## 2023-03-09 ENCOUNTER — ANESTHESIA (OUTPATIENT)
Dept: RADIOLOGY | Facility: HOSPITAL | Age: 73
End: 2023-03-09

## 2023-03-09 ENCOUNTER — HOSPITAL ENCOUNTER (OUTPATIENT)
Dept: RADIOLOGY | Facility: HOSPITAL | Age: 73
Discharge: HOME | End: 2023-03-09
Attending: RADIOLOGY

## 2023-03-09 VITALS
OXYGEN SATURATION: 97 % | BODY MASS INDEX: 25.99 KG/M2 | HEART RATE: 59 BPM | WEIGHT: 156 LBS | SYSTOLIC BLOOD PRESSURE: 148 MMHG | RESPIRATION RATE: 18 BRPM | HEIGHT: 65 IN | TEMPERATURE: 97.3 F | DIASTOLIC BLOOD PRESSURE: 71 MMHG

## 2023-03-09 DIAGNOSIS — M80.08XA AGE-RELATED OSTEOPOROSIS WITH CURRENT PATHOLOGICAL FRACTURE OF VERTEBRA (HCC): ICD-10-CM

## 2023-03-09 DIAGNOSIS — S32.030A CLOSED COMPRESSION FRACTURE OF L3 LUMBAR VERTEBRA, INITIAL ENCOUNTER (HCC): ICD-10-CM

## 2023-03-09 RX ORDER — GLYCOPYRROLATE 0.2 MG/ML
INJECTION INTRAMUSCULAR; INTRAVENOUS AS NEEDED
Status: DISCONTINUED | OUTPATIENT
Start: 2023-03-09 | End: 2023-03-09

## 2023-03-09 RX ORDER — ONDANSETRON 2 MG/ML
INJECTION INTRAMUSCULAR; INTRAVENOUS AS NEEDED
Status: DISCONTINUED | OUTPATIENT
Start: 2023-03-09 | End: 2023-03-09

## 2023-03-09 RX ORDER — CEFAZOLIN SODIUM 1 G/3ML
INJECTION, POWDER, FOR SOLUTION INTRAMUSCULAR; INTRAVENOUS AS NEEDED
Status: DISCONTINUED | OUTPATIENT
Start: 2023-03-09 | End: 2023-03-09

## 2023-03-09 RX ORDER — FENTANYL CITRATE 50 UG/ML
INJECTION, SOLUTION INTRAMUSCULAR; INTRAVENOUS AS NEEDED
Status: DISCONTINUED | OUTPATIENT
Start: 2023-03-09 | End: 2023-03-09

## 2023-03-09 RX ORDER — LIDOCAINE HYDROCHLORIDE 10 MG/ML
INJECTION, SOLUTION EPIDURAL; INFILTRATION; INTRACAUDAL; PERINEURAL AS NEEDED
Status: COMPLETED | OUTPATIENT
Start: 2023-03-09 | End: 2023-03-09

## 2023-03-09 RX ORDER — SODIUM CHLORIDE 9 MG/ML
INJECTION, SOLUTION INTRAVENOUS CONTINUOUS PRN
Status: DISCONTINUED | OUTPATIENT
Start: 2023-03-09 | End: 2023-03-09

## 2023-03-09 RX ORDER — MIDAZOLAM HYDROCHLORIDE 2 MG/2ML
INJECTION, SOLUTION INTRAMUSCULAR; INTRAVENOUS AS NEEDED
Status: DISCONTINUED | OUTPATIENT
Start: 2023-03-09 | End: 2023-03-09

## 2023-03-09 RX ORDER — SODIUM CHLORIDE 9 MG/ML
75 INJECTION, SOLUTION INTRAVENOUS CONTINUOUS
Status: DISCONTINUED | OUTPATIENT
Start: 2023-03-09 | End: 2023-03-10 | Stop reason: HOSPADM

## 2023-03-09 RX ORDER — CEFAZOLIN SODIUM 1 G/50ML
1000 SOLUTION INTRAVENOUS ONCE
Status: DISCONTINUED | OUTPATIENT
Start: 2023-03-09 | End: 2023-03-10 | Stop reason: HOSPADM

## 2023-03-09 RX ORDER — PROPOFOL 10 MG/ML
INJECTION, EMULSION INTRAVENOUS AS NEEDED
Status: DISCONTINUED | OUTPATIENT
Start: 2023-03-09 | End: 2023-03-09

## 2023-03-09 RX ORDER — PROPOFOL 10 MG/ML
INJECTION, EMULSION INTRAVENOUS CONTINUOUS PRN
Status: DISCONTINUED | OUTPATIENT
Start: 2023-03-09 | End: 2023-03-09

## 2023-03-09 RX ORDER — BUPIVACAINE HYDROCHLORIDE 5 MG/ML
INJECTION, SOLUTION PERINEURAL AS NEEDED
Status: COMPLETED | OUTPATIENT
Start: 2023-03-09 | End: 2023-03-09

## 2023-03-09 RX ADMIN — PROPOFOL 20 MG: 10 INJECTION, EMULSION INTRAVENOUS at 09:15

## 2023-03-09 RX ADMIN — LIDOCAINE HYDROCHLORIDE 5 ML: 10 INJECTION, SOLUTION EPIDURAL; INFILTRATION; INTRACAUDAL; PERINEURAL at 08:57

## 2023-03-09 RX ADMIN — PROPOFOL 20 MG: 10 INJECTION, EMULSION INTRAVENOUS at 08:57

## 2023-03-09 RX ADMIN — PROPOFOL 10 MG: 10 INJECTION, EMULSION INTRAVENOUS at 08:53

## 2023-03-09 RX ADMIN — MIDAZOLAM 2 MG: 1 INJECTION INTRAMUSCULAR; INTRAVENOUS at 08:34

## 2023-03-09 RX ADMIN — SODIUM CHLORIDE 75 ML/HR: 0.9 INJECTION, SOLUTION INTRAVENOUS at 07:09

## 2023-03-09 RX ADMIN — FENTANYL CITRATE 25 MCG: 50 INJECTION INTRAMUSCULAR; INTRAVENOUS at 08:59

## 2023-03-09 RX ADMIN — GLYCOPYRROLATE 0.1 MG: 0.2 INJECTION, SOLUTION INTRAMUSCULAR; INTRAVENOUS at 08:34

## 2023-03-09 RX ADMIN — PROPOFOL 50 MCG/KG/MIN: 10 INJECTION, EMULSION INTRAVENOUS at 08:53

## 2023-03-09 RX ADMIN — CEFAZOLIN 1000 MG: 1 INJECTION, POWDER, FOR SOLUTION INTRAMUSCULAR; INTRAVENOUS at 08:34

## 2023-03-09 RX ADMIN — FENTANYL CITRATE 25 MCG: 50 INJECTION INTRAMUSCULAR; INTRAVENOUS at 08:49

## 2023-03-09 RX ADMIN — FENTANYL CITRATE 25 MCG: 50 INJECTION INTRAMUSCULAR; INTRAVENOUS at 08:53

## 2023-03-09 RX ADMIN — SODIUM CHLORIDE: 0.9 INJECTION, SOLUTION INTRAVENOUS at 08:20

## 2023-03-09 RX ADMIN — BUPIVACAINE HYDROCHLORIDE 15 ML: 5 INJECTION, SOLUTION PERINEURAL at 08:57

## 2023-03-09 RX ADMIN — FENTANYL CITRATE 25 MCG: 50 INJECTION INTRAMUSCULAR; INTRAVENOUS at 09:16

## 2023-03-09 RX ADMIN — ONDANSETRON 4 MG: 2 INJECTION INTRAMUSCULAR; INTRAVENOUS at 08:20

## 2023-03-09 NOTE — SEDATION DOCUMENTATION
Kyphoplasty completed by Dr Monique Axon  Tolerated well  Bedrest start at 09:30   Report called to [facility]

## 2023-03-09 NOTE — DISCHARGE INSTRUCTIONS
Vertebroplasty   WHAT YOU NEED TO KNOW:   Vertebroplasty is a procedure to fix broken vertebrae  Vertebrae are the round, strong bones that form your spine  You have just had treatment for one or more compression fractures  It may take several weeks for complete pain relief  Continue to use the pain medicine that has been prescribed to you as needed  DISCHARGE INSTRUCTIONS:   Resume your normal diet and medications  Small sips of flat soda will help with nausea  Contact Interventional Radiology at [phone] Marc PATIENTS: Contact Interventional Radiology at [phone]) Jo Nunez PATIENTS: Contact Interventional Radiology at [phone]) if any of the following occur: You have a fever greater than 101*  You have persistent nausea or vomiting  You have worsening pain, even after you take your medicine  You have increased trouble walking, moving around or numbness and weakness of legs  You have pain in your ribs or lower back  You have drainage from the area where your procedure was done  Upon discharge from the hospital, limit your activity for the remainder of the day  No driving, operating heavy machinery or lifting heavy objects  The following morning, you may increase your activity level as tolerated

## 2023-03-09 NOTE — ANESTHESIA POSTPROCEDURE EVALUATION
Post-Op Assessment Note    CV Status:  Stable  Pain Score: 0    Pain management: adequate     Mental Status:  Sleepy and arousable   Hydration Status:  Euvolemic   PONV Controlled:  Controlled   Airway Patency:  Patent      Post Op Vitals Reviewed: Yes      Staff: Anesthesiologist, CRNA   Comments: aox3, no complaints        There were no known notable events for this encounter      BP   144/74   Temp      Pulse   88   Resp   14   SpO2   95% RA

## 2023-03-09 NOTE — ANESTHESIA PREPROCEDURE EVALUATION
Procedure:  IR KYPHOPLASTY/VERTEBROPLASTY    Relevant Problems   ANESTHESIA (within normal limits)      CARDIO   (+) Chronic migraine without aura without status migrainosus, not intractable   (+) Essential hypertension   (+) Migraine headache   (+) Pure hypercholesterolemia      ENDO (within normal limits)      GI/HEPATIC   (+) Esophageal reflux      /RENAL (within normal limits)      HEMATOLOGY (within normal limits)      MUSCULOSKELETAL   (+) Arthritis      NEURO/PSYCH   (+) Chronic migraine without aura without status migrainosus, not intractable   (+) Encounter for follow-up surveillance of breast cancer   (+) Migraine headache   (+) Personal history of adenocarcinoma of breast      PULMONARY (within normal limits)      Digestive   (+) Ulcerative colitis (HCC)      Nervous and Auditory   (+) Idiopathic peripheral neuropathy      Musculoskeletal and Integument   (+) Age-related osteoporosis with current pathological fracture of vertebra ([facility])   (+) Carcinoma, basal cell, skin   (+) Closed compression fracture of L3 lumbar vertebra, initial encounter ([facility])   (+) Lumbar disc herniation   (+) Osteopenia      Other   (+) Neurologic gait dysfunction      MRI Lumbar Spine 1/2023:  Subacute L3 superior endplate compression fracture with mild height loss  Diffuse bone marrow edema of L3 vertebral body with extension into left L3 pedicle and left L3 superior articular process      Focal bone marrow edema in right L5 pedicle extending into right L5 superior articular facet, may be stress reaction      Multilevel degenerative changes of lumbar spine with varying degrees of canal stenosis (moderate L4-L5) and foraminal narrowing (mild bilateral L4-L5), as detailed above      EKG 12/2022:  Sinus bradycardia  Otherwise normal ECG  When compared with ECG of 19-DEC-2020 15:51,  No significant change was found    Lab Results   Component Value Date    WBC 3 87 (L) 03/02/2023    HGB 13 0 03/02/2023    HCT 41 8 03/02/2023    MCV 91 03/02/2023     03/02/2023     Lab Results   Component Value Date    SODIUM 139 03/02/2023    K 4 0 03/02/2023     03/02/2023    CO2 29 03/02/2023    BUN 10 03/02/2023    CREATININE 0 72 03/02/2023    GLUC 110 12/04/2022    CALCIUM 9 7 03/02/2023     Lab Results   Component Value Date    INR 0 90 03/02/2023    PROTIME 12 3 03/02/2023     Lab Results   Component Value Date    HGBA1C 5 8 (H) 03/02/2023          Physical Exam    Airway    Mallampati score: II  TM Distance: >3 FB  Neck ROM: full     Dental   No notable dental hx     Cardiovascular  Cardiovascular exam normal    Pulmonary  Pulmonary exam normal     Other Findings        Anesthesia Plan  ASA Score- 2     Anesthesia Type- IV sedation with anesthesia with ASA Monitors  Additional Monitors:   Airway Plan:           Plan Factors-Exercise tolerance (METS): >4 METS  Chart reviewed  EKG reviewed  Existing labs reviewed  Patient summary reviewed  Induction- intravenous  Postoperative Plan- Plan for postoperative opioid use  Planned trial extubation    Informed Consent- Anesthetic plan and risks discussed with patient  I personally reviewed this patient with the CRNA  Discussed and agreed on the Anesthesia Plan with the CRNA  Rosalia Gamboa

## 2023-03-14 ENCOUNTER — TELEPHONE (OUTPATIENT)
Dept: NEUROSURGERY | Facility: CLINIC | Age: 73
End: 2023-03-14

## 2023-03-14 NOTE — TELEPHONE ENCOUNTER
Spoke with Amina Posey to see how she is doing after kyphoplasty/vertebroplasty 3/9/2023  She reports that she is doing well overall and denies any incisional issues or fevers  Has noticed mild improvement in her symptoms, able to get up from a seated position easier than prior to procedure  Went over incision care with patient including keeping it clean and dry and not to apply any creams or ointments to the site  The site should be monitored for s/s of infection such as swelling, redness, or drainage  Call the office if any of these develop  Itching at bandage site  Has been wearing brace when up and ambulatory but has decreased the frequency  Advised that she should take it easy for the first few days and avoid any strenuous activity or heavy lifting >10 pounds  If needed Tylenol can be used for pain following the procedure  She has already noticed a decreased need for this  Verified date/time/location of her upcoming POV on 4/14/2023 and advised her to call the office with any further questions or concerns, or if any incisional issues or fevers would arise  Patient was appreciative for the call

## 2023-03-16 ENCOUNTER — HOSPITAL ENCOUNTER (OUTPATIENT)
Dept: RADIOLOGY | Facility: HOSPITAL | Age: 73
Discharge: HOME | End: 2023-03-16
Attending: ORTHOPAEDIC SURGERY

## 2023-03-16 DIAGNOSIS — M23.91 INTERNAL DERANGEMENT OF RIGHT KNEE: ICD-10-CM

## 2023-03-16 DIAGNOSIS — M25.361 INSTABILITY OF RIGHT KNEE JOINT: ICD-10-CM

## 2023-03-16 DIAGNOSIS — M25.561 ACUTE PAIN OF RIGHT KNEE: ICD-10-CM

## 2023-03-21 DIAGNOSIS — K21.9 GASTROESOPHAGEAL REFLUX DISEASE WITHOUT ESOPHAGITIS: ICD-10-CM

## 2023-03-21 RX ORDER — FAMOTIDINE 40 MG/1
TABLET, FILM COATED ORAL
Qty: 30 TABLET | Refills: 0 | Status: SHIPPED | OUTPATIENT
Start: 2023-03-21

## 2023-03-28 ENCOUNTER — OFFICE VISIT (OUTPATIENT)
Dept: OBGYN CLINIC | Facility: HOSPITAL | Age: 73
End: 2023-03-28

## 2023-03-28 VITALS
SYSTOLIC BLOOD PRESSURE: 128 MMHG | HEIGHT: 65 IN | BODY MASS INDEX: 26.82 KG/M2 | DIASTOLIC BLOOD PRESSURE: 83 MMHG | HEART RATE: 79 BPM | WEIGHT: 161 LBS

## 2023-03-28 DIAGNOSIS — S83.241A OTHER TEAR OF MEDIAL MENISCUS, CURRENT INJURY, RIGHT KNEE, INITIAL ENCOUNTER: Primary | ICD-10-CM

## 2023-03-28 NOTE — PROGRESS NOTES
Assessment:  1  Other tear of medial meniscus, current injury, right knee, initial encounter            Plan:  Right medial meniscus tear witnessed on recent MRI  The patient is a candidate for right knee arthroscopy yet she is currently doing well s/p steroid injection  He is encouraged to remain active  She is encouraged to do straight leg raises for quadriceps strengthening  The patient can follow up as needed and is welcome to return at any point with any new or old issue  To do next visit:  Return if symptoms worsen or fail to improve  The above stated was discussed in layman's terms and the patient expressed understanding  All questions were answered to the patient's satisfaction  Scribe Attestation    I,:  Mayra Tirado am acting as a scribe while in the presence of the attending physician :       I,:  Alfonzo Osei MD personally performed the services described in this documentation    as scribed in my presence :             Subjective:   Julieta Ivory is a 71 y o  female who presents for follow up of right knee with MRI review  She is s/p right knee steroid injection with benefit, 3/3/2023  Today she complains of right medial knee pain with locking and catching with squatting motions  She avoids NSAIDs due to gastric ulcer          Review of systems negative unless otherwise specified in HPI    Past Medical History:   Diagnosis Date   • Arthritis    • Breast cancer ([facility] [address] )    • Gastric ulcer    • GERD (gastroesophageal reflux disease)    • Heart murmur    • History of radiation therapy 2007    PBRT   • IBS (irritable bowel syndrome)    • Irritable bowel syndrome 12/11/2012   • Migraine    • Skin cancer    • Use of letrozole (Femara)     took for 2 years D/john due to side effects       Past Surgical History:   Procedure Laterality Date   • ABDOMINOPLASTY     • ADENOIDECTOMY     • BREAST BIOPSY Right 10/12/2007    IDC   • BREAST LUMPECTOMY Right 2007   • BREAST SURGERY lumpectomy, resolved 2007   •  SECTION     • CHOLECYSTECTOMY     • COLON SURGERY     • COLONOSCOPY     • COSMETIC SURGERY      forehead   • FOOT SURGERY     • IR KYPHOPLASTY/VERTEBROPLASTY  3/9/2023   • MOHS SURGERY     • REDUCTION MAMMAPLASTY Bilateral 2015    reduction on left/lift on right   • SENTINEL LYMPH NODE BIOPSY Right 2007   • TOE SURGERY      left toe surgery   • TONSILLECTOMY         Family History   Problem Relation Age of Onset   • Hypertension Mother    • Melanoma Mother    • Heart disease Mother    • Heart attack Father         acute MI, CABG   • Hypertension Father    • Heart disease Father    • Other Brother         CABG   • Lymphoma Brother         non-Hodgkin's   • Hypertension Brother    • Heart disease Brother    • Other Family         back disorder   • Stroke Family         CVA   • Diabetes Family    • Cancer Family        Social History     Occupational History   • Not on file   Tobacco Use   • Smoking status: Former   • Smokeless tobacco: Never   • Tobacco comments:     Smoker in teenage years  Quit at 22  One pack per week     Vaping Use   • Vaping Use: Never used   Substance and Sexual Activity   • Alcohol use: Not Currently     Comment: May have alcohol but is not drinking it currently   • Drug use: No   • Sexual activity: Not on file         Current Outpatient Medications:   •  acetaminophen (TYLENOL) 650 mg CR tablet, Take 1,300 mg by mouth every 6 (six) hours as needed for mild pain, Disp: , Rfl:   •  Calcium Carbonate-Vit D-Min (CALCIUM 1200 PO), Take by mouth, Disp: , Rfl:   •  Cholecalciferol (VITAMIN D3) 5000 units CAPS, Take 1 tablet by mouth daily, Disp: , Rfl:   •  diltiazem (CARDIZEM CD) 180 mg 24 hr capsule, TAKE 1 CAPSULE(180 MG) BY MOUTH DAILY, Disp: 90 capsule, Rfl: 3  •  famotidine (PEPCID) 40 MG tablet, TAKE ONE TABLET BY MOUTH DAILY AS NEEDED for heartburn, Disp: 30 tablet, Rfl: 0  •  methocarbamol (ROBAXIN) 500 mg tablet, Take 1 tablet (500 mg "total) by mouth 2 (two) times a day as needed for muscle spasms, Disp: 60 tablet, Rfl: 1  •  Multiple Vitamins-Minerals (MULTI COMPLETE) CAPS, Take 1 capsule by mouth daily, Disp: , Rfl:   •  naloxone (NARCAN) 4 mg/0 1 mL nasal spray, Administer 1 spray into a nostril  If no response after 2-3 minutes, give another dose in the other nostril using a new spray , Disp: 1 each, Rfl: 1  •  Probiotic Product (VSL#3) CAPS, Take 1 capsule by mouth daily, Disp: 90 capsule, Rfl: 3  •  rosuvastatin (CRESTOR) 10 MG tablet, TAKE 1 TABLET BY MOUTH DAILY, Disp: 90 tablet, Rfl: 3  •  Turmeric 500 MG CAPS, Take 1,000 mg by mouth daily, Disp: , Rfl:   •  Zinc 50 MG CAPS, Take by mouth daily, Disp: , Rfl:     Allergies   Allergen Reactions   • Ciprofloxacin Anaphylaxis     Anaphylaxis   • Celecoxib Swelling     Edema of legs   • Amitriptyline GI Intolerance     Action Taken: bloating,GI problems;    • Bactrim [Sulfamethoxazole-Trimethoprim] Rash   • Lidoderm [Lidocaine] Rash     Rash from lidoderm patch   • Mesalamine Dizziness and Headache   • Other Vomiting     \"black olives->GI upset\"   • Sulfa Antibiotics Rash   • Wound Dressing Adhesive Rash            Vitals:    03/28/23 0825   BP: 128/83   Pulse: 79       Objective:  Physical exam  · General: Awake, Alert, Oriented  · Eyes: Pupils equal, round and reactive to light  · Heart: regular rate and rhythm  · Lungs: No audible wheezing  · Abdomen: soft                    Ortho Exam  Right knee:  Slight valgus alignment  TTP over medial joint line  No erythema or ecchymosis  No effusion or swelling  Normal strength  Good ROM   Calf compartments soft and supple  Sensation intact  Toes are warm sensate and mobile        Diagnostics, reviewed and taken today if performed as documented: The attending physician has personally reviewed the pertinent films in PACS and interpretation is as follows:  Right knee MRI of 3/9/2023:  Medial meniscus tear with mild arthritic changes    " "    Procedures, if performed today:    Procedures    None performed      Portions of the record may have been created with voice recognition software  Occasional wrong word or \"sound a like\" substitutions may have occurred due to the inherent limitations of voice recognition software  Read the chart carefully and recognize, using context, where substitutions have occurred      "

## 2023-05-04 ENCOUNTER — EVALUATION (OUTPATIENT)
Dept: PHYSICAL THERAPY | Facility: CLINIC | Age: 73
End: 2023-05-04

## 2023-05-04 DIAGNOSIS — M80.08XA AGE-RELATED OSTEOPOROSIS WITH CURRENT PATHOLOGICAL FRACTURE OF VERTEBRA (HCC): ICD-10-CM

## 2023-05-04 DIAGNOSIS — Z98.890 S/P KYPHOPLASTY: Primary | ICD-10-CM

## 2023-05-04 DIAGNOSIS — S32.030A CLOSED COMPRESSION FRACTURE OF L3 LUMBAR VERTEBRA, INITIAL ENCOUNTER (HCC): ICD-10-CM

## 2023-05-04 NOTE — PROGRESS NOTES
PT Evaluation     Today's date: 2023  Patient name: Dario Guevara  : 1950  MRN: [id number]  Referring provider: Evelyn Samson MD  Dx:   Encounter Diagnosis     ICD-10-CM    1  S/P kyphoplasty  Z98 890       2  Closed compression fracture of L3 lumbar vertebra, initial encounter [facility])  S32 030A Ambulatory Referral to Physical Therapy      3  Age-related osteoporosis with current pathological fracture of vertebra ([facility] [address] )  M80 08XA Ambulatory Referral to Physical Therapy          Start Time: 1430  Stop Time: 1520  Total time in clinic (min): 50 minutes    Assessment  Assessment details: Dario Guevara is a pleasant 68 y o  female presents approximately 8 weeks s/p kyphoplasty following L3 compression fx  Pt with lumbar posturing into hyperlordosis throughout functional activities with limited TA activation  Also noting motion restrictions in LB and provided unloaded stretching to address  No further referral appears necessary at this time  Skilled PT services appropriate to facilitate return to prior level of function with transition to home exercise program for independent management when appropriate  Impairments: abnormal muscle firing, abnormal muscle tone, abnormal or restricted ROM, impaired physical strength, lacks appropriate home exercise program and pain with function  Understanding of Dx/Px/POC: good   Prognosis: good    Goals  Patient will be able to manage symptoms independently  LT weeks  1  pt will reach foto predicted  2  pt will decrease worst pain 75%   ST weeks  1  Pt will decrease worst pain 50%  2   Patient will successfully manage HEP      Plan  Patient would benefit from: skilled PT  Referral necessary: No  Planned modality interventions: thermotherapy: hydrocollator packs  Planned therapy interventions: home exercise program, manual therapy, neuromuscular re-education, patient education, functional ROM exercises, strengthening, stretching, joint mobilization, graded activity, graded exercise, therapeutic exercise, body mechanics training, motor coordination training and activity modification  Frequency: 2x week  Duration in weeks: 12  Treatment plan discussed with: patient        Subjective Evaluation    History of Present Illness  Mechanism of injury: Pt presents to PT s/p kyphoplasty after L3 compression fx performed on 3/9/2023  She noted instant relief after the surgery  Pt notes continued weakness in the back and fatiguing muscles that causes b/l radiating soreness through the back  She uses her brace at this time only for additional support when fatigued  Most discomfort is in b/l LB locally  Sitting long periods of time irritates  Standing at work 8-10 hours bothers as well  Walking typically provides relief  Pt notes no lifting/motion restrictions at this point other than to pain tolerance  Pt had recent meniscus tear in R knee with injection provided  She has been getting catching and instability secondary to this, especially with squatting  Pain is well managed but feels she can't put much stress through it    Pain  Current pain ratin  At best pain ratin  At worst pain ratin          Objective     Active Range of Motion     Lumbar   Flexion:  WFL  Extension:  Restriction level: minimal  Left lateral flexion:  Restriction level: moderate  Right lateral flexion:  Restriction level: moderate  Left rotation:  Restriction level: moderate  Right rotation:  Restriction level: minimal    Strength/Myotome Testing     Left Knee   Flexion: 4  Extension: 4    Right Knee   Flexion: 4  Extension: 4+    Tests     Lumbar     Left   Negative slump test      Right   Negative slump test      General Comments:      Lumbar Comments  B/l paraspinals restricted and TTP    DL squat: Quad dominant w/lumbar hyperextension  Sitting posture: weight directly over ischial tuberosities, lumber hyperextension  Hip abductors: 4/5 b/l             Precautions: s/p L3 kyphoplasty, activity to tolerance      Manuals 5/4            Paraspinal STM PRN                                                    Neuro Re-Ed             iso unilateral hip flexion w/tball reviewed            Seated tband antirotation reviewed            SL hip abd nv            catalino bar press nv            Seated hip hinge retraining nv                                      Ther Ex             LTR reviewed            Bridge nv            bike nv            Seated lumbar rotation reviewed                                                                Ther Activity                                       Gait Training                                       Modalities

## 2023-05-08 ENCOUNTER — OFFICE VISIT (OUTPATIENT)
Dept: PHYSICAL THERAPY | Facility: CLINIC | Age: 73
End: 2023-05-08

## 2023-05-08 DIAGNOSIS — Z98.890 S/P KYPHOPLASTY: ICD-10-CM

## 2023-05-08 DIAGNOSIS — S32.030A CLOSED COMPRESSION FRACTURE OF L3 LUMBAR VERTEBRA, INITIAL ENCOUNTER (HCC): Primary | ICD-10-CM

## 2023-05-08 NOTE — PROGRESS NOTES
Daily Note     Today's date: 2023  Patient name: Ankit Solis  : 1950  MRN: [id number]  Referring provider: Pattie Lane MD  Dx:   Encounter Diagnosis     ICD-10-CM    1  Closed compression fracture of L3 lumbar vertebra, initial encounter ([facility] [address] )  S32 030A       2  S/P kyphoplasty  Z98 890                      Subjective: Pt notes no new complaints with the LB  She feels it is getting a little stronger  Objective: See treatment diary below      Assessment: Tolerated treatment well  Patient would benefit from continued PT  Pain with LTR L improved with R paraspinal and QL STM prone  Plan: Continue per plan of care        Precautions: s/p L3 kyphoplasty, activity to tolerance      Manuals            Paraspinal STM PRN  R CB                                                  Neuro Re-Ed             iso unilateral hip flexion w/tball reviewed kickouts progressions 10x5: ea           Seated tband antirotation reviewed nv           SL hip abd nv 5:x10 ea           catalino bar press nv nv           Seated hip hinge retraining nv nv                                     Ther Ex             LTR reviewed x10            Bridge nv 10x10:           bike nv 10'           Seated lumbar rotation reviewed home                                                               Ther Activity                                       Gait Training                                       Modalities

## 2023-05-10 ENCOUNTER — OFFICE VISIT (OUTPATIENT)
Dept: PHYSICAL THERAPY | Facility: CLINIC | Age: 73
End: 2023-05-10

## 2023-05-10 DIAGNOSIS — S32.030A CLOSED COMPRESSION FRACTURE OF L3 LUMBAR VERTEBRA, INITIAL ENCOUNTER (HCC): Primary | ICD-10-CM

## 2023-05-10 DIAGNOSIS — Z98.890 S/P KYPHOPLASTY: ICD-10-CM

## 2023-05-10 NOTE — PROGRESS NOTES
Daily Note     Today's date: 5/10/2023  Patient name: Cristel Pascual  : 1950  MRN: [id number]  Referring provider: Eugene Stubbs MD  Dx:   Encounter Diagnosis     ICD-10-CM    1  Closed compression fracture of L3 lumbar vertebra, initial encounter ([facility] [address] )  S32 030A       2  S/P kyphoplasty  Z98 890                      Subjective: Pt notes no increased pain after lv  Objective: See treatment diary below      Assessment: Tolerated treatment well  Patient would benefit from continued PT  Focused on standing lumbar dissociation from arm motion as she has a tendency towards allowing excessive lordosis  Reduced pain during activity when she achieves neutral spine  Plan: Continue per plan of care        Precautions: s/p L3 kyphoplasty, activity to tolerance      Manuals  5/10          Paraspinal STM PRN  R CB CB                                                 Neuro Re-Ed             iso unilateral hip flexion w/tball reviewed kickouts progressions 10x5: ea CB          Seated tband antirotation reviewed nv RTB 10:x10 ea          SL hip abd nv 5:x10 ea nv          catalino bar press nv nv x10 ea 10#          Seated hip hinge retraining nv nv           Goblet squat   5# x10          OH press   4# x10 ea          Ther Ex             LTR reviewed x10  CB          Bridge nv 10x10: nv          bike nv 10' 6' L1                                                              Ther Activity                                       Gait Training                                       Modalities

## 2023-05-17 ENCOUNTER — OFFICE VISIT (OUTPATIENT)
Dept: PHYSICAL THERAPY | Facility: CLINIC | Age: 73
End: 2023-05-17

## 2023-05-17 DIAGNOSIS — Z98.890 S/P KYPHOPLASTY: ICD-10-CM

## 2023-05-17 DIAGNOSIS — S32.030A CLOSED COMPRESSION FRACTURE OF L3 LUMBAR VERTEBRA, INITIAL ENCOUNTER (HCC): Primary | ICD-10-CM

## 2023-05-17 NOTE — PROGRESS NOTES
Daily Note     Today's date: 2023  Patient name: Pedro Gamino  : 1950  MRN: [id number]  Referring provider: Heron Moran MD  Dx:   Encounter Diagnosis     ICD-10-CM    1  Closed compression fracture of L3 lumbar vertebra, initial encounter ([facility] [address] )  S32 030A       2  S/P kyphoplasty  Z98 890                      Subjective: Pt reports no new complaints after lv  She notices her arms are weak after doing the workout lv  Objective: See treatment diary below      Assessment: Tolerated treatment well  Patient would benefit from continued PT  Adequate fatigue post visit  Plan: Continue per plan of care        Precautions: s/p L3 kyphoplasty, activity to tolerance      Manuals 5/4 5/8 5/10 5/17         Paraspinal STM PRN  R CB CB nv                                                Neuro Re-Ed             iso unilateral hip flexion w/tball reviewed kickouts progressions 10x5: ea CB CB         Seated tband antirotation reviewed nv RTB 10:x10 ea CB         SL hip abd nv 5:x10 ea nv x10 ea         catalino bar press nv nv x10 ea 10# CB         Seated hip hinge retraining nv nv           Goblet squat   5# x10 5#x15         OH press   4# x10 ea CB         Ther Ex             LTR reviewed x10  CB          Bridge nv 10x10: nv 10x10:         bike nv 10' 6' L1 7' L1                                                             Ther Activity                                       Gait Training                                       Modalities

## 2023-05-19 ENCOUNTER — OFFICE VISIT (OUTPATIENT)
Dept: PHYSICAL THERAPY | Facility: CLINIC | Age: 73
End: 2023-05-19

## 2023-05-19 DIAGNOSIS — Z98.890 S/P KYPHOPLASTY: Primary | ICD-10-CM

## 2023-05-19 DIAGNOSIS — S32.030A CLOSED COMPRESSION FRACTURE OF L3 LUMBAR VERTEBRA, INITIAL ENCOUNTER (HCC): ICD-10-CM

## 2023-05-19 NOTE — PROGRESS NOTES
Daily Note     Today's date: 2023  Patient name: Dung Pinto  : 1950  MRN: [id number]  Referring provider: Jenna Reynoso MD  Dx:   Encounter Diagnosis     ICD-10-CM    1  S/P kyphoplasty  Z98 890       2  Closed compression fracture of L3 lumbar vertebra, initial encounter ([facility])  S32 030A                      Subjective: Pt with some continued LB soreness  She is asking about ways to lift her granddaughter  Objective: See treatment diary below      Assessment: Tolerated treatment well  Patient would benefit from continued PT  Pt with highly limited left lumbar shear with resting posture in R shear  R QL was TTP and restricted  This is also similar region to where she often gets her pain  Educated on light stretching for home in supine position  Educated on appropriate squat positions to lift from the floor and able to perform with appropriate hip hinge  Plan: Continue per plan of care        Precautions: s/p L3 kyphoplasty, activity to tolerance      Manuals 5/4 5/8 5/10 5/17 5/19        Paraspinal STM PRN  R CB CB nv         R QL FMT     CB                                  Neuro Re-Ed             iso unilateral hip flexion w/tball reviewed kickouts progressions 10x5: ea CB CB CB        Seated tband antirotation reviewed nv RTB 10:x10 ea CB         SL hip abd nv 5:x10 ea nv x10 ea         catalino bar press nv nv x10 ea 10# CB         Seated hip hinge retraining nv nv           Goblet squat   5# x10 5#x15 CB        OH press   4# x10 ea CB         Ther Ex             LTR reviewed x10  CB          Bridge nv 10x10: nv 10x10:         bike nv 10' 6' L1 7' L1 CB                                                            Ther Activity             Squat education     8'                     Gait Training                                       Modalities

## 2023-05-22 ENCOUNTER — OFFICE VISIT (OUTPATIENT)
Dept: PHYSICAL THERAPY | Facility: CLINIC | Age: 73
End: 2023-05-22

## 2023-05-22 DIAGNOSIS — Z98.890 S/P KYPHOPLASTY: Primary | ICD-10-CM

## 2023-05-22 NOTE — PROGRESS NOTES
Daily Note     Today's date: 2023  Patient name: Radha Brown  : 1950  MRN: [id number]  Referring provider: Nicky Rock MD  Dx:   Encounter Diagnosis     ICD-10-CM    1  S/P kyphoplasty  Z98 890                      Subjective: Pt notes no new complaints after lv  Objective: See treatment diary below      Assessment: Tolerated treatment well  Patient would benefit from continued PT  She continues with highly restricted R QL with associated R lumbar shear  Educated on further stretching to address  Continue with current core strengthening as tolerated  Plan: Continue per plan of care  Precautions: s/p L3 kyphoplasty, activity to tolerance      Manuals 5/4 5/8 5/10 5/17 5/19 5/22       Paraspinal STM PRN  R CB CB nv         R QL FMT     CB CB                                 Neuro Re-Ed             iso unilateral hip flexion w/tball reviewed kickouts progressions 10x5: ea CB CB CB CB       Seated tband antirotation reviewed nv RTB 10:x10 ea CB  GTB 10x10:       SL hip abd nv 5:x10 ea nv x10 ea         catalino bar press nv nv x10 ea 10# CB         Seated hip hinge retraining nv nv           Goblet squat   5# x10 5#x15 CB        OH press   4# x10 ea CB         Ther Ex             LTR reviewed x10  CB          Bridge nv 10x10: nv 10x10:         bike nv 10' 6' L1 7' L1 CB CB       Basking seal      10x10:                                               Ther Activity             Squat education     8'                     Gait Training                                       Modalities

## 2023-05-24 ENCOUNTER — OFFICE VISIT (OUTPATIENT)
Dept: PHYSICAL THERAPY | Facility: CLINIC | Age: 73
End: 2023-05-24

## 2023-05-24 DIAGNOSIS — S32.030A CLOSED COMPRESSION FRACTURE OF L3 LUMBAR VERTEBRA, INITIAL ENCOUNTER (HCC): ICD-10-CM

## 2023-05-24 DIAGNOSIS — Z98.890 S/P KYPHOPLASTY: Primary | ICD-10-CM

## 2023-05-24 NOTE — PROGRESS NOTES
Daily Note     Today's date: 2023  Patient name: Ember Dial  : 1950  MRN: [id number]  Referring provider: Genetta Sicard, MD  Dx:   Encounter Diagnosis     ICD-10-CM    1  S/P kyphoplasty  Z98 890       2  Closed compression fracture of L3 lumbar vertebra, initial encounter ([facility] [address] )  S32 139Y                      Subjective: Pt notes no complaints after lv  Objective: See treatment diary below      Assessment: Tolerated treatment well  Patient would benefit from continued PT  Significant improvement in ROM into L lumbar shear post R QL mobilization and more neutral spine alignment obtained  Plan: Continue per plan of care        Precautions: s/p L3 kyphoplasty, activity to tolerance      Manuals 5/4 5/8 5/10 5/17 5/19 5/22 5/24      Paraspinal STM PRN  R CB CB nv         R QL FMT     CB CB CB                                Neuro Re-Ed             iso unilateral hip flexion w/tball reviewed kickouts progressions 10x5: ea CB CB CB CB CB      Seated tband antirotation reviewed nv RTB 10:x10 ea CB  GTB 10x10:       SL hip abd nv 5:x10 ea nv x10 ea         catalino bar press nv nv x10 ea 10# CB   x15 ea 10#      Seated hip hinge retraining nv nv           Goblet squat   5# x10 5#x15 CB        OH press   4# x10 ea CB         Ther Ex             LTR reviewed x10  CB          Bridge nv 10x10: nv 10x10:         bike nv 10' 6' L1 7' L1 CB CB CB      Basking seal      10x10: CB                                             Ther Activity             Squat education     8'                     Gait Training                                       Modalities

## 2023-06-01 ENCOUNTER — OFFICE VISIT (OUTPATIENT)
Dept: PHYSICAL THERAPY | Facility: CLINIC | Age: 73
End: 2023-06-01

## 2023-06-01 DIAGNOSIS — S32.030A CLOSED COMPRESSION FRACTURE OF L3 LUMBAR VERTEBRA, INITIAL ENCOUNTER (HCC): ICD-10-CM

## 2023-06-01 DIAGNOSIS — Z98.890 S/P KYPHOPLASTY: Primary | ICD-10-CM

## 2023-06-01 NOTE — PROGRESS NOTES
Daily Note     Today's date: 2023  Patient name: Teresa Bella  : 1950  MRN: [id number]  Referring provider: Galdino Rehman MD  Dx:   Encounter Diagnosis     ICD-10-CM    1  S/P kyphoplasty  Z98 890       2  Closed compression fracture of L3 lumbar vertebra, initial encounter ([facility])  S32 030A                      Subjective: Pt with no new complaints  Her LB is feeling looser  Objective: See treatment diary below      Assessment: Tolerated treatment well  Patient would benefit from continued PT  Pt able to progress strengthening well  Continues with restricted R QL  Plan: Continue per plan of care        Precautions: s/p L3 kyphoplasty, activity to tolerance      Manuals 5/4 5/8 5/10 5/17 5/19 5/22 5/24 6/1     Paraspinal STM PRN  R CB CB nv         R QL FMT     CB CB CB CB                               Neuro Re-Ed             iso unilateral hip flexion w/tball reviewed kickouts progressions 10x5: ea CB CB CB CB CB CB     Seated tband antirotation reviewed nv RTB 10:x10 ea CB  GTB 10x10:       SL hip abd nv 5:x10 ea nv x10 ea         catalino bar press nv nv x10 ea 10# CB   x15 ea 10# CB     Seated hip hinge retraining nv nv           Goblet squat   5# x10 5#x15 CB   6# x20     Hip hinge sports cords tricep ext        10# 2x10     Ther Ex             LTR reviewed x10  CB          Bridge nv 10x10: nv 10x10:         bike nv 10' 6' L1 7' L1 CB CB CB      Basking seal      10x10: CB CB                                            Ther Activity             Squat education     8'                     Gait Training                                       Modalities

## 2023-06-05 ENCOUNTER — APPOINTMENT (OUTPATIENT)
Dept: PHYSICAL THERAPY | Facility: CLINIC | Age: 73
End: 2023-06-05
Payer: MEDICARE

## 2023-06-07 ENCOUNTER — APPOINTMENT (OUTPATIENT)
Dept: PHYSICAL THERAPY | Facility: CLINIC | Age: 73
End: 2023-06-07
Payer: MEDICARE

## 2023-06-13 ENCOUNTER — APPOINTMENT (OUTPATIENT)
Dept: PHYSICAL THERAPY | Facility: CLINIC | Age: 73
End: 2023-06-13
Payer: MEDICARE

## 2023-06-21 ENCOUNTER — OFFICE VISIT (OUTPATIENT)
Dept: PHYSICAL THERAPY | Facility: CLINIC | Age: 73
End: 2023-06-21
Payer: MEDICARE

## 2023-06-21 DIAGNOSIS — Z98.890 S/P KYPHOPLASTY: Primary | ICD-10-CM

## 2023-06-21 DIAGNOSIS — S32.030A CLOSED COMPRESSION FRACTURE OF L3 LUMBAR VERTEBRA, INITIAL ENCOUNTER (HCC): ICD-10-CM

## 2023-06-21 PROCEDURE — 97140 MANUAL THERAPY 1/> REGIONS: CPT | Performed by: PHYSICAL THERAPIST

## 2023-06-21 PROCEDURE — 97110 THERAPEUTIC EXERCISES: CPT | Performed by: PHYSICAL THERAPIST

## 2023-06-21 PROCEDURE — 97112 NEUROMUSCULAR REEDUCATION: CPT | Performed by: PHYSICAL THERAPIST

## 2023-06-21 NOTE — PROGRESS NOTES
Daily Note     Today's date: 2023  Patient name: Gucci Horan  : 1950  MRN: [id number]  Referring provider: Doretha Padilla MD  Dx:   Encounter Diagnosis     ICD-10-CM    1  S/P kyphoplasty  Z98 890       2  Closed compression fracture of L3 lumbar vertebra, initial encounter ([facility] [address] )  S32 030A                      Subjective: Pt reports her back has been feeling better recently  She had to miss prior sessions due to ulcerative colitis flair up but is doing better with this as well  Her right sided LB tightness has been subsiding  Objective: See treatment diary below      Assessment: Tolerated treatment well  Patient would benefit from continued PT  Pt able to progress strengthening today  Decreased QL tension noted  Plan: Continue per plan of care        Precautions: s/p L3 kyphoplasty, activity to tolerance      Manuals 5/4 5/8 5/10 5/17 5/19 5/22 5/24 6/1 6/21    Paraspinal STM PRN  R CB CB nv         R QL FMT     CB CB CB CB CB                              Neuro Re-Ed             iso unilateral hip flexion w/tball reviewed kickouts progressions 10x5: ea CB CB CB CB CB CB CB    Seated tband antirotation reviewed nv RTB 10:x10 ea CB  GTB 10x10:       SL hip abd nv 5:x10 ea nv x10 ea         catalino bar press nv nv x10 ea 10# CB   x15 ea 10# CB CB    Seated hip hinge retraining nv nv           Goblet squat   5# x10 5#x15 CB   6# x20     Supine kickouts         x20 ea    Hip hinge sports cords tricep ext        10# 2x10 CB    Ther Ex             LTR reviewed x10  CB          Bridge nv 10x10: nv 10x10:         bike nv 10' 6' L1 7' L1 CB CB CB      Basking seal      10x10: CB CB CB                                           Ther Activity             Squat education     8'                     Gait Training                                       Modalities

## 2023-07-03 ENCOUNTER — OFFICE VISIT (OUTPATIENT)
Dept: PHYSICAL THERAPY | Facility: CLINIC | Age: 73
End: 2023-07-03
Payer: MEDICARE

## 2023-07-03 DIAGNOSIS — Z98.890 S/P KYPHOPLASTY: Primary | ICD-10-CM

## 2023-07-03 DIAGNOSIS — S32.030A CLOSED COMPRESSION FRACTURE OF L3 LUMBAR VERTEBRA, INITIAL ENCOUNTER (HCC): ICD-10-CM

## 2023-07-03 PROCEDURE — 97140 MANUAL THERAPY 1/> REGIONS: CPT | Performed by: PHYSICAL THERAPIST

## 2023-07-03 PROCEDURE — 97110 THERAPEUTIC EXERCISES: CPT | Performed by: PHYSICAL THERAPIST

## 2023-07-03 PROCEDURE — 97112 NEUROMUSCULAR REEDUCATION: CPT | Performed by: PHYSICAL THERAPIST

## 2023-07-03 NOTE — PROGRESS NOTES
Daily Note     Today's date: 7/3/2023  Patient name: Edin Medina  : 1950  MRN: [id number]  Referring provider: Glen Floyd MD  Dx:   Encounter Diagnosis     ICD-10-CM    1. S/P kyphoplasty  Z98.890       2. Closed compression fracture of L3 lumbar vertebra, initial encounter ([facility])  S32.030A                      Subjective: Pt notes continued improvement in LBP. She does continue with some b/l LB soreness when standing after prolonged sitting. Objective: See treatment diary below      Assessment: Tolerated treatment well. Patient would benefit from continued PT. Continue to strengthen as tolerated. Plan: Continue per plan of care.       Precautions: s/p L3 kyphoplasty, activity to tolerance      Manuals 5/4 5/8 5/10 5/17 5/19 5/22 5/24 6/1 6/21 7/3   Paraspinal STM PRN  R CB CB nv         R QL FMT     CB CB CB CB CB CB                             Neuro Re-Ed             iso unilateral hip flexion w/tball reviewed kickouts progressions 10x5: ea CB CB CB CB CB CB CB CB   Seated tband antirotation reviewed nv RTB 10:x10 ea CB  GTB 10x10:       SL hip abd nv 5:x10 ea nv x10 ea         catalino bar press nv nv x10 ea 10# CB   x15 ea 10# CB CB CB   Seated hip hinge retraining nv nv           Goblet squat   5# x10 5#x15 CB   6# x20     Supine kickouts         x20 ea CB   Hip hinge sports cords tricep ext        10# 2x10 CB CB   Ther Ex             LTR reviewed x10  CB          Bridge nv 10x10: nv 10x10:         bike nv 10' 6' L1 7' L1 CB CB CB      Basking seal      10x10: CB CB CB CB                                          Ther Activity             Squat education     8'                     Gait Training                                       Modalities

## 2023-07-06 ENCOUNTER — OFFICE VISIT (OUTPATIENT)
Dept: PHYSICAL THERAPY | Facility: CLINIC | Age: 73
End: 2023-07-06
Payer: MEDICARE

## 2023-07-06 DIAGNOSIS — S32.030A CLOSED COMPRESSION FRACTURE OF L3 LUMBAR VERTEBRA, INITIAL ENCOUNTER (HCC): ICD-10-CM

## 2023-07-06 DIAGNOSIS — Z98.890 S/P KYPHOPLASTY: Primary | ICD-10-CM

## 2023-07-06 PROCEDURE — 97112 NEUROMUSCULAR REEDUCATION: CPT | Performed by: PHYSICAL THERAPIST

## 2023-07-06 PROCEDURE — 97110 THERAPEUTIC EXERCISES: CPT | Performed by: PHYSICAL THERAPIST

## 2023-07-06 PROCEDURE — 97140 MANUAL THERAPY 1/> REGIONS: CPT | Performed by: PHYSICAL THERAPIST

## 2023-07-06 NOTE — PROGRESS NOTES
Daily Note     Today's date: 2023  Patient name: Gertrude Gaffney  : 1950  MRN: [id number]  Referring provider: Lulú Mccray MD  Dx:   Encounter Diagnosis     ICD-10-CM    1. S/P kyphoplasty  Z98.890       2. Closed compression fracture of L3 lumbar vertebra, initial encounter ([address])  S32.030A                      Subjective: Pt reports minimal increased pain after lv. Objective: See treatment diary below      Assessment: Tolerated treatment well. Patient would benefit from continued PT. 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 Plan: Continue per plan of care.       Precautions: s/p L3 kyphoplasty, activity to tolerance      Manuals 7/6 5/8 5/10 5/17 5/19 5/22 5/24 6/1 6/21 7/3   Paraspinal STM PRN  R CB CB nv         R QL FMT CB    CB CB CB CB CB CB                             Neuro Re-Ed             iso unilateral hip flexion w/tball  kickouts progressions 10x5: ea CB CB CB CB CB CB CB CB   Seated tband antirotation  nv RTB 10:x10 ea CB  GTB 10x10:       SL hip abd  5:x10 ea nv x10 ea         catalino bar press x10 ea 16# nv x10 ea 10# CB   x15 ea 10# CB CB CB   Seated hip hinge retraining  nv           Goblet squat 7# 2x10  5# x10 5#x15 CB   6# x20     Supine kickouts CB        x20 ea CB   catalino shoulder ext 8# 2x10            Hip hinge sports cords tricep ext        10# 2x10 CB CB   Ther Ex             LTR  x10  CB          Bridge 10x10: 10x10: nv 10x10:         bike 5' 10' 6' L1 7' L1 CB CB CB      Basking seal CB     10x10: CB CB CB CB                                          Ther Activity             Squat education     8'                     Gait Training                                       Modalities

## 2023-07-11 ENCOUNTER — OFFICE VISIT (OUTPATIENT)
Dept: PHYSICAL THERAPY | Facility: CLINIC | Age: 73
End: 2023-07-11
Payer: MEDICARE

## 2023-07-11 DIAGNOSIS — Z98.890 S/P KYPHOPLASTY: Primary | ICD-10-CM

## 2023-07-11 DIAGNOSIS — S32.030A CLOSED COMPRESSION FRACTURE OF L3 LUMBAR VERTEBRA, INITIAL ENCOUNTER (HCC): ICD-10-CM

## 2023-07-11 PROCEDURE — 97110 THERAPEUTIC EXERCISES: CPT | Performed by: PHYSICAL THERAPIST

## 2023-07-11 PROCEDURE — 97140 MANUAL THERAPY 1/> REGIONS: CPT | Performed by: PHYSICAL THERAPIST

## 2023-07-11 PROCEDURE — 97112 NEUROMUSCULAR REEDUCATION: CPT | Performed by: PHYSICAL THERAPIST

## 2023-07-11 NOTE — PROGRESS NOTES
Daily Note     Today's date: 2023  Patient name: Krunal Rondon  : 1950  MRN: [id number]  Referring provider: Carlene Navarrete MD  Dx:   Encounter Diagnosis     ICD-10-CM    1. S/P kyphoplasty  Z98.890       2. Closed compression fracture of L3 lumbar vertebra, initial encounter ([address])  S32.030A                      Subjective: Pt reports continued improvement in LBP. Most pain is transitioning ot standing after prolonged sitting described as b/l across the LB. Objective: See treatment diary below      Assessment: Tolerated treatment well. Patient would benefit from continued PT. Educated on further paraspinal stretching and focused on manuals to this area today. Plan: Continue per plan of care.       Precautions: s/p L3 kyphoplasty, activity to tolerance      Manuals 7/6 7/11 5/10 5/17 5/19 5/22 5/24 6/1 6/21 7/3   Paraspinal STM PRN B/l CB B/l CB CB nv         R QL FMT CB    CB CB CB CB CB CB                             Neuro Re-Ed             iso unilateral hip flexion w/tball CB kickouts progressions 10x5: ea CB CB CB CB CB CB CB CB   Seated tband antirotation   RTB 10:x10 ea CB  GTB 10x10:       SL hip abd   nv x10 ea         catalino bar press   x10 ea 10# CB   x15 ea 10# CB CB CB   Seated hip hinge retraining             Goblet squat 7# 2x10  5# x10 5#x15 CB   6# x20     Supine kickouts CB CB       x20 ea CB   catalino shoulder ext 8# 2x10            Hip hinge sports cords tricep ext        10# 2x10 CB CB   Ther Ex             LTR   CB          Bridge   nv 10x10:         bike 5' 10' 6' L1 7' L1 CB CB CB      Basking seal      10x10: CB CB CB CB   pball rollout  x10 L/R                                     Ther Activity             Squat education     8'                     Gait Training                                       Modalities

## 2023-07-13 ENCOUNTER — OFFICE VISIT (OUTPATIENT)
Dept: PHYSICAL THERAPY | Facility: CLINIC | Age: 73
End: 2023-07-13
Payer: MEDICARE

## 2023-07-13 DIAGNOSIS — Z98.890 S/P KYPHOPLASTY: Primary | ICD-10-CM

## 2023-07-13 DIAGNOSIS — S32.030A CLOSED COMPRESSION FRACTURE OF L3 LUMBAR VERTEBRA, INITIAL ENCOUNTER (HCC): ICD-10-CM

## 2023-07-13 PROCEDURE — 97140 MANUAL THERAPY 1/> REGIONS: CPT | Performed by: PHYSICAL THERAPIST

## 2023-07-13 PROCEDURE — 97110 THERAPEUTIC EXERCISES: CPT | Performed by: PHYSICAL THERAPIST

## 2023-07-13 PROCEDURE — 97112 NEUROMUSCULAR REEDUCATION: CPT | Performed by: PHYSICAL THERAPIST

## 2023-07-13 NOTE — PROGRESS NOTES
Daily Note     Today's date: 2023  Patient name: Adelita Yen  : 1950  MRN: [id number]  Referring provider: Laren Klinefelter, MD  Dx:   Encounter Diagnosis     ICD-10-CM    1. S/P kyphoplasty  Z98.890       2. Closed compression fracture of L3 lumbar vertebra, initial encounter ([facility])  S32.030A                      Subjective: Pt reports decreased pain in LB today. Objective: See treatment diary below      Assessment: Tolerated treatment well. Patient would benefit from continued PT. Begin increasing resistance in single plane core strengthening and increased [address] in multiplane activity. Plan: Continue per plan of care.       Precautions: s/p L3 kyphoplasty, activity to tolerance      Manuals 7/6 7/11 7/13 5/17 5/19 5/22 5/24 6/1 6/21 7/3   Paraspinal STM PRN B/l CB B/l CB CB nv         R QL FMT CB    CB CB CB CB CB CB                             Neuro Re-Ed             iso unilateral hip flexion w/tball CB kickouts progressions 10x5: ea CB CB CB CB CB CB CB CB   Seated tband antirotation    CB  GTB 10x10:       SL hip abd    x10 ea         catalino bar press   15# x20 CB   x15 ea 10# CB CB CB   catalino chop   x10 ea 8#          Goblet squat 7# 2x10   5#x15 CB   6# x20     Supine kickouts CB CB CB      x20 ea CB   catalino shoulder ext 8# 2x10  8# 2x10          Hip hinge sports cords tricep ext        10# 2x10 CB CB   Ther Ex             LTR   CB          Bridge   nv 10x10:         bike 5' 10' 6' L1 7' L1 CB CB CB      Basking seal      10x10: CB CB CB CB   pball rollout  x10 L/R nv                                    Ther Activity             Squat education     8'                     Gait Training                                       Modalities

## 2023-07-18 ENCOUNTER — OFFICE VISIT (OUTPATIENT)
Dept: PHYSICAL THERAPY | Facility: CLINIC | Age: 73
End: 2023-07-18
Payer: MEDICARE

## 2023-07-18 DIAGNOSIS — S32.030A CLOSED COMPRESSION FRACTURE OF L3 LUMBAR VERTEBRA, INITIAL ENCOUNTER (HCC): ICD-10-CM

## 2023-07-18 DIAGNOSIS — Z98.890 S/P KYPHOPLASTY: Primary | ICD-10-CM

## 2023-07-18 PROCEDURE — 97112 NEUROMUSCULAR REEDUCATION: CPT | Performed by: PHYSICAL THERAPIST

## 2023-07-18 PROCEDURE — 97110 THERAPEUTIC EXERCISES: CPT | Performed by: PHYSICAL THERAPIST

## 2023-07-18 PROCEDURE — 97140 MANUAL THERAPY 1/> REGIONS: CPT | Performed by: PHYSICAL THERAPIST

## 2023-07-18 NOTE — PROGRESS NOTES
Daily Note     Today's date: 2023  Patient name: Morteza Schmitt  : 1950  MRN: [id number]  Referring provider: Kurtis Lee MD  Dx:   Encounter Diagnosis     ICD-10-CM    1. S/P kyphoplasty  Z98.890       2. Closed compression fracture of L3 lumbar vertebra, initial encounter ([address])  S32.030A                      Subjective: Pt reports some R sided LBP after turing to look behind quickly. Objective: See treatment diary below      Assessment: Tolerated treatment well. Patient would benefit from continued PT. Continue with multiplane core strengthening. Plan: Continue per plan of care.       Precautions: s/p L3 kyphoplasty, activity to tolerance      Manuals 7/6 7/11 7/13 7/18 5/19 5/22 5/24 6/1 6/21 7/3   Paraspinal STM PRN B/l CB B/l CB CB CB         R QL FMT CB    CB CB CB CB CB CB                             Neuro Re-Ed             sup             Seated tband antirotation      GTB 10x10:       SL hip abd             catalino bar press   15# x20 CB   x15 ea 10# CB CB CB   catalino chop   x10 ea 8# CB         Goblet squat 7# 2x10    CB   6# x20     Supine kickouts CB CB CB      x20 ea CB   catalino shoulder ext 8# 2x10  8# 2x10 CB         Hip hinge sports cords tricep ext        10# 2x10 CB CB   Ther Ex             LTR   CB          Bridge   nv          bike 5' 10' 6' L1 CB CB CB CB      Basking seal      10x10: CB CB CB CB   pball rollout  x10 L/R nv          catalino OH press    x15 ea         Standing march    10x10: ea         Ther Activity             Squat education     8'                     Gait Training                                       Modalities

## 2023-07-21 ENCOUNTER — OFFICE VISIT (OUTPATIENT)
Dept: PHYSICAL THERAPY | Facility: CLINIC | Age: 73
End: 2023-07-21
Payer: MEDICARE

## 2023-07-21 DIAGNOSIS — Z98.890 S/P KYPHOPLASTY: Primary | ICD-10-CM

## 2023-07-21 DIAGNOSIS — S32.030A CLOSED COMPRESSION FRACTURE OF L3 LUMBAR VERTEBRA, INITIAL ENCOUNTER (HCC): ICD-10-CM

## 2023-07-21 PROCEDURE — 97112 NEUROMUSCULAR REEDUCATION: CPT | Performed by: PHYSICAL THERAPIST

## 2023-07-21 PROCEDURE — 97140 MANUAL THERAPY 1/> REGIONS: CPT | Performed by: PHYSICAL THERAPIST

## 2023-07-21 NOTE — PROGRESS NOTES
Daily Note     Today's date: 2023  Patient name: Luz Astudillo  : 1950  MRN: [id number]  Referring provider: Clarence Jernigan MD  Dx:   Encounter Diagnosis     ICD-10-CM    1. S/P kyphoplasty  Z98.890       2. Closed compression fracture of L3 lumbar vertebra, initial encounter ([facility])  S32.030A                      Subjective: Pt reports some general workout soreness after lv. Her knee has been bothering more recently. Objective: See treatment diary below      Assessment: Tolerated treatment well. Patient would benefit from continued PT. 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 Plan: Continue per plan of care.       Precautions: s/p L3 kyphoplasty, activity to tolerance      Manuals 7/6 7/11 7/13 7/18 7/21 5/22 5/24 6/1 6/21 7/3   Paraspinal STM PRN B/l CB B/l CB CB CB CB        R QL FMT CB     CB CB CB CB CB                             Neuro Re-Ed             sup             Seated tband antirotation      GTB 10x10:       SL hip abd             catalino bar press   15# x20 CB CB  x15 ea 10# CB CB CB   Supine pball passes     2x10        catalino chop   x10 ea 8# CB CB        Goblet squat 7# 2x10       6# x20     Supine kickouts CB CB CB  2x10    x20 ea CB   catalino shoulder ext 8# 2x10  8# 2x10 CB nv        dumbell overhead reach /march     2# x10 ea        Ther Ex             LTR   CB          Bridge   nv          bike 5' 10' 6' L1 CB CB CB CB      Basking seal      10x10: CB CB CB CB   pball rollout  x10 L/R nv          catalino OH press    x15 ea CB        Standing march    10x10: ea CB        Ther Activity             Squat education                          Gait Training                                       Modalities

## 2023-07-27 ENCOUNTER — OFFICE VISIT (OUTPATIENT)
Dept: PHYSICAL THERAPY | Facility: CLINIC | Age: 73
End: 2023-07-27
Payer: MEDICARE

## 2023-07-27 DIAGNOSIS — Z98.890 S/P KYPHOPLASTY: Primary | ICD-10-CM

## 2023-07-27 DIAGNOSIS — S32.030A CLOSED COMPRESSION FRACTURE OF L3 LUMBAR VERTEBRA, INITIAL ENCOUNTER (HCC): ICD-10-CM

## 2023-07-27 PROCEDURE — 97112 NEUROMUSCULAR REEDUCATION: CPT | Performed by: PHYSICAL THERAPIST

## 2023-07-27 PROCEDURE — 97140 MANUAL THERAPY 1/> REGIONS: CPT | Performed by: PHYSICAL THERAPIST

## 2023-07-27 PROCEDURE — 97110 THERAPEUTIC EXERCISES: CPT | Performed by: PHYSICAL THERAPIST

## 2023-07-27 NOTE — PROGRESS NOTES
Daily Note     Today's date: 2023  Patient name: Horace Lundberg  : 1950  MRN: [id number]  Referring provider: Binu Mata MD  Dx:   Encounter Diagnosis     ICD-10-CM    1. S/P kyphoplasty  Z98.890       2. Closed compression fracture of L3 lumbar vertebra, initial encounter ([address])  S32.030A                      Subjective: Pt reports no new complaints with the LB. She does feel her knee continues to catch from her meniscus tear. She is considering having it looked at by ortho again. Objective: See treatment diary below      Assessment: Tolerated treatment well. Patient would benefit from continued PT. Continue to strengthen as tolerated. Plan: Continue per plan of care.       Precautions: s/p L3 kyphoplasty, activity to tolerance      Manuals 7/6 7/11 7/13 7/18 7/21 7/27 5/24 6/1 6/21 7/3   Paraspinal STM PRN B/l CB B/l CB CB CB CB CB       R QL FMT CB      CB CB CB CB                             Neuro Re-Ed             sup             Seated tband antirotation             SL hip abd             catalino bar press   15# x20 CB CB 15#x30 x15 ea 10# CB CB CB   Supine pball passes     2x10 CB       catalino chop   x10 ea 8# CB CB CB       Goblet squat 7# 2x10       6# x20     Supine kickouts CB CB CB  2x10 CB   x20 ea CB   catalino shoulder ext 8# 2x10  8# 2x10 CB nv        SL hip abd      x20 ea       catalino bent over row      8# 2x10       dumbell overhead reach /march     2# x10 ea 3# x20 ea       Ther Ex             LTR   CB          Bridge   nv          bike 5' 10' 6' L1 CB CB CB CB      Basking seal       CB CB CB CB   pball rollout  x10 L/R nv          catalino OH press    x15 ea CB        Standing march    10x10: ea CB        Ther Activity             Squat education                          Gait Training                                       Modalities

## 2023-08-03 ENCOUNTER — OFFICE VISIT (OUTPATIENT)
Dept: PHYSICAL THERAPY | Facility: CLINIC | Age: 73
End: 2023-08-03
Payer: MEDICARE

## 2023-08-03 DIAGNOSIS — S32.030A CLOSED COMPRESSION FRACTURE OF L3 LUMBAR VERTEBRA, INITIAL ENCOUNTER (HCC): ICD-10-CM

## 2023-08-03 DIAGNOSIS — Z98.890 S/P KYPHOPLASTY: Primary | ICD-10-CM

## 2023-08-03 PROCEDURE — 97140 MANUAL THERAPY 1/> REGIONS: CPT | Performed by: PHYSICAL THERAPIST

## 2023-08-03 PROCEDURE — 97110 THERAPEUTIC EXERCISES: CPT | Performed by: PHYSICAL THERAPIST

## 2023-08-03 PROCEDURE — 97112 NEUROMUSCULAR REEDUCATION: CPT | Performed by: PHYSICAL THERAPIST

## 2023-08-03 NOTE — PROGRESS NOTES
Daily Note     Today's date: 8/3/2023  Patient name: Casi Collins  : 1950  MRN: [id number]  Referring provider: Tj Cole MD  Dx:   Encounter Diagnosis     ICD-10-CM    1. S/P kyphoplasty  Z98.890       2. Closed compression fracture of L3 lumbar vertebra, initial encounter ([facility])  S32.030A                      Subjective: Pt reports continued improvement in core strength and pain on the R side LB/ central LB. Objective: See treatment diary below      Assessment: Tolerated treatment well. Patient would benefit from continued PT. Continue to strengthen as tolerated. Plan: Continue per plan of care.       Precautions: s/p L3 kyphoplasty, activity to tolerance      Manuals 7/6 7/11 7/13 7/18 7/21 7/27 8/3 6/1 6/21 7/3   Paraspinal STM PRN B/l CB B/l CB CB CB CB CB CB      R QL FMT CB      CB CB CB CB                             Neuro Re-Ed             pallof press       x10 ea      Seated tband antirotation             SL hip abd             catalino bar press   15# x20 CB CB 15#x30 CB CB CB CB   Supine pball passes     2x10 CB CB      catalino chop   x10 ea 8# CB CB CB       Goblet squat 7# 2x10       6# x20     Supine kickouts CB CB CB  2x10 CB CB  x20 ea CB   catalino shoulder ext 8# 2x10  8# 2x10 CB nv        SL hip abd      x20 ea       catalino bent over row      8# 2x10 CB      dumbell overhead reach /march     2# x10 ea 3# x20 ea CB      Ther Ex             LTR   CB          Bridge   nv          bike 5' 10' 6' L1 CB CB CB CB      Basking seal        CB CB CB   pball rollout  x10 L/R nv          Shoulder OH ext       7# x10 ea      Standing march    10x10: ea CB        Ther Activity             Squat education                          Gait Training                                       Modalities

## 2023-08-07 ENCOUNTER — TELEPHONE (OUTPATIENT)
Age: 73
End: 2023-08-07

## 2023-08-11 ENCOUNTER — OFFICE VISIT (OUTPATIENT)
Dept: PHYSICAL THERAPY | Facility: CLINIC | Age: 73
End: 2023-08-11
Payer: MEDICARE

## 2023-08-11 DIAGNOSIS — S32.030A CLOSED COMPRESSION FRACTURE OF L3 LUMBAR VERTEBRA, INITIAL ENCOUNTER (HCC): ICD-10-CM

## 2023-08-11 DIAGNOSIS — Z98.890 S/P KYPHOPLASTY: Primary | ICD-10-CM

## 2023-08-11 PROCEDURE — 97110 THERAPEUTIC EXERCISES: CPT | Performed by: PHYSICAL THERAPIST

## 2023-08-11 PROCEDURE — 97112 NEUROMUSCULAR REEDUCATION: CPT | Performed by: PHYSICAL THERAPIST

## 2023-08-11 PROCEDURE — 97140 MANUAL THERAPY 1/> REGIONS: CPT | Performed by: PHYSICAL THERAPIST

## 2023-08-11 NOTE — PROGRESS NOTES
Daily Note     Today's date: 2023  Patient name: Hannah Cesar  : 1950  MRN: [id number]  Referring provider: Jes Steel MD  Dx:   Encounter Diagnosis     ICD-10-CM    1. S/P kyphoplasty  Z98.890       2. Closed compression fracture of L3 lumbar vertebra, initial encounter ([facility])  S32.030A                      Subjective: Pt notes no new complaints. Objective: See treatment diary below      Assessment: Tolerated treatment well. Patient would benefit from continued PT. Continue to strengthen as tolerated. Plan: Continue per plan of care.       Precautions: s/p L3 kyphoplasty, activity to tolerance      Manuals 7/6 7/11 7/13 7/18 7/21 7/27 8/3 8/11 6/21 7/3   Paraspinal STM PRN B/l CB B/l CB CB CB CB CB CB CB     R QL FMT CB      CB CB CB CB                             Neuro Re-Ed             pallof press       x10 ea CB     Seated tband antirotation             SL hip abd             catalino bar press   15# x20 CB CB 15#x30 CB CB CB CB   Supine pball passes     2x10 CB CB CB     catalino chop   x10 ea 8# CB CB CB       Goblet squat 7# 2x10            Supine kickouts CB CB CB  2x10 CB CB x25 x20 ea CB   catalino shoulder ext 8# 2x10  8# 2x10 CB nv        SL hip abd      x20 ea       catalino bent over row      8# 2x10 CB CB     dumbell overhead reach /march     2# x10 ea 3# x20 ea CB CB     Ther Ex             LTR   CB          Bridge   nv     2x10     bike 5' 10' 6' L1 CB CB CB CB CB     Basking seal         CB CB   pball rollout  x10 L/R nv          Shoulder OH ext       7# x10 ea CB     Standing march    10x10: ea CB        Ther Activity             Squat education                          Gait Training                                       Modalities

## 2023-08-15 ENCOUNTER — OFFICE VISIT (OUTPATIENT)
Dept: PHYSICAL THERAPY | Facility: CLINIC | Age: 73
End: 2023-08-15
Payer: MEDICARE

## 2023-08-15 DIAGNOSIS — Z98.890 S/P KYPHOPLASTY: Primary | ICD-10-CM

## 2023-08-15 DIAGNOSIS — S32.030A CLOSED COMPRESSION FRACTURE OF L3 LUMBAR VERTEBRA, INITIAL ENCOUNTER (HCC): ICD-10-CM

## 2023-08-15 PROCEDURE — 97140 MANUAL THERAPY 1/> REGIONS: CPT | Performed by: PHYSICAL THERAPIST

## 2023-08-15 PROCEDURE — 97112 NEUROMUSCULAR REEDUCATION: CPT | Performed by: PHYSICAL THERAPIST

## 2023-08-15 PROCEDURE — 97110 THERAPEUTIC EXERCISES: CPT | Performed by: PHYSICAL THERAPIST

## 2023-08-15 NOTE — PROGRESS NOTES
Daily Note     Today's date: 8/15/2023  Patient name: Chani Santana  : 1950  MRN: [id number]  Referring provider: David Humphrey MD  Dx:   Encounter Diagnosis     ICD-10-CM    1. S/P kyphoplasty  Z98.890       2. Closed compression fracture of L3 lumbar vertebra, initial encounter ([address])  S32.030A                      Subjective: Pt reports minimal increased pain over the weekend when installing a door. Objective: See treatment diary below      Assessment: Tolerated treatment well. Patient would benefit from continued PT. Continue to strengthen as tolerated. Plan: Continue per plan of care.       Precautions: s/p L3 kyphoplasty, activity to tolerance      Manuals 7/6 7/11 7/13 7/18 7/21 7/27 8/3 8/11 8/15 7/3   Paraspinal STM PRN B/l CB B/l CB CB CB CB CB CB CB CB    R QL FMT CB      CB CB CB CB                             Neuro Re-Ed             pallof press       x10 ea CB x15 ea BTB    Seated tband antirotation             SL hip abd             catalino bar press   15# x20 CB CB 15#x30 CB CB 18# x30 CB   Supine pball passes     2x10 CB CB CB CB    catalino chop   x10 ea 8# CB CB CB       Goblet squat 7# 2x10            Supine kickouts CB CB CB  2x10 CB CB x25 nv CB   catalino shoulder ext 8# 2x10  8# 2x10 CB nv        SL hip abd      x20 ea       catalino bent over row      8# 2x10 CB CB 12# 2x15    dumbell overhead reach /march     2# x10 ea 3# x20 ea CB CB nv    Ther Ex             LTR   CB          Bridge   nv     2x10     bike 5' 10' 6' L1 CB CB CB CB CB CB    Basking seal          CB   pball rollout  x10 L/R nv          Shoulder OH ext       7# x10 ea CB     Seated SC shoulder diagonal ext         Yellow x10 ea    Ther Activity             Squat education                          Gait Training                                       Modalities

## 2023-08-22 ENCOUNTER — OFFICE VISIT (OUTPATIENT)
Dept: PHYSICAL THERAPY | Facility: CLINIC | Age: 73
End: 2023-08-22
Payer: MEDICARE

## 2023-08-22 DIAGNOSIS — S32.030A CLOSED COMPRESSION FRACTURE OF L3 LUMBAR VERTEBRA, INITIAL ENCOUNTER (HCC): ICD-10-CM

## 2023-08-22 DIAGNOSIS — Z98.890 S/P KYPHOPLASTY: Primary | ICD-10-CM

## 2023-08-22 PROCEDURE — 97140 MANUAL THERAPY 1/> REGIONS: CPT | Performed by: PHYSICAL THERAPIST

## 2023-08-22 PROCEDURE — 97112 NEUROMUSCULAR REEDUCATION: CPT | Performed by: PHYSICAL THERAPIST

## 2023-08-22 NOTE — PROGRESS NOTES
Daily Note     Today's date: 2023  Patient name: Latha Durham  : 1950  MRN: [id number]  Referring provider: Manuel Gillette MD  Dx:   Encounter Diagnosis     ICD-10-CM    1. S/P kyphoplasty  Z98.890       2. Closed compression fracture of L3 lumbar vertebra, initial encounter ([address])  S32.030A                      Subjective: Pt reports a little more LB soreness today and attributes to lack of exercise. Objective: See treatment diary below      Assessment: Tolerated treatment well. Patient would benefit from continued PT. R QL with greater restriction today and more rapid fatigue on strengthening RLE. Will return to full session nv. Plan: Continue per plan of care.       Precautions: s/p L3 kyphoplasty, activity to tolerance      Manuals 7/6 7/11 7/13 7/18 7/21 7/27 8/3 8/11 8/15 8/22   Paraspinal STM PRN B/l CB B/l CB CB CB CB CB CB CB CB CB   R QL FMT CB      CB CB CB CB                             Neuro Re-Ed             pallof press       x10 ea CB x15 ea BTB    Seated tband antirotation             SL hip abd             catalino bar press   15# x20 CB CB 15#x30 CB CB 18# x30 CB   Supine pball passes     2x10 CB CB CB CB CB   catalino chop   x10 ea 8# CB CB CB       Goblet squat 7# 2x10            Supine kickouts CB CB CB  2x10 CB CB x25 nv CB   catalino shoulder ext 8# 2x10  8# 2x10 CB nv        SL hip abd      x20 ea       catalino bent over row      8# 2x10 CB CB 12# 2x15 CB   dumbell overhead reach /march     2# x10 ea 3# x20 ea CB CB nv 4# x15 ea   Ther Ex             LTR   CB          Bridge   nv     2x10     bike 5' 10' 6' L1 CB CB CB CB CB CB CB   Basking seal          10x10:    pball rollout  x10 L/R nv          Shoulder OH ext       7# x10 ea CB     Seated SC shoulder diagonal ext         Yellow x10 ea    Ther Activity             Squat education                          Gait Training                                       Modalities

## 2023-08-24 ENCOUNTER — OFFICE VISIT (OUTPATIENT)
Dept: PHYSICAL THERAPY | Facility: CLINIC | Age: 73
End: 2023-08-24
Payer: MEDICARE

## 2023-08-24 DIAGNOSIS — Z98.890 S/P KYPHOPLASTY: Primary | ICD-10-CM

## 2023-08-24 DIAGNOSIS — S32.030A CLOSED COMPRESSION FRACTURE OF L3 LUMBAR VERTEBRA, INITIAL ENCOUNTER (HCC): ICD-10-CM

## 2023-08-24 PROCEDURE — 97112 NEUROMUSCULAR REEDUCATION: CPT | Performed by: PHYSICAL THERAPIST

## 2023-08-24 PROCEDURE — 97140 MANUAL THERAPY 1/> REGIONS: CPT | Performed by: PHYSICAL THERAPIST

## 2023-08-24 PROCEDURE — 97110 THERAPEUTIC EXERCISES: CPT | Performed by: PHYSICAL THERAPIST

## 2023-08-24 NOTE — PROGRESS NOTES
Daily Note     Today's date: 2023  Patient name: Gaston Garcia  : 1950  MRN: [id number]  Referring provider: Alexandro Elder MD  Dx:   Encounter Diagnosis     ICD-10-CM    1. S/P kyphoplasty  Z98.890       2. Closed compression fracture of L3 lumbar vertebra, initial encounter ([facility])  S32.030A                      Subjective: Pt with decreased pain from lv. Objective: See treatment diary below      Assessment: Tolerated treatment well. Patient would benefit from continued PT. Continue to strengthen as tolerated. Plan: Continue per plan of care.       Precautions: s/p L3 kyphoplasty, activity to tolerance      Manuals 8/24 7/11 7/13 7/18 7/21 7/27 8/3 8/11 8/15 8/22   Paraspinal STM PRN CB B/l CB CB CB CB CB CB CB CB CB   R QL FMT CB      CB CB CB CB                             Neuro Re-Ed             pallof press       x10 ea CB x15 ea BTB    Seated tband antirotation             SL hip abd             catalino bar press   15# x20 CB CB 15#x30 CB CB 18# x30 CB   Supine pball passes CB    2x10 CB CB CB CB CB   catalino chop x15 ea 8#  x10 ea 8# CB CB CB       Goblet squat             Supine kickouts  CB CB  2x10 CB CB x25 nv CB   catalino shoulder ext   8# 2x10 CB nv        SL hip abd      x20 ea       catalino bent over row      8# 2x10 CB CB 12# 2x15 CB   R SL mass flexion  10x10:            dumbell overhead reach /march CB    2# x10 ea 3# x20 ea CB CB nv 4# x15 ea   Ther Ex             LTR   CB          Bridge   nv     2x10     bike CB 10' 6' L1 CB CB CB CB CB CB CB   Basking seal          10x10:    pball rollout  x10 L/R nv          Seated SC shoulder diagonal ext         Yellow x10 ea    Ther Activity             Squat education                          Gait Training                                       Modalities

## 2023-08-29 ENCOUNTER — OFFICE VISIT (OUTPATIENT)
Dept: OBGYN CLINIC | Facility: HOSPITAL | Age: 73
End: 2023-08-29
Payer: MEDICARE

## 2023-08-29 ENCOUNTER — HOSPITAL ENCOUNTER (OUTPATIENT)
Dept: RADIOLOGY | Facility: HOSPITAL | Age: 73
Discharge: HOME | End: 2023-08-29
Attending: ORTHOPAEDIC SURGERY
Payer: MEDICARE

## 2023-08-29 VITALS
HEART RATE: 64 BPM | SYSTOLIC BLOOD PRESSURE: 147 MMHG | HEIGHT: 64 IN | DIASTOLIC BLOOD PRESSURE: 92 MMHG | WEIGHT: 165 LBS | BODY MASS INDEX: 28.17 KG/M2

## 2023-08-29 DIAGNOSIS — M25.361 KNEE INSTABILITY, RIGHT: Primary | ICD-10-CM

## 2023-08-29 DIAGNOSIS — M17.11 PRIMARY OSTEOARTHRITIS OF RIGHT KNEE: ICD-10-CM

## 2023-08-29 DIAGNOSIS — M25.361 KNEE INSTABILITY, RIGHT: ICD-10-CM

## 2023-08-29 PROCEDURE — 73562 X-RAY EXAM OF KNEE 3: CPT

## 2023-08-29 PROCEDURE — 99213 OFFICE O/P EST LOW 20 MIN: CPT | Performed by: ORTHOPAEDIC SURGERY

## 2023-08-29 NOTE — PROGRESS NOTES
Subjective; 29-year-old adult female, care of knee discomfort and knee instability with flexion hyperflexion and squatting. She can walk 5 miles without predictable weightbearing pain. When she hyper flexes and squats to get down and play with children she has the feeling of instability snapping laterally and points to the lateral aspect of her patellofemoral joint. She has tried a knee sleeve  She has performed a home exercise regimen to promote increased function, and alleviation of pain. She openly states that she would like etiology of her pain and diagnosis as well as treatment ramifications. She would like to enjoy life more.     Past Medical History:   Diagnosis Date   • Arthritis    • Breast cancer ([address])    • Gastric ulcer    • GERD (gastroesophageal reflux disease)    • Heart murmur    • History of radiation therapy     PBRT   • IBS (irritable bowel syndrome)    • Irritable bowel syndrome 2012   • Migraine    • Skin cancer    • Use of letrozole (Femara)     took for 2 years D/john due to side effects       Past Surgical History:   Procedure Laterality Date   • ABDOMINOPLASTY     • ADENOIDECTOMY     • BREAST BIOPSY Right 10/12/2007    IDC   • BREAST LUMPECTOMY Right    • BREAST SURGERY      lumpectomy, resolved 2007   •  SECTION     • CHOLECYSTECTOMY     • COLON SURGERY     • COLONOSCOPY     • COSMETIC SURGERY      forehead   • FOOT SURGERY     • IR KYPHOPLASTY/VERTEBROPLASTY  3/9/2023   • MOHS SURGERY     • REDUCTION MAMMAPLASTY Bilateral 2015    reduction on left/lift on right   • SENTINEL LYMPH NODE BIOPSY Right 2007   • TOE SURGERY      left toe surgery   • TONSILLECTOMY         Family History   Problem Relation Age of Onset   • Hypertension Mother    • Melanoma Mother    • Heart disease Mother    • Heart attack Father         acute MI, CABG   • Hypertension Father    • Heart disease Father    • Other Brother         CABG   • Lymphoma Brother non-Hodgkin's   • Hypertension Brother    • Heart disease Brother    • Other Family         back disorder   • Stroke Family         CVA   • Diabetes Family    • Cancer Family        Social History     Tobacco Use   • Smoking status: Former   • Smokeless tobacco: Never   • Tobacco comments:     Smoker in teenage years. Quit at 22. One pack per week. Vaping Use   • Vaping Use: Never used   Substance Use Topics   • Alcohol use: Not Currently     Comment: May have alcohol but is not drinking it currently   • Drug use: No     Exam;    The outward appearance of her right knee offers suspicion for valgus angulation. Extends her right knee fully. She has increased lateral version of the patella com paired to medial.  And also compared to the left knee  Her patella tracks laterally with flexion and hyperflexion, but without apprehension. Xrays; right knee series 3 views merchant view with deposition seen medial to the patella, the patella having a overhang border laterally, yet no tilt. There is equal medial and lateral compartmental wear without bone-on-bone opposition. This is our wet reading in the office prior to the radiologists formal interpretation. Impression;    Right knee instability  Right knee discomfort  Right knee osteoarthritis. Plan;      She already has ongoing physical therapy.   We have added an order for the right knee for patellofemoral stability,-modalities  See her in follow-up on a as needed basis    Experience was supervised by and plan formulated by the attending surgeon it was my privilege to assist him in the delivery of her care

## 2023-08-30 ENCOUNTER — OFFICE VISIT (OUTPATIENT)
Dept: PHYSICAL THERAPY | Facility: CLINIC | Age: 73
End: 2023-08-30
Payer: MEDICARE

## 2023-08-30 DIAGNOSIS — G89.29 CHRONIC PAIN OF RIGHT KNEE: Primary | ICD-10-CM

## 2023-08-30 DIAGNOSIS — M25.561 CHRONIC PAIN OF RIGHT KNEE: Primary | ICD-10-CM

## 2023-08-30 DIAGNOSIS — M25.361 KNEE INSTABILITY, RIGHT: ICD-10-CM

## 2023-08-30 DIAGNOSIS — M17.11 PRIMARY OSTEOARTHRITIS OF RIGHT KNEE: ICD-10-CM

## 2023-08-30 PROCEDURE — 97110 THERAPEUTIC EXERCISES: CPT | Performed by: PHYSICAL THERAPIST

## 2023-08-30 PROCEDURE — 97112 NEUROMUSCULAR REEDUCATION: CPT | Performed by: PHYSICAL THERAPIST

## 2023-08-30 PROCEDURE — 97164 PT RE-EVAL EST PLAN CARE: CPT | Performed by: PHYSICAL THERAPIST

## 2023-08-30 NOTE — PROGRESS NOTES
PT Evaluation     Today's date: 2023  Patient name: Ravi Reynoso  : 1950  MRN: [id number]  Referring provider: Ryan Pool MD  Dx:   Encounter Diagnosis     ICD-10-CM    1. Chronic pain of right knee  M25.561     G89.29           Start Time: 945  Stop Time: 1030  Total time in clinic (min): 45 minutes    Assessment  Assessment details: Ravi Reynoso is a pleasant 68 y.o. female presents with R knee pain. Educated on quad strengthening/stabilization with no increased pain post visit. Trialled patellar taping for short term relief as well. No further referral appears necessary at this time. Skilled PT services appropriate to facilitate return to prior level of function with transition to home exercise program for independent management when appropriate. Impairments: abnormal muscle firing, abnormal muscle tone, abnormal or restricted ROM, impaired physical strength, lacks appropriate home exercise program and pain with function  Understanding of Dx/Px/POC: good   Prognosis: good    Goals  Patient will be able to manage symptoms independently  LT weeks  1. pt will reach foto predicted  2. pt will decrease worst pain 75%   ST weeks  1. Pt will decrease worst pain 50%  2.  Patient will successfully manage HEP      Plan  Patient would benefit from: skilled PT  Referral necessary: No  Planned modality interventions: thermotherapy: hydrocollator packs  Planned therapy interventions: home exercise program, manual therapy, neuromuscular re-education, patient education, functional ROM exercises, strengthening, stretching, joint mobilization, graded activity, graded exercise, therapeutic exercise, body mechanics training, motor coordination training and activity modification  Frequency: 2x week  Duration in weeks: 12  Treatment plan discussed with: patient        Subjective Evaluation    History of Present Illness  Mechanism of injury: Pt reports a chronic R knee pain that occassionally has a subluxing sensation near the patella. She was seen by ortho and x-rays showed appropriate tib-femoral alignment and lateral patellofemoral arthritis. She described general hypermobility throughout her joints since she was young. Giving out sensation mostly happens when down on the floor playing with her grandkid, rolling in bed or transitioning to standing. She is able to go for walks without much issue. Pain is lateral when the sublux occurs and medially throughout the day as a dull ache. Pain  Quality: sharp and dull ache          Objective     Strength/Myotome Testing     Right Knee   Flexion: 4+  Extension: 4  Quadriceps contraction: good    General Comments:      Knee Comments  Valgus R knee compared to L in static standing  Lateral patellar tracking R  SL squat: mild knee valgus, mild stability deficits  Knee ROM WFL    gastroc medial restriction and TTP             Precautions: none      Manuals 8/30            Patellar taping CB                                                   Neuro Re-Ed             SL step up reviewed            VG SL squat 40%x20            tband weight shifts to march 10x10:                                                                 Ther Ex             SLR flexion reviewed            LAQ nv            bike nv                                                                             Ther Activity                                       Gait Training                                       Modalities

## 2023-09-06 ENCOUNTER — OFFICE VISIT (OUTPATIENT)
Dept: PHYSICAL THERAPY | Facility: CLINIC | Age: 73
End: 2023-09-06
Payer: MEDICARE

## 2023-09-06 DIAGNOSIS — Z98.890 S/P KYPHOPLASTY: ICD-10-CM

## 2023-09-06 DIAGNOSIS — M25.361 KNEE INSTABILITY, RIGHT: ICD-10-CM

## 2023-09-06 DIAGNOSIS — S32.030A CLOSED COMPRESSION FRACTURE OF L3 LUMBAR VERTEBRA, INITIAL ENCOUNTER (HCC): ICD-10-CM

## 2023-09-06 DIAGNOSIS — M17.11 PRIMARY OSTEOARTHRITIS OF RIGHT KNEE: ICD-10-CM

## 2023-09-06 DIAGNOSIS — G89.29 CHRONIC PAIN OF RIGHT KNEE: Primary | ICD-10-CM

## 2023-09-06 DIAGNOSIS — M25.561 CHRONIC PAIN OF RIGHT KNEE: Primary | ICD-10-CM

## 2023-09-06 PROCEDURE — 97112 NEUROMUSCULAR REEDUCATION: CPT | Performed by: PHYSICAL THERAPIST

## 2023-09-06 PROCEDURE — 97140 MANUAL THERAPY 1/> REGIONS: CPT | Performed by: PHYSICAL THERAPIST

## 2023-09-06 PROCEDURE — 97110 THERAPEUTIC EXERCISES: CPT | Performed by: PHYSICAL THERAPIST

## 2023-09-06 NOTE — PROGRESS NOTES
Daily Note     Today's date: 2023  Patient name: Del Carmen  : 1950  MRN: [id number]  Referring provider: Cindy Reddy MD  Dx:   Encounter Diagnosis     ICD-10-CM    1. Chronic pain of right knee  M25.561     G89.29       2. Knee instability, right  M25.361       3. Primary osteoarthritis of right knee  M17.11       4. S/P kyphoplasty  Z98.890       5. Closed compression fracture of L3 lumbar vertebra, initial encounter ([facility])  S32.030A                      Subjective: Pt reports decreased pain and catching in the knee. Objective: See treatment diary below      Assessment: Tolerated treatment well. Patient would benefit from continued PT. Continue to strengthen as tolerated. Significant challenge with tband marches and maintaining knee alignment with quad control. Plan: Continue per plan of care.       Precautions: none      Manuals  96           Patellar taping CB CB           R QL STM  CB           Lateral patellar STM  CB                        Neuro Re-Ed             SL step up reviewed 2x10           VG SL squat 40%x20 CB           tband weight shifts to march 10x10: CB           SL LP  45# 3x10                                                  Ther Ex             SLR flexion reviewed 2x10 3#           bike nv                                                                             Ther Activity                                       Gait Training                                       Modalities

## 2023-09-08 ENCOUNTER — OFFICE VISIT (OUTPATIENT)
Dept: PHYSICAL THERAPY | Facility: CLINIC | Age: 73
End: 2023-09-08
Payer: MEDICARE

## 2023-09-08 DIAGNOSIS — M25.561 CHRONIC PAIN OF RIGHT KNEE: ICD-10-CM

## 2023-09-08 DIAGNOSIS — G89.29 CHRONIC PAIN OF RIGHT KNEE: ICD-10-CM

## 2023-09-08 DIAGNOSIS — S32.030A CLOSED COMPRESSION FRACTURE OF L3 LUMBAR VERTEBRA, INITIAL ENCOUNTER (HCC): Primary | ICD-10-CM

## 2023-09-08 PROCEDURE — 97110 THERAPEUTIC EXERCISES: CPT | Performed by: PHYSICAL THERAPIST

## 2023-09-08 PROCEDURE — 97140 MANUAL THERAPY 1/> REGIONS: CPT | Performed by: PHYSICAL THERAPIST

## 2023-09-08 PROCEDURE — 97112 NEUROMUSCULAR REEDUCATION: CPT | Performed by: PHYSICAL THERAPIST

## 2023-09-08 NOTE — PROGRESS NOTES
Daily Note     Today's date: 2023  Patient name: Vazquez Diaz  : 1950  MRN: [id number]  Referring provider: Kamini Forbes MD  Dx:   Encounter Diagnosis     ICD-10-CM    1. Closed compression fracture of L3 lumbar vertebra, initial encounter ([address])  S32.030A       2. Chronic pain of right knee  M25.561     G89.29                      Subjective: Pt reports a little increased soreness in the R side LB. Objective: See treatment diary below      Assessment: Tolerated treatment well. Patient would benefit from continued PT. Pt with right lumbar shift with step ups and able to correct with cuing. Plan: Continue per plan of care.       Precautions: none      Manuals           Patellar taping CB CB           R QL STM  CB CB          Lateral patellar STM  CB                        Neuro Re-Ed             SL step up reviewed 2x10 x10          VG SL squat 40%x20 CB nv          tband weight shifts to march 10x10: CB x10          SL LP  45# 3x10 60# 2x10          Goblet squat   2x10 7#          SL balance foam   15:x5                       Ther Ex             SLR flexion reviewed 2x10 3# CB          bike nv                                                                             Ther Activity                                       Gait Training                                       Modalities

## 2023-09-11 ENCOUNTER — OFFICE VISIT (OUTPATIENT)
Dept: SURGICAL ONCOLOGY | Facility: CLINIC | Age: 73
End: 2023-09-11
Payer: MEDICARE

## 2023-09-11 VITALS
OXYGEN SATURATION: 96 % | WEIGHT: 163.4 LBS | BODY MASS INDEX: 27.9 KG/M2 | SYSTOLIC BLOOD PRESSURE: 124 MMHG | HEIGHT: 64 IN | HEART RATE: 78 BPM | DIASTOLIC BLOOD PRESSURE: 88 MMHG | TEMPERATURE: 97.1 F

## 2023-09-11 DIAGNOSIS — Z12.31 SCREENING MAMMOGRAM FOR BREAST CANCER: ICD-10-CM

## 2023-09-11 DIAGNOSIS — Z85.3 ENCOUNTER FOR FOLLOW-UP SURVEILLANCE OF BREAST CANCER: Primary | ICD-10-CM

## 2023-09-11 DIAGNOSIS — Z08 ENCOUNTER FOR FOLLOW-UP SURVEILLANCE OF BREAST CANCER: Primary | ICD-10-CM

## 2023-09-11 DIAGNOSIS — Z85.3 PERSONAL HISTORY OF ADENOCARCINOMA OF BREAST: ICD-10-CM

## 2023-09-11 PROCEDURE — 99213 OFFICE O/P EST LOW 20 MIN: CPT | Performed by: SURGERY

## 2023-09-11 RX ORDER — DIAZEPAM 2 MG/1
2 TABLET ORAL
Qty: 2 TABLET | Refills: 0 | Status: SHIPPED | OUTPATIENT
Start: 2023-09-11

## 2023-09-11 NOTE — PROGRESS NOTES
Surgical Oncology Follow Up       [facility] SURGICAL ONCOLOGY [place] [id number]    Linh Riding [place]ford  1950 [id number]  [facility] SURGICAL ONCOLOGY [place]  [address] [place] [id number]    Chief Complaint   Patient presents with   • Follow-up       Assessment/Plan   Diagnoses and all orders for this visit:    Encounter for follow-up surveillance of breast cancer    Personal history of adenocarcinoma of breast  -     diazepam (VALIUM) 2 mg tablet; Take 1 tablet (2 mg total) by mouth 30 min pre-procedure May repeat x 1  -     MRI breast bilateral w and wo contrast w cad; Future  -     Basic metabolic panel; Future    Screening mammogram for breast cancer  -     Mammo screening bilateral w 3d & cad; Future        Advance Care Planning/Advance Directives:  Discussed disease status, cancer treatment plans and/or cancer treatment goals with the patient. Oncology History:    Oncology History    No history exists. History of Present Illness: Breast cancer follow-up, no breast referable concerns, other issues as noted in ROS  -Interval History: Benign imaging    Review of Systems:  Review of Systems   Constitutional: Negative. Negative for appetite change and fever. HENT: Positive for dental problem. Eyes: Negative. Respiratory: Negative for shortness of breath. Cardiovascular: Negative. Gastrointestinal: Negative. Endocrine: Negative. Genitourinary: Negative. Musculoskeletal: Negative for arthralgias and myalgias. Recent back surgery, meniscal tear-going to PT   Skin: Negative. Allergic/Immunologic: Negative. Neurological: Negative. Hematological: Negative. Negative for adenopathy. Does not bruise/bleed easily. Psychiatric/Behavioral: Negative.         Patient Active Problem List   Diagnosis   • Personal history of adenocarcinoma of breast   • Arthritis   • Carcinoma, basal cell, skin   • Esophageal reflux   • Pure hypercholesterolemia   • Lumbar disc herniation   • Migraine headache   • Osteopenia   • Malignant neoplasm of skin   • Idiopathic peripheral neuropathy   • Neutropenia (HCC)   • Dupuytren's contracture   • Hip flexor tendinitis, right   • Essential hypertension   • Vertigo   • Neurologic gait dysfunction   • Tinnitus of both ears   • Breast calcifications   • Ulcerative colitis (HCC)   • Hyperbilirubinemia   • Chronic migraine without aura without status migrainosus, not intractable   • Encounter for follow-up surveillance of breast cancer   • Screening mammogram for breast cancer   • Closed compression fracture of L3 lumbar vertebra, initial encounter ([address] [facility])   • Age-related osteoporosis with current pathological fracture of vertebra [facility])     Past Medical History:   Diagnosis Date   • Arthritis    • Gastric ulcer    • GERD (gastroesophageal reflux disease)    • Heart murmur    • History of radiation therapy     PBRT   • IBS (irritable bowel syndrome)    • Irritable bowel syndrome 2012   • Migraine    • Skin cancer    • Use of letrozole (Femara)     took for 2 years D/john due to side effects     Past Surgical History:   Procedure Laterality Date   • ABDOMINOPLASTY     • ADENOIDECTOMY     • BREAST BIOPSY Right 10/12/2007    IDC   • BREAST LUMPECTOMY Right    • BREAST SURGERY      lumpectomy, resolved 2007   •  SECTION     • CHOLECYSTECTOMY     • COLON SURGERY     • COLONOSCOPY     • COSMETIC SURGERY  1975    forehead   • FOOT SURGERY     • IR KYPHOPLASTY/VERTEBROPLASTY  3/9/2023   • MOHS SURGERY     • REDUCTION MAMMAPLASTY Bilateral 2015    reduction on left/lift on right   • SENTINEL LYMPH NODE BIOPSY Right 2007   • TOE SURGERY      left toe surgery   • TONSILLECTOMY       Family History   Problem Relation Age of Onset   • Hypertension Mother    • Melanoma Mother    • Heart disease Mother    • Heart attack Father acute MI, CABG   • Hypertension Father    • Heart disease Father    • Other Brother         CABG   • Lymphoma Brother         non-Hodgkin's   • Hypertension Brother    • Heart disease Brother    • Other Family         back disorder   • Stroke Family         CVA   • Diabetes Family    • Cancer Family      Social History     Socioeconomic History   • Marital status:      Spouse name: Not on file   • Number of children: Not on file   • Years of education: Not on file   • Highest education level: Not on file   Occupational History   • Not on file   Tobacco Use   • Smoking status: Former   • Smokeless tobacco: Never   • Tobacco comments:     Smoker in teenage years. Quit at 22. One pack per week.    Vaping Use   • Vaping Use: Never used   Substance and Sexual Activity   • Alcohol use: Not Currently     Comment: May have alcohol but is not drinking it currently   • Drug use: No   • Sexual activity: Not on file   Other Topics Concern   • Not on file   Social History Narrative    Daily caffeine use- 2 cups of green tea and chocolate     Social Determinants of Health     Financial Resource Strain: Not on file   Food Insecurity: Not on file   Transportation Needs: Not on file   Physical Activity: Not on file   Stress: Not on file   Social Connections: Not on file   Intimate Partner Violence: Not on file   Housing Stability: Not on file       Current Outpatient Medications:   •  acetaminophen (TYLENOL) 650 mg CR tablet, Take 1,300 mg by mouth every 6 (six) hours as needed for mild pain, Disp: , Rfl:   •  Calcium Carbonate-Vit D-Min (CALCIUM 1200 PO), Take by mouth, Disp: , Rfl:   •  Cholecalciferol (VITAMIN D3) 5000 units CAPS, Take 1 tablet by mouth daily, Disp: , Rfl:   •  diazepam (VALIUM) 2 mg tablet, Take 1 tablet (2 mg total) by mouth 30 min pre-procedure May repeat x 1, Disp: 2 tablet, Rfl: 0  •  diltiazem (CARDIZEM CD) 180 mg 24 hr capsule, TAKE 1 CAPSULE(180 MG) BY MOUTH DAILY, Disp: 90 capsule, Rfl: 3  • famotidine (PEPCID) 40 MG tablet, TAKE ONE TABLET BY MOUTH DAILY AS NEEDED for heartburn, Disp: 30 tablet, Rfl: 0  •  methocarbamol (ROBAXIN) 500 mg tablet, Take 1 tablet (500 mg total) by mouth 2 (two) times a day as needed for muscle spasms, Disp: 60 tablet, Rfl: 1  •  Multiple Vitamins-Minerals (MULTI COMPLETE) CAPS, Take 1 capsule by mouth daily, Disp: , Rfl:   •  naloxone (NARCAN) 4 mg/0.1 mL nasal spray, Administer 1 spray into a nostril. If no response after 2-3 minutes, give another dose in the other nostril using a new spray., Disp: 1 each, Rfl: 1  •  Probiotic Product (VSL#3) CAPS, Take 1 capsule by mouth daily, Disp: 90 capsule, Rfl: 3  •  rosuvastatin (CRESTOR) 10 MG tablet, TAKE 1 TABLET BY MOUTH DAILY, Disp: 90 tablet, Rfl: 3  •  Turmeric 500 MG CAPS, Take 1,000 mg by mouth if needed, Disp: , Rfl:   •  Zinc 50 MG CAPS, Take by mouth daily, Disp: , Rfl:   Allergies   Allergen Reactions   • Ciprofloxacin Anaphylaxis     Anaphylaxis   • Celecoxib Swelling     Edema of legs   • Amitriptyline GI Intolerance     Action Taken: bloating,GI problems;    • Bactrim [Sulfamethoxazole-Trimethoprim] Rash   • Lidoderm [Lidocaine] Rash     Rash from lidoderm patch   • Mesalamine Dizziness and Headache   • Other Vomiting     "black olives->GI upset"   • Sulfa Antibiotics Rash   • Wound Dressing Adhesive Rash       The following portions of the patient's history were reviewed and updated as appropriate: allergies, current medications, past family history, past medical history, past social history, past surgical history and problem list.        Vitals:    09/11/23 1355   BP: 124/88   Pulse: 78   Temp: (!) 97.1 °F (36.2 °C)   SpO2: 96%       Physical Exam  Constitutional:       General: She is not in acute distress. Appearance: Normal appearance. She is well-developed. HENT:      Head: Normocephalic and atraumatic. Cardiovascular:      Heart sounds: Normal heart sounds.    Pulmonary:      Breath sounds: Normal breath sounds. Chest:   Breasts:     Right: Skin change ( Reduction pattern scar) present. No inverted nipple, mass, nipple discharge or tenderness. Left: Skin change ( Reduction pattern scar) present. No inverted nipple, mass, nipple discharge or tenderness. Abdominal:      Palpations: Abdomen is soft. Lymphadenopathy:      Upper Body:      Right upper body: No supraclavicular, axillary or pectoral adenopathy. Left upper body: No supraclavicular, axillary or pectoral adenopathy. Neurological:      Mental Status: She is alert and oriented to person, place, and time. Psychiatric:         Mood and Affect: Mood normal.           Results:  Labs:      Imaging  10/1022 bilateral breast MRI was benign BI-RADS 2 density of 2    10/10/2022 bilateral 3D screening mammogram was benign BI-RADS 2 density of 2    Patient asked about repeating her DEXA scan which was in February of this year. Recommendations are repeat in 1 to 2 years following osteoporosis treatment which she has not yet initiated. I reviewed the above imaging data. Discussion/Summary: 67-year-old female status post right breast conservation for a stage I invasive ductal carcinoma. She had partial breast irradiation. She took adjuvant hormone therapy. There is no evidence of disease based on exam today. Her imaging last year was benign. She prefers to continue with breast MRI. I will make arrangements for her mammogram and MRI for next month when she is due. Provided there are no concerns, we will see her again in 1 year or sooner should the need arise.

## 2023-09-12 ENCOUNTER — OFFICE VISIT (OUTPATIENT)
Dept: PHYSICAL THERAPY | Facility: CLINIC | Age: 73
End: 2023-09-12
Payer: MEDICARE

## 2023-09-12 DIAGNOSIS — Z98.890 S/P KYPHOPLASTY: ICD-10-CM

## 2023-09-12 DIAGNOSIS — M25.361 KNEE INSTABILITY, RIGHT: ICD-10-CM

## 2023-09-12 DIAGNOSIS — G89.29 CHRONIC PAIN OF RIGHT KNEE: ICD-10-CM

## 2023-09-12 DIAGNOSIS — M17.11 PRIMARY OSTEOARTHRITIS OF RIGHT KNEE: ICD-10-CM

## 2023-09-12 DIAGNOSIS — S32.030A CLOSED COMPRESSION FRACTURE OF L3 LUMBAR VERTEBRA, INITIAL ENCOUNTER (HCC): Primary | ICD-10-CM

## 2023-09-12 DIAGNOSIS — M25.561 CHRONIC PAIN OF RIGHT KNEE: ICD-10-CM

## 2023-09-12 PROCEDURE — 97110 THERAPEUTIC EXERCISES: CPT | Performed by: PHYSICAL THERAPIST

## 2023-09-12 PROCEDURE — 97140 MANUAL THERAPY 1/> REGIONS: CPT | Performed by: PHYSICAL THERAPIST

## 2023-09-12 PROCEDURE — 97530 THERAPEUTIC ACTIVITIES: CPT | Performed by: PHYSICAL THERAPIST

## 2023-09-12 PROCEDURE — 97112 NEUROMUSCULAR REEDUCATION: CPT | Performed by: PHYSICAL THERAPIST

## 2023-09-12 NOTE — PROGRESS NOTES
Daily Note     Today's date: 2023  Patient name: Willis Dillard  : 1950  MRN: [id number]  Referring provider: Adelina Tejada MD  Dx:   Encounter Diagnosis     ICD-10-CM    1. Closed compression fracture of L3 lumbar vertebra, initial encounter ([address])  S32.030A       2. Chronic pain of right knee  M25.561     G89.29       3. Knee instability, right  M25.361       4. Primary osteoarthritis of right knee  M17.11       5. 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 The knee has not given out for the last 1-2 weeks which she sees as progress. Objective: See treatment diary below      Assessment: Tolerated treatment well. Patient would benefit from continued PT. Continue with quad strengthening and core control. Plan: Continue per plan of care.       Precautions: none      Manuals          Patellar taping CB CB  CB         R QL STM  CB CB CB; paraspinals         Lateral patellar STM  CB  CB                      Neuro Re-Ed             SL step up reviewed 2x10 x10 x10         VG SL squat 40%x20 CB nv          tband weight shifts to march 10x10: CB x10 iso x10         SL LP  45# 3x10 60# 2x10 60# 3x10         Goblet squat   2x10 7# CB         SL balance foam   15:x5 15:x8         Lunge to bosu    x10         Supine pball transfers    x10 ea         Ther Ex             SLR flexion reviewed 2x10 3# CB 2x10 3#         bike nv   7'         SL hip abd    2x10         LAQ catalino    2x10 10#                                                Ther Activity                                       Gait Training                                       Modalities

## 2023-09-14 ENCOUNTER — OFFICE VISIT (OUTPATIENT)
Dept: PHYSICAL THERAPY | Facility: CLINIC | Age: 73
End: 2023-09-14
Payer: MEDICARE

## 2023-09-14 DIAGNOSIS — Z98.890 S/P KYPHOPLASTY: Primary | ICD-10-CM

## 2023-09-14 PROCEDURE — 97112 NEUROMUSCULAR REEDUCATION: CPT | Performed by: PHYSICAL THERAPIST

## 2023-09-14 PROCEDURE — 97140 MANUAL THERAPY 1/> REGIONS: CPT | Performed by: PHYSICAL THERAPIST

## 2023-09-14 PROCEDURE — 97110 THERAPEUTIC EXERCISES: CPT | Performed by: PHYSICAL THERAPIST

## 2023-09-14 NOTE — PROGRESS NOTES
Daily Note     Today's date: 2023  Patient name: Dinah Edwards  : 1950  MRN: [id number]  Referring provider: Claudette Iqbla MD  Dx:   Encounter Diagnosis     ICD-10-CM    1. S/P kyphoplasty  Z98.890                      Subjective: Pt notes a little LB soreness. No instances of the R knee giving out recently and she feels she is making progress in the strength. Objective: See treatment diary below      Assessment: Tolerated treatment well. Patient would benefit from continued PT      Plan: Continue per plan of care.       Precautions: none      Manuals         Patellar taping CB CB  CB CB        R QL STM  CB CB CB; paraspinals CB        Lateral patellar STM  CB  CB CB                     Neuro Re-Ed             SL step up reviewed 2x10 x10 x10         VG SL squat 40%x20 CB nv          tband weight shifts to march 10x10: CB x10 iso x10 iso x10, GTB x10        SL LP  45# 3x10 60# 2x10 60# 3x10 CB        Goblet squat   2x10 7# CB nv        SL balance foam   15:x5 15:x8 CB        Lunge to bosu    x10 x10 ea        catalino bar press     x20 ea 13#        Supine pball transfers    x10 ea x15 ea        Ther Ex             SLR flexion reviewed 2x10 3# CB 2x10 3# CB        bike nv   7' 7' L1        SL hip abd    2x10 CB        LAQ catalino    2x10 10# nv                                               Ther Activity                                       Gait Training                                       Modalities

## 2023-09-18 ENCOUNTER — OFFICE VISIT (OUTPATIENT)
Dept: PHYSICAL THERAPY | Facility: CLINIC | Age: 73
End: 2023-09-18
Payer: MEDICARE

## 2023-09-18 DIAGNOSIS — M25.561 CHRONIC PAIN OF RIGHT KNEE: ICD-10-CM

## 2023-09-18 DIAGNOSIS — Z98.890 S/P KYPHOPLASTY: Primary | ICD-10-CM

## 2023-09-18 DIAGNOSIS — S32.030A CLOSED COMPRESSION FRACTURE OF L3 LUMBAR VERTEBRA, INITIAL ENCOUNTER (HCC): ICD-10-CM

## 2023-09-18 DIAGNOSIS — M25.361 KNEE INSTABILITY, RIGHT: ICD-10-CM

## 2023-09-18 DIAGNOSIS — M17.11 PRIMARY OSTEOARTHRITIS OF RIGHT KNEE: ICD-10-CM

## 2023-09-18 DIAGNOSIS — G89.29 CHRONIC PAIN OF RIGHT KNEE: ICD-10-CM

## 2023-09-18 PROCEDURE — 97112 NEUROMUSCULAR REEDUCATION: CPT | Performed by: PHYSICAL THERAPIST

## 2023-09-18 PROCEDURE — 97110 THERAPEUTIC EXERCISES: CPT | Performed by: PHYSICAL THERAPIST

## 2023-09-18 PROCEDURE — 97140 MANUAL THERAPY 1/> REGIONS: CPT | Performed by: PHYSICAL THERAPIST

## 2023-09-18 NOTE — PROGRESS NOTES
Daily Note     Today's date: 2023  Patient name: Juvenal Dueñas  : 1950  MRN: [id number]  Referring provider: Shanel Whitney MD  Dx:   Encounter Diagnosis     ICD-10-CM    1. S/P kyphoplasty  Z98.890       2. Closed compression fracture of L3 lumbar vertebra, initial encounter ([address])  S32.030A       3. Chronic pain of right knee  M25.561     G89.29       4. Knee instability, right  M25.361       5. Primary osteoarthritis of right knee  M17.11                      Subjective: Pt reports some soreness today. Objective: See treatment diary below      Assessment: Tolerated treatment well. Patient would benefit from continued PT. Continue to strengthen as tolerated. Plan: Continue per plan of care.       Precautions: none      Manuals        Patellar taping CB CB  CB CB CB       R QL STM  CB CB CB; paraspinals CB CB       Lateral patellar STM  CB  CB CB CB                    Neuro Re-Ed             SL step up reviewed 2x10 x10 x10         VG SL squat 40%x20 CB nv          tband weight shifts to march 10x10: CB x10 iso x10 iso x10, GTB x10 CB       SL LP  45# 3x10 60# 2x10 60# 3x10 CB CB       Goblet squat   2x10 7# CB nv        SL balance foam   15:x5 15:x8 CB CB       Lunge to bosu    x10 x10 ea CB       catalino bar press     x20 ea 13# x20 ea 18#       Supine pball transfers    x10 ea x15 ea x15 ea       Ther Ex             SLR flexion reviewed 2x10 3# CB 2x10 3# CB CB       bike nv   7' 7' L1 CB       SL hip abd    2x10 CB CB       LAQ catalino    2x10 10# nv        Basking seal      30:x3                                 Ther Activity                                       Gait Training                                       Modalities

## 2023-09-20 ENCOUNTER — OFFICE VISIT (OUTPATIENT)
Dept: PHYSICAL THERAPY | Facility: CLINIC | Age: 73
End: 2023-09-20
Payer: MEDICARE

## 2023-09-20 DIAGNOSIS — M25.361 KNEE INSTABILITY, RIGHT: ICD-10-CM

## 2023-09-20 DIAGNOSIS — M17.11 PRIMARY OSTEOARTHRITIS OF RIGHT KNEE: Primary | ICD-10-CM

## 2023-09-20 DIAGNOSIS — M25.561 CHRONIC PAIN OF RIGHT KNEE: ICD-10-CM

## 2023-09-20 DIAGNOSIS — K21.9 GASTROESOPHAGEAL REFLUX DISEASE WITHOUT ESOPHAGITIS: ICD-10-CM

## 2023-09-20 DIAGNOSIS — G89.29 CHRONIC PAIN OF RIGHT KNEE: ICD-10-CM

## 2023-09-20 DIAGNOSIS — Z98.890 S/P KYPHOPLASTY: ICD-10-CM

## 2023-09-20 DIAGNOSIS — S32.030A CLOSED COMPRESSION FRACTURE OF L3 LUMBAR VERTEBRA, INITIAL ENCOUNTER (HCC): ICD-10-CM

## 2023-09-20 PROCEDURE — 97110 THERAPEUTIC EXERCISES: CPT | Performed by: PHYSICAL THERAPIST

## 2023-09-20 PROCEDURE — 97112 NEUROMUSCULAR REEDUCATION: CPT | Performed by: PHYSICAL THERAPIST

## 2023-09-20 PROCEDURE — 97140 MANUAL THERAPY 1/> REGIONS: CPT | Performed by: PHYSICAL THERAPIST

## 2023-09-20 RX ORDER — FAMOTIDINE 40 MG/1
40 TABLET, FILM COATED ORAL DAILY PRN
Qty: 30 TABLET | Refills: 0 | Status: SHIPPED | OUTPATIENT
Start: 2023-09-20

## 2023-09-20 NOTE — PROGRESS NOTES
Daily Note     Today's date: 2023  Patient name: Adriana Parry  : 1950  MRN: [id number]  Referring provider: Fidelia Kumar MD  Dx:   Encounter Diagnosis     ICD-10-CM    1. Primary osteoarthritis of right knee  M17.11       2. S/P kyphoplasty  Z98.890       3. Closed compression fracture of L3 lumbar vertebra, initial encounter ([address])  S32.030A       4. Chronic pain of right knee  M25.561     G89.29       5. Knee instability, right  M25.361                      Subjective: Pt reports minimal increased soreness in the LB after lv. Objective: See treatment diary below      Assessment: Tolerated treatment well. Patient would benefit from continued PT. Pt with minimal increased pain post visit. Pt with improving single leg stability on the R. Continue with RLE strengthening and lumbopelvic control exercises. Plan: Continue per plan of care.       Precautions: none      Manuals       Patellar taping CB CB  CB CB CB CB      R QL STM  CB CB CB; paraspinals CB CB CB      Lateral patellar STM  CB  CB CB CB CB                   Neuro Re-Ed             SL step up reviewed 2x10 x10 x10         VG SL squat 40%x20 CB nv          tband weight shifts to march 10x10: CB x10 iso x10 iso x10, GTB x10 CB CB      SL LP  45# 3x10 60# 2x10 60# 3x10 CB CB CB      Goblet squat   2x10 7# CB nv        SL balance foam   15:x5 15:x8 CB CB CB      Lunge to bosu    x10 x10 ea CB CB      catalino bar press     x20 ea 13# x20 ea 18# nv      Standing march OH reach       4# 2x10       Seated pallof press       2x10 BTB      Supine pball transfers    x10 ea x15 ea x15 ea CB      Ther Ex             SLR flexion reviewed 2x10 3# CB 2x10 3# CB CB CB      bike nv   7' 7' L1 CB CB      SL hip abd    2x10 CB CB CB      LAQ catalino    2x10 10# nv        Basking seal      30:x3 CB                                Ther Activity                                       Gait Training Modalities

## 2023-09-27 ENCOUNTER — APPOINTMENT (OUTPATIENT)
Dept: RADIOLOGY | Facility: MEDICAL CENTER | Age: 73
End: 2023-09-27
Payer: MEDICARE

## 2023-09-27 ENCOUNTER — OFFICE VISIT (OUTPATIENT)
Dept: URGENT CARE | Facility: MEDICAL CENTER | Age: 73
End: 2023-09-27
Payer: MEDICARE

## 2023-09-27 VITALS
DIASTOLIC BLOOD PRESSURE: 86 MMHG | OXYGEN SATURATION: 97 % | BODY MASS INDEX: 27.98 KG/M2 | WEIGHT: 163 LBS | RESPIRATION RATE: 18 BRPM | HEART RATE: 59 BPM | SYSTOLIC BLOOD PRESSURE: 166 MMHG | TEMPERATURE: 97.1 F

## 2023-09-27 DIAGNOSIS — S69.91XA INJURY OF RIGHT HAND, INITIAL ENCOUNTER: ICD-10-CM

## 2023-09-27 DIAGNOSIS — S63.601A SPRAIN OF RIGHT THUMB, INITIAL ENCOUNTER: Primary | ICD-10-CM

## 2023-09-27 PROCEDURE — G0463 HOSPITAL OUTPT CLINIC VISIT: HCPCS

## 2023-09-27 PROCEDURE — 99213 OFFICE O/P EST LOW 20 MIN: CPT

## 2023-09-27 PROCEDURE — 73130 X-RAY EXAM OF HAND: CPT

## 2023-09-27 NOTE — PATIENT INSTRUCTIONS
Wear thumb spica brace as discussed  Tylenol/ibuprofen as needed  Ice 20 minutes 3-4 times per day  Insulate the skin from the ice to prevent frostbite  Rest and Elevate  Follow up with orthopedic if symptoms do not improve  Follow up with PCP in 3-5 days. Proceed to ER if symptoms worsen.

## 2023-09-27 NOTE — PROGRESS NOTES
[facility]        NAME: Adriana Parry is a 68 y.o. female  : 1950    MRN: [id number]  DATE: 2023  TIME: 7:44 PM    Assessment and Plan   Sprain of right thumb, initial encounter [S63.601A]  1. Sprain of right thumb, initial encounter        2. Injury of right hand, initial encounter  XR hand 3+ vw right        Right hand xray reviewed: Significant degenerative changes noted. No acute abnormalities noted. Pending radiology final read. Patient Instructions     Wear thumb spica brace as discussed  Tylenol/ibuprofen as needed  Ice 20 minutes 3-4 times per day  Insulate the skin from the ice to prevent frostbite  Rest and Elevate  Follow up with orthopedic if symptoms do not improve  Follow up with PCP in 3-5 days. Proceed to  ER if symptoms worsen. Chief Complaint     Chief Complaint   Patient presents with   • Hand Injury     Pt c/o right hand pain  Monday night after falling trying to push rolling racks on floor - jamming right thumb in between racks - swelling and discoloration          History of Present Illness       Patient is a 69 yo female with significant PMH of OA and Dupytrens presenting in the clinic today for right thumb pain x 2 days. Patient notes she was pushing a rolling cart with her right hand when her thumb became stuck within the cart and was hyperextended. She states she is right-handed. Patient locates their pain to the proximal aspect of right thumb. Admits swelling, warm, tenderness, and bruising along the proximal aspect of the right thumb. Admits pain is exacerbated with thumb adduction, thumb flxion, and gripping. Denies fever, chills, numbness, tingling, erythema, chest pain, and SOB. Admits the use of tylenol for symptom management. Denies prior similar injuries. Review of Systems   Review of Systems   Constitutional: Negative for chills and fever. Respiratory: Negative for shortness of breath. Cardiovascular: Negative for chest pain. Musculoskeletal: Positive for arthralgias and joint swelling. Skin: Negative for rash and wound. Neurological: Negative for numbness. Current Medications       Current Outpatient Medications:   •  acetaminophen (TYLENOL) 650 mg CR tablet, Take 1,300 mg by mouth every 6 (six) hours as needed for mild pain, Disp: , Rfl:   •  Cholecalciferol (VITAMIN D3) 5000 units CAPS, Take 1 tablet by mouth daily, Disp: , Rfl:   •  diazepam (VALIUM) 2 mg tablet, Take 1 tablet (2 mg total) by mouth 30 min pre-procedure May repeat x 1, Disp: 2 tablet, Rfl: 0  •  diltiazem (CARDIZEM CD) 180 mg 24 hr capsule, TAKE 1 CAPSULE(180 MG) BY MOUTH DAILY, Disp: 90 capsule, Rfl: 3  •  famotidine (PEPCID) 40 MG tablet, Take 1 tablet (40 mg total) by mouth daily as needed for heartburn, Disp: 30 tablet, Rfl: 0  •  methocarbamol (ROBAXIN) 500 mg tablet, Take 1 tablet (500 mg total) by mouth 2 (two) times a day as needed for muscle spasms, Disp: 60 tablet, Rfl: 1  •  Multiple Vitamins-Minerals (MULTI COMPLETE) CAPS, Take 1 capsule by mouth daily, Disp: , Rfl:   •  Probiotic Product (VSL#3) CAPS, Take 1 capsule by mouth daily, Disp: 90 capsule, Rfl: 3  •  rosuvastatin (CRESTOR) 10 MG tablet, TAKE 1 TABLET BY MOUTH DAILY, Disp: 90 tablet, Rfl: 3  •  Zinc 50 MG CAPS, Take by mouth daily, Disp: , Rfl:   •  Calcium Carbonate-Vit D-Min (CALCIUM 1200 PO), Take by mouth (Patient not taking: Reported on 9/27/2023), Disp: , Rfl:   •  naloxone (NARCAN) 4 mg/0.1 mL nasal spray, Administer 1 spray into a nostril. If no response after 2-3 minutes, give another dose in the other nostril using a new spray.  (Patient not taking: Reported on 9/27/2023), Disp: 1 each, Rfl: 1  •  Turmeric 500 MG CAPS, Take 1,000 mg by mouth if needed (Patient not taking: Reported on 9/27/2023), Disp: , Rfl:     Current Allergies     Allergies as of 09/27/2023 - Reviewed 09/27/2023   Allergen Reaction Noted   • Ciprofloxacin Anaphylaxis 10/30/2003   • Celecoxib Swelling 2012   • Amitriptyline GI Intolerance 2017   • Bactrim [sulfamethoxazole-trimethoprim] Rash 10/30/2003   • Lidoderm [lidocaine] Rash 2022   • Mesalamine Dizziness and Headache 2020   • Other Vomiting 10/02/2014   • Sulfa antibiotics Rash 2012   • Wound dressing adhesive Rash 2012            The following portions of the patient's history were reviewed and updated as appropriate: allergies, current medications, past family history, past medical history, past social history, past surgical history and problem list.     Past Medical History:   Diagnosis Date   • Arthritis    • Gastric ulcer    • GERD (gastroesophageal reflux disease)    • Heart murmur    • History of radiation therapy     PBRT   • IBS (irritable bowel syndrome)    • Irritable bowel syndrome 2012   • Migraine    • Skin cancer    • Use of letrozole (Femara)     took for 2 years D/john due to side effects       Past Surgical History:   Procedure Laterality Date   • ABDOMINOPLASTY     • ADENOIDECTOMY     • BREAST BIOPSY Right 10/12/2007    ID[facility]   • BREAST LUMPECTOMY Right    • BREAST SURGERY      lumpectomy, resolved 2007   •  SECTION     • CHOLECYSTECTOMY     • COLON SURGERY     • COLONOSCOPY     • COSMETIC SURGERY  1975    forehead   • FOOT SURGERY     • IR KYPHOPLASTY/VERTEBROPLASTY  3/9/2023   • MOHS SURGERY     • REDUCTION MAMMAPLASTY Bilateral 2015    reduction on left/lift on right   • SENTINEL LYMPH NODE BIOPSY Right 2007   • TOE SURGERY      left toe surgery   • TONSILLECTOMY         Family History   Problem Relation Age of Onset   • Hypertension Mother    • Melanoma Mother    • Heart disease Mother    • Heart attack Father         acute MI, CABG   • Hypertension Father    • Heart disease Father    • Other Brother         CABG   • Lymphoma Brother         non-Hodgkin's   • Hypertension Brother    • Heart disease Brother    • Other Family         back disorder   • Stroke Family CVA   • Diabetes Family    • Cancer Family          Medications have been verified. Objective   /86 (BP Location: Left arm)   Pulse 59   Temp (!) 97.1 °F (36.2 °C) (Tympanic)   Resp 18   Wt 73.9 kg (163 lb)   SpO2 97%   BMI 27.98 kg/m²        Physical Exam     Physical Exam  Vitals reviewed. Constitutional:       General: She is not in acute distress. Appearance: Normal appearance. She is normal weight. She is not ill-appearing. HENT:      Head: Normocephalic. Nose: Nose normal.      Mouth/Throat:      Mouth: Mucous membranes are moist.   Eyes:      Conjunctiva/sclera: Conjunctivae normal.   Cardiovascular:      Rate and Rhythm: Normal rate and regular rhythm. Pulses: Normal pulses. Heart sounds: Normal heart sounds. No murmur heard. No friction rub. No gallop. Pulmonary:      Effort: Pulmonary effort is normal.      Breath sounds: Normal breath sounds. No wheezing, rhonchi or rales. Musculoskeletal:      Right wrist: Normal.      Left wrist: Normal.      Right hand: Swelling (proximal thumb) and tenderness (proximal thumb) present. No bony tenderness. Decreased range of motion (thumb ROM secondary to pain and swelling). Normal strength. Normal sensation. Normal capillary refill. Normal pulse. Left hand: Normal.      Cervical back: Normal range of motion and neck supple. Skin:     General: Skin is warm. Findings: No rash. Neurological:      Mental Status: She is alert.    Psychiatric:         Mood and Affect: Mood normal.         Behavior: Behavior normal.

## 2023-10-02 ENCOUNTER — OFFICE VISIT (OUTPATIENT)
Dept: PHYSICAL THERAPY | Facility: CLINIC | Age: 73
End: 2023-10-02
Payer: MEDICARE

## 2023-10-02 DIAGNOSIS — M25.361 KNEE INSTABILITY, RIGHT: Primary | ICD-10-CM

## 2023-10-02 DIAGNOSIS — G89.29 CHRONIC PAIN OF RIGHT KNEE: ICD-10-CM

## 2023-10-02 DIAGNOSIS — M25.561 CHRONIC PAIN OF RIGHT KNEE: ICD-10-CM

## 2023-10-02 DIAGNOSIS — Z98.890 S/P KYPHOPLASTY: ICD-10-CM

## 2023-10-02 DIAGNOSIS — S32.030A CLOSED COMPRESSION FRACTURE OF L3 LUMBAR VERTEBRA, INITIAL ENCOUNTER (HCC): ICD-10-CM

## 2023-10-02 DIAGNOSIS — M17.11 PRIMARY OSTEOARTHRITIS OF RIGHT KNEE: ICD-10-CM

## 2023-10-02 PROCEDURE — 97112 NEUROMUSCULAR REEDUCATION: CPT | Performed by: PHYSICAL THERAPIST

## 2023-10-02 PROCEDURE — 97110 THERAPEUTIC EXERCISES: CPT | Performed by: PHYSICAL THERAPIST

## 2023-10-02 PROCEDURE — 97140 MANUAL THERAPY 1/> REGIONS: CPT | Performed by: PHYSICAL THERAPIST

## 2023-10-02 NOTE — PROGRESS NOTES
Daily Note     Today's date: 10/2/2023  Patient name: Willis Dillard  : 1950  MRN: [id number]  Referring provider: Adelina Tejada MD  Dx:   Encounter Diagnosis     ICD-10-CM    1. Knee instability, right  M25.361       2. Primary osteoarthritis of right knee  M17.11       3. S/P kyphoplasty  Z98.890       4. Closed compression fracture of L3 lumbar vertebra, initial encounter ([address])  S32.030A       5. Chronic pain of right knee  M25.561     G89.29                      Subjective: Pt was putting together a kids playset and on her knees quite a bit. She also was packing up a store and lifting heavier boxes. She did feel her knee go out one time this morning and attributes to all the activity. Objective: See treatment diary below      Assessment: Tolerated treatment well. Patient would benefit from continued PT. Pt with increased medial hamstring/gastroc tension today. Continue to strengthen as tolerated. Plan: Continue per plan of care.       Precautions: none      Manuals  10/2     Patellar taping CB CB  CB CB CB CB CB     R QL STM  CB CB CB; paraspinals CB CB CB CB     Lateral patellar STM  CB  CB CB CB CB CB                  Neuro Re-Ed             SL step up reviewed 2x10 x10 x10         VG SL squat 40%x20 CB nv          tband weight shifts to march 10x10: CB x10 iso x10 iso x10, GTB x10 CB CB CB     SL LP  45# 3x10 60# 2x10 60# 3x10 CB CB CB CB     Goblet squat   2x10 7# CB nv        SL balance foam   15:x5 15:x8 CB CB CB CB     Lunge to bosu    x10 x10 ea CB CB CB     catalino bar press     x20 ea 13# x20 ea 18# nv      Standing march [place] reach       4# 2x10  nv     Seated pallof press       2x10 BTB nv     Supine pball transfers    x10 ea x15 ea x15 ea CB CB     Ther Ex             SLR flexion reviewed 2x10 3# CB 2x10 3# CB CB CB 2x10, x8 3#     bike nv   7' 7' L1 CB CB      SL hip abd    2x10 CB CB CB CB     LAQ catalino    2x10 10# nv        Basking seal 30: x3 CB                                Ther Activity                                       Gait Training                                       Modalities

## 2023-10-04 ENCOUNTER — OFFICE VISIT (OUTPATIENT)
Dept: PHYSICAL THERAPY | Facility: CLINIC | Age: 73
End: 2023-10-04
Payer: MEDICARE

## 2023-10-04 DIAGNOSIS — M25.561 CHRONIC PAIN OF RIGHT KNEE: Primary | ICD-10-CM

## 2023-10-04 DIAGNOSIS — G89.29 CHRONIC PAIN OF RIGHT KNEE: Primary | ICD-10-CM

## 2023-10-04 DIAGNOSIS — S32.030A CLOSED COMPRESSION FRACTURE OF L3 LUMBAR VERTEBRA, INITIAL ENCOUNTER (HCC): ICD-10-CM

## 2023-10-04 DIAGNOSIS — M17.11 PRIMARY OSTEOARTHRITIS OF RIGHT KNEE: ICD-10-CM

## 2023-10-04 DIAGNOSIS — Z98.890 S/P KYPHOPLASTY: ICD-10-CM

## 2023-10-04 DIAGNOSIS — M25.361 KNEE INSTABILITY, RIGHT: ICD-10-CM

## 2023-10-04 PROCEDURE — 97140 MANUAL THERAPY 1/> REGIONS: CPT | Performed by: PHYSICAL THERAPIST

## 2023-10-04 PROCEDURE — 97112 NEUROMUSCULAR REEDUCATION: CPT | Performed by: PHYSICAL THERAPIST

## 2023-10-04 PROCEDURE — 97110 THERAPEUTIC EXERCISES: CPT | Performed by: PHYSICAL THERAPIST

## 2023-10-04 NOTE — PROGRESS NOTES
Daily Note     Today's date: 10/4/2023  Patient name: Gaston Garcia  : 1950  MRN: [id number]  Referring provider: Alexandro Elder MD  Dx:   Encounter Diagnosis     ICD-10-CM    1. Chronic pain of right knee  M25.561     G89.29       2. Knee instability, right  M25.361       3. Primary osteoarthritis of right knee  M17.11       4. S/P kyphoplasty  Z98.890       5. Closed compression fracture of L3 lumbar vertebra, initial encounter ([address])  S32.030A                      Subjective: Pt reports no instances of her knee giving out since lv and the LB is feeling okay. Objective: See treatment diary below      Assessment: Tolerated treatment well. Patient would benefit from continued PT. Pt able to progress exercises well with minimal pain. Plan: Continue per plan of care.       Precautions: none      Manuals 8/30 9/6 9/8 9/12 9/14 9/18 9/20 10/2 10/4    Patellar taping CB CB  CB CB CB CB CB CB    R QL STM  CB CB CB; paraspinals CB CB CB CB CB    Lateral patellar STM  CB  CB CB CB CB CB CB                 Neuro Re-Ed             SL step up reviewed 2x10 x10 x10         VG SL squat 40%x20 CB nv          tband weight shifts to march 10x10: CB x10 iso x10 iso x10, GTB x10 CB CB CB isox20, GTB x10    SL LP  45# 3x10 60# 2x10 60# 3x10 CB CB CB CB 80# 3x10    Goblet squat   2x10 7# CB nv        SL balance foam   15:x5 15:x8 CB CB CB CB 15:x10    Lunge to bosu    x10 x10 ea CB CB CB CB    catalino bar press     x20 ea 13# x20 ea 18# nv      Standing march [place] reach       4# 2x10  nv     Seated pallof press       2x10 BTB nv     Seated med ball reach         x15 4#    Supine pball transfers    x10 ea x15 ea x15 ea CB CB CB    Ther Ex             SLR flexion reviewed 2x10 3# CB 2x10 3# CB CB CB 2x10, x8 3# 3x10 3#    bike nv   7' 7' L1 CB CB      SL hip abd    2x10 CB CB CB CB 3x10    LAQ catalino    2x10 10# nv        Basking seal      30:x3 CB  30:x2                              Ther Activity Gait Training                                       Modalities

## 2023-10-09 ENCOUNTER — OFFICE VISIT (OUTPATIENT)
Dept: PHYSICAL THERAPY | Facility: CLINIC | Age: 73
End: 2023-10-09
Payer: MEDICARE

## 2023-10-09 DIAGNOSIS — M25.561 CHRONIC PAIN OF RIGHT KNEE: Primary | ICD-10-CM

## 2023-10-09 DIAGNOSIS — Z98.890 S/P KYPHOPLASTY: ICD-10-CM

## 2023-10-09 DIAGNOSIS — M25.361 KNEE INSTABILITY, RIGHT: ICD-10-CM

## 2023-10-09 DIAGNOSIS — S32.030A CLOSED COMPRESSION FRACTURE OF L3 LUMBAR VERTEBRA, INITIAL ENCOUNTER (HCC): ICD-10-CM

## 2023-10-09 DIAGNOSIS — M17.11 PRIMARY OSTEOARTHRITIS OF RIGHT KNEE: ICD-10-CM

## 2023-10-09 DIAGNOSIS — G89.29 CHRONIC PAIN OF RIGHT KNEE: Primary | ICD-10-CM

## 2023-10-09 PROCEDURE — 97140 MANUAL THERAPY 1/> REGIONS: CPT | Performed by: PHYSICAL THERAPIST

## 2023-10-09 PROCEDURE — 97110 THERAPEUTIC EXERCISES: CPT | Performed by: PHYSICAL THERAPIST

## 2023-10-09 PROCEDURE — 97112 NEUROMUSCULAR REEDUCATION: CPT | Performed by: PHYSICAL THERAPIST

## 2023-10-09 NOTE — PROGRESS NOTES
Daily Note     Today's date: 10/9/2023  Patient name: Juvenal Dueñas  : 1950  MRN: [id number]  Referring provider: Shanel Whitney MD  Dx:   Encounter Diagnosis     ICD-10-CM    1. Chronic pain of right knee  M25.561     G89.29       2. Knee instability, right  M25.361       3. Primary osteoarthritis of right knee  M17.11       4. S/P kyphoplasty  Z98.890       5. Closed compression fracture of L3 lumbar vertebra, initial encounter ([facility])  S32.030A                      Subjective: Pt reports some increased soreness in the LB after lots of activity over the weekend. Her knee went out 2 times when standing from the ground. Objective: See treatment diary below      Assessment: Tolerated treatment well. Patient would benefit from continued PT. Provided additional R quad strengthening in deeper flexion as she has limited control in those further ranges. No increased pain throughout session. Plan: Continue per plan of care.       Precautions: none      Manuals 8/30 9/6 9/8 9/12 9/14 9/18 9/20 10/2 10/4 10/9   Patellar taping CB CB  CB CB CB CB CB CB    R QL STM  CB CB CB; paraspinals CB CB CB CB CB CB   Lateral patellar STM  CB  CB CB CB CB CB CB CB                Neuro Re-Ed             SL step up reviewed 2x10 x10 x10         VG SL squat 40%x20 CB nv          tband weight shifts to march 10x10: CB x10 iso x10 iso x10, GTB x10 CB CB CB isox20, GTB x10 CB   SL LP  45# 3x10 60# 2x10 60# 3x10 CB CB CB CB 80# 3x10 CB   Goblet squat   2x10 7# CB nv     2x10 7#   SL balance foam   15:x5 15:x8 CB CB CB CB 15:x10 CB   Lunge to bosu    x10 x10 ea CB CB CB CB    catalino bar press     x20 ea 13# x20 ea 18# nv      Standing march OH reach       4# 2x10  nv     Seated pallof press       2x10 BTB nv     Seated med ball reach         x15 4#    Supine pball transfers    x10 ea x15 ea x15 ea CB CB CB CB   Ther Ex             SLR flexion reviewed 2x10 3# CB 2x10 3# CB CB CB 2x10, x8 3# 3x10 3# CB   bike nv   7' 7' L1 CB CB      SL hip abd    2x10 CB CB CB CB 3x10 CB   LAQ catalino    2x10 10# nv        Basking seal      30:x3 CB  30:x2    Lunge R forward          x15 bolster prop   Bent over row          catalino 6# 2x10   Ther Activity                                       Gait Training                                       Modalities

## 2023-10-11 ENCOUNTER — APPOINTMENT (OUTPATIENT)
Dept: PHYSICAL THERAPY | Facility: CLINIC | Age: 73
End: 2023-10-11
Payer: MEDICARE

## 2023-10-16 ENCOUNTER — OFFICE VISIT (OUTPATIENT)
Dept: PHYSICAL THERAPY | Facility: CLINIC | Age: 73
End: 2023-10-16
Payer: MEDICARE

## 2023-10-16 DIAGNOSIS — M25.361 KNEE INSTABILITY, RIGHT: ICD-10-CM

## 2023-10-16 DIAGNOSIS — G89.29 CHRONIC PAIN OF RIGHT KNEE: ICD-10-CM

## 2023-10-16 DIAGNOSIS — M17.11 PRIMARY OSTEOARTHRITIS OF RIGHT KNEE: ICD-10-CM

## 2023-10-16 DIAGNOSIS — Z98.890 S/P KYPHOPLASTY: ICD-10-CM

## 2023-10-16 DIAGNOSIS — M25.561 CHRONIC PAIN OF RIGHT KNEE: ICD-10-CM

## 2023-10-16 DIAGNOSIS — S32.030A CLOSED COMPRESSION FRACTURE OF L3 LUMBAR VERTEBRA, INITIAL ENCOUNTER (HCC): Primary | ICD-10-CM

## 2023-10-16 PROCEDURE — 97140 MANUAL THERAPY 1/> REGIONS: CPT | Performed by: PHYSICAL THERAPIST

## 2023-10-16 PROCEDURE — 97110 THERAPEUTIC EXERCISES: CPT | Performed by: PHYSICAL THERAPIST

## 2023-10-16 PROCEDURE — 97112 NEUROMUSCULAR REEDUCATION: CPT | Performed by: PHYSICAL THERAPIST

## 2023-10-16 NOTE — PROGRESS NOTES
Daily Note     Today's date: 10/16/2023  Patient name: Abdelrahman Ellison  : 1950  MRN: [id number]  Referring provider: Norah Albarran MD  Dx:   Encounter Diagnosis     ICD-10-CM    1. Closed compression fracture of L3 lumbar vertebra, initial encounter ([address])  S32.030A       2. Chronic pain of right knee  M25.561     G89.29       3. Knee instability, right  M25.361       4. Primary osteoarthritis of right knee  M17.11       5. S/P kyphoplasty  Z98.890                      Subjective: Pt with improvement in ability to lift her grandchild. Objective: See treatment diary below      Assessment: Tolerated treatment well. Patient would benefit from continued PT. Continue to strengthen as tolerated. Improved tolerance and form with deeper squat today. Plan: Continue per plan of care.       Precautions: none      Manuals 10/16 9/6 9/8 9/12 9/14 9/18 9/20 10/2 10/4 10/9   Patellar taping  CB  CB CB CB CB CB CB    R QL STM CB CB CB CB; paraspinals CB CB CB CB CB CB   Lateral patellar STM CB CB  CB CB CB CB CB CB CB                Neuro Re-Ed             SL step up  2x10 x10 x10         VG SL squat  CB nv          tband weight shifts to march CB CB x10 iso x10 iso x10, GTB x10 CB CB CB isox20, GTB x10 CB   SL LP CB 45# 3x10 60# 2x10 60# 3x10 CB CB CB CB 80# 3x10 CB   Goblet squat CB  2x10 7# CB nv     2x10 7#   SL balance foam 15:x10 ea  15:x5 15:x8 CB CB CB CB 15:x10 CB   Lunge to bosu CB   x10 x10 ea CB CB CB CB CB   kirsten bar press X20 ea 18#    x20 ea 13# x20 ea 18# nv      Standing march OH reach       4# 2x10  nv     Seated pallof press 2x10 ea BTB      2x10 BTB nv     Seated med ball reach X20 ea         x15 4#    Kirsten chop 8# x15 ea            Supine pball transfers CB   x10 ea x15 ea x15 ea CB CB CB CB   Ther Ex             SLR flexion CB  CB 2x10 3# CB CB CB 2x10, x8 3# 3x10 3# CB   bike 6' L1   7' 7' L1 CB CB      SL hip abd CB   2x10 CB CB CB CB 3x10 CB   LAQ kirsten    2x10 10# nv Basking seal      30:x3 CB  30:x2    Lunge R forward 2x10 bolster         x15 bolster prop   Bent over row [place] 7.5# x30         catalino 6# 2x10   Ther Activity                                       Gait Training                                       Modalities

## 2023-10-17 ENCOUNTER — CLINICAL SUPPORT (OUTPATIENT)
Dept: FAMILY MEDICINE CLINIC | Facility: CLINIC | Age: 73
End: 2023-10-17
Payer: MEDICARE

## 2023-10-17 DIAGNOSIS — Z23 ENCOUNTER FOR IMMUNIZATION: Primary | ICD-10-CM

## 2023-10-17 PROCEDURE — 90662 IIV NO PRSV INCREASED AG IM: CPT

## 2023-10-17 PROCEDURE — G0008 ADMIN INFLUENZA VIRUS VAC: HCPCS

## 2023-10-18 ENCOUNTER — OFFICE VISIT (OUTPATIENT)
Dept: PHYSICAL THERAPY | Facility: CLINIC | Age: 73
End: 2023-10-18
Payer: MEDICARE

## 2023-10-18 DIAGNOSIS — S32.030A CLOSED COMPRESSION FRACTURE OF L3 LUMBAR VERTEBRA, INITIAL ENCOUNTER (HCC): ICD-10-CM

## 2023-10-18 DIAGNOSIS — G89.29 CHRONIC PAIN OF RIGHT KNEE: ICD-10-CM

## 2023-10-18 DIAGNOSIS — M25.361 KNEE INSTABILITY, RIGHT: ICD-10-CM

## 2023-10-18 DIAGNOSIS — M25.561 CHRONIC PAIN OF RIGHT KNEE: ICD-10-CM

## 2023-10-18 DIAGNOSIS — Z98.890 S/P KYPHOPLASTY: Primary | ICD-10-CM

## 2023-10-18 PROCEDURE — 97110 THERAPEUTIC EXERCISES: CPT | Performed by: PHYSICAL THERAPIST

## 2023-10-18 PROCEDURE — 97140 MANUAL THERAPY 1/> REGIONS: CPT | Performed by: PHYSICAL THERAPIST

## 2023-10-18 PROCEDURE — 97112 NEUROMUSCULAR REEDUCATION: CPT | Performed by: PHYSICAL THERAPIST

## 2023-10-18 NOTE — PROGRESS NOTES
Daily Note     Today's date: 10/18/2023  Patient name: Kacie Leal  : 1950  MRN: [id number]  Referring provider: Connie Omalley MD  Dx:   Encounter Diagnosis     ICD-10-CM    1. S/P kyphoplasty  Z98.890       2. Closed compression fracture of L3 lumbar vertebra, initial encounter ([address])  S32.030A       3. Chronic pain of right knee  M25.561     G89.29       4. Knee instability, right  M25.361                      Subjective: Pt notes having a sharp pain in the R LB travelling down the leg when rolling in bed. It has loosened some by now but still a little irritated. Objective: See treatment diary below      Assessment: Tolerated treatment well. Patient would benefit from continued PT. Increased QL restriction and innominate elevated on R. Plan: Continue per plan of care.       Precautions: none      Manuals 10/16 10/18 9/8 9/12 9/14 9/18 9/20 10/2 10/4 10/9   Patellar taping    CB CB CB CB CB CB    R QL STM CB CB; R innom depression CB CB; paraspinals CB CB CB CB CB CB   Lateral patellar STM CB   CB CB CB CB CB CB CB                Neuro Re-Ed             SL step up   x10 x10         VG SL squat   nv          tband weight shifts to march CB  x10 iso x10 iso x10, GTB x10 CB CB CB isox20, GTB x10 CB   SL LP CB  60# 2x10 60# 3x10 CB CB CB CB 80# 3x10 CB   Goblet squat CB CB 2x10 7# CB nv     2x10 7#   SL balance foam 15:x10 ea CB 15:x5 15:x8 CB CB CB CB 15:x10 CB   Lunge to bosu CB CB  x10 x10 ea CB CB CB CB CB   catalino bar press X20 ea 18# CB   x20 ea 13# x20 ea 18# nv      Standing march OH reach       4# 2x10  nv     Seated pallof press 2x10 ea BTB      2x10 BTB nv     Seated med ball reach X20 ea         x15 4#    [place] chop 8# x15 ea            Supine pball transfers CB CB  x10 ea x15 ea x15 ea CB CB CB CB   Ther Ex             SLR flexion CB  CB 2x10 3# CB CB CB 2x10, x8 3# 3x10 3# CB   bike 6' L1 CB  7' 7' L1 CB CB      SL hip abd CB CB  2x10 CB CB CB CB 3x10 CB   LAQ catalino    2x10 10# nv        Basking seal      30:x3 CB  30:x2    Lunge R forward 2x10 bolster CB        x15 bolster prop   Bent over row Kirsten 7.5# x30 CB        kirsten 6# 2x10   Ther Activity                                       Gait Training                                       Modalities

## 2023-10-23 ENCOUNTER — OFFICE VISIT (OUTPATIENT)
Dept: PHYSICAL THERAPY | Facility: CLINIC | Age: 73
End: 2023-10-23
Payer: MEDICARE

## 2023-10-23 DIAGNOSIS — Z98.890 S/P KYPHOPLASTY: Primary | ICD-10-CM

## 2023-10-23 DIAGNOSIS — G89.29 CHRONIC PAIN OF RIGHT KNEE: ICD-10-CM

## 2023-10-23 DIAGNOSIS — M25.561 CHRONIC PAIN OF RIGHT KNEE: ICD-10-CM

## 2023-10-23 DIAGNOSIS — M25.361 KNEE INSTABILITY, RIGHT: ICD-10-CM

## 2023-10-23 DIAGNOSIS — S32.030A CLOSED COMPRESSION FRACTURE OF L3 LUMBAR VERTEBRA, INITIAL ENCOUNTER (HCC): ICD-10-CM

## 2023-10-23 PROCEDURE — 97110 THERAPEUTIC EXERCISES: CPT | Performed by: PHYSICAL THERAPIST

## 2023-10-23 PROCEDURE — 97112 NEUROMUSCULAR REEDUCATION: CPT | Performed by: PHYSICAL THERAPIST

## 2023-10-23 PROCEDURE — 97140 MANUAL THERAPY 1/> REGIONS: CPT | Performed by: PHYSICAL THERAPIST

## 2023-10-23 NOTE — PROGRESS NOTES
Daily Note     Today's date: 10/23/2023  Patient name: Gaston Garcia  : 1950  MRN: [id number]  Referring provider: Alexandro Elder MD  Dx:   Encounter Diagnosis     ICD-10-CM    1. S/P kyphoplasty  Z98.890       2. Closed compression fracture of L3 lumbar vertebra, initial encounter ([address])  S32.030A       3. Chronic pain of right knee  M25.561     G89.29       4. Knee instability, right  M25.361                      Subjective: Pt reports mild pain in R LB. The R knee has been tolerating sessions well. Objective: See treatment diary below      Assessment: Tolerated treatment well. Patient would benefit from continued PT. Progress strengthening as tolerated. Plan: Continue per plan of care.       Precautions: none      Manuals 10/16 10/18 10/23 9/12 9/14 9/18 9/20 10/2 10/4 10/9   Patellar taping    CB CB CB CB CB CB    R QL STM CB CB; R innom depression CB CB; paraspinals CB CB CB CB CB CB   Lateral patellar STM CB  CB CB CB CB CB CB CB CB                Neuro Re-Ed             SL step up             VG SL squat             tband weight shifts to march CB    iso x10, GTB x10 CB CB CB isox20, GTB x10 CB   SL LP CB  80# 3x10  CB CB CB CB 80# 3x10 CB   Goblet squat CB CB CB  nv     2x10 7#   SL balance foam 15:x10 ea CB CB  CB CB CB CB 15:x10 CB   Lunge to bosu CB CB CB  x10 ea CB CB CB CB CB   catalino bar press X20 ea 18# CB CB  x20 ea 13# x20 ea 18# nv         2x10          Seated pallof press 2x10 ea BTB      2x10 BTB nv     Seated med ball reach X20 ea   X10 ea      x15 4#    [place] chop 8# x15 ea  10# x15 ea          Supine pball transfers CB CB CB  x15 ea x15 ea CB CB CB CB   Ther Ex             SLR flexion CB    CB CB CB 2x10, x8 3# 3x10 3# CB   bike 6' L1 CB CB  7' L1 CB CB      SL hip abd CB CB CB  CB CB CB CB 3x10 CB   LAQ catalino     nv        Basking seal      30:x3 CB  30:x2    Lunge R forward 2x10 bolster CB CB       x15 bolster prop   Bent over row [place] 7.5# x30 CB CB catalino 6# 2x10   Ther Activity                                       Gait Training                                       Modalities

## 2023-10-25 ENCOUNTER — OFFICE VISIT (OUTPATIENT)
Dept: PHYSICAL THERAPY | Facility: CLINIC | Age: 73
End: 2023-10-25
Payer: MEDICARE

## 2023-10-25 DIAGNOSIS — G89.29 CHRONIC PAIN OF RIGHT KNEE: ICD-10-CM

## 2023-10-25 DIAGNOSIS — M25.561 CHRONIC PAIN OF RIGHT KNEE: ICD-10-CM

## 2023-10-25 DIAGNOSIS — M17.11 PRIMARY OSTEOARTHRITIS OF RIGHT KNEE: ICD-10-CM

## 2023-10-25 DIAGNOSIS — Z98.890 S/P KYPHOPLASTY: Primary | ICD-10-CM

## 2023-10-25 DIAGNOSIS — S32.030A CLOSED COMPRESSION FRACTURE OF L3 LUMBAR VERTEBRA, INITIAL ENCOUNTER (HCC): ICD-10-CM

## 2023-10-25 DIAGNOSIS — M25.361 KNEE INSTABILITY, RIGHT: ICD-10-CM

## 2023-10-25 PROCEDURE — 97110 THERAPEUTIC EXERCISES: CPT | Performed by: PHYSICAL THERAPIST

## 2023-10-25 PROCEDURE — 97140 MANUAL THERAPY 1/> REGIONS: CPT | Performed by: PHYSICAL THERAPIST

## 2023-10-25 PROCEDURE — 97112 NEUROMUSCULAR REEDUCATION: CPT | Performed by: PHYSICAL THERAPIST

## 2023-10-25 NOTE — PROGRESS NOTES
Daily Note     Today's date: 10/25/2023  Patient name: Edmund Ma  : 1950  MRN: [id number]  Referring provider: Cristian Barron MD  Dx:   Encounter Diagnosis     ICD-10-CM    1. S/P kyphoplasty  Z98.890       2. Primary osteoarthritis of right knee  M17.11       3. Closed compression fracture of L3 lumbar vertebra, initial encounter ([address])  S32.030A       4. Chronic pain of right knee  M25.561     G89.29       5. Knee instability, right  M25.361                      Subjective: Pt reports some soreness in the LB but overall doing well. No instances of the knee giving out. Objective: See treatment diary below      Assessment: Tolerated treatment well. Patient would benefit from continued PT. Pt with no increased pain post visit. Plan: Continue per plan of care.       Precautions: none      Manuals 10/16 10/18 10/23 10/25 9/14 9/18 9/20 10/2 10/4 10/9   Patellar taping     CB CB CB CB CB    R QL STM CB CB; R innom depression CB CB CB CB CB CB CB CB   Lateral patellar STM CB  CB CB CB CB CB CB CB CB                Neuro Re-Ed             SL step up             VG SL squat             tband weight shifts to march CB    iso x10, GTB x10 CB CB CB isox20, GTB x10 CB   SL LP CB  80# 3x10 CB CB CB CB CB 80# 3x10 CB   Goblet squat CB CB CB CB nv     2x10 7#   SL balance foam 15:x10 ea CB CB CB CB CB CB CB 15:x10 CB   Lunge to bosu CB CB CB CB x10 ea CB CB CB CB CB   catalino bar press X20 ea 18# CB CB CB x20 ea 13# x20 ea 18# nv         2x10 nv         Seated pallof press 2x10 ea BTB      2x10 BTB nv     Seated med ball reach X20 ea   X10 ea nv     x15 4#    [place] chop 8# x15 ea  10# x15 ea CB         Supine pball transfers CB CB CB CB x15 ea x15 ea CB CB CB CB   Ther Ex             SLR flexion CB    CB CB CB 2x10, x8 3# 3x10 3# CB   bike 6' L1 CB CB CB 7' L1 CB CB      SL hip abd CB CB CB nv CB CB CB CB 3x10 CB   LAQ catalino     nv        Basking seal      30:x3 CB  30:x2    Lunge R forward 2x10 bolster CB CB CB      x15 bolster prop   Bent over row Kirsten 7.5# x30 CB CB CB      kirsten 6# 2x10   Ther Activity                                       Gait Training                                       Modalities

## 2023-10-30 ENCOUNTER — OFFICE VISIT (OUTPATIENT)
Dept: PHYSICAL THERAPY | Facility: CLINIC | Age: 73
End: 2023-10-30
Payer: MEDICARE

## 2023-10-30 DIAGNOSIS — S32.030A CLOSED COMPRESSION FRACTURE OF L3 LUMBAR VERTEBRA, INITIAL ENCOUNTER (HCC): ICD-10-CM

## 2023-10-30 DIAGNOSIS — G89.29 CHRONIC PAIN OF RIGHT KNEE: ICD-10-CM

## 2023-10-30 DIAGNOSIS — M17.11 PRIMARY OSTEOARTHRITIS OF RIGHT KNEE: ICD-10-CM

## 2023-10-30 DIAGNOSIS — M25.561 CHRONIC PAIN OF RIGHT KNEE: ICD-10-CM

## 2023-10-30 DIAGNOSIS — M25.361 KNEE INSTABILITY, RIGHT: Primary | ICD-10-CM

## 2023-10-30 DIAGNOSIS — Z98.890 S/P KYPHOPLASTY: ICD-10-CM

## 2023-10-30 PROCEDURE — 97140 MANUAL THERAPY 1/> REGIONS: CPT | Performed by: PHYSICAL THERAPIST

## 2023-10-30 PROCEDURE — 97112 NEUROMUSCULAR REEDUCATION: CPT | Performed by: PHYSICAL THERAPIST

## 2023-10-30 PROCEDURE — 97110 THERAPEUTIC EXERCISES: CPT | Performed by: PHYSICAL THERAPIST

## 2023-10-30 NOTE — PROGRESS NOTES
Daily Note     Today's date: 10/30/2023  Patient name: Daniel Null  : 1950  MRN: [id number]  Referring provider: Garfield Zarate MD  Dx:   Encounter Diagnosis     ICD-10-CM    1. Knee instability, right  M25.361       2. S/P kyphoplasty  Z98.890       3. Primary osteoarthritis of right knee  M17.11       4. Closed compression fracture of L3 lumbar vertebra, initial encounter ([address])  S32.030A       5. Chronic pain of right knee  M25.561     G89.29                      Subjective: Pt reports minimal increase in pain recently. Objective: See treatment diary below      Assessment: Tolerated treatment well. Patient would benefit from continued PT. Able to tolerate strengthening well. Plan: Continue per plan of care.       Precautions: none      Manuals 10/16 10/18 10/23 10/25 10/30 9/18 9/20 10/2 10/4 10/9   Patellar taping      CB CB CB CB    R QL STM CB CB; R innom depression CB CB CB CB CB CB CB CB   Lateral patellar STM CB  CB CB CB CB CB CB CB CB                Neuro Re-Ed             SL step up             VG SL squat             tband weight shifts to march CB     CB CB CB isox20, GTB x10 CB   SL LP CB  80# 3x10 CB CB CB CB CB 80# 3x10 CB   Goblet squat CB CB CB CB CB     2x10 7#   SL balance foam 15:x10 ea CB CB CB CB CB CB CB 15:x10 CB   Lunge to bosu CB CB CB CB CB CB CB CB CB CB   kirsten bar press X20 ea 18# CB CB CB CB x20 ea 18# nv         2x10 nv         Seated pallof press 2x10 ea BTB      2x10 BTB nv     Seated med ball reach X20 ea   X10 ea nv     x15 4#    Kirst[place] chop 8# x15 ea  10# x15 ea CB CB        Supine pball transfers CB CB CB CB CB x15 ea CB CB CB CB   Ther Ex             SLR flexion CB     CB CB 2x10, x8 3# 3x10 3# CB   bike 6' L1 CB CB CB CB CB CB      SL hip abd CB CB CB nv  CB CB CB 3x10 CB   LAQ kirsten             Basking seal      30:x3 CB  30:x2    Lunge R forward 2x10 bolster CB CB CB CB     x15 bolster prop   Bent over row [place] 7.5# x30 CB CB CB CB [place] 6# 2x10   Ther Activity                                       Gait Training                                       Modalities

## 2023-11-01 ENCOUNTER — OFFICE VISIT (OUTPATIENT)
Dept: PHYSICAL THERAPY | Facility: CLINIC | Age: 73
End: 2023-11-01
Payer: MEDICARE

## 2023-11-01 DIAGNOSIS — Z98.890 S/P KYPHOPLASTY: ICD-10-CM

## 2023-11-01 DIAGNOSIS — M25.561 CHRONIC PAIN OF RIGHT KNEE: Primary | ICD-10-CM

## 2023-11-01 DIAGNOSIS — G89.29 CHRONIC PAIN OF RIGHT KNEE: Primary | ICD-10-CM

## 2023-11-01 DIAGNOSIS — S32.030A CLOSED COMPRESSION FRACTURE OF L3 LUMBAR VERTEBRA, INITIAL ENCOUNTER (HCC): ICD-10-CM

## 2023-11-01 DIAGNOSIS — M25.361 KNEE INSTABILITY, RIGHT: ICD-10-CM

## 2023-11-01 DIAGNOSIS — M17.11 PRIMARY OSTEOARTHRITIS OF RIGHT KNEE: ICD-10-CM

## 2023-11-01 PROCEDURE — 97140 MANUAL THERAPY 1/> REGIONS: CPT | Performed by: PHYSICAL THERAPIST

## 2023-11-01 PROCEDURE — 97112 NEUROMUSCULAR REEDUCATION: CPT | Performed by: PHYSICAL THERAPIST

## 2023-11-01 PROCEDURE — 97110 THERAPEUTIC EXERCISES: CPT | Performed by: PHYSICAL THERAPIST

## 2023-11-01 NOTE — PROGRESS NOTES
Daily Note     Today's date: 2023  Patient name: Jailene Guy  : 1950  MRN: [id number]  Referring provider: Nury Cobos MD  Dx:   Encounter Diagnosis     ICD-10-CM    1. Chronic pain of right knee  M25.561     G89.29       2. Knee instability, right  M25.361       3. S/P kyphoplasty  Z98.890       4. Primary osteoarthritis of right knee  M17.11       5. Closed compression fracture of L3 lumbar vertebra, initial encounter ([address])  S32.030A                      Subjective: Pt reports minimal pain in the LB today. Her knee did give out once this morning when getting up from the floor but this overall has been less frequent recently. Objective: See treatment diary below      Assessment: Tolerated treatment well. Patient would benefit from continued PT. Pt with minimal soreness post visit. Plan: Continue per plan of care. 2 more weeks with potential DC to HEP at that point.      Precautions: none      Manuals 10/16 10/18 10/23 10/25 10/30 11/1 9/20 10/2 10/4 10/9   Patellar taping       CB CB CB    R QL STM CB CB; R innom depression CB CB CB CB CB CB CB CB   Lateral patellar STM CB  CB CB CB CB CB CB CB CB                Neuro Re-Ed             SL step up             VG SL squat             tband weight shifts to march CB      CB CB isox20, GTB x10 CB   SL LP CB  80# 3x10 CB CB CB CB CB 80# 3x10 CB   Goblet squat CB CB CB CB CB CB    2x10 7#   SL balance foam 15:x10 ea CB CB CB CB CB CB CB 15:x10 CB   Lunge to bosu CB CB CB CB CB CB CB CB CB CB   catalino bar press X20 ea 18# CB CB CB CB CB nv         2x10 nv         Seated pallof press 2x10 ea BTB      2x10 BTB nv     Seated med ball reach X20 ea   X10 ea nv     x15 4#    [place] chop 8# x15 ea  10# x15 ea CB CB CB       Supine pball transfers CB CB CB CB CB CB CB CB CB CB   Ther Ex             SLR flexion CB      CB 2x10, x8 3# 3x10 3# CB   bike 6' L1 CB CB CB CB CB CB      SL hip abd CB CB CB nv   CB CB 3x10 CB   LAQ catalino Basking seal       CB  30:x2    Lunge R forward 2x10 bolster CB CB CB CB     x15 bolster prop   Bent over row Kirsten 7.5# x30 CB CB CB CB     kirsten 6# 2x10   Ther Activity                                       Gait Training                                       Modalities

## 2023-11-07 ENCOUNTER — OFFICE VISIT (OUTPATIENT)
Dept: PHYSICAL THERAPY | Facility: CLINIC | Age: 73
End: 2023-11-07
Payer: MEDICARE

## 2023-11-07 DIAGNOSIS — G89.29 CHRONIC PAIN OF RIGHT KNEE: Primary | ICD-10-CM

## 2023-11-07 DIAGNOSIS — M25.561 CHRONIC PAIN OF RIGHT KNEE: Primary | ICD-10-CM

## 2023-11-07 DIAGNOSIS — M25.361 KNEE INSTABILITY, RIGHT: ICD-10-CM

## 2023-11-07 DIAGNOSIS — M17.11 PRIMARY OSTEOARTHRITIS OF RIGHT KNEE: ICD-10-CM

## 2023-11-07 DIAGNOSIS — Z98.890 S/P KYPHOPLASTY: ICD-10-CM

## 2023-11-07 PROCEDURE — 97110 THERAPEUTIC EXERCISES: CPT | Performed by: PHYSICAL THERAPIST

## 2023-11-07 PROCEDURE — 97112 NEUROMUSCULAR REEDUCATION: CPT | Performed by: PHYSICAL THERAPIST

## 2023-11-07 PROCEDURE — 97140 MANUAL THERAPY 1/> REGIONS: CPT | Performed by: PHYSICAL THERAPIST

## 2023-11-07 NOTE — PROGRESS NOTES
Daily Note     Today's date: 2023  Patient name: Kvng Alston  : 1950  MRN: [id number]  Referring provider: Jenny Garcia MD  Dx:   Encounter Diagnosis     ICD-10-CM    1. Chronic pain of right knee  M25.561     G89.29       2. Knee instability, right  M25.361       3. S/P kyphoplasty  Z98.890       4. Primary osteoarthritis of right knee  M17.11                      Subjective: Pt reports no increased complaints since lv. Objective: See treatment diary below      Assessment: Tolerated treatment well. Patient would benefit from continued PT. Continue to address core strengthening/control as tolerated. Plan: Continue per plan of care.       Precautions: none      Manuals 10/16 10/18 10/23 10/25 10/30 11/1 11/7 10/2 10/4 10/9   Patellar taping        CB CB    R QL STM CB CB; R innom depression CB CB CB CB CB CB CB CB   Lateral patellar STM CB  CB CB CB CB  CB CB CB                Neuro Re-Ed             SL step up             VG SL squat             tband weight shifts to march CB      2x10 GTB ea CB isox20, GTB x10 CB   SL LP CB  80# 3x10 CB CB CB  CB 80# 3x10 CB   Goblet squat CB CB CB CB CB CB    2x10 7#   SL balance foam 15:x10 ea CB CB CB CB CB  CB 15:x10 CB   Lunge to bosu CB CB CB CB CB CB  CB CB CB   catalino bar press X20 ea 18# CB CB CB CB CB CB         2x10 nv         Seated pallof press 2x10 ea BTB       nv     Seated med ball reach X20 ea   X10 ea nv     x15 4#    [place] chop 8# x15 ea  10# x15 ea CB CB CB CB      Supine pball transfers CB CB CB CB CB CB  CB CB CB   Ther Ex             SLR flexion CB       2x10, x8 3# 3x10 3# CB   bike 6' L1 CB CB CB CB CB CB      SL hip abd CB CB CB nv    CB 3x10 CB   LAQ catalino             Basking seal         30:x2    Lunge R forward 2x10 bolster CB CB CB CB     x15 bolster prop   Bent over row [place] 7.5# x30 CB CB CB CB     catalino 6# 2x10   Ther Activity                                       Gait Training Modalities

## 2023-11-14 ENCOUNTER — OFFICE VISIT (OUTPATIENT)
Dept: PHYSICAL THERAPY | Facility: CLINIC | Age: 73
End: 2023-11-14
Payer: MEDICARE

## 2023-11-14 DIAGNOSIS — G89.29 CHRONIC PAIN OF RIGHT KNEE: Primary | ICD-10-CM

## 2023-11-14 DIAGNOSIS — M25.561 CHRONIC PAIN OF RIGHT KNEE: Primary | ICD-10-CM

## 2023-11-14 DIAGNOSIS — Z98.890 S/P KYPHOPLASTY: ICD-10-CM

## 2023-11-14 DIAGNOSIS — M25.361 KNEE INSTABILITY, RIGHT: ICD-10-CM

## 2023-11-14 PROCEDURE — 97112 NEUROMUSCULAR REEDUCATION: CPT | Performed by: PHYSICAL THERAPIST

## 2023-11-14 PROCEDURE — 97140 MANUAL THERAPY 1/> REGIONS: CPT | Performed by: PHYSICAL THERAPIST

## 2023-11-14 PROCEDURE — 97110 THERAPEUTIC EXERCISES: CPT | Performed by: PHYSICAL THERAPIST

## 2023-11-14 NOTE — PROGRESS NOTES
Daily Note     Today's date: 2023  Patient name: Vijay Platt  : 1950  MRN: [id number]  Referring provider: Chaka Blunt MD  Dx:   Encounter Diagnosis     ICD-10-CM    1. Chronic pain of right knee  M25.561     G89.29       2. Knee instability, right  M25.361       3. S/P kyphoplasty  Z98.890                      Subjective: Pt reports no new complaints in the LB. Objective: See treatment diary below      Assessment: Tolerated treatment well. Patient would benefit from continued PT. Educated Celestino Pulido  on STM to QL today with good understanding so she can maintain mobility once Dc'd. Plan: Continue per plan of care.       Precautions: none      Manuals 10/16 10/18 10/23 10/25 10/30 11/1 11/7 11/14 10/4 10/9   Patellar taping         CB    R QL STM CB CB; R innom depression CB CB CB CB CB CB CB CB   Lateral patellar STM CB  CB CB CB CB  CB CB CB                Neuro Re-Ed             SL step up             VG SL squat             tband weight shifts to march CB      2x10 GTB ea CB isox20, GTB x10 CB   SL LP CB  80# 3x10 CB CB CB  CB 80# 3x10 CB   Goblet squat CB CB CB CB CB CB    2x10 7#   SL balance foam 15:x10 ea CB CB CB CB CB  CB 15:x10 CB   Lunge to bosu CB CB CB CB CB CB  CB CB CB   kirsten bar press X20 ea 18# CB CB CB CB CB CB         2x10 nv         Seated pallof press 2x10 ea BTB            Seated med ball reach X20 ea   X10 ea nv     x15 4#    [place] chop 8# x15 ea  10# x15 ea CB CB CB CB      Supine pball transfers CB CB CB CB CB CB  CB CB CB   Ther Ex             SLR flexion CB       2x10, x8 3# 3x10 3# CB   bike 6' L1 CB CB CB CB CB CB      SL hip abd CB CB CB nv    CB 3x10 CB   LAQ kirsten             Basking seal         30:x2    Lunge R forward 2x10 bolster CB CB CB CB     x15 bolster prop   Bent over row [place] 7.5# x30 CB CB CB CB     kirsten 6# 2x10   Ther Activity                                       Gait Training Modalities

## 2023-11-16 ENCOUNTER — OFFICE VISIT (OUTPATIENT)
Dept: PHYSICAL THERAPY | Facility: CLINIC | Age: 73
End: 2023-11-16
Payer: MEDICARE

## 2023-11-16 DIAGNOSIS — G89.29 CHRONIC PAIN OF RIGHT KNEE: Primary | ICD-10-CM

## 2023-11-16 DIAGNOSIS — M25.561 CHRONIC PAIN OF RIGHT KNEE: Primary | ICD-10-CM

## 2023-11-16 DIAGNOSIS — S32.030A CLOSED COMPRESSION FRACTURE OF L3 LUMBAR VERTEBRA, INITIAL ENCOUNTER (HCC): ICD-10-CM

## 2023-11-16 PROCEDURE — 97110 THERAPEUTIC EXERCISES: CPT | Performed by: PHYSICAL THERAPIST

## 2023-11-16 PROCEDURE — 97112 NEUROMUSCULAR REEDUCATION: CPT | Performed by: PHYSICAL THERAPIST

## 2023-11-16 PROCEDURE — 97140 MANUAL THERAPY 1/> REGIONS: CPT | Performed by: PHYSICAL THERAPIST

## 2023-11-16 NOTE — PROGRESS NOTES
Daily Note     Today's date: 2023  Patient name: Zahra Mac  : 1950  MRN: [id number]  Referring provider: Von Martinez MD  Dx:   Encounter Diagnosis     ICD-10-CM    1. Chronic pain of right knee  M25.561     G89.29       2. Closed compression fracture of L3 lumbar vertebra, initial encounter ([address])  S32.030A                      Subjective: Pt notes her knee giving out once when in a deep squat at the grocery store. Overall her knee is feeling stronger. She feels her LBP has been managed well with good relief in remembering appropriate lifting mechanics/posture. Objective: See treatment diary below      Assessment: Tolerated treatment well. Patient exhibited good technique with therapeutic exercises. Pt has exceeded FOTO predicted. Patient is appropriate to move to [facility] only at this point and understands they may call in with any future questions/concerns.         Plan:  DC     Precautions: none      Manuals 10/16 10/18 10/23 10/25 10/30 11/1 11/7 11/14 11/16 10/9   Patellar taping         CB    R QL STM CB CB; R innom depression CB CB CB CB CB CB CB CB   Lateral patellar STM CB  CB CB CB CB  CB CB CB                Neuro Re-Ed             SL step up             VG SL squat             tband weight shifts to march CB      2x10 GTB ea CB isox20, GTB x10 CB   SL LP CB  80# 3x10 CB CB CB  CB 80# 3x10 CB   Goblet squat CB CB CB CB CB CB    2x10 7#   SL balance foam 15:x10 ea CB CB CB CB CB  CB 15:x10 CB   Lunge to bosu CB CB CB CB CB CB  CB CB CB   kirsten bar press X20 ea 18# CB CB CB CB CB CB         2x10 nv         Seated pallof press 2x10 ea BTB            Seated med ball reach X20 ea   X10 ea nv     x15 4#    Kirsten chop 8# x15 ea  10# x15 ea CB CB CB CB      Supine pball transfers CB CB CB CB CB CB  CB CB CB   Ther Ex             SLR flexion CB       2x10, x8 3# 3x10 3# CB   bike 6' L1 CB CB CB CB CB CB      SL hip abd CB CB CB nv    CB 3x10 CB   LAQ kirsten             [employer] seal             Lunge R forward 2x10 bolster CB CB CB CB    CB x15 bolster prop   Bent over row Kirsten 7.5# x30 CB CB CB CB     kirsten 6# 2x10   Ther Activity                                       Gait Training                                       Modalities

## 2023-12-04 DIAGNOSIS — E78.5 HYPERLIPIDEMIA, UNSPECIFIED HYPERLIPIDEMIA TYPE: ICD-10-CM

## 2023-12-04 RX ORDER — ROSUVASTATIN CALCIUM 10 MG/1
10 TABLET, COATED ORAL DAILY
Qty: 90 TABLET | Refills: 3 | Status: SHIPPED | OUTPATIENT
Start: 2023-12-04

## 2023-12-08 ENCOUNTER — OFFICE VISIT (OUTPATIENT)
Dept: FAMILY MEDICINE CLINIC | Facility: CLINIC | Age: 73
End: 2023-12-08
Payer: MEDICARE

## 2023-12-08 VITALS
BODY MASS INDEX: 27.42 KG/M2 | DIASTOLIC BLOOD PRESSURE: 80 MMHG | OXYGEN SATURATION: 100 % | SYSTOLIC BLOOD PRESSURE: 140 MMHG | WEIGHT: 160.6 LBS | HEIGHT: 64 IN | RESPIRATION RATE: 12 BRPM | HEART RATE: 54 BPM | TEMPERATURE: 97.2 F

## 2023-12-08 DIAGNOSIS — K51.00 ULCERATIVE PANCOLITIS WITHOUT COMPLICATION (HCC): ICD-10-CM

## 2023-12-08 DIAGNOSIS — D70.9 NEUTROPENIA, UNSPECIFIED TYPE (HCC): ICD-10-CM

## 2023-12-08 DIAGNOSIS — Z12.4 CERVICAL CANCER SCREENING: ICD-10-CM

## 2023-12-08 DIAGNOSIS — K21.9 GASTROESOPHAGEAL REFLUX DISEASE WITHOUT ESOPHAGITIS: ICD-10-CM

## 2023-12-08 DIAGNOSIS — T75.3XXA MOTION SICKNESS, INITIAL ENCOUNTER: ICD-10-CM

## 2023-12-08 DIAGNOSIS — Z23 NEED FOR SHINGLES VACCINE: ICD-10-CM

## 2023-12-08 DIAGNOSIS — E78.00 PURE HYPERCHOLESTEROLEMIA: ICD-10-CM

## 2023-12-08 DIAGNOSIS — H91.90 HEARING LOSS, UNSPECIFIED HEARING LOSS TYPE, UNSPECIFIED LATERALITY: ICD-10-CM

## 2023-12-08 DIAGNOSIS — M80.08XD AGE-RELATED OSTEOPOROSIS WITH CURRENT PATHOLOGICAL FRACTURE OF VERTEBRA WITH ROUTINE HEALING, SUBSEQUENT ENCOUNTER: ICD-10-CM

## 2023-12-08 DIAGNOSIS — A09 TRAVELER'S DIARRHEA: ICD-10-CM

## 2023-12-08 DIAGNOSIS — I10 ESSENTIAL HYPERTENSION: ICD-10-CM

## 2023-12-08 DIAGNOSIS — Z12.83 SKIN CANCER SCREENING: ICD-10-CM

## 2023-12-08 DIAGNOSIS — Z00.00 MEDICARE ANNUAL WELLNESS VISIT, SUBSEQUENT: Primary | ICD-10-CM

## 2023-12-08 PROCEDURE — G0439 PPPS, SUBSEQ VISIT: HCPCS | Performed by: PHYSICIAN ASSISTANT

## 2023-12-08 PROCEDURE — 99214 OFFICE O/P EST MOD 30 MIN: CPT | Performed by: PHYSICIAN ASSISTANT

## 2023-12-08 RX ORDER — SCOLOPAMINE TRANSDERMAL SYSTEM 1 MG/1
1 PATCH, EXTENDED RELEASE TRANSDERMAL
Qty: 4 PATCH | Refills: 0 | Status: SHIPPED | OUTPATIENT
Start: 2023-12-08

## 2023-12-08 RX ORDER — ZOSTER VACCINE RECOMBINANT, ADJUVANTED 50 MCG/0.5
0.5 KIT INTRAMUSCULAR ONCE
Qty: 1 EACH | Refills: 1 | Status: SHIPPED | OUTPATIENT
Start: 2023-12-08 | End: 2023-12-08

## 2023-12-08 RX ORDER — AZITHROMYCIN 500 MG/1
500 TABLET, FILM COATED ORAL DAILY
Qty: 3 TABLET | Refills: 0 | Status: SHIPPED | OUTPATIENT
Start: 2023-12-08 | End: 2023-12-11

## 2023-12-08 NOTE — PATIENT INSTRUCTIONS
Medicare Preventive Visit Patient Instructions  Thank you for completing your Welcome to Medicare Visit or Medicare Annual Wellness Visit today. Your next wellness visit will be due in one year (12/12/2024). The screening/preventive services that you may require over the next 5-10 years are detailed below. Some tests may not apply to you based off risk factors and/or age. Screening tests ordered at today's visit but not completed yet may show as past due. Also, please note that scanned in results may not display below. Preventive Screenings:  Service Recommendations Previous Testing/Comments   Colorectal Cancer Screening  * Colonoscopy    * Fecal Occult Blood Test (FOBT)/Fecal Immunochemical Test (FIT)  * Fecal DNA/Cologuard Test  * Flexible Sigmoidoscopy Age: 43-73 years old   Colonoscopy: every 10 years (may be performed more frequently if at higher risk)  OR  FOBT/FIT: every 1 year  OR  Cologuard: every 3 years  OR  Sigmoidoscopy: every 5 years  Screening may be recommended earlier than age 39 if at higher risk for colorectal cancer. Also, an individualized decision between you and your healthcare provider will decide whether screening between the ages of 77-80 would be appropriate. Colonoscopy: 10/07/2020  FOBT/FIT: Not on file  Cologuard: Not on file  Sigmoidoscopy: 01/06/2021    Screening Current     Breast Cancer Screening Age: 37 years old  Frequency: every 1-2 years  Not required if history of left and right mastectomy Mammogram: 10/10/2022    History Breast Cancer  Due for Mammogram   Cervical Cancer Screening Between the ages of 21-29, pap smear recommended once every 3 years. Between the ages of 32-69, can perform pap smear with HPV co-testing every 5 years.    Recommendations may differ for women with a history of total hysterectomy, cervical cancer, or abnormal pap smears in past. Pap Smear: Not on file    Screening Not Indicated   Hepatitis C Screening Once for adults born between 1945 and 1965  More frequently in patients at high risk for Hepatitis C Hep C Antibody: 11/09/2020    Screening Current   Diabetes Screening 1-2 times per year if you're at risk for diabetes or have pre-diabetes Fasting glucose: 106 mg/dL (3/2/2023)  A1C: 5.8 % (3/2/2023)  Screening Current   Cholesterol Screening Once every 5 years if you don't have a lipid disorder. May order more often based on risk factors. Lipid panel: 11/10/2021    Screening Not Indicated  History Lipid Disorder     Other Preventive Screenings Covered by Medicare:  Abdominal Aortic Aneurysm (AAA) Screening: covered once if your at risk. You're considered to be at risk if you have a family history of AAA. Lung Cancer Screening: covers low dose CT scan once per year if you meet all of the following conditions: (1) Age 48-67; (2) No signs or symptoms of lung cancer; (3) Current smoker or have quit smoking within the last 15 years; (4) You have a tobacco smoking history of at least 20 pack years (packs per day multiplied by number of years you smoked); (5) You get a written order from a healthcare provider. Glaucoma Screening: covered annually if you're considered high risk: (1) You have diabetes OR (2) Family history of glaucoma OR (3)  aged 48 and older OR (3)  American aged 72 and older  Osteoporosis Screening: covered every 2 years if you meet one of the following conditions: (1) You're estrogen deficient and at risk for osteoporosis based off medical history and other findings; (2) Have a vertebral abnormality; (3) On glucocorticoid therapy for more than 3 months; (4) Have primary hyperparathyroidism; (5) On osteoporosis medications and need to assess response to drug therapy. Last bone density test (DXA Scan): 02/16/2023. HIV Screening: covered annually if you're between the age of 14-79. Also covered annually if you are younger than 13 and older than 72 with risk factors for HIV infection.  For pregnant patients, it is covered up to 3 times per pregnancy. Immunizations:  Immunization Recommendations   Influenza Vaccine Annual influenza vaccination during flu season is recommended for all persons aged >= 6 months who do not have contraindications   Pneumococcal Vaccine   * Pneumococcal conjugate vaccine = PCV13 (Prevnar 13), PCV15 (Vaxneuvance), PCV20 (Prevnar 20)  * Pneumococcal polysaccharide vaccine = PPSV23 (Pneumovax) Adults 76-13 yo with certain risk factors or if 69+ yo  If never received any pneumonia vaccine: recommend Prevnar 20 (PCV20)  Give PCV20 if previously received 1 dose of PCV13 or PPSV23   Hepatitis B Vaccine 3 dose series if at intermediate or high risk (ex: diabetes, end stage renal disease, liver disease)   Respiratory syncytial virus (RSV) Vaccine - COVERED BY MEDICARE PART D  * RSVPreF3 (Arexvy) CDC recommends that adults 61years of age and older may receive a single dose of RSV vaccine using shared clinical decision-making (SCDM)   Tetanus (Td) Vaccine - COST NOT COVERED BY MEDICARE PART B Following completion of primary series, a booster dose should be given every 10 years to maintain immunity against tetanus. Td may also be given as tetanus wound prophylaxis. Tdap Vaccine - COST NOT COVERED BY MEDICARE PART B Recommended at least once for all adults. For pregnant patients, recommended with each pregnancy. Shingles Vaccine (Shingrix) - COST NOT COVERED BY MEDICARE PART B  2 shot series recommended in those 19 years and older who have or will have weakened immune systems or those 50 years and older     Health Maintenance Due:      Topic Date Due   • Cervical Cancer Screening  Never done   • Colorectal Cancer Screening  10/07/2021   • Breast Cancer Screening: Mammogram  10/10/2023   • DXA SCAN  02/16/2028   • Hepatitis C Screening  Completed     Immunizations Due:  There are no preventive care reminders to display for this patient. Advance Directives   What are advance directives?   Advance directives are legal documents that state your wishes and plans for medical care. These plans are made ahead of time in case you lose your ability to make decisions for yourself. Advance directives can apply to any medical decision, such as the treatments you want, and if you want to donate organs. What are the types of advance directives? There are many types of advance directives, and each state has rules about how to use them. You may choose a combination of any of the following:  Living will: This is a written record of the treatment you want. You can also choose which treatments you do not want, which to limit, and which to stop at a certain time. This includes surgery, medicine, IV fluid, and tube feedings. Durable power of  for [facility]): This is a written record that states who you want to make healthcare choices for you when you are unable to make them for yourself. This person, called a proxy, is usually a family member or a friend. You may choose more than 1 proxy. Do not resuscitate (DNR) order:  A DNR order is used in case your heart stops beating or you stop breathing. It is a request not to have certain forms of treatment, such as CPR. A DNR order may be included in other types of advance directives. Medical directive: This covers the care that you want if you are in a coma, near death, or unable to make decisions for yourself. You can list the treatments you want for each condition. Treatment may include pain medicine, surgery, blood transfusions, dialysis, IV or tube feedings, and a ventilator (breathing machine). Values history: This document has questions about your views, beliefs, and how you feel and think about life. This information can help others choose the care that you would choose. Why are advance directives important? An advance directive helps you control your care. Although spoken wishes may be used, it is better to have your wishes written down.  Spoken wishes can be misunderstood, or not followed. Treatments may be given even if you do not want them. An advance directive may make it easier for your family to make difficult choices about your care. Fall Prevention    Fall prevention  includes ways to make your home and other areas safer. It also includes ways you can move more carefully to prevent a fall. Health conditions that cause changes in your blood pressure, vision, or muscle strength and coordination may increase your risk for falls. Medicines may also increase your risk for falls if they make you dizzy, weak, or sleepy. Fall prevention tips:   Stand or sit up slowly. Use assistive devices as directed. Wear shoes that fit well and have soles that . Wear a personal alarm. Stay active. Manage your medical conditions. Home Safety Tips:  Add items to prevent falls in the bathroom. Keep paths clear. Install bright lights in your home. Keep items you use often on shelves within reach. Paint or place reflective tape on the edges of your stairs. Weight Management   Why it is important to manage your weight:  Being overweight increases your risk of health conditions such as heart disease, high blood pressure, type 2 diabetes, and certain types of cancer. It can also increase your risk for osteoarthritis, sleep apnea, and other respiratory problems. Aim for a slow, steady weight loss. Even a small amount of weight loss can lower your risk of health problems. How to lose weight safely:  A safe and healthy way to lose weight is to eat fewer calories and get regular exercise. You can lose up about 1 pound a week by decreasing the number of calories you eat by 500 calories each day. Healthy meal plan for weight management:  A healthy meal plan includes a variety of foods, contains fewer calories, and helps you stay healthy. A healthy meal plan includes the following:  Eat whole-grain foods more often.   A healthy meal plan should contain fiber. Fiber is the part of grains, fruits, and vegetables that is not broken down by your body. Whole-grain foods are healthy and provide extra fiber in your diet. Some examples of whole-grain foods are whole-wheat breads and pastas, oatmeal, brown rice, and bulgur. Eat a variety of vegetables every day. Include dark, leafy greens such as spinach, kale, kenneth greens, and mustard greens. Eat yellow and orange vegetables such as carrots, sweet potatoes, and winter squash. Eat a variety of fruits every day. Choose fresh or canned fruit (canned in its own juice or light syrup) instead of juice. Fruit juice has very little or no fiber. Eat low-fat dairy foods. Drink fat-free (skim) milk or 1% milk. Eat fat-free yogurt and low-fat cottage cheese. Try low-fat cheeses such as mozzarella and other reduced-fat cheeses. Choose meat and other protein foods that are low in fat. Choose beans or other legumes such as split peas or lentils. Choose fish, skinless poultry (chicken or turkey), or lean cuts of red meat (beef or pork). Before you cook meat or poultry, cut off any visible fat. Use less fat and oil. Try baking foods instead of frying them. Add less fat, such as margarine, sour cream, regular salad dressing and mayonnaise to foods. Eat fewer high-fat foods. Some examples of high-fat foods include french fries, doughnuts, ice cream, and cakes. Eat fewer sweets. Limit foods and drinks that are high in sugar. This includes candy, cookies, regular soda, and sweetened drinks. Exercise:  Exercise at least 30 minutes per day on most days of the week. Some examples of exercise include walking, biking, dancing, and swimming. You can also fit in more physical activity by taking the stairs instead of the elevator or parking farther away from stores. Ask your healthcare provider about the best exercise plan for you. Alcohol Use and Your Health    Drinking too much can harm your health.   Excessive alcohol use leads to about 88,000 death in [facility] each year, and shortens the life of those who diet by almost 30 years. Further, excessive drinking cost the economy $249 billion in 2010. Most excessive drinkers are not alcohol dependent. Excessive alcohol use has immediate effects that increase the risk of many harmful health conditions. These are most often the result of binge drinking. Over time, excessive alcohol use can lead to the development of chronic diseases and other series health problems. What is considered a "drink"? Excessive alcohol use includes:  Binge Drinking: For women, 4 or more drinks consumed on one occasion. For men, 5 or more drinks consumed on one occasion. Heavy Drinking: For women, 8 or more drinks per week. For men, 15 or more drinks per week  Any alcohol used by pregnant women  Any alcohol used by those under the age of 21 years    If you choose to drink, do so in moderation:  Do not drink at all if you are under the age of 24, or if you are or may be pregnant, or have health problems that could be made worse by drinking.   For women, up to 1 drink per day  For men, up to 2 drinks a day    No one should begin drinking or drink more frequently based on potential health benefits    Short-Term Health Risks:  Injuries: motor vehicle crashes, falls, drownings, burns  Violence: homicide, suicide, sexual assault, intimate partner violence  Alcohol poisoning  Reproductive health: risky sexual behaviors, unintended prengnacy, sexually transmitted diseases, miscarriage, stillbirth, fetal alcohol syndrome    Long-Term Health Risks:  Chronic diseases: high blood pressure, heart disease, stroke, liver disease, digestive problems  Cancers: breast, mouth and throat, liver, colon  Learning and memory problems: dementia, poor school performance  Mental health: depression, anxiety, insomnia  Social problems: lost productivity, family problems, unemployment  Alcohol dependence    For support and more information:  Substance Abuse and [address]  Web Address: https://[employer]    Alcoholics Anonymous        Web Address: http://www.cruz.info/    https://www.cdc.gov/alcohol/fact-sheets/alcohol-use.htm     © Copyright [employer] 2018 Information is for End User's use only and may not be sold, redistributed or otherwise used for commercial purposes. All illustrations and images included in CareNotes® are the copyrighted property of [employer] or "[employer]" [facility] Preventive Visit Patient Instructions  Thank you for completing your Welcome to Medicare Visit or Medicare Annual Wellness Visit today. Your next wellness visit will be due in one year (12/12/2024). The screening/preventive services that you may require over the next 5-10 years are detailed below. Some tests may not apply to you based off risk factors and/or age. Screening tests ordered at today's visit but not completed yet may show as past due. Also, please note that scanned in results may not display below. Preventive Screenings:  Service Recommendations Previous Testing/Comments   Colorectal Cancer Screening  * Colonoscopy    * Fecal Occult Blood Test (FOBT)/Fecal Immunochemical Test (FIT)  * Fecal DNA/Cologuard Test  * Flexible Sigmoidoscopy Age: 43-73 years old   Colonoscopy: every 10 years (may be performed more frequently if at higher risk)  OR  FOBT/FIT: every 1 year  OR  Cologuard: every 3 years  OR  Sigmoidoscopy: every 5 years  Screening may be recommended earlier than age 39 if at higher risk for colorectal cancer. Also, an individualized decision between you and your healthcare provider will decide whether screening between the ages of 77-80 would be appropriate.  Colonoscopy: 10/07/2020  FOBT/FIT: Not on file  Cologuard: Not on file  Sigmoidoscopy: 01/06/2021    Screening Current     Breast Cancer Screening Age: 36+ years old  Frequency: every 1-2 years  Not required if history of left and right mastectomy Mammogram: 10/10/2022    History Breast Cancer  Due for Mammogram   Cervical Cancer Screening Between the ages of 21-29, pap smear recommended once every 3 years. Between the ages of 32-69, can perform pap smear with HPV co-testing every 5 years. Recommendations may differ for women with a history of total hysterectomy, cervical cancer, or abnormal pap smears in past. Pap Smear: Not on file    Screening Not Indicated   Hepatitis C Screening Once for adults born between 1945 and 1965  More frequently in patients at high risk for Hepatitis C Hep C Antibody: 11/09/2020    Screening Current   Diabetes Screening 1-2 times per year if you're at risk for diabetes or have pre-diabetes Fasting glucose: 106 mg/dL (3/2/2023)  A1C: 5.8 % (3/2/2023)  Screening Current   Cholesterol Screening Once every 5 years if you don't have a lipid disorder. May order more often based on risk factors. Lipid panel: 11/10/2021    Screening Not Indicated  History Lipid Disorder     Other Preventive Screenings Covered by Medicare:  Abdominal Aortic Aneurysm (AAA) Screening: covered once if your at risk. You're considered to be at risk if you have a family history of AAA. Lung Cancer Screening: covers low dose CT scan once per year if you meet all of the following conditions: (1) Age 48-67; (2) No signs or symptoms of lung cancer; (3) Current smoker or have quit smoking within the last 15 years; (4) You have a tobacco smoking history of at least 20 pack years (packs per day multiplied by number of years you smoked); (5) You get a written order from a healthcare provider.   Glaucoma Screening: covered annually if you're considered high risk: (1) You have diabetes OR (2) Family history of glaucoma OR (3)  aged 48 and older OR (3)  American aged 72 and older  Osteoporosis Screening: covered every 2 years if you meet one of the following conditions: (1) You're estrogen deficient and at risk for osteoporosis based off medical history and other findings; (2) Have a vertebral abnormality; (3) On glucocorticoid therapy for more than 3 months; (4) Have primary hyperparathyroidism; (5) On osteoporosis medications and need to assess response to drug therapy. Last bone density test (DXA Scan): 02/16/2023. HIV Screening: covered annually if you're between the age of 14-79. Also covered annually if you are younger than 13 and older than 72 with risk factors for HIV infection. For pregnant patients, it is covered up to 3 times per pregnancy. Immunizations:  Immunization Recommendations   Influenza Vaccine Annual influenza vaccination during flu season is recommended for all persons aged >= 6 months who do not have contraindications   Pneumococcal Vaccine   * Pneumococcal conjugate vaccine = PCV13 (Prevnar 13), PCV15 (Vaxneuvance), PCV20 (Prevnar 20)  * Pneumococcal polysaccharide vaccine = PPSV23 (Pneumovax) Adults 45-18 yo with certain risk factors or if 69+ yo  If never received any pneumonia vaccine: recommend Prevnar 20 (PCV20)  Give PCV20 if previously received 1 dose of PCV13 or PPSV23   Hepatitis B Vaccine 3 dose series if at intermediate or high risk (ex: diabetes, end stage renal disease, liver disease)   Respiratory syncytial virus (RSV) Vaccine - COVERED BY MEDICARE PART D  * RSVPreF3 (Arexvy) CDC recommends that adults 61years of age and older may receive a single dose of RSV vaccine using shared clinical decision-making (SCDM)   Tetanus (Td) Vaccine - COST NOT COVERED BY MEDICARE PART B Following completion of primary series, a booster dose should be given every 10 years to maintain immunity against tetanus. Td may also be given as tetanus wound prophylaxis. Tdap Vaccine - COST NOT COVERED BY MEDICARE PART B Recommended at least once for all adults. For pregnant patients, recommended with each pregnancy.    Shingles Vaccine (Shingrix) - COST NOT COVERED BY MEDICARE PART B  2 shot series recommended in those 19 years and older who have or will have weakened immune systems or those 50 years and older     Health Maintenance Due:      Topic Date Due   • Cervical Cancer Screening  Never done   • Colorectal Cancer Screening  10/07/2021   • Breast Cancer Screening: Mammogram  10/10/2023   • DXA SCAN  02/16/2028   • Hepatitis C Screening  Completed     Immunizations Due:  There are no preventive care reminders to display for this patient. Advance Directives   What are advance directives? Advance directives are legal documents that state your wishes and plans for medical care. These plans are made ahead of time in case you lose your ability to make decisions for yourself. Advance directives can apply to any medical decision, such as the treatments you want, and if you want to donate organs. What are the types of advance directives? There are many types of advance directives, and each state has rules about how to use them. You may choose a combination of any of the following:  Living will: This is a written record of the treatment you want. You can also choose which treatments you do not want, which to limit, and which to stop at a certain time. This includes surgery, medicine, IV fluid, and tube feedings. Durable power of  for healthcare [facility] This is a written record that states who you want to make healthcare choices for you when you are unable to make them for yourself. This person, called a proxy, is usually a family member or a friend. You may choose more than 1 proxy. Do not resuscitate (DNR) order:  A DNR order is used in case your heart stops beating or you stop breathing. It is a request not to have certain forms of treatment, such as CPR. A DNR order may be included in other types of advance directives. Medical directive:   This covers the care that you want if you are in a coma, near death, or unable to make decisions for yourself. You can list the treatments you want for each condition. Treatment may include pain medicine, surgery, blood transfusions, dialysis, IV or tube feedings, and a ventilator (breathing machine). Values history: This document has questions about your views, beliefs, and how you feel and think about life. This information can help others choose the care that you would choose. Why are advance directives important? An advance directive helps you control your care. Although spoken wishes may be used, it is better to have your wishes written down. Spoken wishes can be misunderstood, or not followed. Treatments may be given even if you do not want them. An advance directive may make it easier for your family to make difficult choices about your care. Fall Prevention    Fall prevention  includes ways to make your home and other areas safer. It also includes ways you can move more carefully to prevent a fall. Health conditions that cause changes in your blood pressure, vision, or muscle strength and coordination may increase your risk for falls. Medicines may also increase your risk for falls if they make you dizzy, weak, or sleepy. Fall prevention tips:   Stand or sit up slowly. Use assistive devices as directed. Wear shoes that fit well and have soles that . Wear a personal alarm. Stay active. Manage your medical conditions. Home Safety Tips:  Add items to prevent falls in the bathroom. Keep paths clear. Install bright lights in your home. Keep items you use often on shelves within reach. Paint or place reflective tape on the edges of your stairs. Weight Management   Why it is important to manage your weight:  Being overweight increases your risk of health conditions such as heart disease, high blood pressure, type 2 diabetes, and certain types of cancer. It can also increase your risk for osteoarthritis, sleep apnea, and other respiratory problems.  Aim for a slow, steady weight loss. Even a small amount of weight loss can lower your risk of health problems. How to lose weight safely:  A safe and healthy way to lose weight is to eat fewer calories and get regular exercise. You can lose up about 1 pound a week by decreasing the number of calories you eat by 500 calories each day. Healthy meal plan for weight management:  A healthy meal plan includes a variety of foods, contains fewer calories, and helps you stay healthy. A healthy meal plan includes the following:  Eat whole-grain foods more often. A healthy meal plan should contain fiber. Fiber is the part of grains, fruits, and vegetables that is not broken down by your body. Whole-grain foods are healthy and provide extra fiber in your diet. Some examples of whole-grain foods are whole-wheat breads and pastas, oatmeal, brown rice, and bulgur. Eat a variety of vegetables every day. Include dark, leafy greens such as spinach, kale, kenneth greens, and mustard greens. Eat yellow and orange vegetables such as carrots, sweet potatoes, and winter squash. Eat a variety of fruits every day. Choose fresh or canned fruit (canned in its own juice or light syrup) instead of juice. Fruit juice has very little or no fiber. Eat low-fat dairy foods. Drink fat-free (skim) milk or 1% milk. Eat fat-free yogurt and low-fat cottage cheese. Try low-fat cheeses such as mozzarella and other reduced-fat cheeses. Choose meat and other protein foods that are low in fat. Choose beans or other legumes such as split peas or lentils. Choose fish, skinless poultry (chicken or turkey), or lean cuts of red meat (beef or pork). Before you cook meat or poultry, cut off any visible fat. Use less fat and oil. Try baking foods instead of frying them. Add less fat, such as margarine, sour cream, regular salad dressing and mayonnaise to foods. Eat fewer high-fat foods.  Some examples of high-fat foods include french fries, doughnuts, ice cream, and cakes. Eat fewer sweets. Limit foods and drinks that are high in sugar. This includes candy, cookies, regular soda, and sweetened drinks. Exercise:  Exercise at least 30 minutes per day on most days of the week. Some examples of exercise include walking, biking, dancing, and swimming. You can also fit in more physical activity by taking the stairs instead of the elevator or parking farther away from stores. Ask your healthcare provider about the best exercise plan for you. Alcohol Use and Your Health    Drinking too much can harm your health. Excessive alcohol use leads to about 88,000 death in [facility] each year, and shortens the life of those who diet by almost 30 years. Further, excessive drinking cost the economy $249 billion in 2010. Most excessive drinkers are not alcohol dependent. Excessive alcohol use has immediate effects that increase the risk of many harmful health conditions. These are most often the result of binge drinking. Over time, excessive alcohol use can lead to the development of chronic diseases and other series health problems. What is considered a "drink"? Excessive alcohol use includes:  Binge Drinking: For women, 4 or more drinks consumed on one occasion. For men, 5 or more drinks consumed on one occasion. Heavy Drinking: For women, 8 or more drinks per week. For men, 15 or more drinks per week  Any alcohol used by pregnant women  Any alcohol used by those under the age of 21 years    If you choose to drink, do so in moderation:  Do not drink at all if you are under the age of 24, or if you are or may be pregnant, or have health problems that could be made worse by drinking.   For women, up to 1 drink per day  For men, up to 2 drinks a day    No one should begin drinking or drink more frequently based on potential health benefits    Short-Term Health Risks:  Injuries: motor vehicle crashes, falls, drownings, burns  Violence: homicide, suicide, sexual assault, intimate partner violence  Alcohol poisoning  Reproductive health: risky sexual behaviors, unintended prengnacy, sexually transmitted diseases, miscarriage, stillbirth, fetal alcohol syndrome    Long-Term Health Risks:  Chronic diseases: high blood pressure, heart disease, stroke, liver disease, digestive problems  Cancers: breast, mouth and throat, liver, colon  Learning and memory problems: dementia, poor school performance  Mental health: depression, anxiety, insomnia  Social problems: lost productivity, family problems, unemployment  Alcohol dependence    For support and more information:  Substance Abuse and [address]  Web Address: https://iFlexMe/    Alcoholics Anonymous        Web Address: http://www.Arisoko.info/    https://www.cdc.gov/alcohol/fact-sheets/alcohol-use.htm     © Copyright [employer] 2018 Information is for End User's use only and may not be sold, redistributed or otherwise used for commercial purposes.  All illustrations and images included in CareNotes® are the copyrighted property of [employer] Inc[employer] or [address] [place] [address]

## 2023-12-08 NOTE — PROGRESS NOTES
Assessment and Plan:     Problem List Items Addressed This Visit          Digestive    Esophageal reflux     Stable. Continue famotidine 40 mg daily prn. Ulcerative colitis ([address])     Mild. Continue with GI. Reviewed that she is overdue for repeat sigmoidoscopy based upon reports. Cardiovascular and Mediastinum    Essential hypertension     Controlled at home/borderline today. Continue diltiazem 180 mg daily. Encouraged to checkblood pressure at home more frequently. Reach out with any consistent elevations. Relevant Orders    CBC and differential    Comprehensive metabolic panel    TSH, 3rd generation with Free T4 reflex       Musculoskeletal and Integument    Age-related osteoporosis with current pathological fracture of vertebra (HCC)     Currently supplementing vitamin D/calcium in her multivitamin. Status post kyphoplasty. Will continue to monitor. Other    Pure hypercholesterolemia     Currently on Crestor 10 mg daily. Recheck lipid panel. Relevant Orders    Lipid Panel with Direct LDL reflex    Neutropenia ([address])     Recheck with CBC as ordered. Other Visit Diagnoses       Medicare annual wellness visit, subsequent    -  Primary    Cervical cancer screening        Need for shingles vaccine        Skin cancer screening        Relevant Orders    Ambulatory Referral to Dermatology    Hearing loss, unspecified hearing loss type, unspecified laterality        Referred to ENT for further evaluation. Relevant Orders    Ambulatory Referral to Otolaryngology    Motion sickness, initial encounter        Given prescription for scopolamine patch to use if needed on upcoming trip. Relevant Medications    scopolamine (TRANSDERM-SCOP) 1 mg/3 days TD 72 hr patch    Traveler's diarrhea        Prescription for azithromycin to have on hand in case of traveler's diarrhea on vaccination. (Allergic to Cipro)          BMI Counseling: Body mass index is 27.57 kg/m². The BMI is above normal. Nutrition recommendations include encouraging healthy choices of fruits and vegetables. Exercise recommendations include exercising 3-5 times per week. Rationale for BMI follow-up plan is due to patient being overweight or obese. Depression Screening and Follow-up Plan: Patient was screened for depression during today's encounter. They screened negative with a PHQ-2 score of 0. Preventive health issues were discussed with patient, and age appropriate screening tests were ordered as noted in patient's After Visit Summary. Personalized health advice and appropriate referrals for health education or preventive services given if needed, as noted in patient's After Visit Summary. 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 HENT:  Negative for congestion, rhinorrhea and sore throat. Eyes:  Negative for visual disturbance. Respiratory:  Negative for cough, shortness of breath and wheezing. Cardiovascular:  Negative for chest pain, palpitations and leg swelling. Gastrointestinal:  Positive for diarrhea (rare from IBS and UC - helped by VSL).  Negative for abdominal pain, blood in stool, constipation, nausea and vomiting. Endocrine: Negative for polydipsia and polyuria. Genitourinary:  Negative for dysuria and frequency. Musculoskeletal:  Negative for arthralgias and myalgias. Skin:  Negative for rash. Neurological:  Negative for dizziness, syncope and headaches. Hematological:  Does not bruise/bleed easily. Psychiatric/Behavioral:  Negative for dysphoric mood. The patient is not nervous/anxious.          Problem List:     Patient Active Problem List   Diagnosis    Personal history of adenocarcinoma of breast    Arthritis    Carcinoma, basal cell, skin    Esophageal reflux    Pure hypercholesterolemia    Lumbar disc herniation    Migraine headache    Osteopenia    Malignant neoplasm of skin    Idiopathic peripheral neuropathy    Neutropenia (HCC)    Dupuytren's contracture    Hip flexor tendinitis, right    Essential hypertension    Vertigo    Neurologic gait dysfunction    Tinnitus of both ears    Breast calcifications    Ulcerative colitis ([address])    Hyperbilirubinemia    Chronic migraine without aura without status migrainosus, not intractable    Encounter for follow-up surveillance of breast cancer    Screening mammogram for breast cancer    Closed compression fracture of L3 lumbar vertebra, initial encounter ([address])    Age-related osteoporosis with current pathological fracture of vertebra [facility])      Past Medical and Surgical History:     Past Medical History:   Diagnosis Date    Arthritis     Gastric ulcer     GERD (gastroesophageal reflux disease)     Heart murmur     History of radiation therapy 2007    PBRT    IBS (irritable bowel syndrome)     Irritable bowel syndrome 12/11/2012    Migraine     Skin cancer     Use of letrozole (Femara)     took for 2 years D/john due to side effects     Past Surgical History:   Procedure Laterality Date    ABDOMINOPLASTY      ADENOIDECTOMY      BREAST BIOPSY Right 10/12/2007    IDC    BREAST LUMPECTOMY Right 2007 BREAST SURGERY      lumpectomy, resolved 2007     SECTION      CHOLECYSTECTOMY      COLON SURGERY      COLONOSCOPY      COSMETIC SURGERY  1975    forehead    FOOT SURGERY      IR KYPHOPLASTY/VERTEBROPLASTY  3/9/2023    MOHS SURGERY      REDUCTION MAMMAPLASTY Bilateral 2015    reduction on left/lift on right    SENTINEL LYMPH NODE BIOPSY Right 2007    TOE SURGERY      left toe surgery    TONSILLECTOMY        Family History:     Family History   Problem Relation Age of Onset    Hypertension Mother     Melanoma Mother     Heart disease Mother     Heart attack Father         acute MI, CABG    Hypertension Father     Heart disease Father     Other Brother         CABG    Lymphoma Brother         non-Hodgkin's    Hypertension Brother     Heart disease Brother     Other Family         back disorder    Stroke Family         CVA    Diabetes Family     Cancer Family       Social History:     Social History     Socioeconomic History    Marital status:      Spouse name: None    Number of children: None    Years of education: None    Highest education level: None   Occupational History    None   Tobacco Use    Smoking status: Former    Smokeless tobacco: Never    Tobacco comments:     Smoker in teenage years. Quit at 22. One pack per week. Vaping Use    Vaping Use: Never used   Substance and Sexual Activity    Alcohol use:  Yes     Alcohol/week: 2.0 - 3.0 standard drinks of alcohol     Types: 2 - 3 Glasses of wine per week    Drug use: No    Sexual activity: None   Other Topics Concern    None   Social History Narrative    Daily caffeine use- 2 cups of green tea and chocolate     Social Determinants of Health     Financial Resource Strain: Low Risk  (2023)    Overall Financial Resource Strain (CARDIA)     Difficulty of Paying Living Expenses: Not hard at all   Food Insecurity: Not on file   Transportation Needs: No Transportation Needs (2023)    PRAPARE - Transportation     Lack of Transportation (Medical): No     Lack of Transportation (Non-Medical): No   Physical Activity: Not on file   Stress: Not on file   Social Connections: Not on file   Intimate Partner Violence: Not on file   Housing Stability: Not on file      Medications and Allergies:     Current Outpatient Medications   Medication Sig Dispense Refill    acetaminophen (TYLENOL) 650 mg CR tablet Take 1,300 mg by mouth every 6 (six) hours as needed for mild pain      Cholecalciferol (VITAMIN D3) 5000 units CAPS Take 1 tablet by mouth daily      diazepam (VALIUM) 2 mg tablet Take 1 tablet (2 mg total) by mouth 30 min pre-procedure May repeat x 1 2 tablet 0    diltiazem (CARDIZEM CD) 180 mg 24 hr capsule TAKE 1 CAPSULE(180 MG) BY MOUTH DAILY 90 capsule 3    famotidine (PEPCID) 40 MG tablet Take 1 tablet (40 mg total) by mouth daily as needed for heartburn 30 tablet 0    Multiple Vitamins-Minerals (MULTI COMPLETE) CAPS Take 1 capsule by mouth daily      Probiotic Product (VSL#3) CAPS Take 1 capsule by mouth daily 90 capsule 3    rosuvastatin (CRESTOR) 10 MG tablet Take 1 tablet (10 mg total) by mouth daily 90 tablet 3    scopolamine (TRANSDERM-SCOP) 1 mg/3 days TD 72 hr patch Place 1 patch on the skin over 72 hours every third day 4 patch 0    Zinc 50 MG CAPS Take by mouth daily      Calcium Carbonate-Vit D-Min (CALCIUM 1200 PO) Take by mouth (Patient not taking: Reported on 12/8/2023)       No current facility-administered medications for this visit. Allergies   Allergen Reactions    Ciprofloxacin Anaphylaxis     Anaphylaxis    Celecoxib Swelling     Edema of legs    Amitriptyline GI Intolerance     Action Taken: bloating,GI problems;      Bactrim [Sulfamethoxazole-Trimethoprim] Rash    Lidoderm [Lidocaine] Rash     Rash from lidoderm patch    Mesalamine Dizziness and Headache    Other Vomiting     "black olives->GI upset"    Sulfa Antibiotics Rash    Wound Dressing Adhesive Rash      Immunizations:     Immunization History Administered Date(s) Administered    COVID-19 MODERNA VACC 0.5 ML IM 02/19/2021, 03/19/2021    H1N1, All Formulations 10/26/2009    INFLUENZA 11/18/2005, 12/01/2006, 10/19/2007, 11/01/2015, 10/05/2020, 10/31/2022, 10/17/2023    Influenza Quadrivalent, 6-35 Months IM 11/01/2015    Influenza Split High Dose Preservative Free IM 10/14/2016, 10/09/2017    Influenza, high dose seasonal 0.7 mL 09/18/2018, 09/27/2019, 10/20/2021, 10/31/2022, 10/17/2023    Influenza, seasonal, injectable 09/14/1997, 10/09/2013    Pneumococcal Conjugate 13-Valent 11/16/2015    Pneumococcal Polysaccharide PPV23 12/19/2016    Tdap 11/16/2015, 07/04/2016, 04/03/2017    Zoster 01/06/2014      Health Maintenance:         Topic Date Due    Cervical Cancer Screening  Never done    Colorectal Cancer Screening  10/07/2021    Breast Cancer Screening: Mammogram  10/10/2023    DXA SCAN  02/16/2028    Hepatitis C Screening  Completed     There are no preventive care reminders to display for this patient. Medicare Screening Tests and Risk Assessments:     Jaun Estevez is here for her Subsequent Wellness visit. Health Risk Assessment:   Patient rates overall health as very good. Patient feels that their physical health rating is same. Patient is satisfied with their life. Eyesight was rated as slightly worse. Hearing was rated as slightly worse. Patient feels that their emotional and mental health rating is same. Patients states they are never, rarely angry. Patient states they are never, rarely unusually tired/fatigued. Pain experienced in the last 7 days has been some. Patient's pain rating has been 3/10. Patient states that she has experienced weight loss or gain in last 6 months. Gains 12 lbs when i broke my back and was restricted    Sees Dr. Eva Weir for regular eye exams - has contacts and glasses    Depression Screening:   PHQ-2 Score: 0  PHQ-9 Score: 1      Fall Risk Screening:    In the past year, patient has experienced: history of falling in past year    Number of falls: 1  Injured during fall?: Yes    Feels unsteady when standing or walking?: No    Worried about falling?: No      Urinary Incontinence Screening:   Patient has not leaked urine accidently in the last six months. Home Safety:  Patient does not have trouble with stairs inside or outside of their home. Patient has working smoke alarms and has working carbon monoxide detector. Home safety hazards include: none. Nutrition:   Current diet is Low Saturated Fat and Limited junk food. Medications:   Patient is currently taking over-the-counter supplements. OTC medications include: see medication list. Patient is able to manage medications. Activities of Daily Living (ADLs)/Instrumental Activities of Daily Living (IADLs):   Walk and transfer into and out of bed and chair?: Yes  Dress and groom yourself?: Yes    Bathe or shower yourself?: Yes    Feed yourself?  Yes  Do your laundry/housekeeping?: Yes  Manage your money, pay your bills and track your expenses?: Yes  Make your own meals?: Yes    Do your own shopping?: Yes    Durable Medical Equipment Suppliers  N/A    Previous Hospitalizations:   Any hospitalizations or ED visits within the last 12 months?: Yes    How many hospitalizations have you had in the last year?: 1-2    Advance Care Planning:   Living will: No    Durable POA for healthcare: No    Advanced directive: No      Cognitive Screening:   Provider or family/friend/caregiver concerned regarding cognition?: No    PREVENTIVE SCREENINGS      Cardiovascular Screening:    General: Screening Not Indicated and History Lipid Disorder      Diabetes Screening:     General: Screening Current      Colorectal Cancer Screening:     General: Screening Current      Breast Cancer Screening:     General: History Breast Cancer    Due for: Mammogram        Cervical Cancer Screening:    General: Screening Not Indicated      Osteoporosis Screening:    General: Screening Not Indicated and History Osteoporosis      Abdominal Aortic Aneurysm (AAA) Screening:        General: Screening Not Indicated      Lung Cancer Screening:     General: Screening Not Indicated      Hepatitis C Screening:    General: Screening Current    Screening, Brief Intervention, and Referral to Treatment (SBIRT)    Screening  Typical number of drinks in a day: 0  Typical number of drinks in a week: 2  Interpretation: Low risk drinking behavior. AUDIT-C Screenin) How often did you have a drink containing alcohol in the past year? 2 to 3 times a week  2) How many drinks did you have on a typical day when you were drinking in the past year? 1 to 2  3) How often did you have 6 or more drinks on one occasion in the past year? never    AUDIT-C Score: 3  Interpretation: Score 3-12 (female): POSITIVE screen for alcohol misuse    AUDIT Screenin) How often during the last year have you found that you were not able to stop drinking once you had started? 0 - never  5) How often during the last year have you failed to do what was normally expected from you because of drinking? 0 - never  6) How often during the last year have you needed a first drink in the morning to get yourself going after a heavy drinking session?  0 - never  7) How often during the last year have you had a feeling of guilt or remorse after drinking? 0 - never  8) How often during the last year have you been unable to remember what happened the night before because you had been drinking? 0 - never  9) Have you or someone else been injured as a result of your drinking? 0 - no  10) Has a relative or friend or a doctor or another health worker been concerned about your drinking or suggested you cut down? 0 - no    AUDIT Score: 3  Interpretation: Low risk alcohol consumption    Single Item Drug Screening:  How often have you used an illegal drug (including marijuana) or a prescription medication for non-medical reasons in the past year? never    Single Item Drug Screen Score: 0  Interpretation: Negative screen for possible drug use disorder    No results found. Physical Exam:     /80 (BP Location: Left arm, Patient Position: Sitting, Cuff Size: Large)   Pulse (!) 54   Temp (!) 97.2 °F (36.2 °C) (Temporal)   Resp 12   Ht 5' 4" (1.626 m)   Wt 72.8 kg (160 lb 9.6 oz)   SpO2 100%   BMI 27.57 kg/m²     Physical Exam  Vitals reviewed. Constitutional:       General: She is not in acute distress. Appearance: Normal appearance. She is well-developed. She is not ill-appearing. HENT:      Head: Normocephalic and atraumatic. Right Ear: Tympanic membrane, ear canal and external ear normal.      Left Ear: Tympanic membrane, ear canal and external ear normal.      Nose: Nose normal. No congestion. Mouth/Throat:      Mouth: Mucous membranes are moist.      Pharynx: No oropharyngeal exudate. Eyes:      Conjunctiva/sclera: Conjunctivae normal.      Pupils: Pupils are equal, round, and reactive to light. Neck:      Thyroid: No thyromegaly. Cardiovascular:      Rate and Rhythm: Normal rate and regular rhythm. Pulses: Normal pulses. Heart sounds: Normal heart sounds. No murmur heard. Pulmonary:      Effort: Pulmonary effort is normal.      Breath sounds: Normal breath sounds. No wheezing, rhonchi or rales. Abdominal:      General: Bowel sounds are normal. There is no distension. Palpations: Abdomen is soft. There is no mass. Tenderness: There is no abdominal tenderness. Musculoskeletal:      Cervical back: Normal range of motion and neck supple. Right lower leg: No edema. Left lower leg: No edema. Lymphadenopathy:      Cervical: No cervical adenopathy. Skin:     General: Skin is warm and dry. Findings: No rash. Neurological:      Mental Status: She is alert. Sensory: No sensory deficit. Motor: No weakness.       Comments: 5/5 strength in UE and LE   Psychiatric:         Mood and Affect: Mood normal. Behavior: Behavior normal.         Thought Content:  Thought content normal.         Judgment: Judgment normal.          Miguel Sanchez PA-C

## 2023-12-12 NOTE — ASSESSMENT & PLAN NOTE
Understanding Menopause  Menopause marks the point where youve gone 12 months in a row without a period. The average age for this is around 51, but it can happen at younger or older ages. During the months or years before menopause, your body goes through many changes. It may be helpful to understand these changes and what you can do about the symptoms that result.     Use a portable fan to help stay cool.    Symptoms  Perimenopause is sometimes called the menopause transition. It happens in the months or years before menopause. It may begin when you reach your mid-40s. During this time, your estrogen levels go up and down and then decrease. As a result, you may notice some of the following symptoms:  · Menstrual periods that come more or less often than usual  · Menstrual periods that are lighter or heavier than normal  · Increased premenstrual syndrome (PMS) symptoms  · Hot flashes  · Night sweats  · Mood swings  · Vaginal dryness with possible painful sexual activity  · Difficulty going to sleep or staying asleep  · Decreased sexual drive and function  · Urinating frequently  It is important to remember that you could become pregnant until 12 months have passed since your last menstrual period. Ask your healthcare provider about birth control choices.   Controlling symptoms  Your healthcare provider may suggest pills or an intrauterine device (IUD) that contain the hormone progesterone. This can make your periods more regular and prevent excess bleeding. If you have symptoms due to lower estrogen levels, your healthcare provider may suggest pills that contain estrogen and/or progesterone. This is called hormone therapy (HT).  There are also other prescription medicines that help control some of the bothersome symptoms, like hot flashes, mood swings, and vaginal dryness.  Other ways for you to deal with symptoms are listed below.  · Hot flashes. Wear layers that you can remove. Try all-cotton clothing, sheets,  Mild. Continue with GI. Reviewed that she is overdue for repeat sigmoidoscopy based upon reports. and blankets. Keep a glass of cold water by your bed.  · Pain during sex. You can buy a water-based lubricant or vaginal moisturizer in the drugstore that may help. Your healthcare provider may also prescribe an estrogen cream for your vagina.  · Mood swings. Talking to friends who are going through the same changes can sometimes help.  Date Last Reviewed: 12/1/2016  © 5109-3889 [employer]. [address] [place] [address], [place] [address]. All rights reserved. This information is not intended as a substitute for professional medical care. Always follow your healthcare professional's instructions.

## 2023-12-12 NOTE — ASSESSMENT & PLAN NOTE
Currently supplementing vitamin D/calcium in her multivitamin. Status post kyphoplasty. Will continue to monitor.

## 2023-12-12 NOTE — ASSESSMENT & PLAN NOTE
Controlled at home/borderline today. Continue diltiazem 180 mg daily. Encouraged to checkblood pressure at home more frequently. Reach out with any consistent elevations.

## 2023-12-18 ENCOUNTER — HOSPITAL ENCOUNTER (OUTPATIENT)
Dept: RADIOLOGY | Facility: HOSPITAL | Age: 73
Discharge: HOME | End: 2023-12-18
Payer: MEDICARE

## 2023-12-18 VITALS — HEIGHT: 64 IN | BODY MASS INDEX: 27.31 KG/M2 | WEIGHT: 160 LBS

## 2023-12-18 DIAGNOSIS — Z12.31 SCREENING MAMMOGRAM FOR BREAST CANCER: ICD-10-CM

## 2023-12-18 DIAGNOSIS — Z85.3 PERSONAL HISTORY OF ADENOCARCINOMA OF BREAST: ICD-10-CM

## 2023-12-18 PROCEDURE — C8908 MRI W/O FOL W/CONT, BREAST,: HCPCS

## 2023-12-18 PROCEDURE — A9585 GADOBUTROL INJECTION: HCPCS | Performed by: SURGERY

## 2023-12-18 PROCEDURE — 77063 BREAST TOMOSYNTHESIS BI: CPT

## 2023-12-18 PROCEDURE — C8937 CAD BREAST MRI: HCPCS

## 2023-12-18 PROCEDURE — 77067 SCR MAMMO BI INCL CAD: CPT

## 2023-12-18 PROCEDURE — G1004 CDSM NDSC: HCPCS

## 2023-12-18 RX ORDER — GADOBUTROL 604.72 MG/ML
7 INJECTION INTRAVENOUS
Status: COMPLETED | OUTPATIENT
Start: 2023-12-18 | End: 2023-12-18

## 2023-12-18 RX ADMIN — GADOBUTROL 7 ML: 604.72 INJECTION INTRAVENOUS at 09:12

## 2023-12-22 ENCOUNTER — TELEPHONE (OUTPATIENT)
Dept: FAMILY MEDICINE CLINIC | Facility: CLINIC | Age: 73
End: 2023-12-22

## 2023-12-22 DIAGNOSIS — I10 ESSENTIAL HYPERTENSION: Primary | ICD-10-CM

## 2023-12-22 DIAGNOSIS — I10 ESSENTIAL HYPERTENSION: ICD-10-CM

## 2023-12-22 RX ORDER — DILTIAZEM HYDROCHLORIDE 240 MG/1
240 CAPSULE, COATED, EXTENDED RELEASE ORAL DAILY
Qty: 30 CAPSULE | Refills: 0 | Status: SHIPPED | OUTPATIENT
Start: 2023-12-22 | End: 2023-12-22

## 2023-12-22 RX ORDER — DILTIAZEM HYDROCHLORIDE 240 MG/1
CAPSULE, COATED, EXTENDED RELEASE ORAL
Qty: 90 CAPSULE | Refills: 2 | Status: SHIPPED | OUTPATIENT
Start: 2023-12-22

## 2023-12-22 NOTE — TELEPHONE ENCOUNTER
12-22-23 / 10:16 AM    Pt called in stating that her BP has been running consistently high.  Pt stated this morning it was 148/83.  Please advise.

## 2023-12-22 NOTE — TELEPHONE ENCOUNTER
Script sent for next strength up of diltiazem 240 mg daily. Please call her to inform her to switch to this strength. Please also schedule her a follow-up appointment in a few weeks.

## 2023-12-26 NOTE — TELEPHONE ENCOUNTER
Patient aware and since taking higher dosage, her bp readings have been 148/83 151/54 135/68 and now she is 126/65. She states she cut off salt and has been staying hydrated she will continue to monitor it but over all she is happy that her bp has gone down some since starting the new dose.

## 2024-01-06 DIAGNOSIS — K21.9 GASTROESOPHAGEAL REFLUX DISEASE WITHOUT ESOPHAGITIS: ICD-10-CM

## 2024-01-06 RX ORDER — FAMOTIDINE 40 MG/1
TABLET, FILM COATED ORAL
Qty: 30 TABLET | Refills: 0 | Status: SHIPPED | OUTPATIENT
Start: 2024-01-06

## 2024-01-09 ENCOUNTER — APPOINTMENT (OUTPATIENT)
Dept: LAB | Facility: MEDICAL CENTER | Age: 74
End: 2024-01-09
Payer: MEDICARE

## 2024-01-09 DIAGNOSIS — I10 ESSENTIAL HYPERTENSION: ICD-10-CM

## 2024-01-09 DIAGNOSIS — E78.00 PURE HYPERCHOLESTEROLEMIA: ICD-10-CM

## 2024-01-09 DIAGNOSIS — Z85.3 PERSONAL HISTORY OF ADENOCARCINOMA OF BREAST: ICD-10-CM

## 2024-01-09 LAB
ALBUMIN SERPL BCP-MCNC: 4.5 G/DL (ref 3.5–5)
ALP SERPL-CCNC: 99 U/L (ref 34–104)
ALT SERPL W P-5'-P-CCNC: 26 U/L (ref 7–52)
ANION GAP SERPL CALCULATED.3IONS-SCNC: 8 MMOL/L
AST SERPL W P-5'-P-CCNC: 28 U/L (ref 13–39)
BASOPHILS # BLD AUTO: 0.08 THOUSANDS/ÂΜL (ref 0–0.1)
BASOPHILS NFR BLD AUTO: 1 % (ref 0–1)
BILIRUB SERPL-MCNC: 0.81 MG/DL (ref 0.2–1)
BUN SERPL-MCNC: 17 MG/DL (ref 5–25)
CALCIUM SERPL-MCNC: 9.8 MG/DL (ref 8.4–10.2)
CHLORIDE SERPL-SCNC: 105 MMOL/L (ref 96–108)
CHOLEST SERPL-MCNC: 142 MG/DL
CO2 SERPL-SCNC: 29 MMOL/L (ref 21–32)
CREAT SERPL-MCNC: 0.76 MG/DL (ref 0.6–1.3)
EOSINOPHIL # BLD AUTO: 0.2 THOUSAND/ÂΜL (ref 0–0.61)
EOSINOPHIL NFR BLD AUTO: 4 % (ref 0–6)
ERYTHROCYTE [DISTWIDTH] IN BLOOD BY AUTOMATED COUNT: 13.9 % (ref 11.6–15.1)
GFR SERPL CREATININE-BSD FRML MDRD: 78 ML/MIN/1.73SQ M
GLUCOSE P FAST SERPL-MCNC: 117 MG/DL (ref 65–99)
HCT VFR BLD AUTO: 42.3 % (ref 34.8–46.1)
HDLC SERPL-MCNC: 55 MG/DL
HGB BLD-MCNC: 13.6 G/DL (ref 11.5–15.4)
IMM GRANULOCYTES # BLD AUTO: 0.02 THOUSAND/UL (ref 0–0.2)
IMM GRANULOCYTES NFR BLD AUTO: 0 % (ref 0–2)
LDLC SERPL CALC-MCNC: 69 MG/DL (ref 0–100)
LYMPHOCYTES # BLD AUTO: 1.9 THOUSANDS/ÂΜL (ref 0.6–4.47)
LYMPHOCYTES NFR BLD AUTO: 34 % (ref 14–44)
MCH RBC QN AUTO: 28.8 PG (ref 26.8–34.3)
MCHC RBC AUTO-ENTMCNC: 32.2 G/DL (ref 31.4–37.4)
MCV RBC AUTO: 89 FL (ref 82–98)
MONOCYTES # BLD AUTO: 0.62 THOUSAND/ÂΜL (ref 0.17–1.22)
MONOCYTES NFR BLD AUTO: 11 % (ref 4–12)
NEUTROPHILS # BLD AUTO: 2.84 THOUSANDS/ÂΜL (ref 1.85–7.62)
NEUTS SEG NFR BLD AUTO: 50 % (ref 43–75)
NRBC BLD AUTO-RTO: 0 /100 WBCS
PLATELET # BLD AUTO: 291 THOUSANDS/UL (ref 149–390)
PMV BLD AUTO: 10.3 FL (ref 8.9–12.7)
POTASSIUM SERPL-SCNC: 3.7 MMOL/L (ref 3.5–5.3)
PROT SERPL-MCNC: 6.7 G/DL (ref 6.4–8.4)
RBC # BLD AUTO: 4.73 MILLION/UL (ref 3.81–5.12)
SODIUM SERPL-SCNC: 142 MMOL/L (ref 135–147)
TRIGL SERPL-MCNC: 89 MG/DL
TSH SERPL DL<=0.05 MIU/L-ACNC: 2 UIU/ML (ref 0.45–4.5)
WBC # BLD AUTO: 5.66 THOUSAND/UL (ref 4.31–10.16)

## 2024-01-09 PROCEDURE — 85025 COMPLETE CBC W/AUTO DIFF WBC: CPT

## 2024-01-09 PROCEDURE — 84443 ASSAY THYROID STIM HORMONE: CPT

## 2024-01-09 PROCEDURE — 36415 COLL VENOUS BLD VENIPUNCTURE: CPT

## 2024-01-09 PROCEDURE — 80053 COMPREHEN METABOLIC PANEL: CPT

## 2024-01-09 PROCEDURE — 80061 LIPID PANEL: CPT

## 2024-01-11 DIAGNOSIS — R73.01 IFG (IMPAIRED FASTING GLUCOSE): Primary | ICD-10-CM

## 2024-03-01 DIAGNOSIS — I10 ESSENTIAL HYPERTENSION: Primary | ICD-10-CM

## 2024-03-06 RX ORDER — DILTIAZEM HYDROCHLORIDE 180 MG/1
180 CAPSULE, COATED, EXTENDED RELEASE ORAL DAILY
Qty: 90 CAPSULE | Refills: 0 | Status: SHIPPED | OUTPATIENT
Start: 2024-03-06

## 2024-03-06 RX ORDER — DILTIAZEM HYDROCHLORIDE 240 MG/1
CAPSULE, COATED, EXTENDED RELEASE ORAL
Qty: 90 CAPSULE | Refills: 0 | Status: CANCELLED | OUTPATIENT
Start: 2024-03-06

## 2024-03-06 NOTE — TELEPHONE ENCOUNTER
Patient called back again stating the wrong prescription was sent to [facility] pharmacy.  Patient advised that she stated it was to be 240mg of Diltiazem now, stated it was changed back in December.  Patient is asking if the 180mg can be removed from her chart so this doesn't happen again.  She will have [facility] pharmacy contact [facility] to transfer the current prescription for the 240mg for now.

## 2024-03-15 ENCOUNTER — NURSE TRIAGE (OUTPATIENT)
Age: 74
End: 2024-03-15

## 2024-03-15 ENCOUNTER — OFFICE VISIT (OUTPATIENT)
Dept: URGENT CARE | Facility: MEDICAL CENTER | Age: 74
End: 2024-03-15
Payer: MEDICARE

## 2024-03-15 VITALS
HEART RATE: 96 BPM | TEMPERATURE: 97.8 F | RESPIRATION RATE: 18 BRPM | DIASTOLIC BLOOD PRESSURE: 77 MMHG | SYSTOLIC BLOOD PRESSURE: 143 MMHG | OXYGEN SATURATION: 97 %

## 2024-03-15 DIAGNOSIS — R35.0 URINARY FREQUENCY: Primary | ICD-10-CM

## 2024-03-15 DIAGNOSIS — N30.01 ACUTE CYSTITIS WITH HEMATURIA: ICD-10-CM

## 2024-03-15 LAB
SL AMB  POCT GLUCOSE, UA: ABNORMAL
SL AMB LEUKOCYTE ESTERASE,UA: ABNORMAL
SL AMB POCT BILIRUBIN,UA: ABNORMAL
SL AMB POCT BLOOD,UA: ABNORMAL
SL AMB POCT CLARITY,UA: CLEAR
SL AMB POCT COLOR,UA: ABNORMAL
SL AMB POCT KETONES,UA: ABNORMAL
SL AMB POCT NITRITE,UA: ABNORMAL
SL AMB POCT PH,UA: ABNORMAL
SL AMB POCT SPECIFIC GRAVITY,UA: ABNORMAL
SL AMB POCT URINE PROTEIN: ABNORMAL
SL AMB POCT UROBILINOGEN: ABNORMAL

## 2024-03-15 PROCEDURE — 81002 URINALYSIS NONAUTO W/O SCOPE: CPT | Performed by: ORTHOPAEDIC SURGERY

## 2024-03-15 PROCEDURE — 99213 OFFICE O/P EST LOW 20 MIN: CPT | Performed by: ORTHOPAEDIC SURGERY

## 2024-03-15 PROCEDURE — 87077 CULTURE AEROBIC IDENTIFY: CPT | Performed by: ORTHOPAEDIC SURGERY

## 2024-03-15 PROCEDURE — 87186 SC STD MICRODIL/AGAR DIL: CPT | Performed by: ORTHOPAEDIC SURGERY

## 2024-03-15 PROCEDURE — G0463 HOSPITAL OUTPT CLINIC VISIT: HCPCS | Performed by: ORTHOPAEDIC SURGERY

## 2024-03-15 PROCEDURE — 87086 URINE CULTURE/COLONY COUNT: CPT | Performed by: ORTHOPAEDIC SURGERY

## 2024-03-15 RX ORDER — CEPHALEXIN 500 MG/1
500 CAPSULE ORAL EVERY 12 HOURS SCHEDULED
Qty: 14 CAPSULE | Refills: 0 | Status: SHIPPED | OUTPATIENT
Start: 2024-03-15 | End: 2024-03-22

## 2024-03-15 NOTE — PROGRESS NOTES
"  [facility]        NAME: Fabiola Christina is a 73 y.o. female  : 1950    MRN: [id number]  DATE: March 15, 2024  TIME: 8:04 PM    Assessment and Plan   Urinary frequency [R35.0]  1. Urinary frequency  POCT urine dip    Urine culture    cephalexin (KEFLEX) 500 mg capsule      2. Acute cystitis with hematuria          POCT urine shows moderate nonhemolyzed blood, though sample discolored due to azo. Will send to lab for culture.     Patient Instructions     Take prescription antibiotic as prescribed.   Your urine sample was sent to our labs for culture. The office will contact you following results to either continue your current antibiotics or change to a different antibiotic that will work better  You may have been prescribed phenazopyridine (Pyridium) to take for relief of UTI symptoms such as pain, burning, irritation, and urinary frequency. It should only be taken for the first 48 hours of treatment. This drug causes orange/yellow discoloration of the urine, which is a normal side effect.   You should stay properly hydrated and frequently urinate to help flush out the infection  Warm compresses for abdominal discomfort  Follow up with your PCP in 3-5 days  Proceed to ER if your symptoms worsen  If you experience worsening abdominal/back pain, fever/chills, bloody urination      If tests are performed, our office will contact you with results only if changes need to made to the care plan discussed with you at the visit. [facility]    Chief Complaint     Chief Complaint   Patient presents with    Possible UTI     Pt states she started with frequency and burning with urination.  Pt states her suprapubic region is tender and \"pinches at the urethra\"         History of Present Illness       73-year-old female presents to urgent care for evaluation of urinary frequency, urgency, burning.  Patient also complains of suprapubic pressure.  She notes symptoms " began Tuesday.  She did have sexual intercourse with her partner on Monday and suspects that this may have been the cause.  The patient denies any fever or chills.  She denies noticing any blood in her urine.  She denies any flank pain.  The patient has been taking Tylenol and Azo for symptom relief.  She has not had a UTI in over 3 years.        Review of Systems   Review of Systems   Constitutional:  Negative for chills and fever.   HENT:  Negative for ear pain and sore throat.    Eyes:  Negative for pain and visual disturbance.   Respiratory:  Negative for cough and shortness of breath.    Cardiovascular:  Negative for chest pain and palpitations.   Gastrointestinal:  Negative for abdominal pain and vomiting.   Genitourinary:  Positive for dysuria, frequency and urgency. Negative for flank pain, hematuria, vaginal bleeding and vaginal discharge.   Musculoskeletal:  Negative for arthralgias and back pain.   Skin:  Negative for color change and rash.   Neurological:  Negative for seizures and syncope.   All other systems reviewed and are negative.        Current Medications       Current Outpatient Medications:     acetaminophen (TYLENOL) 650 mg CR tablet, Take 1,300 mg by mouth every 6 (six) hours as needed for mild pain, Disp: , Rfl:     cephalexin (KEFLEX) 500 mg capsule, Take 1 capsule (500 mg total) by mouth every 12 (twelve) hours for 7 days, Disp: 14 capsule, Rfl: 0    Cholecalciferol (VITAMIN D3) 5000 units CAPS, Take 1 tablet by mouth daily, Disp: , Rfl:     diazepam (VALIUM) 2 mg tablet, Take 1 tablet (2 mg total) by mouth 30 min pre-procedure May repeat x 1, Disp: 2 tablet, Rfl: 0    diltiazem (CARDIZEM CD) 180 mg 24 hr capsule, TAKE ONE CAPSULE BY MOUTH DAILY, Disp: 90 capsule, Rfl: 0    diltiazem (CARDIZEM CD) 240 mg 24 hr capsule, TAKE 1 CAPSULE(240 MG) BY MOUTH DAILY, Disp: 90 capsule, Rfl: 2    famotidine (PEPCID) 40 MG tablet, TAKE ONE TABLET BY MOUTH DAILY IF NEEDED FOR HEARTBURN, Disp: 30 tablet,  Rfl: 0    Multiple Vitamins-Minerals (MULTI COMPLETE) CAPS, Take 1 capsule by mouth daily, Disp: , Rfl:     Probiotic Product (VSL#3) CAPS, Take 1 capsule by mouth daily, Disp: 90 capsule, Rfl: 3    rosuvastatin (CRESTOR) 10 MG tablet, Take 1 tablet (10 mg total) by mouth daily, Disp: 90 tablet, Rfl: 3    scopolamine (TRANSDERM-SCOP) 1 mg/3 days TD 72 hr patch, Place 1 patch on the skin over 72 hours every third day, Disp: 4 patch, Rfl: 0    Zinc 50 MG CAPS, Take by mouth daily, Disp: , Rfl:     Calcium Carbonate-Vit D-Min (CALCIUM 1200 PO), Take by mouth (Patient not taking: Reported on 2023), Disp: , Rfl:     Current Allergies     Allergies as of 03/15/2024 - Reviewed 03/15/2024   Allergen Reaction Noted    Ciprofloxacin Anaphylaxis 10/30/2003    Celecoxib Swelling 2012    Amitriptyline GI Intolerance 2017    Bactrim [sulfamethoxazole-trimethoprim] Rash 10/30/2003    Lidoderm [lidocaine] Rash 2022    Mesalamine Dizziness and Headache 2020    Other Vomiting 10/02/2014    Sulfa antibiotics Rash 2012    Wound dressing adhesive Rash 2012            The following portions of the patient's history were reviewed and updated as appropriate: allergies, current medications, past family history, past medical history, past social history, past surgical history and problem list.     Past Medical History:   Diagnosis Date    Arthritis     Gastric ulcer     GERD (gastroesophageal reflux disease)     Heart murmur     History of radiation therapy     PBRT    IBS (irritable bowel syndrome)     Irritable bowel syndrome 2012    Migraine     Skin cancer     Use of letrozole (Femara)     took for 2 years D/john due to side effects       Past Surgical History:   Procedure Laterality Date    ABDOMINOPLASTY      ADENOIDECTOMY      BREAST BIOPSY Right 10/12/2007    IDC    BREAST LUMPECTOMY Right 2007    BREAST SURGERY      lumpectomy, resolved 2007     SECTION       CHOLECYSTECTOMY      COLON SURGERY      COLONOSCOPY      COSMETIC SURGERY  1975    forehead    FOOT SURGERY      IR KYPHOPLASTY/VERTEBROPLASTY  3/9/2023    MOHS SURGERY      REDUCTION MAMMAPLASTY Bilateral 2015    reduction on left/lift on right    SENTINEL LYMPH NODE BIOPSY Right 11/14/2007    TOE SURGERY      left toe surgery    TONSILLECTOMY         Family History   Problem Relation Age of Onset    Hypertension Mother     Melanoma Mother     Heart disease Mother     Heart attack Father         acute MI, CABG    Hypertension Father     Heart disease Father     Other Brother         CABG    Lymphoma Brother         non-Hodgkin's    Hypertension Brother     Heart disease Brother     Other Family         back disorder    Stroke Family         CVA    Diabetes Family     Cancer Family          Medications have been verified.        Objective   /77 (BP Location: Left arm)   Pulse 96   Temp 97.8 °F (36.6 °C) (Tympanic)   Resp 18   SpO2 97%        Physical Exam     Physical Exam  Vitals and nursing note reviewed.   Constitutional:       General: She is not in acute distress.     Appearance: Normal appearance. She is not ill-appearing.   HENT:      Head: Normocephalic and atraumatic.      Nose: Nose normal.      Mouth/Throat:      Mouth: Mucous membranes are moist.      Pharynx: Oropharynx is clear.   Eyes:      Extraocular Movements: Extraocular movements intact.      Pupils: Pupils are equal, round, and reactive to light.   Cardiovascular:      Rate and Rhythm: Normal rate and regular rhythm.      Pulses: Normal pulses.      Heart sounds: Normal heart sounds. No murmur heard.  Pulmonary:      Effort: Pulmonary effort is normal. No respiratory distress.      Breath sounds: Normal breath sounds. No wheezing or rhonchi.   Abdominal:      Palpations: Abdomen is soft.      Tenderness: There is no abdominal tenderness (Suprapubic). There is no right CVA tenderness or left CVA tenderness.   Musculoskeletal:          General: Normal range of motion.      Cervical back: Normal range of motion.   Skin:     General: Skin is warm and dry.      Capillary Refill: Capillary refill takes less than 2 seconds.   Neurological:      General: No focal deficit present.      Mental Status: She is alert and oriented to person, place, and time.   Psychiatric:         Mood and Affect: Mood normal.         Behavior: Behavior normal.

## 2024-03-15 NOTE — TELEPHONE ENCOUNTER
Regarding: bladder infection  ----- Message from Fanta Kumar sent at 3/15/2024 12:12 PM EDT -----  Patient states that she has a bladder infection and her symptoms started on Tuesday she is having urine frequency and burning and pinching when urinating, pelvic pain, no fever, no blood that she can see. Patient is unable to come in for an appointment at this time.

## 2024-03-15 NOTE — TELEPHONE ENCOUNTER
"Patient calling in stating she has UTI symptoms started Tuesday this week.  C/o of abd pain, burning, frequency, moderate discomfort. Patient is unable to come into office because she works till 7-8 pm tonight. Would like to know if PCP would order urine tests and medication or if she should go to [facility] after work. Patient requesting call back to know if PCP will or will not and then have to go to [facility]. Please advise.     Reason for Disposition   Urinating more frequently than usual (i.e., frequency)    Answer Assessment - Initial Assessment Questions  1. SYMPTOM: \"What's the main symptom you're concerned about?\" (e.g., frequency, incontinence)      Discomfort in lower abd  2. ONSET:      tuesday  3. PAIN: \"Is there any pain?\" If Yes, ask: \"How bad is it?\" (Scale: 1-10; mild, moderate, severe)      yes  4. CAUSE: \"What do you think is causing the symptoms?\"      Sexual intercourse on Monday   5. OTHER SYMPTOMS: \"Do you have any other symptoms?\" (e.g., fever, flank pain, blood in urine, pain with urination)      Burning, frequency, abd pain    Protocols used: Urinary Symptoms-ADULT-[place]    "

## 2024-03-15 NOTE — PATIENT INSTRUCTIONS
Take prescription antibiotic as prescribed.   Your urine sample was sent to our labs for culture. The office will contact you following results to either continue your current antibiotics or change to a different antibiotic that will work better  You may have been prescribed phenazopyridine (Pyridium) to take for relief of UTI symptoms such as pain, burning, irritation, and urinary frequency. It should only be taken for the first 48 hours of treatment. This drug causes orange/yellow discoloration of the urine, which is a normal side effect.   You should stay properly hydrated and frequently urinate to help flush out the infection  Warm compresses for abdominal discomfort  Follow up with your PCP in 3-5 days  Proceed to ER if your symptoms worsen  If you experience worsening abdominal/back pain, fever/chills, bloody urination

## 2024-03-17 LAB — BACTERIA UR CULT: ABNORMAL

## 2024-03-18 LAB
BACTERIA UR CULT: ABNORMAL
BACTERIA UR CULT: ABNORMAL

## 2024-03-20 ENCOUNTER — TELEPHONE (OUTPATIENT)
Age: 74
End: 2024-03-20

## 2024-03-20 NOTE — TELEPHONE ENCOUNTER
Patient calling b/c she is going to [place] [employer] and heard there is a measles outbreak, wanted to know if she should get a booster or a titer. States she had measles when she was a kid. Please follow up with patient. Her cell# is [phone]

## 2024-03-21 NOTE — TELEPHONE ENCOUNTER
Pt has been informed of PCP message. She stated she would pay out of pocket if needed. She has taken the message into consideration and will f/u prior to leaving if she makes a change in decision.

## 2024-03-26 ENCOUNTER — OFFICE VISIT (OUTPATIENT)
Dept: FAMILY MEDICINE CLINIC | Facility: CLINIC | Age: 74
End: 2024-03-26
Payer: MEDICARE

## 2024-03-26 ENCOUNTER — TELEPHONE (OUTPATIENT)
Age: 74
End: 2024-03-26

## 2024-03-26 VITALS
SYSTOLIC BLOOD PRESSURE: 130 MMHG | HEIGHT: 64 IN | DIASTOLIC BLOOD PRESSURE: 76 MMHG | OXYGEN SATURATION: 98 % | BODY MASS INDEX: 28.31 KG/M2 | WEIGHT: 165.8 LBS | HEART RATE: 65 BPM

## 2024-03-26 DIAGNOSIS — K51.00 ULCERATIVE PANCOLITIS WITHOUT COMPLICATION (HCC): ICD-10-CM

## 2024-03-26 DIAGNOSIS — D70.9 NEUTROPENIA, UNSPECIFIED TYPE (HCC): ICD-10-CM

## 2024-03-26 DIAGNOSIS — N30.01 ACUTE CYSTITIS WITH HEMATURIA: Primary | ICD-10-CM

## 2024-03-26 LAB
BACTERIA UR QL AUTO: ABNORMAL /HPF
BILIRUB UR QL STRIP: ABNORMAL
CLARITY UR: CLEAR
COLOR UR: ABNORMAL
GLUCOSE UR STRIP-MCNC: NEGATIVE MG/DL
HGB UR QL STRIP.AUTO: NEGATIVE
KETONES UR STRIP-MCNC: NEGATIVE MG/DL
LEUKOCYTE ESTERASE UR QL STRIP: ABNORMAL
MUCOUS THREADS UR QL AUTO: ABNORMAL
NITRITE UR QL STRIP: POSITIVE
NON-SQ EPI CELLS URNS QL MICRO: ABNORMAL /HPF
PH UR STRIP.AUTO: 6 [PH]
PROT UR STRIP-MCNC: ABNORMAL MG/DL
RBC #/AREA URNS AUTO: ABNORMAL /HPF
SP GR UR STRIP.AUTO: 1.02 (ref 1–1.03)
UROBILINOGEN UR STRIP-ACNC: 2 MG/DL
WBC #/AREA URNS AUTO: ABNORMAL /HPF

## 2024-03-26 PROCEDURE — 87077 CULTURE AEROBIC IDENTIFY: CPT | Performed by: INTERNAL MEDICINE

## 2024-03-26 PROCEDURE — 87147 CULTURE TYPE IMMUNOLOGIC: CPT | Performed by: INTERNAL MEDICINE

## 2024-03-26 PROCEDURE — 87086 URINE CULTURE/COLONY COUNT: CPT | Performed by: INTERNAL MEDICINE

## 2024-03-26 PROCEDURE — 81001 URINALYSIS AUTO W/SCOPE: CPT | Performed by: INTERNAL MEDICINE

## 2024-03-26 PROCEDURE — 87186 SC STD MICRODIL/AGAR DIL: CPT | Performed by: INTERNAL MEDICINE

## 2024-03-26 PROCEDURE — 99214 OFFICE O/P EST MOD 30 MIN: CPT | Performed by: INTERNAL MEDICINE

## 2024-03-26 PROCEDURE — G2211 COMPLEX E/M VISIT ADD ON: HCPCS | Performed by: INTERNAL MEDICINE

## 2024-03-26 RX ORDER — AMOXICILLIN AND CLAVULANATE POTASSIUM 875; 125 MG/1; MG/1
1 TABLET, FILM COATED ORAL EVERY 12 HOURS SCHEDULED
Qty: 14 TABLET | Refills: 0 | Status: SHIPPED | OUTPATIENT
Start: 2024-03-26 | End: 2024-03-26

## 2024-03-26 RX ORDER — AMOXICILLIN AND CLAVULANATE POTASSIUM 875; 125 MG/1; MG/1
1 TABLET, FILM COATED ORAL EVERY 12 HOURS SCHEDULED
Qty: 14 TABLET | Refills: 0 | Status: SHIPPED | OUTPATIENT
Start: 2024-03-26 | End: 2024-04-02

## 2024-03-26 NOTE — TELEPHONE ENCOUNTER
Patient was seen in Urgent care on 3/15 for a UTI. She is still experiencing symptoms after finishing antibiotic. Burning w/wo urination, pain in her bladder, Frequency , etc. Asking for another script to be sent to [facility] [place] she will gladly go do another urine test if needed.

## 2024-03-26 NOTE — PROGRESS NOTES
Assessment/Plan:    Acute cystitis with hematuria  Urine cultures positive for Enterococcus faecalis in the past, treated with Keflex with no response.  Will switch to Augmentin, side effects discussed.  Will recheck urine culture.  Will adjust therapy if needed according to her culture results.       Diagnoses and all orders for this visit:    Acute cystitis with hematuria  -     Discontinue: amoxicillin-clavulanate (AUGMENTIN) 875-125 mg per tablet; Take 1 tablet by mouth every 12 (twelve) hours for 7 days  -     amoxicillin-clavulanate (AUGMENTIN) 875-125 mg per tablet; Take 1 tablet by mouth every 12 (twelve) hours for 7 days  -     Cancel: UA (URINE) with reflex to Scope; Future  -     Cancel: Urine culture; Future  -     UA (URINE) with reflex to Scope; Future  -     Urine culture; Future  -     UA (URINE) with reflex to Scope  -     Urine culture  -     Urine Microscopic    Neutropenia, unspecified type (HCC)    Ulcerative pancolitis without complication (HCC)          Subjective:      Patient ID: Fabiola Christina is a 73 y.o. female.    Patient came today with a new complaints of discomfort of urination and suprapubic tenderness, she was treated for UTI recently with Keflex, her urine culture came back to be positive for Enterococcus faecalis.        The following portions of the patient's history were reviewed and updated as appropriate: allergies, current medications, past family history, past medical history, past social history, past surgical history, and problem list.    Review of Systems   Constitutional:  Negative for chills and fever.   Gastrointestinal:  Negative for abdominal pain.   Genitourinary:  Positive for frequency. Negative for dysuria, flank pain, hematuria and pelvic pain.   Musculoskeletal:  Negative for back pain.   Psychiatric/Behavioral:  Negative for confusion.          Objective:      /76 (BP Location: Left arm, Patient Position: Sitting, Cuff Size: Standard)   Pulse 65    "Ht 5' 4\" (1.626 m)   Wt 75.2 kg (165 lb 12.8 oz)   SpO2 98%   BMI 28.46 kg/m²     Allergies   Allergen Reactions    Ciprofloxacin Anaphylaxis     Anaphylaxis    Celecoxib Swelling     Edema of legs    Amitriptyline GI Intolerance     Action Taken: bloating,GI problems;     Bactrim [Sulfamethoxazole-Trimethoprim] Rash    Lidoderm [Lidocaine] Rash     Rash from lidoderm patch    Mesalamine Dizziness and Headache    Other Vomiting     \"black olives->GI upset\"    Sulfa Antibiotics Rash    Wound Dressing Adhesive Rash          Current Outpatient Medications:     acetaminophen (TYLENOL) 650 mg CR tablet, Take 1,300 mg by mouth every 6 (six) hours as needed for mild pain, Disp: , Rfl:     amoxicillin-clavulanate (AUGMENTIN) 875-125 mg per tablet, Take 1 tablet by mouth every 12 (twelve) hours for 7 days, Disp: 14 tablet, Rfl: 0    Calcium Carbonate-Vit D-Min (CALCIUM 1200 PO), Take by mouth, Disp: , Rfl:     Cholecalciferol (VITAMIN D3) 5000 units CAPS, Take 1 tablet by mouth daily, Disp: , Rfl:     diltiazem (CARDIZEM CD) 180 mg 24 hr capsule, TAKE ONE CAPSULE BY MOUTH DAILY, Disp: 90 capsule, Rfl: 0    diltiazem (CARDIZEM CD) 240 mg 24 hr capsule, TAKE 1 CAPSULE(240 MG) BY MOUTH DAILY, Disp: 90 capsule, Rfl: 2    famotidine (PEPCID) 40 MG tablet, TAKE ONE TABLET BY MOUTH DAILY IF NEEDED FOR HEARTBURN, Disp: 30 tablet, Rfl: 0    Multiple Vitamins-Minerals (MULTI COMPLETE) CAPS, Take 1 capsule by mouth daily, Disp: , Rfl:     Probiotic Product (VSL#3) CAPS, Take 1 capsule by mouth daily, Disp: 90 capsule, Rfl: 3    rosuvastatin (CRESTOR) 10 MG tablet, Take 1 tablet (10 mg total) by mouth daily, Disp: 90 tablet, Rfl: 3    scopolamine (TRANSDERM-SCOP) 1 mg/3 days TD 72 hr patch, Place 1 patch on the skin over 72 hours every third day, Disp: 4 patch, Rfl: 0    Zinc 50 MG CAPS, Take by mouth daily, Disp: , Rfl:     diazepam (VALIUM) 2 mg tablet, Take 1 tablet (2 mg total) by mouth 30 min pre-procedure May repeat x 1, Disp: " 2 tablet, Rfl: 0     There are no Patient Instructions on file for this visit.        Physical Exam  Vitals reviewed.   Constitutional:       General: She is not in acute distress.     Appearance: She is not ill-appearing or toxic-appearing.   Abdominal:      General: There is no distension.      Palpations: There is no mass.      Tenderness: There is abdominal tenderness. There is no right CVA tenderness, left CVA tenderness, guarding or rebound.   Neurological:      Mental Status: She is alert.   Psychiatric:         Mood and Affect: Mood normal.         Behavior: Behavior normal.

## 2024-03-27 PROBLEM — N30.01 ACUTE CYSTITIS WITH HEMATURIA: Status: ACTIVE | Noted: 2024-03-27

## 2024-03-27 NOTE — ASSESSMENT & PLAN NOTE
Urine cultures positive for Enterococcus faecalis in the past, treated with Keflex with no response.  Will switch to Augmentin, side effects discussed.  Will recheck urine culture.  Will adjust therapy if needed according to her culture results.

## 2024-03-28 LAB — BACTERIA UR CULT: ABNORMAL

## 2024-05-01 PROBLEM — N30.01 ACUTE CYSTITIS WITH HEMATURIA: Status: RESOLVED | Noted: 2024-03-27 | Resolved: 2024-05-01

## 2024-05-27 DIAGNOSIS — K21.9 GASTROESOPHAGEAL REFLUX DISEASE WITHOUT ESOPHAGITIS: ICD-10-CM

## 2024-05-27 RX ORDER — FAMOTIDINE 40 MG/1
TABLET, FILM COATED ORAL
Qty: 30 TABLET | Refills: 5 | Status: SHIPPED | OUTPATIENT
Start: 2024-05-27

## 2024-06-11 ENCOUNTER — TELEPHONE (OUTPATIENT)
Age: 74
End: 2024-06-11

## 2024-06-11 NOTE — TELEPHONE ENCOUNTER
Pt's appt needs to be rescheduled d/t provider not being in the office. Attempted to reach patient about need of appointment change with no success. Appointment canceled and detailed VM left with [facility] number [phone] for patient to return call to reschedule.     Additional message sent Via [employer].

## 2024-06-13 ENCOUNTER — OFFICE VISIT (OUTPATIENT)
Dept: FAMILY MEDICINE CLINIC | Facility: CLINIC | Age: 74
End: 2024-06-13
Payer: MEDICARE

## 2024-06-13 VITALS
DIASTOLIC BLOOD PRESSURE: 88 MMHG | BODY MASS INDEX: 28.49 KG/M2 | HEART RATE: 68 BPM | SYSTOLIC BLOOD PRESSURE: 132 MMHG | OXYGEN SATURATION: 97 % | WEIGHT: 166 LBS

## 2024-06-13 DIAGNOSIS — M62.838 MUSCLE SPASM: ICD-10-CM

## 2024-06-13 DIAGNOSIS — S32.030A CLOSED COMPRESSION FRACTURE OF L3 LUMBAR VERTEBRA, INITIAL ENCOUNTER (HCC): Primary | ICD-10-CM

## 2024-06-13 PROCEDURE — 20552 NJX 1/MLT TRIGGER POINT 1/2: CPT | Performed by: FAMILY MEDICINE

## 2024-06-13 PROCEDURE — 99213 OFFICE O/P EST LOW 20 MIN: CPT | Performed by: FAMILY MEDICINE

## 2024-06-13 RX ORDER — TRIAMCINOLONE ACETONIDE 40 MG/ML
40 INJECTION, SUSPENSION INTRA-ARTICULAR; INTRAMUSCULAR
Status: COMPLETED | OUTPATIENT
Start: 2024-06-13 | End: 2024-06-13

## 2024-06-13 RX ORDER — LIDOCAINE HYDROCHLORIDE 10 MG/ML
2 INJECTION, SOLUTION INFILTRATION; PERINEURAL
Status: COMPLETED | OUTPATIENT
Start: 2024-06-13 | End: 2024-06-13

## 2024-06-13 RX ADMIN — LIDOCAINE HYDROCHLORIDE 2 ML: 10 INJECTION, SOLUTION INFILTRATION; PERINEURAL at 11:40

## 2024-06-13 RX ADMIN — TRIAMCINOLONE ACETONIDE 40 MG: 40 INJECTION, SUSPENSION INTRA-ARTICULAR; INTRAMUSCULAR at 11:40

## 2024-06-13 NOTE — PROGRESS NOTES
Assessment/Plan:       Problem List Items Addressed This Visit          Musculoskeletal and Integument    Closed compression fracture of L3 lumbar vertebra, initial encounter (HCC) - Primary     Other Visit Diagnoses       Muscle spasm                 Universal Protocol:  Consent: Verbal consent obtained.  Timeout called at: 6/13/2024 12:18 PM.  Required items: required blood products, implants, devices, and special equipment available  Supporting Documentation  Indications: pain   Trigger Point Injections: single/multiple trigger point(s): 1-2 muscle groups    Injection site identified by: palpation  Procedure Details  Location(s):    Lower Back: L quadratus lumborum   Needle size: 25 G  Medications: 2 mL lidocaine 1 %; 40 mg triamcinolone acetonide 40 mg/mL      Patient with chronic low back pain, Physical therapist recommended trigger point injection into quadratus lumborum, done today      Subjective:      Patient ID: Fabiola Christina is a 74 y.o. female.    Back Pain  Pertinent negatives include no abdominal pain or chest pain.   74 year old presenting in follow up of back pain    Physical therapist recommended trigger point injection into quadratus lumborum, done today    Patient tolerated well      The following portions of the patient's history were reviewed and updated as appropriate: allergies, current medications, past family history, past medical history, past social history, past surgical history and problem list.      Current Outpatient Medications:     acetaminophen (TYLENOL) 650 mg CR tablet, Take 1,300 mg by mouth every 6 (six) hours as needed for mild pain, Disp: , Rfl:     Calcium Carbonate-Vit D-Min (CALCIUM 1200 PO), Take by mouth, Disp: , Rfl:     Cholecalciferol (VITAMIN D3) 5000 units CAPS, Take 1 tablet by mouth daily, Disp: , Rfl:     diltiazem (CARDIZEM CD) 180 mg 24 hr capsule, TAKE ONE CAPSULE BY MOUTH DAILY, Disp: 90 capsule, Rfl: 0    diltiazem (CARDIZEM CD) 240 mg 24 hr capsule, TAKE  1 CAPSULE(240 MG) BY MOUTH DAILY, Disp: 90 capsule, Rfl: 2    famotidine (PEPCID) 40 MG tablet, TAKE ONE TABLET BY MOUTH DAILY IF NEEDED FOR HEARTBURN, Disp: 30 tablet, Rfl: 5    Multiple Vitamins-Minerals (MULTI COMPLETE) CAPS, Take 1 capsule by mouth daily, Disp: , Rfl:     Probiotic Product (VSL#3) CAPS, Take 1 capsule by mouth daily, Disp: 90 capsule, Rfl: 3    rosuvastatin (CRESTOR) 10 MG tablet, Take 1 tablet (10 mg total) by mouth daily, Disp: 90 tablet, Rfl: 3    scopolamine (TRANSDERM-SCOP) 1 mg/3 days TD 72 hr patch, Place 1 patch on the skin over 72 hours every third day, Disp: 4 patch, Rfl: 0    Zinc 50 MG CAPS, Take by mouth daily, Disp: , Rfl:     diazepam (VALIUM) 2 mg tablet, Take 1 tablet (2 mg total) by mouth 30 min pre-procedure May repeat x 1, Disp: 2 tablet, Rfl: 0     Review of Systems   Constitutional:  Negative for activity change and appetite change.   Respiratory:  Negative for apnea and chest tightness.    Cardiovascular:  Negative for chest pain and palpitations.   Gastrointestinal:  Negative for abdominal distention and abdominal pain.   Musculoskeletal:  Positive for back pain.         Objective:      /88 (BP Location: Left arm, Cuff Size: Standard)   Pulse 68   Wt 75.3 kg (166 lb)   SpO2 97%   BMI 28.49 kg/m²          Physical Exam  Constitutional:       Appearance: Normal appearance.   Cardiovascular:      Rate and Rhythm: Normal rate and regular rhythm.      Pulses: Normal pulses.      Heart sounds: Normal heart sounds.   Pulmonary:      Effort: Pulmonary effort is normal.      Breath sounds: Normal breath sounds.   Musculoskeletal:         General: Normal range of motion.      Lumbar back: Tenderness present. No bony tenderness.   Neurological:      General: No focal deficit present.      Mental Status: She is alert and oriented to person, place, and time.           Cristino Correa MD

## 2024-07-03 ENCOUNTER — PATIENT MESSAGE (OUTPATIENT)
Dept: FAMILY MEDICINE CLINIC | Facility: CLINIC | Age: 74
End: 2024-07-03

## 2024-07-03 DIAGNOSIS — F40.240 CLAUSTROPHOBIA: Primary | ICD-10-CM

## 2024-07-05 ENCOUNTER — TELEPHONE (OUTPATIENT)
Age: 74
End: 2024-07-05

## 2024-07-05 DIAGNOSIS — M62.838 MUSCLE SPASM: ICD-10-CM

## 2024-07-05 DIAGNOSIS — M51.26 LUMBAR DISC HERNIATION: ICD-10-CM

## 2024-07-05 DIAGNOSIS — S32.030A CLOSED COMPRESSION FRACTURE OF L3 LUMBAR VERTEBRA, INITIAL ENCOUNTER (HCC): Primary | ICD-10-CM

## 2024-07-05 RX ORDER — DIAZEPAM 5 MG/1
TABLET ORAL
Qty: 1 TABLET | Refills: 0 | Status: SHIPPED | OUTPATIENT
Start: 2024-07-05

## 2024-07-17 ENCOUNTER — HOSPITAL ENCOUNTER (OUTPATIENT)
Facility: MEDICAL CENTER | Age: 74
Discharge: HOME | End: 2024-07-17
Payer: MEDICARE

## 2024-07-17 DIAGNOSIS — M51.26 LUMBAR DISC HERNIATION: ICD-10-CM

## 2024-07-17 DIAGNOSIS — S32.030A CLOSED COMPRESSION FRACTURE OF L3 LUMBAR VERTEBRA, INITIAL ENCOUNTER (HCC): ICD-10-CM

## 2024-07-17 PROCEDURE — 72148 MRI LUMBAR SPINE W/O DYE: CPT

## 2024-07-26 ENCOUNTER — CONSULT (OUTPATIENT)
Dept: PAIN MEDICINE | Facility: CLINIC | Age: 74
End: 2024-07-26
Payer: MEDICARE

## 2024-07-26 VITALS — BODY MASS INDEX: 28.34 KG/M2 | WEIGHT: 166 LBS | HEIGHT: 64 IN

## 2024-07-26 DIAGNOSIS — M79.18 MYOFASCIAL PAIN SYNDROME: Primary | ICD-10-CM

## 2024-07-26 DIAGNOSIS — M47.816 LUMBAR SPONDYLOSIS: ICD-10-CM

## 2024-07-26 PROCEDURE — 20552 NJX 1/MLT TRIGGER POINT 1/2: CPT | Performed by: ANESTHESIOLOGY

## 2024-07-26 PROCEDURE — 76942 ECHO GUIDE FOR BIOPSY: CPT | Performed by: ANESTHESIOLOGY

## 2024-07-26 PROCEDURE — 99204 OFFICE O/P NEW MOD 45 MIN: CPT | Performed by: ANESTHESIOLOGY

## 2024-07-26 RX ORDER — METHYLPREDNISOLONE ACETATE 40 MG/ML
40 INJECTION, SUSPENSION INTRA-ARTICULAR; INTRALESIONAL; INTRAMUSCULAR; SOFT TISSUE ONCE
Status: COMPLETED | OUTPATIENT
Start: 2024-07-26 | End: 2024-07-26

## 2024-07-26 RX ORDER — BUPIVACAINE HYDROCHLORIDE 5 MG/ML
9 INJECTION, SOLUTION EPIDURAL; INTRACAUDAL ONCE
Status: COMPLETED | OUTPATIENT
Start: 2024-07-26 | End: 2024-07-26

## 2024-07-26 RX ADMIN — BUPIVACAINE HYDROCHLORIDE 9 ML: 5 INJECTION, SOLUTION EPIDURAL; INTRACAUDAL at 10:55

## 2024-07-26 RX ADMIN — METHYLPREDNISOLONE ACETATE 40 MG: 40 INJECTION, SUSPENSION INTRA-ARTICULAR; INTRALESIONAL; INTRAMUSCULAR; SOFT TISSUE at 10:55

## 2024-07-26 NOTE — PROGRESS NOTES
Assessment  1. Myofascial pain syndrome    2. Lumbar spondylosis      Patient presenting with chronic back pain for greater than 1 year, worsening over the past several months.  Pain is consistent with myofascial pain syndrome, lumbar spondylosis accompanied by pain >7/10 on the pain scale with inability to participate in IADLs for >6 weeks. Patient has participated with physical therapy as well as home exercises and stretches. Denies any bowel or bladder incontinence, saddle anesthesia.    In regards to the patient's  pathology, we discussed the various treatment options including physical therapy, chiropractic treatment, medication management, activity modifications, interventional spine procedures.  Given that patient has not had any benefit with conservative treatments, I think patient would benefit from targeted interventional treatment modalities.    Independently reviewed and interpreted lumbar MRI-this showed multilevel degenerative changes with mild to moderate spinal stenosis at L4-5 and mild stenosis at L2-3.  There is facet arthropathy at L5-S1 with some fluid in the right facet joint and a synovial cyst.    Plan    Based on exam she does have symptoms consistent with myofascial pain.  She had previous trigger point injections with benefit.  At this point I discussed and offered an ultrasound-guided trigger point injection to target the right quadratus lumborum and erector spinae muscles.  Patient is leaving on a trip tomorrow.  Will return to follow-up after she returns.  Pain persist or develops any radicular pattern we will reevaluate for other alternative interventional modalities.    Risks, benefits, and alternatives to steroid injections thoroughly discussed with patient.   Complete risks and benefits including bleeding, infection, tissue reaction, nerve injury and allergic reaction were discussed. The approach was demonstrated using models and literature was provided. Verbal and written consent  were obtained.    Reviewed external notes from family medicine, physical therapy  in regards to recent and prior relevant medical histories, treatment recommendations, medication and/or interventional treatment responses.    Reviewed hemoglobin A1c, renal function, CBC and/or PT/INR prior to discussing/offering interventional modalities.    [place] Prescription Drug Monitoring Program report was reviewed and was appropriate     My impressions and treatment recommendations were discussed in detail with the patient who verbalized understanding and had no further questions.  Discharge instructions were provided. I personally saw and examined the patient and I agree with the above discussed plan of care.    Procedure Note  Indication: Right-sided low back pain  Preoperative diagnosis: Myofascial pain syndrome  Postoperative diagnosis: Same    Procedure: Ultrasound guided trigger point injections    Muscles targeted: Right quadratus lumborum, erector spinae - 2 sites total    Medications: 10mL used of 10mL mixture containing 40mg Depomedrol with 9mL 0.5% bupivacaine     After discussing the risks, benefits, and alternatives to the procedure, the patient expressed understanding and wished to proceed. The patient was placed in sitting position. A procedural pause was conducted to verify: Correct patient identity, procedure to be performed and as applicable, correct side and site, correct patient position, and availability of implants, special equipment or special requirements.    Following this, the area was prepped with Chloraprep. A 2 inch 25 gauge needle was advanced into the identified trigger points with live ultrasound guidance using a 8-12MHz linear ultrasound probe. After negative aspiration of blood, air, or bodily fluids; 5cc of the above injectate was injected into each trigger point.    The patient tolerated the procedure well and there were no apparent complications. After an appropriate amount of  observation, the patient was dismissed from thein good condition under their own power.    History of Present Illness    Fabiola Christina is a 74 y.o. female presenting for new patient visit (referred by Zeinab Huitron PA-C) at [facility] for exam and evaluation of chronic low back pain R>L for greater than 1 year, worsening over the past several months. Pain started without any precipitating injury or trauma. Over the past month, the intensity of pain has been Severe. Pain is currently 10/10. Pain does interfere with age appropriate activities of daily living. Pain is intermittent, with no typical pattern throughout the day. Pain is described as shooting, sharp. Patient denies weakness in the lower extremities. Assistance device used: None.    Pain is increased with standing, bending, walking.   Pain is decreased with resting, sitting, lying down.    Treatments tried:   Heat/ice: yes  PT: yes  Chiropractic therapy: no  Injections: yes   Previous spine surgery: No    Anticoagulation: no    Medications tried:   Tylenol    I have personally reviewed and/or updated the patient's past medical history, past surgical history, family history, social history, current medications, allergies, and vital signs today.     Review of Systems   Constitutional:  Negative for chills and fever.   HENT:  Negative for ear pain and sore throat.    Eyes:  Negative for pain and visual disturbance.   Respiratory:  Negative for cough and shortness of breath.    Cardiovascular:  Negative for chest pain and palpitations.   Gastrointestinal:  Negative for abdominal pain and vomiting.   Genitourinary:  Negative for dysuria and hematuria.   Musculoskeletal:  Positive for back pain, gait problem and myalgias. Negative for arthralgias.   Skin:  Negative for color change and rash.   Neurological:  Positive for weakness. Negative for seizures and syncope.   All other systems reviewed and are negative.      Patient Active  Problem List   Diagnosis    Personal history of adenocarcinoma of breast    Arthritis    Carcinoma, basal cell, skin    Esophageal reflux    Pure hypercholesterolemia    Lumbar disc herniation    Migraine headache    Osteopenia    Malignant neoplasm of skin    Idiopathic peripheral neuropathy    Neutropenia (HCC)    Dupuytren's contracture    Hip flexor tendinitis, right    Essential hypertension    Vertigo    Neurologic gait dysfunction    Tinnitus of both ears    Breast calcifications    Ulcerative colitis (HCC)    Hyperbilirubinemia    Chronic migraine without aura without status migrainosus, not intractable    Encounter for follow-up surveillance of breast cancer    Screening mammogram for breast cancer    Closed compression fracture of L3 lumbar vertebra, initial encounter ([facility])    Age-related osteoporosis with current pathological fracture of vertebra ([facility])       Past Medical History:   Diagnosis Date    Arthritis     Gastric ulcer     GERD (gastroesophageal reflux disease)     Heart murmur     History of radiation therapy     PBRT    IBS (irritable bowel syndrome)     Irritable bowel syndrome 2012    Migraine     Skin cancer     Use of letrozole (Femara)     took for 2 years D/john due to side effects       Past Surgical History:   Procedure Laterality Date    ABDOMINOPLASTY      ADENOIDECTOMY      BREAST BIOPSY Right 10/12/2007    IDC    BREAST LUMPECTOMY Right 2007    BREAST SURGERY      lumpectomy, resolved 2007     SECTION      CHOLECYSTECTOMY      COLON SURGERY      COLONOSCOPY      COSMETIC SURGERY  1975    forehead    FOOT SURGERY      IR KYPHOPLASTY/VERTEBROPLASTY  3/9/2023    MOHS SURGERY      REDUCTION MAMMAPLASTY Bilateral 2015    reduction on left/lift on right    SENTINEL LYMPH NODE BIOPSY Right 2007    TOE SURGERY      left toe surgery    TONSILLECTOMY         Family History   Problem Relation Age of Onset    Hypertension Mother     Melanoma Mother     Heart disease  Mother     Heart attack Father         acute MI, CABG    Hypertension Father     Heart disease Father     Other Brother         CABG    Lymphoma Brother         non-Hodgkin's    Hypertension Brother     Heart disease Brother     Other Family         back disorder    Stroke Family         CVA    Diabetes Family     Cancer Family        Social History     Occupational History    Not on file   Tobacco Use    Smoking status: Former     Types: Cigarettes    Smokeless tobacco: Never    Tobacco comments:     Smoker in teenage years.  Quit at 21.  One pack per week.   Vaping Use    Vaping status: Never Used   Substance and Sexual Activity    Alcohol use: Yes     Alcohol/week: 2.0 - 3.0 standard drinks of alcohol     Types: 2 - 3 Glasses of wine per week    Drug use: No    Sexual activity: Not on file       Current Outpatient Medications on File Prior to Visit   Medication Sig    acetaminophen (TYLENOL) 650 mg CR tablet Take 1,300 mg by mouth every 6 (six) hours as needed for mild pain    Calcium Carbonate-Vit D-Min (CALCIUM 1200 PO) Take by mouth    Cholecalciferol (VITAMIN D3) 5000 units CAPS Take 1 tablet by mouth daily    diazepam (VALIUM) 5 mg tablet Take 1 tablet by mouth 30-60 minutes prior to MRI.    diltiazem (CARDIZEM CD) 240 mg 24 hr capsule TAKE 1 CAPSULE(240 MG) BY MOUTH DAILY    famotidine (PEPCID) 40 MG tablet TAKE ONE TABLET BY MOUTH DAILY IF NEEDED FOR HEARTBURN    Multiple Vitamins-Minerals (MULTI COMPLETE) CAPS Take 1 capsule by mouth daily    Probiotic Product (VSL#3) CAPS Take 1 capsule by mouth daily    rosuvastatin (CRESTOR) 10 MG tablet Take 1 tablet (10 mg total) by mouth daily    Zinc 50 MG CAPS Take by mouth daily    diazepam (VALIUM) 2 mg tablet Take 1 tablet (2 mg total) by mouth 30 min pre-procedure May repeat x 1 (Patient not taking: Reported on 7/26/2024)    diltiazem (CARDIZEM CD) 180 mg 24 hr capsule TAKE ONE CAPSULE BY MOUTH DAILY (Patient not taking: Reported on 7/26/2024)    scopolamine  "(TRANSDERM-SCOP) 1 mg/3 days TD 72 hr patch Place 1 patch on the skin over 72 hours every third day (Patient not taking: Reported on 7/26/2024)     No current facility-administered medications on file prior to visit.       Allergies   Allergen Reactions    Ciprofloxacin Anaphylaxis     Anaphylaxis    Celecoxib Swelling     Edema of legs    Amitriptyline GI Intolerance     Action Taken: bloating,GI problems;     Bactrim [Sulfamethoxazole-Trimethoprim] Rash    Lidoderm [Lidocaine] Rash     Rash from lidoderm patch    Mesalamine Dizziness and Headache    Other Vomiting     \"black olives->GI upset\"    Sulfa Antibiotics Rash    Wound Dressing Adhesive Rash       Physical Exam    Ht 5' 4\" (1.626 m)   Wt 75.3 kg (166 lb)   BMI 28.49 kg/m²     Constitutional: normal, well developed, well nourished, alert, in no distress and non-toxic and no overt pain behavior.  Eyes: anicteric  HEENT: grossly intact  Neck: supple, symmetric, trachea midline and no masses   Pulmonary:even and unlabored  Cardiovascular:No edema or pitting edema present  Skin:Normal without rashes or lesions and well hydrated  Psychiatric:Mood and affect appropriate  Neurologic: Motor function is grossly intact with no focal neurologic deficits   Musculoskeletal: Tenderness in the right lumbar paraspinal muscles    Imaging  MRI lumbar spine  FINDINGS:     VERTEBRAL BODIES:  There are 5 lumbar type vertebral bodies. There is grade 1 anterolisthesis of L4-L5. Patient is status post vertebroplasty at L3 with stable loss of height. Mild edema noted within the L2 vertebral body, improved.     SACRUM:  Normal signal within the sacrum. No evidence of insufficiency or stress fracture.     DISTAL CORD AND CONUS:  Normal size and signal within the distal cord and conus.     PARASPINAL SOFT TISSUES:  Paraspinal soft tissues are unremarkable.     LOWER THORACIC DISC SPACES:  Normal disc height and signal.  No disc herniation, canal stenosis or foraminal narrowing.   "   LUMBAR DISC SPACES:     L1-L2: No significant canal stenosis or foraminal narrowing.     L2-L3: Mild bulge. Facet arthrosis significant thickening. Mild mass effect on the thecal sac. Mild foraminal narrowing. Findings are stable.     L3-L4: Facet arthrosis. Minimal bulge with superimposed right extraforaminal disc protrusion and marginal osteophytosis. There is an associated annular fissure mild right foraminal narrowing. No significant canal stenosis. Findings are stable.     L4-L5: There is disc degeneration and narrowing. There is a bulging annulus with dorsal marginal osteophytosis. There is facet arthrosis. There is mild to moderate mass effect on the thecal sac. There is moderate left and mild right foraminal narrowing.   Findings are stable.     L5-S1: There is right greater left facet arthrosis with fluid in the right facet joint. There is a bulging annulus. There is a synovial cyst posterior to the right facet joint. There is no significant foraminal narrowing. There is right lateral recess   stenosis with contact of the descending right S1 nerve root.     OTHER FINDINGS: There are renal cysts.     IMPRESSION:     Status post L3 vertebroplasty with stable loss of height. Mild persistent edema noted, improved since the prior exam. No new acute compression fractures are seen.     Degenerative changes of the lumbar spine, as described above is overall similar to the prior exam.

## 2024-08-02 ENCOUNTER — OFFICE VISIT (OUTPATIENT)
Dept: URGENT CARE | Facility: MEDICAL CENTER | Age: 74
End: 2024-08-02
Payer: MEDICARE

## 2024-08-02 VITALS
OXYGEN SATURATION: 99 % | RESPIRATION RATE: 18 BRPM | HEART RATE: 80 BPM | SYSTOLIC BLOOD PRESSURE: 147 MMHG | DIASTOLIC BLOOD PRESSURE: 70 MMHG | TEMPERATURE: 98.4 F

## 2024-08-02 DIAGNOSIS — R05.1 ACUTE COUGH: Primary | ICD-10-CM

## 2024-08-02 PROBLEM — R42 VERTIGO: Status: RESOLVED | Noted: 2021-05-21 | Resolved: 2024-08-02

## 2024-08-02 PROCEDURE — 99213 OFFICE O/P EST LOW 20 MIN: CPT | Performed by: NURSE PRACTITIONER

## 2024-08-02 PROCEDURE — G0463 HOSPITAL OUTPT CLINIC VISIT: HCPCS | Performed by: NURSE PRACTITIONER

## 2024-08-02 RX ORDER — PREDNISONE 20 MG/1
40 TABLET ORAL DAILY
Qty: 10 TABLET | Refills: 0 | Status: SHIPPED | OUTPATIENT
Start: 2024-08-02 | End: 2024-08-07

## 2024-08-02 RX ORDER — BENZONATATE 200 MG/1
200 CAPSULE ORAL 3 TIMES DAILY PRN
Qty: 20 CAPSULE | Refills: 0 | Status: SHIPPED | OUTPATIENT
Start: 2024-08-02

## 2024-08-02 NOTE — PROGRESS NOTES
[facility]        NAME: Fabiola Christina is a 74 y.o. female  : 1950    MRN: [id number]  DATE: 2024  TIME: 12:42 PM    Assessment and Plan   Acute cough [R05.1]  1. Acute cough  predniSONE 20 mg tablet    benzonatate (TESSALON) 200 MG capsule        Patient in NAD and VSS upon exam. Discussed with patient results of testing in office. Discussed viral vs bacterial illness, no indication for antibiotics at this time. Will give prednisone and tessalon for cough, patient declined albuterol inhaler at this time. Discussed supportive care and return precautions.       Patient Instructions       Follow up with PCP in 3-5 days.  Proceed to  ER if symptoms worsen.    If tests have been performed at [facility], our office will contact you with results if changes need to be made to the care plan discussed with you at the visit.  You can review your full results on [facility].    Chief Complaint     Chief Complaint   Patient presents with    Cold Like Symptoms     Patient c/o nasal congestion , chest congestion with burning in the chest , cough, and fatigue x 4-5 days          History of Present Illness       Started: 4-5 days  Positive: dry cough, congestion, fatigue, scratchy throat has improved, chest burns with cough  Negative: fever  Denies CP, SOB, trouble breathing  Treatment: allegra, tylenol, cough drops        Review of Systems   Review of Systems   Constitutional:  Positive for fatigue. Negative for fever.   HENT:  Positive for congestion, postnasal drip, rhinorrhea and sinus pressure. Negative for sore throat.    Respiratory:  Positive for cough.    Musculoskeletal:  Negative for myalgias.   Neurological:  Positive for headaches.   All other systems reviewed and are negative.        Current Medications       Current Outpatient Medications:     benzonatate (TESSALON) 200 MG capsule, Take 1 capsule (200 mg total) by mouth 3 (three) times a day as needed for cough, Disp: 20 capsule,  Rfl: 0    predniSONE 20 mg tablet, Take 2 tablets (40 mg total) by mouth daily for 5 days, Disp: 10 tablet, Rfl: 0    acetaminophen (TYLENOL) 650 mg CR tablet, Take 1,300 mg by mouth every 6 (six) hours as needed for mild pain, Disp: , Rfl:     Calcium Carbonate-Vit D-Min (CALCIUM 1200 PO), Take by mouth, Disp: , Rfl:     Cholecalciferol (VITAMIN D3) 5000 units CAPS, Take 1 tablet by mouth daily, Disp: , Rfl:     diltiazem (CARDIZEM CD) 240 mg 24 hr capsule, TAKE 1 CAPSULE(240 MG) BY MOUTH DAILY, Disp: 90 capsule, Rfl: 2    famotidine (PEPCID) 40 MG tablet, TAKE ONE TABLET BY MOUTH DAILY IF NEEDED FOR HEARTBURN, Disp: 30 tablet, Rfl: 5    Multiple Vitamins-Minerals (MULTI COMPLETE) CAPS, Take 1 capsule by mouth daily, Disp: , Rfl:     Probiotic Product (VSL#3) CAPS, Take 1 capsule by mouth daily, Disp: 90 capsule, Rfl: 3    rosuvastatin (CRESTOR) 10 MG tablet, Take 1 tablet (10 mg total) by mouth daily, Disp: 90 tablet, Rfl: 3    Zinc 50 MG CAPS, Take by mouth daily, Disp: , Rfl:     Current Allergies     Allergies as of 08/02/2024 - Reviewed 08/02/2024   Allergen Reaction Noted    Ciprofloxacin Anaphylaxis 10/30/2003    Celecoxib Swelling 12/11/2012    Amitriptyline GI Intolerance 05/30/2017    Bactrim [sulfamethoxazole-trimethoprim] Rash 10/30/2003    Lidoderm [lidocaine] Rash 08/31/2022    Mesalamine Dizziness and Headache 12/08/2020    Other Vomiting 10/02/2014    Sulfa antibiotics Rash 12/11/2012    Wound dressing adhesive Rash 12/11/2012            The following portions of the patient's history were reviewed and updated as appropriate: allergies, current medications, past family history, past medical history, past social history, past surgical history and problem list.     Past Medical History:   Diagnosis Date    Arthritis     Gastric ulcer     GERD (gastroesophageal reflux disease)     Heart murmur     History of radiation therapy 2007    PBRT    IBS (irritable bowel syndrome)     Irritable bowel syndrome  2012    Migraine     Skin cancer     Use of letrozole (Femara)     took for 2 years D/john due to side effects       Past Surgical History:   Procedure Laterality Date    ABDOMINOPLASTY      ADENOIDECTOMY      BREAST BIOPSY Right 10/12/2007    IDC    BREAST LUMPECTOMY Right 2007    BREAST SURGERY      lumpectomy, resolved 2007     SECTION      CHOLECYSTECTOMY      COLON SURGERY      COLONOSCOPY      COSMETIC SURGERY  1975    forehead    FOOT SURGERY      IR KYPHOPLASTY/VERTEBROPLASTY  3/9/2023    MOHS SURGERY      REDUCTION MAMMAPLASTY Bilateral 2015    reduction on left/lift on right    SENTINEL LYMPH NODE BIOPSY Right 2007    TOE SURGERY      left toe surgery    TONSILLECTOMY         Family History   Problem Relation Age of Onset    Hypertension Mother     Melanoma Mother     Heart disease Mother     Heart attack Father         acute MI, CABG    Hypertension Father     Heart disease Father     Other Brother         CABG    Lymphoma Brother         non-Hodgkin's    Hypertension Brother     Heart disease Brother     Other Family         back disorder    Stroke Family         CVA    Diabetes Family     Cancer Family          Medications have been verified.        Objective   /70   Pulse 80   Temp 98.4 °F (36.9 °C)   Resp 18   SpO2 99%   No LMP recorded. Patient is postmenopausal.       Physical Exam     Physical Exam  Constitutional:       General: She is not in acute distress.     Appearance: Normal appearance. She is not ill-appearing.   HENT:      Head: Normocephalic and atraumatic.      Right Ear: Hearing, tympanic membrane, ear canal and external ear normal.      Left Ear: Hearing, tympanic membrane, ear canal and external ear normal.      Nose: Congestion present.      Right Sinus: Maxillary sinus tenderness and frontal sinus tenderness present.      Left Sinus: Maxillary sinus tenderness and frontal sinus tenderness present.      Mouth/Throat:      Lips: Pink.      Mouth:  Mucous membranes are moist.      Pharynx: Oropharynx is clear.   Cardiovascular:      Rate and Rhythm: Normal rate and regular rhythm.   Pulmonary:      Effort: Pulmonary effort is normal.      Breath sounds: Normal breath sounds.      Comments: Harsh dry cough on exam  Musculoskeletal:         General: Normal range of motion.   Lymphadenopathy:      Cervical: Cervical adenopathy present.   Skin:     General: Skin is warm and dry.   Neurological:      Mental Status: She is alert and oriented to person, place, and time.

## 2024-08-02 NOTE — PATIENT INSTRUCTIONS
Prednisone daily x 5 days  Tessalon 1-3 times a day for cough  Mucinex-DM for congestion and cough  Saline nasal spray 2-3 times a day  Viral illness is typically 7-10 days, days 3-5 being the worst  Rest as needed and hydrate  Follow up with PCP if not improving  Hope you're feeling better soon!

## 2024-08-07 ENCOUNTER — OFFICE VISIT (OUTPATIENT)
Dept: PAIN MEDICINE | Facility: CLINIC | Age: 74
End: 2024-08-07
Payer: MEDICARE

## 2024-08-07 VITALS — HEIGHT: 64 IN | BODY MASS INDEX: 28.34 KG/M2 | WEIGHT: 166 LBS

## 2024-08-07 DIAGNOSIS — M47.816 LUMBAR SPONDYLOSIS: ICD-10-CM

## 2024-08-07 DIAGNOSIS — M79.18 MYOFASCIAL PAIN SYNDROME: Primary | ICD-10-CM

## 2024-08-07 PROCEDURE — G2211 COMPLEX E/M VISIT ADD ON: HCPCS | Performed by: ANESTHESIOLOGY

## 2024-08-07 PROCEDURE — 99213 OFFICE O/P EST LOW 20 MIN: CPT | Performed by: ANESTHESIOLOGY

## 2024-08-07 NOTE — PROGRESS NOTES
Assessment:  1. Myofascial pain syndrome    2. Lumbar spondylosis        Patient presenting for follow-up office visit.  She has a history of chronic back pain for greater than 1 year, worsening over the past several months.    Pain is consistent with myofascial pain syndrome, lumbar spondylosis accompanied by pain at times 7/10 on the pain scale with inability to participate in IADLs for >6 weeks. Patient has participated with physical therapy as well as home exercises and stretches. Denies any bowel or bladder incontinence, saddle anesthesia.     In regards to the patient's  pathology, we discussed the various treatment options including physical therapy, chiropractic treatment, medication management, activity modifications, interventional spine procedures.  Given that patient has not had any benefit with conservative treatments, I think patient would benefit from targeted interventional treatment modalities.     Independently reviewed and interpreted lumbar MRI-this showed multilevel degenerative changes with mild to moderate spinal stenosis at L4-5 and mild stenosis at L2-3.  There is facet arthropathy at L5-S1 with some fluid in the right facet joint and a synovial cyst.     Plan:     After recent trigger point injections to the right quadratus lumborum and erector spinae muscles, she does note 70% improvement of her myofascial symptoms.    At this time she will contact our office in the future if her symptoms return or she has the new pain pattern that is to a significant degree.    Risks, benefits, and alternatives to steroid injections thoroughly discussed with patient.     Reviewed external notes from family medicine, physical therapy  in regards to recent and prior relevant medical histories, treatment recommendations, medication and/or interventional treatment responses.     Reviewed hemoglobin A1c, renal function, CBC and/or PT/INR prior to discussing/offering interventional modalities.     [place]  Prescription Drug Monitoring Program report was reviewed and was appropriate      My impressions and treatment recommendations were discussed in detail with the patient who verbalized understanding and had no further questions.  Discharge instructions were provided. I personally saw and examined the patient and I agree with the above discussed plan of care.    History of Present Illness:  Fabiola Christina is a 74 y.o. female who presents for a follow up office visit in regards to Back Pain.   The patient’s current symptoms include improved right-sided low back pain symptoms.  Pain is currently rated 3 out of 10 described an intermittent dull/aching pain      I have personally reviewed and/or updated the patient's past medical history, past surgical history, family history, social history, current medications, allergies, and vital signs today.     Review of Systems   Constitutional:  Negative for chills and fever.   HENT:  Negative for ear pain and sore throat.    Eyes:  Negative for pain and visual disturbance.   Respiratory:  Negative for cough and shortness of breath.    Cardiovascular:  Negative for chest pain and palpitations.   Gastrointestinal:  Negative for abdominal pain and vomiting.   Genitourinary:  Negative for dysuria and hematuria.   Musculoskeletal:  Positive for back pain, gait problem and myalgias. Negative for arthralgias.   Skin:  Negative for color change and rash.   Neurological:  Positive for weakness. Negative for seizures and syncope.   All other systems reviewed and are negative.      Patient Active Problem List   Diagnosis    Personal history of adenocarcinoma of breast    Arthritis    Carcinoma, basal cell, skin    Esophageal reflux    Pure hypercholesterolemia    Lumbar disc herniation    Migraine headache    Osteopenia    Malignant neoplasm of skin    Idiopathic peripheral neuropathy    Neutropenia (HCC)    Dupuytren's contracture    Hip flexor tendinitis, right    Essential  hypertension    Neurologic gait dysfunction    Tinnitus of both ears    Breast calcifications    Ulcerative colitis (HCC)    Hyperbilirubinemia    Chronic migraine without aura without status migrainosus, not intractable    Encounter for follow-up surveillance of breast cancer    Screening mammogram for breast cancer    Closed compression fracture of L3 lumbar vertebra, initial encounter ([facility])    Age-related osteoporosis with current pathological fracture of vertebra (HCC)       Past Medical History:   Diagnosis Date    Arthritis     Gastric ulcer     GERD (gastroesophageal reflux disease)     Heart murmur     History of radiation therapy     PBRT    IBS (irritable bowel syndrome)     Irritable bowel syndrome 2012    Migraine     Skin cancer     Use of letrozole (Femara)     took for 2 years D/john due to side effects       Past Surgical History:   Procedure Laterality Date    ABDOMINOPLASTY      ADENOIDECTOMY      BREAST BIOPSY Right 10/12/2007    IDC    BREAST LUMPECTOMY Right     BREAST SURGERY      lumpectomy, resolved 2007     SECTION      CHOLECYSTECTOMY      COLON SURGERY      COLONOSCOPY      COSMETIC SURGERY      forehead    FOOT SURGERY      IR KYPHOPLASTY/VERTEBROPLASTY  3/9/2023    MOHS SURGERY      REDUCTION MAMMAPLASTY Bilateral 2015    reduction on left/lift on right    SENTINEL LYMPH NODE BIOPSY Right 2007    TOE SURGERY      left toe surgery    TONSILLECTOMY         Family History   Problem Relation Age of Onset    Hypertension Mother     Melanoma Mother     Heart disease Mother     Heart attack Father         acute MI, CABG    Hypertension Father     Heart disease Father     Other Brother         CABG    Lymphoma Brother         non-Hodgkin's    Hypertension Brother     Heart disease Brother     Other Family         back disorder    Stroke Family         CVA    Diabetes Family     Cancer Family        Social History     Occupational History    Not on file  "  Tobacco Use    Smoking status: Former     Types: Cigarettes    Smokeless tobacco: Never    Tobacco comments:     Smoker in teenage years.  Quit at 21.  One pack per week.   Vaping Use    Vaping status: Never Used   Substance and Sexual Activity    Alcohol use: Yes     Alcohol/week: 2.0 - 3.0 standard drinks of alcohol     Types: 2 - 3 Glasses of wine per week    Drug use: No    Sexual activity: Not on file       Current Outpatient Medications on File Prior to Visit   Medication Sig    acetaminophen (TYLENOL) 650 mg CR tablet Take 1,300 mg by mouth every 6 (six) hours as needed for mild pain    benzonatate (TESSALON) 200 MG capsule Take 1 capsule (200 mg total) by mouth 3 (three) times a day as needed for cough    Calcium Carbonate-Vit D-Min (CALCIUM 1200 PO) Take by mouth    Cholecalciferol (VITAMIN D3) 5000 units CAPS Take 1 tablet by mouth daily    diltiazem (CARDIZEM CD) 240 mg 24 hr capsule TAKE 1 CAPSULE(240 MG) BY MOUTH DAILY    famotidine (PEPCID) 40 MG tablet TAKE ONE TABLET BY MOUTH DAILY IF NEEDED FOR HEARTBURN    Multiple Vitamins-Minerals (MULTI COMPLETE) CAPS Take 1 capsule by mouth daily    predniSONE 20 mg tablet Take 2 tablets (40 mg total) by mouth daily for 5 days    Probiotic Product (VSL#3) CAPS Take 1 capsule by mouth daily    rosuvastatin (CRESTOR) 10 MG tablet Take 1 tablet (10 mg total) by mouth daily    Zinc 50 MG CAPS Take by mouth daily     No current facility-administered medications on file prior to visit.       Allergies   Allergen Reactions    Ciprofloxacin Anaphylaxis     Anaphylaxis    Celecoxib Swelling     Edema of legs    Amitriptyline GI Intolerance     Action Taken: bloating,GI problems;     Bactrim [Sulfamethoxazole-Trimethoprim] Rash    Lidoderm [Lidocaine] Rash     Rash from lidoderm patch    Mesalamine Dizziness and Headache    Other Vomiting     \"black olives->GI upset\"    Sulfa Antibiotics Rash    Wound Dressing Adhesive Rash       Physical Exam:    Ht 5' 4\" (1.626 m)   " Wt 75.3 kg (166 lb)   BMI 28.49 kg/m²     Constitutional:normal, well developed, well nourished, alert, in no distress and non-toxic and no overt pain behavior.  Eyes:anicteric  HEENT:grossly intact  Neck:supple, symmetric, trachea midline and no masses   Pulmonary:even and unlabored  Cardiovascular:No edema or pitting edema present  Skin:Normal without rashes or lesions and well hydrated  Psychiatric:Mood and affect appropriate  Neurologic: Motor function is grossly intact with no focal neurologic deficits  Musculoskeletal:normal    Imaging

## 2024-08-13 ENCOUNTER — PATIENT MESSAGE (OUTPATIENT)
Dept: FAMILY MEDICINE CLINIC | Facility: CLINIC | Age: 74
End: 2024-08-13

## 2024-08-13 DIAGNOSIS — E78.5 HYPERLIPIDEMIA, UNSPECIFIED HYPERLIPIDEMIA TYPE: ICD-10-CM

## 2024-08-13 DIAGNOSIS — I10 ESSENTIAL HYPERTENSION: ICD-10-CM

## 2024-08-13 DIAGNOSIS — K21.9 GASTROESOPHAGEAL REFLUX DISEASE WITHOUT ESOPHAGITIS: ICD-10-CM

## 2024-08-13 DIAGNOSIS — T75.3XXS MOTION SICKNESS, SEQUELA: Primary | ICD-10-CM

## 2024-08-13 DIAGNOSIS — Z71.84 TRAVEL ADVICE ENCOUNTER: ICD-10-CM

## 2024-08-13 RX ORDER — DILTIAZEM HYDROCHLORIDE 240 MG/1
240 CAPSULE, COATED, EXTENDED RELEASE ORAL DAILY
Qty: 90 CAPSULE | Refills: 3 | Status: SHIPPED | OUTPATIENT
Start: 2024-08-13

## 2024-08-13 RX ORDER — AZITHROMYCIN 250 MG/1
TABLET, FILM COATED ORAL
Qty: 6 TABLET | Refills: 0 | Status: SHIPPED | OUTPATIENT
Start: 2024-08-13 | End: 2024-08-20

## 2024-08-13 RX ORDER — SCOLOPAMINE TRANSDERMAL SYSTEM 1 MG/1
1 PATCH, EXTENDED RELEASE TRANSDERMAL
Qty: 10 PATCH | Refills: 1 | Status: SHIPPED | OUTPATIENT
Start: 2024-08-13

## 2024-08-13 RX ORDER — ROSUVASTATIN CALCIUM 10 MG/1
10 TABLET, COATED ORAL DAILY
Qty: 90 TABLET | Refills: 3 | Status: SHIPPED | OUTPATIENT
Start: 2024-08-13

## 2024-08-13 RX ORDER — FAMOTIDINE 40 MG/1
40 TABLET, FILM COATED ORAL 2 TIMES DAILY PRN
Qty: 180 TABLET | Refills: 3 | Status: SHIPPED | OUTPATIENT
Start: 2024-08-13

## 2024-09-24 ENCOUNTER — TELEPHONE (OUTPATIENT)
Dept: DERMATOLOGY | Facility: CLINIC | Age: 74
End: 2024-09-24

## 2024-09-24 NOTE — TELEPHONE ENCOUNTER
Called pt regarding her appt with dr. Murillo on 11/7. Provider is leaving the practice and appt needs to be r/s. LM that has been rescheduled the same day @ 12:40 with Dr. Gonzalez at the [facility] office. Advised pt to call the office to confirm new appt & location.

## 2024-09-26 ENCOUNTER — OFFICE VISIT (OUTPATIENT)
Dept: SURGICAL ONCOLOGY | Facility: CLINIC | Age: 74
End: 2024-09-26
Payer: MEDICARE

## 2024-09-26 VITALS
DIASTOLIC BLOOD PRESSURE: 70 MMHG | BODY MASS INDEX: 28.49 KG/M2 | SYSTOLIC BLOOD PRESSURE: 122 MMHG | HEIGHT: 64 IN | HEART RATE: 64 BPM | OXYGEN SATURATION: 95 % | TEMPERATURE: 97.1 F

## 2024-09-26 DIAGNOSIS — Z08 ENCOUNTER FOR FOLLOW-UP SURVEILLANCE OF BREAST CANCER: ICD-10-CM

## 2024-09-26 DIAGNOSIS — Z12.31 SCREENING MAMMOGRAM FOR BREAST CANCER: ICD-10-CM

## 2024-09-26 DIAGNOSIS — Z85.3 PERSONAL HISTORY OF ADENOCARCINOMA OF BREAST: Primary | ICD-10-CM

## 2024-09-26 DIAGNOSIS — Z85.3 ENCOUNTER FOR FOLLOW-UP SURVEILLANCE OF BREAST CANCER: ICD-10-CM

## 2024-09-26 PROCEDURE — 99213 OFFICE O/P EST LOW 20 MIN: CPT

## 2024-09-26 NOTE — PROGRESS NOTES
Surgical Oncology Follow Up       [address]  [facility]  [address] [facility] [address]    Fabiola Christina  1950  [id number]  [address] [facility]  [address] [place] [address]    Chief Complaint   Patient presents with    Follow-up       Assessment/Plan:  1. Personal history of adenocarcinoma of breast  - 1 year f/u  - MRI breast bilateral w and wo contrast w cad; Future    2. Screening mammogram for breast cancer  - Mammo screening bilateral w 3d and cad; Future    3. Encounter for follow-up surveillance of breast cancer  - MRI breast bilateral w and wo contrast w cad; Future      Discussion/Summary: Patient is a 74 year old female presenting for a follow up for hx of breast cancer in 2007. Pathology revealed IDC ER/VA+, HER2-. She had a right lumpectomy and sentinel node biopsy. She completed RT and took Femara for 2 years. She has no family hx of breast cancer. She has been having annual MRI and mammogram. Her risk for a breast cancer is 5% and she is not dense. Her imaging last year was benign. She requests annual breast MRI. We will get her imaging scheduled for this December. I will see the patient back in 1 year or sooner should the need arise. She was instructed to call with any questions or concerns prior to this time. All questions were answered today.        History of Present Illness:     Oncology History    No history exists.        -Interval History: Patient is a 74 year old female presenting for a follow up for hx of breast cancer in 2007. Patient has been on obs. Patient denies changes on her breast exam. She denies persistent headaches, bone pain, back pain, shortness of breath, or abdominal pain.      Review of Systems:  Review of Systems   Constitutional:  Negative for activity change, appetite change, fatigue and unexpected weight change.   Respiratory:  Negative for cough  and shortness of breath.    Cardiovascular:  Negative for chest pain.   Gastrointestinal:  Negative for abdominal pain, diarrhea, nausea and vomiting.   Endocrine: Negative for heat intolerance.   Musculoskeletal:  Negative for arthralgias, back pain and myalgias.   Skin:  Negative for rash.   Neurological:  Negative for weakness and headaches.   Hematological:  Negative for adenopathy.       Patient Active Problem List   Diagnosis    Personal history of adenocarcinoma of breast    Arthritis    Carcinoma, basal cell, skin    Esophageal reflux    Pure hypercholesterolemia    Lumbar disc herniation    Migraine headache    Osteopenia    Malignant neoplasm of skin    Idiopathic peripheral neuropathy    Neutropenia (HCC)    Dupuytren's contracture    Hip flexor tendinitis, right    Essential hypertension    Neurologic gait dysfunction    Tinnitus of both ears    Breast calcifications    Ulcerative colitis (HCC)    Hyperbilirubinemia    Chronic migraine without aura without status migrainosus, not intractable    Encounter for follow-up surveillance of breast cancer    Screening mammogram for breast cancer    Closed compression fracture of L3 lumbar vertebra, initial encounter ([facility])    Age-related osteoporosis with current pathological fracture of vertebra ([facility])     Past Medical History:   Diagnosis Date    Arthritis     Gastric ulcer     GERD (gastroesophageal reflux disease)     Heart murmur     History of radiation therapy     PBRT    IBS (irritable bowel syndrome)     Irritable bowel syndrome 2012    Migraine     Skin cancer     Use of letrozole (Femara)     took for 2 years D/john due to side effects     Past Surgical History:   Procedure Laterality Date    ABDOMINOPLASTY      ADENOIDECTOMY      BREAST BIOPSY Right 10/12/2007    IDC    BREAST LUMPECTOMY Right     BREAST SURGERY      lumpectomy, resolved 2007     SECTION      CHOLECYSTECTOMY      COLON SURGERY      COLONOSCOPY      COSMETIC  SURGERY  1975    forehead    FOOT SURGERY      IR KYPHOPLASTY/VERTEBROPLASTY  3/9/2023    MOHS SURGERY      REDUCTION MAMMAPLASTY Bilateral 2015    reduction on left/lift on right    SENTINEL LYMPH NODE BIOPSY Right 11/14/2007    TOE SURGERY      left toe surgery    TONSILLECTOMY       Family History   Problem Relation Age of Onset    Hypertension Mother     Melanoma Mother     Heart disease Mother     Heart attack Father         acute MI, CABG    Hypertension Father     Heart disease Father     Other Brother         CABG    Lymphoma Brother         non-Hodgkin's    Hypertension Brother     Heart disease Brother     Other Family         back disorder    Stroke Family         CVA    Diabetes Family     Cancer Family      Social History     Socioeconomic History    Marital status:      Spouse name: Not on file    Number of children: Not on file    Years of education: Not on file    Highest education level: Not on file   Occupational History    Not on file   Tobacco Use    Smoking status: Former     Types: Cigarettes    Smokeless tobacco: Never    Tobacco comments:     Smoker in teenage years.  Quit at 21.  One pack per week.   Vaping Use    Vaping status: Never Used   Substance and Sexual Activity    Alcohol use: Yes     Alcohol/week: 2.0 - 3.0 standard drinks of alcohol     Types: 2 - 3 Glasses of wine per week    Drug use: No    Sexual activity: Not on file   Other Topics Concern    Not on file   Social History Narrative    Daily caffeine use- 2 cups of green tea and chocolate     Social Determinants of Health     Financial Resource Strain: Low Risk  (12/6/2023)    Overall Financial Resource Strain (CARDIA)     Difficulty of Paying Living Expenses: Not hard at all   Food Insecurity: Not on file   Transportation Needs: No Transportation Needs (12/6/2023)    PRAPARE - Transportation     Lack of Transportation (Medical): No     Lack of Transportation (Non-Medical): No   Physical Activity: Not on file   Stress:  "Not on file   Social Connections: Not on file   Intimate Partner Violence: Not on file   Housing Stability: Not on file       Current Outpatient Medications:     acetaminophen (TYLENOL) 650 mg CR tablet, Take 1,300 mg by mouth every 6 (six) hours as needed for mild pain, Disp: , Rfl:     benzonatate (TESSALON) 200 MG capsule, Take 1 capsule (200 mg total) by mouth 3 (three) times a day as needed for cough, Disp: 20 capsule, Rfl: 0    Calcium Carbonate-Vit D-Min (CALCIUM 1200 PO), Take by mouth, Disp: , Rfl:     Cholecalciferol (VITAMIN D3) 5000 units CAPS, Take 1 tablet by mouth daily, Disp: , Rfl:     diltiazem (CARDIZEM CD) 240 mg 24 hr capsule, Take 1 capsule (240 mg total) by mouth daily, Disp: 90 capsule, Rfl: 3    famotidine (PEPCID) 40 MG tablet, Take 1 tablet (40 mg total) by mouth 2 (two) times a day as needed for heartburn, Disp: 180 tablet, Rfl: 3    Multiple Vitamins-Minerals (MULTI COMPLETE) CAPS, Take 1 capsule by mouth daily, Disp: , Rfl:     Probiotic Product (VSL#3) CAPS, Take 1 capsule by mouth daily, Disp: 90 capsule, Rfl: 3    rosuvastatin (CRESTOR) 10 MG tablet, Take 1 tablet (10 mg total) by mouth daily, Disp: 90 tablet, Rfl: 3    scopolamine (TRANSDERM-SCOP) 1 mg/3 days TD 72 hr patch, Place 1 patch on the skin over 72 hours every third day, Disp: 10 patch, Rfl: 1    Zinc 50 MG CAPS, Take by mouth daily, Disp: , Rfl:   Allergies   Allergen Reactions    Ciprofloxacin Anaphylaxis     Anaphylaxis    Celecoxib Swelling     Edema of legs    Amitriptyline GI Intolerance     Action Taken: bloating,GI problems;     Bactrim [Sulfamethoxazole-Trimethoprim] Rash    Lidoderm [Lidocaine] Rash     Rash from lidoderm patch    Mesalamine Dizziness and Headache    Other Vomiting     \"black olives->GI upset\"    Sulfa Antibiotics Rash    Wound Dressing Adhesive Rash     There were no vitals filed for this visit.    Physical Exam  Constitutional:       General: She is not in acute distress.     Appearance: Normal " appearance.   Cardiovascular:      Rate and Rhythm: Normal rate and regular rhythm.      Pulses: Normal pulses.      Heart sounds: Normal heart sounds.   Pulmonary:      Effort: Pulmonary effort is normal.      Breath sounds: Normal breath sounds.   Chest:      Chest wall: No mass.   Breasts:     Right: No swelling, bleeding, inverted nipple, mass, nipple discharge, skin change or tenderness.      Left: No swelling, bleeding, inverted nipple, mass, nipple discharge, skin change or tenderness.       Abdominal:      General: Abdomen is flat.      Palpations: Abdomen is soft.   Lymphadenopathy:      Upper Body:      Right upper body: No supraclavicular, axillary or pectoral adenopathy.      Left upper body: No supraclavicular, axillary or pectoral adenopathy.   Skin:     General: Skin is warm.   Neurological:      General: No focal deficit present.      Mental Status: She is alert and oriented to person, place, and time.   Psychiatric:         Mood and Affect: Mood normal.         Behavior: Behavior normal.           Results:    Imaging  No results found.    I reviewed the above imaging data.      Advance Care Planning/Advance Directives:  Discussed disease status, cancer treatment plans and/or cancer treatment goals with the patient.

## 2024-10-08 ENCOUNTER — IMMUNIZATIONS (OUTPATIENT)
Dept: FAMILY MEDICINE CLINIC | Facility: CLINIC | Age: 74
End: 2024-10-08
Payer: MEDICARE

## 2024-10-08 DIAGNOSIS — Z23 ENCOUNTER FOR IMMUNIZATION: Primary | ICD-10-CM

## 2024-10-08 PROCEDURE — 90662 IIV NO PRSV INCREASED AG IM: CPT

## 2024-10-08 PROCEDURE — G0008 ADMIN INFLUENZA VIRUS VAC: HCPCS

## 2024-11-07 ENCOUNTER — OFFICE VISIT (OUTPATIENT)
Age: 74
End: 2024-11-07
Payer: MEDICARE

## 2024-11-07 VITALS — BODY MASS INDEX: 28.46 KG/M2 | WEIGHT: 165.8 LBS | TEMPERATURE: 98.2 F

## 2024-11-07 DIAGNOSIS — Z12.83 SKIN CANCER SCREENING: ICD-10-CM

## 2024-11-07 DIAGNOSIS — L82.1 SEBORRHEIC KERATOSES: ICD-10-CM

## 2024-11-07 DIAGNOSIS — D18.01 CHERRY ANGIOMA: ICD-10-CM

## 2024-11-07 DIAGNOSIS — L85.3 XEROSIS OF SKIN: ICD-10-CM

## 2024-11-07 DIAGNOSIS — Z00.6 ENCOUNTER FOR EXAMINATION FOR NORMAL COMPARISON OR CONTROL IN CLINICAL RESEARCH PROGRAM: ICD-10-CM

## 2024-11-07 DIAGNOSIS — L82.0 INFLAMED SEBORRHEIC KERATOSIS: ICD-10-CM

## 2024-11-07 DIAGNOSIS — L65.9 HAIR LOSS: ICD-10-CM

## 2024-11-07 DIAGNOSIS — D48.9 NEOPLASM OF UNCERTAIN BEHAVIOR: ICD-10-CM

## 2024-11-07 DIAGNOSIS — Z85.828 HISTORY OF BASAL CELL CARCINOMA: Primary | ICD-10-CM

## 2024-11-07 DIAGNOSIS — L20.9 ATOPIC DERMATITIS, UNSPECIFIED TYPE: ICD-10-CM

## 2024-11-07 PROCEDURE — 88342 IMHCHEM/IMCYTCHM 1ST ANTB: CPT | Performed by: STUDENT IN AN ORGANIZED HEALTH CARE EDUCATION/TRAINING PROGRAM

## 2024-11-07 PROCEDURE — 17110 DESTRUCTION B9 LES UP TO 14: CPT | Performed by: REGISTERED NURSE

## 2024-11-07 PROCEDURE — 88305 TISSUE EXAM BY PATHOLOGIST: CPT | Performed by: STUDENT IN AN ORGANIZED HEALTH CARE EDUCATION/TRAINING PROGRAM

## 2024-11-07 PROCEDURE — 99204 OFFICE O/P NEW MOD 45 MIN: CPT | Performed by: REGISTERED NURSE

## 2024-11-07 PROCEDURE — 11102 TANGNTL BX SKIN SINGLE LES: CPT | Performed by: REGISTERED NURSE

## 2024-11-07 RX ORDER — TRIAMCINOLONE ACETONIDE 1 MG/G
OINTMENT TOPICAL
Qty: 80 G | Refills: 0 | Status: SHIPPED | OUTPATIENT
Start: 2024-11-07

## 2024-11-07 NOTE — PATIENT INSTRUCTIONS
HISTORY OF BASAL CELL CARCINOMA    Assessment and Plan:  Based on a thorough discussion of this condition and the management approach to it (including a comprehensive discussion of the known risks, side effects and potential benefits of treatment), the patient (family) agrees to implement the following specific plan:  Routine skin check  SPF 30+    How can basal cell carcinoma be prevented?  The most important way to prevent BCC is to avoid sunburn. This is especially important in childhood and early life. Fair skinned individuals and those with a personal or family history of BCC should protect their skin from sun exposure daily, year-round and lifelong.  Stay indoors or under the shade in the middle of the day   Wear covering clothing   Apply high protection factor SPF50+ broad-spectrum sunscreens generously to exposed skin if outdoors   Avoid indoor tanning (sun beds, solaria)  Oral nicotinamide (vitamin B3) in a dose of 500 mg twice daily may reduce the number and severity of BCCs.    What is the outlook for basal cell carcinoma?  Most BCCs are cured by treatment. Cure is most likely if treatment is undertaken when the lesion is small.  About 50% of people with BCC develop a second one within 3 years of the first. They are also at increased risk of other skin cancers, especially melanoma. Regular self-skin examinations and long-term annual skin checks by an experienced health professional are recommended.     SEBORRHEIC KERATOSES  - Relevant exam: Scattered over the trunk/extremities are waxy brown to black plaques and papules with stuck on appearance  - Exam and clinical history consistent with seborrheic keratoses  - Counseled that these are benign growths that do not require treatment  - Counseled that removal of lesions is considered cosmetic and so would incur a fee should patient elect to move forward.   - Patient to hold on treatments for now but will inform us should they desire additional  treatments    MELANOCYTIC NEVI  -Relevant exam: Scattered over the trunk/extremities are homogenously pigmented brown macules and papules. ELM performed and without concerning findings.  - Exam and clinical history consistent with melanocytic nevi  - Educated on the ABCDE's of melanoma; handout provided  - Counseled to return to clinic prior to scheduled appointment should any of these lesions change or should any new lesions of concern arise  - Counseled on use of sun protection daily. Reviewed latest FDA sunscreen guidelines, including use of broad spectrum (UVA and UVB blocking) sunscreen or sun protective clothing with SPF 30-50 every 2-3 hours and reapplied after exposure to water; use of photoprotective clothing, including a broad brim hat and UPF rated clothing if outdoors for several hours; avoid use of tanning beds as these pose significant risk for melanoma and skin cancer.    LENTIGINES  OTHER SKIN CHANGES DUE TO CHRONIC EXPOSURE TO NONIONIZING RADIATION  - Relevant exam: Over sun exposed areas are brown macules. ELM performed and without concerning findings.  - Exam and clinical history consistent with lentigines.  - Educated that these are indicative of prior sun exposure.   - Counseled to return to clinic prior to scheduled appointment should any of these lesions change or should any new lesions of concern arise.  - Recommended use of sunscreen as above and below.  - Counseled on use of sun protection daily. Reviewed latest FDA sunscreen guidelines, including use of broad spectrum (UVA and UVB blocking) sunscreen or sun protective clothing with SPF 30-50 every 2-3 hours and reapplied after exposure to water; use of photoprotective clothing, including a broad brim hat and UPF rated clothing if outdoors for several hours; avoid use of tanning beds as these pose significant risk for melanoma and skin cancer.    CHERRY ANGIOMAS  - Relevant exam: Scattered over the trunk/extremities are red papules  - Exam  "and clinical history consistent with cherry angiomas  - Educated that these are benign  - Educated that removal is considered aesthetic and would incur a fee.  - Patient does not wish to pursue removal at this time but will contact us should this change.    HAIR LOSS    Assessment and Plan:  Based on a thorough discussion of this condition and the management approach to it (including a comprehensive discussion of the known risks, side effects and potential benefits of treatment), the patient (family) agrees to implement the following specific plan:  Start OTC Men's 5% Rogaine foam twice daily. May noticed increased shedding, which is normal. If stopping Rogaine, taper down slowly.   Discussed taking biopsies at next visit if no improvement    XEROSIS (\"DRY SKIN\")    Assessment and Plan:  - History and physical consistent with xerosis  - Educated that dry skin is often exacerbated by low humidity conditions and during change of seasons  - Educated that gentle skin care and consistent use of emollients are the mainstay of treatment. Recommended use of occlusive emollient, such as vaseline, aquaphor or aveeno eczema balm. If unable to tolerate, noted that thick white cream is next best.   Based on a thorough discussion of this condition and the management approach to it (including a comprehensive discussion of the known risks, side effects and potential benefits of treatment), the patient (family) agrees to implement the following specific plan:  Start daily emollient to moist skin.  Take luke warm showers (not hot)  Avoid scratching.   Transition to fragrance free, non-irritating skin care as possible.      Tip for relief: If itchy or uncomfortable, placing emollient (like vaseline) in the fridge is a great trick as the cool sensation is soothing.             ATOPIC DERMATITIS     Assessment and Plan:  Based on a thorough discussion of this condition and the management approach to it (including a comprehensive " "discussion of the known risks, side effects and potential benefits of treatment), the patient (family) agrees to implement the following specific plan:  Apply triamcinolone 0.1% ointment twice daily for two weeks. Taper down to three times weekly afterwards.      Assessment and Plan:   Atopic Dermatitis is a chronic, itchy skin condition that is very common in children but may occur at any age. It is also known as “eczema” or “atopic eczema.” It is the most common form of dermatitis.    Atopic dermatitis usually occurs in people who have an “atopic tendency.”  This means they may develop any or all of these closely linked conditions:  Atopic dermatitis, asthma, hay fever (allergic rhinitis), eosinophilic esophagitis, and gastroenteritis.  Often these conditions run within families with a parent, child or sibling also affected. A family history of asthma, eczema or hay fever is particularly useful in diagnosing atopic dermatitis in infants.    Atopic dermatitis arises because of a complex interaction of genetic and environmental factors. These include defects in skin barrier function making the skin more susceptible to irritation by soap and other contact irritants, the weather, temperature and non-specific triggers.  There is also an element of immune system dysregulation that is often present.  By definition, it is chronic and has a \"waxing-waning\" nature; flares should be expected but with good education and treatment strategies can be minimized.    Your treatment plan  Using only mild cleansers (hypoallergenic and without fragrances) and fragrance free detergent (not “unscented” products which contain a masking agent)  Apply moisturizer to entire body at least 2 times a day  Use the above listed medication to affected areas twice a day for a full 2 days after the rash has cleared  Take on over the counter antihistamine like diphenhydramine before bed if the itching is keeping you awake              ATOPIC " "DERMATITIS FAQS    WHAT IS ATOPIC DERMATITIS?  Atopic dermatitis (also called “eczema”) results from a weak skin barrier and an over active immune system.  The weak skin barrier allows things to get into the skin and then the immune system has an intense reaction to this substances.  The result is itchy red patches anywhere on the body.    WHO GETS ATOPIC DERMATITIS?  There is no single answer because many factors are involved.  It is likely a combination of genetic makeup and environmental triggers and/or exposures.  People with atopic dermatitis are prone to other types of \"atopy\".  They are at increased risk for food allergies, Asthma and hay fever and there is often a family history of these problems as well.      WHAT ABOUT FOOD ALLERGIES?  This is a very controversial topic, as many believe that food allergies are responsible for skin flares. In some cases, specific foods may cause worsening of atopic dermatitis; however this occurs in a minority of cases and usually happens within a few hours of ingestion. While food allergy is more common in children with eczema, foods are specific triggers for flares in only a small percentage of children.  If you notice that the skin flares after certain foods you can see if eliminating one food at time makes a difference, as long as your child can still enjoy a well-balanced diet.   There are blood (RAST) and skin (PRICK) tests that can check for allergies, but they are often positive in children who are not truly allergic. Therefore it is important that you work with your allergist and dermatologist to determine which foods are relevant and causing true symptoms.  Extreme food elimination diets without the guidance of your doctor, which have become more popular in recent years, may even result in worsening of the skin rash due to malnutrition and avoidance of essential nutrients.      WHY SO MUCH MOISTURIZER?  This is the 1st step and most important part of treatment.  " This helps maintaint the skin's barrier function and prevent flares. Creams and ointments are preferable over lotions.  Aveeno eczema relief and Eucerin eczema relief  and cerave are all good ones to start with. It is best to put  moisturizer on directly after bathing, while the skin is damp, it is twice as effective then.  You may bathe daily.  Use warm - not hot - water, for short periods of time (5-10 minutes at a time) Dry off by Lightly patting the skin with a towel and not rubbing. While the skin is still damp, (within 3 minutes) apply any medications to the rashy areas 1st and then moisturize the entire body. It's best to apply the medication 1st but if the medications sting, moisturizer can be applied 1st.    WHY USE THE MEDICATION FOR AN EXTRA 2 DAYS AFTER THE RASH IS GONE?  Sometimes the rash may appear to be gone, but there are still microscopic changes in the skin.  Using the medication and extra 2 days will ensure the inflammation has resolve and make the rash less likely to return quickly.        WHAT ARE TRIGGERS?  Occasionally there are specific things that trigger itching and rashes, but in many cases may none can be identified.  The most common triggers are:  Heat and sweat for some individuals, cold weather for others.  House dust mites, pet fur.  Wool; synthetic fabrics like nylon; dyed fabrics.  Tobacco smoke   Fragrances in: shampoos, soaps, lotions, laundry detergents, fabric softeners.  Saliva or prolonged exposure to water.   start with these.  Avoid the use of fabric softeners in the washing machine or dryer sheets (unless they are fragrance-free).  Try to use laundry detergents, soaps and shampoos that are fragrance-free. You may find it helpful to double-rinse your clothes.  Some children are sensitive to house dust mites and they may benefit from a plastic mattress wrap.  While food allergy is more common in children with eczema, foods are specific triggers for flares in only a small  "percentage of children.  If you notice that the skin flares after certain foods you can see if eliminating one food at time makes a difference, as long as your child can still enjoy a well-balanced diet.     4) WHAT DOES THE ANTIHISTAMINE DO?   Antihistamines help you not to scratch. Scratching only makes the skin more reactive and the barrier function even more disrupted.  It can cause both children and their parents to lose sleep!  There are different types of anti-itch medications.  Some cause more drowsiness than others.  Both types are acceptable depending on your child and your preference.  Start with Benadryl and if that does not work, ask for a prescription “antihistamine.”    5) ARE THERE ANY SIDE EFFECTS TO WORRY ABOUT?  Corticosteroid creams and ointments (generally things with \"-one\" or \"susan\" on the end of their names):  The strength of the cream or ointment depends on the name of the active ingredient.  The numbers at the end do not indicate the relative strength.  Thus triamcinolone 0.1% ointment, considered a mid-strength corticosteroid, is much stronger than hydrocortisone 1% even though the number following the name is much lower.  Topical corticosteroids are very effective in treating atopic dermatitis.  When used in the manner prescribed (to rashy areas of skin and for no more than a few weeks at a time to any one area) they are very safe.  These are corticosteroids and are anti-inflammatory, not the “anabolic steroids” like those used illegally by some athletes. It is important that you do not overuse steroid creams, and if you notice a thin, shiny appearance to the skin or broken blood vessels, you should stop using the cream and consult your health care provider regarding possible overuse/overthinning of the skin.  The face, armpits and groin have particularly thin and sensitive skin and are therefore most at risk for bad results if steroids are over-used in these sites.      Topical " non-steroid creams and ointments (immunomodulators):  These creams and ointments are also called topical calcineurin inhibitors (TCIs).  These include Protopic ointment and Elidel cream. Crisaborole 2% (Eucrisa) is a prescription ointment that targets an enzyme called PDE4 (phosphodiesterase 4).  It is used on the skin topically to treat mild-to-moderate eczema in adults and children 2 years of age and older.  In total, these nonsteroidal prescriptions are used to help decrease itching and redness in the skin.  They are not as strong as most steroid creams; however, it is believed that they do not thin the skin when overused.  They are generally used as second-line medications, though they may be used alone or in conjunction with topical steroids.  In sensitive areas such as the face, underarms or groin, they are often recommended.  They can sting inflamed skin, but are generally well tolerated once the skin is healing.    The FDA placed a “black-box” warning on both Elidel and Protopic in 2006 based on animal studies using the medications.  Some animals developed skin cancer and lymphoma.  Subsequently, the FDA released a statement that there is no causal relationship between the two medications and cancer.  Because of this concern, there are ongoing studies to evaluate this relationship in humans.  So far, there are studies that support the safety of these medications.  One showed that the rates of cancer in patient using these medications topically were less than the rates of the general population and another showed that in patient's using the medication over a large area of the body, the levels of the medication in the blood was undetectable.    As for Eucrisa, this product is only approved for the topical treatment of mild-to-moderate eczema in patients 2 years of age and older; use of the medication in kids younger than 2 is considered “off label” and has not been formally studied.  Burning and stinging are  "the most commonly reported side effects of this medication.  Rarely, this product has been known to cause hives and hypersensitivity reactions; discontinue its use if you develop severe itching, swelling, or redness in the area of application.      INFLAMED SEBORRHEIC KERATOSIS    Assessment and Plan:  Based on a thorough discussion of this condition and the management approach to it (including a comprehensive discussion of the known risks, side effects and potential benefits of treatment), the patient (family) agrees to implement the following specific plan:  Cryotherapy given symptoms and inflammation  After care discussed    PROCEDURE:  DESTRUCTION OF BENIGN LESIONS  After a thorough discussion of treatment options and risk/benefits/alternatives (including but not limited to local pain, scarring, dyspigmentation, blistering, and possible superinfection), verbal and written consent were obtained and the aforementioned lesions were treated on with cryotherapy using liquid nitrogen x 1 cycle for 5-10 seconds.    TOTAL NUMBER of 3 lesions were treated today on the ANATOMIC LOCATION: forehead    The patient tolerated the procedure well, and after-care instructions were provided.       NEOPLASM OF UNCERTAIN BEHAVIOR OF SKIN    Assessment and Plan:  I have discussed with the patient that a sample of skin via a \"skin biopsy” would be potentially helpful to further make a specific diagnosis under the microscope.  Based on a thorough discussion of this condition and the management approach to it (including a comprehensive discussion of the known risks, side effects and potential benefits of treatment), the patient (family) agrees to implement the following specific plan:    Procedure:  Skin Biopsy.  After a thorough discussion of treatment options and risk/benefits/alternatives (including but not limited to local pain, scarring, dyspigmentation, blistering, possible superinfection, and inability to confirm a diagnosis via " "histopathology), verbal and written consent were obtained and portion of the rash was biopsied for tissue sample.  See below for consent that was obtained from patient and subsequent Procedure Note.       INFORMED CONSENT DISCUSSION AND POST-OPERATIVE INSTRUCTIONS FOR PATIENT    I.  RATIONALE FOR PROCEDURE  I understand that a skin biopsy allows the Dermatologist to test a lesion or rash under the microscope to obtain a diagnosis.  It usually involves numbing the area with numbing medication and removing a small piece of skin; sometimes the area will be closed with sutures. In this specific procedure, sutures are not usually needed.  If any sutures are placed, then they are usually need to be removed in 2 weeks or less.    I understand that my Dermatologist recommends that a skin \"shave\" biopsy be performed today.  A local anesthetic, similar to the kind that a dentist uses when filling a cavity, will be injected with a very small needle into the skin area to be sampled.  The injected skin and tissue underneath \"will go to sleep” and become numb so no pain should be felt afterwards.  An instrument shaped like a tiny \"razor blade\" (shave biopsy instrument) will be used to cut a small piece of tissue and skin from the area so that a sample of tissue can be taken and examined more closely under the microscope.  A slight amount of bleeding will occur, but it will be stopped with direct pressure and a pressure bandage and any other appropriate methods.  I understands that a scar will form where the wound was created.  Surgical ointment will be applied to help protect the wound.  Sutures are not usually needed.    II.  RISKS AND POTENTIAL COMPLICATIONS   I understand the risks and potential complications of a skin biopsy include but are not limited to the following:  Bleeding  Infection  Pain  Scar/keloid  Skin discoloration  Incomplete Removal  Recurrence  Nerve Damage/Numbness/Loss of Function  Allergic Reaction to " "Anesthesia  Biopsies are diagnostic procedures and based on findings additional treatment or evaluation may be required  Loss or destruction of specimen resulting in no additional findings    My Dermatologist has explained to me the nature of the condition, the nature of the procedure, and the benefits to be reasonably expected compared with alternative approaches.  My Dermatologist has discussed the likelihood of major risks or complications of this procedure including the specific risks listed above, such as bleeding, infection, and scarring/keloid.  I understand that a scar is expected after this procedure.  I understand that my physician cannot predict if the scar will form a \"keloid,\" which extends beyond the borders of the wound that is created.  A keloid is a thick, painful, and bumpy scar.  A keloid can be difficult to treat, as it does not always respond well to therapy, which includes injecting cortisone directly into the keloid every few weeks.  While this usually reduces the pain and size of the scar, it does not eliminate it.      I understand that photographs may be taken before and after the procedure.  These will be maintained as part of the medical providers confidential records and may not be made available to me.  I further authorize the medical provider to use the photographs for teaching purposes or to illustrate scientific papers, books, or lectures if in his/her judgment, medical research, education, or science may benefit from its use.    I have had an opportunity to fully inquire about the risks and benefits of this procedure and its alternatives.   I have been given ample time and opportunity to ask questions and to seek a second opinion if I wished to do so.  I acknowledge that there have specifically been no guarantees as to the cosmetic results from the procedure.  I am aware that with any procedure there is always the possibility of an unexpected complication.    III. POST-PROCEDURAL " "CARE (WHAT YOU WILL NEED TO DO \"AFTER THE BIOPSY\" TO OPTIMIZE HEALING)    Keep the area clean and dry.  Try NOT to remove the bandage or get it wet for the first 24 hours.    Gently clean the area and apply surgical ointment (such as Vaseline petrolatum ointment, which is available \"over the counter\" and not a prescription) to the biopsy site for up to 2 weeks straight.  This acts to protect the wound from the outside world.      Sutures are not usually placed in this procedure.  If any sutures were placed, return for suture removal as instructed (generally 1 week for the face, 2 weeks for the body).      Take Acetaminophen (Tylenol) for discomfort, if no contraindications.  Ibuprofen or aspirin could make bleeding worse.    Call our office immediately for signs of infection: fever, chills, increased redness, warmth, tenderness, discomfort/pain, or pus or foul smell coming from the wound.    WHAT TO DO IF THERE IS ANY BLEEDING?  If a small amount of bleeding is noticed, place a clean cloth over the area and apply firm pressure for ten minutes.  Check the wound after 10 minutes of direct pressure.  If bleeding persists, try one more time for an additional 10 minutes of direct pressure on the area.  If the bleeding becomes heavier or does not stop after the second attempt, or if you have any other questions about this procedure, then please call your [facility]'s Dermatologist by calling [phone] (SKIN).     I hereby acknowledge that I have reviewed and verified the site with my Dermatologist and have requested and authorized my Dermatologist to proceed with the procedure.    "

## 2024-11-07 NOTE — PROGRESS NOTES
"[facility] Note     Patient Name: Fabiola Christina  Encounter Date: 11/7/24     Have you been cared for by a [facility] Dermatologist in the last 3 years and, if so, which description applies to you?    NO.   I am considered a \"new\" patient and must complete all patient intake questions. I am FEMALE/of child-bearing potential.    REVIEW OF SYSTEMS:  Have you recently had or currently have any of the following? Recent fever or chills? No  Any non-healing wound? No  Are you pregnant or planning to become pregnant? No  Are you currently or planning to be nursing or breast feeding? No   PAST MEDICAL HISTORY:  Have you personally ever had or currently have any of the following?  If \"YES,\" then please provide more detail. Skin cancer (such as Melanoma, Basal Cell Carcinoma, Squamous Cell Carcinoma?  YES, mohs bcc  Tuberculosis, HIV/AIDS, Hepatitis B or C: No  Radiation Treatment YES, breast cancer 17 years ago   HISTORY OF IMMUNOSUPPRESSION:   Do you have a history of any of the following:  Systemic Immunosuppression such as Diabetes, Biologic or Immunotherapy, Chemotherapy, Organ Transplantation, Bone Marrow Transplantation or Prednsione?  No    Answering \"YES\" requires the addition of the dotphrase \"IMMUNOSUPPRESSED\" as the first diagnosis of the patient's visit.   FAMILY HISTORY:  Any \"first degree relatives\" (parent, brother, sister, or child) with the following?    Skin Cancer, Pancreatic or Other Cancer? YES, mom-melanoma, brother-stomach cancer   PATIENT EXPERIENCE:    Do you want the Dermatologist to perform a COMPLETE skin exam today including a clinical examination under the \"bra and underwear\" areas?  Yes  If necessary, do we have your permission to call and leave a detailed message on your Preferred Phone number that includes your specific medical information?  Yes      Allergies   Allergen Reactions    Ciprofloxacin Anaphylaxis     Anaphylaxis    Celecoxib Swelling     Edema of legs    " "Amitriptyline GI Intolerance     Action Taken: bloating,GI problems;     Bactrim [Sulfamethoxazole-Trimethoprim] Rash    Lidoderm [Lidocaine] Rash     Rash from lidoderm patch    Mesalamine Dizziness and Headache    Other Vomiting     \"black olives->GI upset\"    Sulfa Antibiotics Rash    Wound Dressing Adhesive Rash      Current Outpatient Medications:     acetaminophen (TYLENOL) 650 mg CR tablet, Take 1,300 mg by mouth every 6 (six) hours as needed for mild pain, Disp: , Rfl:     benzonatate (TESSALON) 200 MG capsule, Take 1 capsule (200 mg total) by mouth 3 (three) times a day as needed for cough, Disp: 20 capsule, Rfl: 0    Calcium Carbonate-Vit D-Min (CALCIUM 1200 PO), Take by mouth, Disp: , Rfl:     Cholecalciferol (VITAMIN D3) 5000 units CAPS, Take 1 tablet by mouth daily, Disp: , Rfl:     diltiazem (CARDIZEM CD) 240 mg 24 hr capsule, Take 1 capsule (240 mg total) by mouth daily, Disp: 90 capsule, Rfl: 3    famotidine (PEPCID) 40 MG tablet, Take 1 tablet (40 mg total) by mouth 2 (two) times a day as needed for heartburn, Disp: 180 tablet, Rfl: 3    Multiple Vitamins-Minerals (MULTI COMPLETE) CAPS, Take 1 capsule by mouth daily, Disp: , Rfl:     Probiotic Product (VSL#3) CAPS, Take 1 capsule by mouth daily, Disp: 90 capsule, Rfl: 3    rosuvastatin (CRESTOR) 10 MG tablet, Take 1 tablet (10 mg total) by mouth daily, Disp: 90 tablet, Rfl: 3    scopolamine (TRANSDERM-SCOP) 1 mg/3 days TD 72 hr patch, Place 1 patch on the skin over 72 hours every third day, Disp: 10 patch, Rfl: 1    Zinc 50 MG CAPS, Take by mouth daily, Disp: , Rfl:           Whom besides the patient is providing clinical information about today's encounter?   NO ADDITIONAL HISTORIAN (patient alone provided history)    Physical Exam and Assessment/Plan by Diagnosis:    HISTORY OF BASAL CELL CARCINOMA    Physical Exam:  Anatomic Location Affected:  nose  Morphological Description of scar:  well healed scar  Suspected Recurrence: No  Pertinent " Positives:  Pertinent Negatives:      Additional History of Present Condition:  History of basal cell carcinoma with no sign of recurrence. Had mohs at previous dermatologist    Assessment and Plan:  Based on a thorough discussion of this condition and the management approach to it (including a comprehensive discussion of the known risks, side effects and potential benefits of treatment), the patient (family) agrees to implement the following specific plan:  Routine skin check  SPF 30+    How can basal cell carcinoma be prevented?  The most important way to prevent BCC is to avoid sunburn. This is especially important in childhood and early life. Fair skinned individuals and those with a personal or family history of BCC should protect their skin from sun exposure daily, year-round and lifelong.  Stay indoors or under the shade in the middle of the day   Wear covering clothing   Apply high protection factor SPF50+ broad-spectrum sunscreens generously to exposed skin if outdoors   Avoid indoor tanning (sun beds, solaria)  Oral nicotinamide (vitamin B3) in a dose of 500 mg twice daily may reduce the number and severity of BCCs.    What is the outlook for basal cell carcinoma?  Most BCCs are cured by treatment. Cure is most likely if treatment is undertaken when the lesion is small.  About 50% of people with BCC develop a second one within 3 years of the first. They are also at increased risk of other skin cancers, especially melanoma. Regular self-skin examinations and long-term annual skin checks by an experienced health professional are recommended.     SEBORRHEIC KERATOSES  - Relevant exam: Scattered over the trunk/extremities are waxy brown to black plaques and papules with stuck on appearance  - Exam and clinical history consistent with seborrheic keratoses  - Counseled that these are benign growths that do not require treatment  - Counseled that removal of lesions is considered cosmetic and so would incur a fee  should patient elect to move forward.   - Patient to hold on treatments for now but will inform us should they desire additional treatments    MELANOCYTIC NEVI  -Relevant exam: Scattered over the trunk/extremities are homogenously pigmented brown macules and papules. ELM performed and without concerning findings.  - Exam and clinical history consistent with melanocytic nevi  - Educated on the ABCDE's of melanoma; handout provided  - Counseled to return to clinic prior to scheduled appointment should any of these lesions change or should any new lesions of concern arise  - Counseled on use of sun protection daily. Reviewed latest FDA sunscreen guidelines, including use of broad spectrum (UVA and UVB blocking) sunscreen or sun protective clothing with SPF 30-50 every 2-3 hours and reapplied after exposure to water; use of photoprotective clothing, including a broad brim hat and UPF rated clothing if outdoors for several hours; avoid use of tanning beds as these pose significant risk for melanoma and skin cancer.    LENTIGINES  OTHER SKIN CHANGES DUE TO CHRONIC EXPOSURE TO NONIONIZING RADIATION  - Relevant exam: Over sun exposed areas are brown macules. ELM performed and without concerning findings.  - Exam and clinical history consistent with lentigines.  - Educated that these are indicative of prior sun exposure.   - Counseled to return to clinic prior to scheduled appointment should any of these lesions change or should any new lesions of concern arise.  - Recommended use of sunscreen as above and below.  - Counseled on use of sun protection daily. Reviewed latest FDA sunscreen guidelines, including use of broad spectrum (UVA and UVB blocking) sunscreen or sun protective clothing with SPF 30-50 every 2-3 hours and reapplied after exposure to water; use of photoprotective clothing, including a broad brim hat and UPF rated clothing if outdoors for several hours; avoid use of tanning beds as these pose significant risk  "for melanoma and skin cancer.    CHERRY ANGIOMAS  - Relevant exam: Scattered over the trunk/extremities are red papules  - Exam and clinical history consistent with cherry angiomas  - Educated that these are benign  - Educated that removal is considered aesthetic and would incur a fee.  - Patient does not wish to pursue removal at this time but will contact us should this change.    HAIR LOSS    Physical Exam:  Anatomic Location Affected:  scalp  Morphological Description:  generalized thinning of hair, widening of midline part and thinning of frontal hair line.   Pertinent Positives:   Pertinent Negatives:  No pruritus, tenderness, scaling, burning and or tingling sensation.     Additional History of Present Condition:  Denies dandruff. Present within the last year. Some thinning and frontal recession of hairline. Has been trying supplement for four months with no success.     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 May noticed increased shedding, which is normal. If stopping Rogaine, taper down slowly.   If no improvement after about 7-8 months, discussed taking biopsies for further evaluation at next visit.     XEROSIS (\"DRY SKIN\")    Physical Exam:  Anatomic Location Affected:  bilateral legs  Morphological Description:  xerotic papules and plaques  Pertinent Positives:  Pertinent Negatives:    Additional History of Present Condition:    - Current treatments: CeraVe but not consistently    Assessment and Plan:  - History and physical consistent with xerosis  - Educated that dry skin is often exacerbated by low humidity " conditions and during change of seasons  - Educated that gentle skin care and consistent use of emollients are the mainstay of treatment. Recommended use of occlusive emollient, such as vaseline, aquaphor or aveeno eczema balm. If unable to tolerate, noted that thick white cream is next best.   Based on a thorough discussion of this condition and the management approach to it (including a comprehensive discussion of the known risks, side effects and potential benefits of treatment), the patient (family) agrees to implement the following specific plan:  Start daily emollient to moist skin.  Take luke warm showers (not hot)  Avoid scratching.   Transition to fragrance free, non-irritating skin care as possible.      Tip for relief: If itchy or uncomfortable, placing emollient (like vaseline) in the fridge is a great trick as the cool sensation is soothing.             NUMMULAR DERMATITIS     Physical Exam:  Anatomic Location Affected:  bilateral lower legs  Morphological Description:  erythematous eczematous scaly patches  Severity: mild  Pertinent Positives:  Pertinent Negatives:    Additional History of Present Condition:  Has had eczema in the past as well.     Assessment and Plan:  Based on a thorough discussion of this condition and the management approach to it (including a comprehensive discussion of the known risks, side effects and potential benefits of treatment), the patient (family) agrees to implement the following specific plan:  Apply triamcinolone 0.1% ointment twice daily for two weeks. Taper down to three times weekly afterwards.   Apply good moisturizers, such as CeraVe     Assessment and Plan:   Atopic Dermatitis is a chronic, itchy skin condition that is very common in children but may occur at any age. It is also known as “eczema” or “atopic eczema.” It is the most common form of dermatitis.    Atopic dermatitis usually occurs in people who have an “atopic tendency.”  This means they may develop  "any or all of these closely linked conditions:  Atopic dermatitis, asthma, hay fever (allergic rhinitis), eosinophilic esophagitis, and gastroenteritis.  Often these conditions run within families with a parent, child or sibling also affected. A family history of asthma, eczema or hay fever is particularly useful in diagnosing atopic dermatitis in infants.    Atopic dermatitis arises because of a complex interaction of genetic and environmental factors. These include defects in skin barrier function making the skin more susceptible to irritation by soap and other contact irritants, the weather, temperature and non-specific triggers.  There is also an element of immune system dysregulation that is often present.  By definition, it is chronic and has a \"waxing-waning\" nature; flares should be expected but with good education and treatment strategies can be minimized.    Your treatment plan  Using only mild cleansers (hypoallergenic and without fragrances) and fragrance free detergent (not “unscented” products which contain a masking agent)  Apply moisturizer to entire body at least 2 times a day  Use the above listed medication to affected areas twice a day for a full 2 days after the rash has cleared  Take on over the counter antihistamine like diphenhydramine before bed if the itching is keeping you awake              ATOPIC DERMATITIS FAQS    WHAT IS ATOPIC DERMATITIS?  Atopic dermatitis (also called “eczema”) results from a weak skin barrier and an over active immune system.  The weak skin barrier allows things to get into the skin and then the immune system has an intense reaction to this substances.  The result is itchy red patches anywhere on the body.    WHO GETS ATOPIC DERMATITIS?  There is no single answer because many factors are involved.  It is likely a combination of genetic makeup and environmental triggers and/or exposures.  People with atopic dermatitis are prone to other types of \"atopy\".  They are " at increased risk for food allergies, Asthma and hay fever and there is often a family history of these problems as well.      WHAT ABOUT FOOD ALLERGIES?  This is a very controversial topic, as many believe that food allergies are responsible for skin flares. In some cases, specific foods may cause worsening of atopic dermatitis; however this occurs in a minority of cases and usually happens within a few hours of ingestion. While food allergy is more common in children with eczema, foods are specific triggers for flares in only a small percentage of children.  If you notice that the skin flares after certain foods you can see if eliminating one food at time makes a difference, as long as your child can still enjoy a well-balanced diet.   There are blood (RAST) and skin (PRICK) tests that can check for allergies, but they are often positive in children who are not truly allergic. Therefore it is important that you work with your allergist and dermatologist to determine which foods are relevant and causing true symptoms.  Extreme food elimination diets without the guidance of your doctor, which have become more popular in recent years, may even result in worsening of the skin rash due to malnutrition and avoidance of essential nutrients.      WHY SO MUCH MOISTURIZER?  This is the 1st step and most important part of treatment.  This helps maintaint the skin's barrier function and prevent flares. Creams and ointments are preferable over lotions.  Aveeno eczema relief and Eucerin eczema relief  and cerave are all good ones to start with. It is best to put  moisturizer on directly after bathing, while the skin is damp, it is twice as effective then.  You may bathe daily.  Use warm - not hot - water, for short periods of time (5-10 minutes at a time) Dry off by Lightly patting the skin with a towel and not rubbing. While the skin is still damp, (within 3 minutes) apply any medications to the rashy areas 1st and then  moisturize the entire body. It's best to apply the medication 1st but if the medications sting, moisturizer can be applied 1st.    WHY USE THE MEDICATION FOR AN EXTRA 2 DAYS AFTER THE RASH IS GONE?  Sometimes the rash may appear to be gone, but there are still microscopic changes in the skin.  Using the medication and extra 2 days will ensure the inflammation has resolve and make the rash less likely to return quickly.        WHAT ARE TRIGGERS?  Occasionally there are specific things that trigger itching and rashes, but in many cases may none can be identified.  The most common triggers are:  Heat and sweat for some individuals, cold weather for others.  House dust mites, pet fur.  Wool; synthetic fabrics like nylon; dyed fabrics.  Tobacco smoke   Fragrances in: shampoos, soaps, lotions, laundry detergents, fabric softeners.  Saliva or prolonged exposure to water.   start with these.  Avoid the use of fabric softeners in the washing machine or dryer sheets (unless they are fragrance-free).  Try to use laundry detergents, soaps and shampoos that are fragrance-free. You may find it helpful to double-rinse your clothes.  Some children are sensitive to house dust mites and they may benefit from a plastic mattress wrap.  While food allergy is more common in children with eczema, foods are specific triggers for flares in only a small percentage of children.  If you notice that the skin flares after certain foods you can see if eliminating one food at time makes a difference, as long as your child can still enjoy a well-balanced diet.     4) WHAT DOES THE ANTIHISTAMINE DO?   Antihistamines help you not to scratch. Scratching only makes the skin more reactive and the barrier function even more disrupted.  It can cause both children and their parents to lose sleep!  There are different types of anti-itch medications.  Some cause more drowsiness than others.  Both types are acceptable depending on your child and your  "preference.  Start with Benadryl and if that does not work, ask for a prescription “antihistamine.”    5) ARE THERE ANY SIDE EFFECTS TO WORRY ABOUT?  Corticosteroid creams and ointments (generally things with \"-one\" or \"susan\" on the end of their names):  The strength of the cream or ointment depends on the name of the active ingredient.  The numbers at the end do not indicate the relative strength.  Thus triamcinolone 0.1% ointment, considered a mid-strength corticosteroid, is much stronger than hydrocortisone 1% even though the number following the name is much lower.  Topical corticosteroids are very effective in treating atopic dermatitis.  When used in the manner prescribed (to rashy areas of skin and for no more than a few weeks at a time to any one area) they are very safe.  These are corticosteroids and are anti-inflammatory, not the “anabolic steroids” like those used illegally by some athletes. It is important that you do not overuse steroid creams, and if you notice a thin, shiny appearance to the skin or broken blood vessels, you should stop using the cream and consult your health care provider regarding possible overuse/overthinning of the skin.  The face, armpits and groin have particularly thin and sensitive skin and are therefore most at risk for bad results if steroids are over-used in these sites.      Topical non-steroid creams and ointments (immunomodulators):  These creams and ointments are also called topical calcineurin inhibitors (TCIs).  These include Protopic ointment and Elidel cream. Crisaborole 2% (Eucrisa) is a prescription ointment that targets an enzyme called PDE4 (phosphodiesterase 4).  It is used on the skin topically to treat mild-to-moderate eczema in adults and children 2 years of age and older.  In total, these nonsteroidal prescriptions are used to help decrease itching and redness in the skin.  They are not as strong as most steroid creams; however, it is believed that they do " not thin the skin when overused.  They are generally used as second-line medications, though they may be used alone or in conjunction with topical steroids.  In sensitive areas such as the face, underarms or groin, they are often recommended.  They can sting inflamed skin, but are generally well tolerated once the skin is healing.    The FDA placed a “black-box” warning on both Elidel and Protopic in 2006 based on animal studies using the medications.  Some animals developed skin cancer and lymphoma.  Subsequently, the FDA released a statement that there is no causal relationship between the two medications and cancer.  Because of this concern, there are ongoing studies to evaluate this relationship in humans.  So far, there are studies that support the safety of these medications.  One showed that the rates of cancer in patient using these medications topically were less than the rates of the general population and another showed that in patient's using the medication over a large area of the body, the levels of the medication in the blood was undetectable.    As for Eucrisa, this product is only approved for the topical treatment of mild-to-moderate eczema in patients 2 years of age and older; use of the medication in kids younger than 2 is considered “off label” and has not been formally studied.  Burning and stinging are the most commonly reported side effects of this medication.  Rarely, this product has been known to cause hives and hypersensitivity reactions; discontinue its use if you develop severe itching, swelling, or redness in the area of application.      INFLAMED SEBORRHEIC KERATOSIS    Physical Exam:  Anatomic Location Affected & Morphological Description:  Waxy well demarcated sessile stuck on brown papule/s with erythema/crusting on the forehead      Additional History of Present Condition:  Present for years, recently has become irritated    Assessment and Plan:  Based on a thorough discussion of  "this condition and the management approach to it (including a comprehensive discussion of the known risks, side effects and potential benefits of treatment), the patient (family) agrees to implement the following specific plan:  Cryotherapy given symptoms and inflammation  After care discussed    PROCEDURE:  DESTRUCTION OF BENIGN LESIONS  After a thorough discussion of treatment options and risk/benefits/alternatives (including but not limited to local pain, scarring, dyspigmentation, blistering, and possible superinfection), verbal and written consent were obtained and the aforementioned lesions were treated on with cryotherapy using liquid nitrogen x 1 cycle for 5-10 seconds.    TOTAL NUMBER of 3 lesions were treated today on the ANATOMIC LOCATION: forehead    The patient tolerated the procedure well, and after-care instructions were provided.       NEOPLASM OF UNCERTAIN BEHAVIOR OF SKIN    Physical Exam:  (Anatomic Location); (Size and Morphological Description); (Differential Diagnosis):  Specimen A; right neck; 0.4 cm skin colored papule ddx idn rule out atypia  Pertinent Positives:  Pertinent Negatives:    Additional History of Present Condition:  Present for a couple months, is sometimes itchy    Assessment and Plan:  I have discussed with the patient that a sample of skin via a \"skin biopsy” would be potentially helpful to further make a specific diagnosis under the microscope.  Based on a thorough discussion of this condition and the management approach to it (including a comprehensive discussion of the known risks, side effects and potential benefits of treatment), the patient (family) agrees to implement the following specific plan:    Procedure:  Skin Biopsy.  After a thorough discussion of treatment options and risk/benefits/alternatives (including but not limited to local pain, scarring, dyspigmentation, blistering, possible superinfection, and inability to confirm a diagnosis via histopathology), " "verbal and written consent were obtained and portion of the rash was biopsied for tissue sample.  See below for consent that was obtained from patient and subsequent Procedure Note.     PROCEDURE TANGENTIAL (SHAVE) BIOPSY NOTE:    Performing Physician:  Dr. Gonzalez  Anatomic Location; Clinical Description with size (cm); Pre-Op Diagnosis:   Specimen A; right neck; 0.4 cm skin colored papule ddx idn rule out atypia  Post-op diagnosis: Same     Local anesthesia: 1% xylocaine with epi      Topical anesthesia: None    Hemostasis: Aluminum chloride       After obtaining informed consent  at which time there was a discussion about the purpose of biopsy  and low risks of infection and bleeding.  The area was prepped and draped in the usual fashion. Anesthesia was obtained with 1% lidocaine with epinephrine. A shave biopsy to an appropriate sampling depth was obtained by Shave (Dermablade or 15 blade) The resulting wound was covered with surgical ointment and bandaged appropriately.     The patient tolerated the procedure well without complications and was without signs of functional compromise.      Specimen has been sent for review by Dermatopathology.    Standard post-procedure care has been explained and has been included in written form within the patient's copy of Informed Consent.    INFORMED CONSENT DISCUSSION AND POST-OPERATIVE INSTRUCTIONS FOR PATIENT    I.  RATIONALE FOR PROCEDURE  I understand that a skin biopsy allows the Dermatologist to test a lesion or rash under the microscope to obtain a diagnosis.  It usually involves numbing the area with numbing medication and removing a small piece of skin; sometimes the area will be closed with sutures. In this specific procedure, sutures are not usually needed.  If any sutures are placed, then they are usually need to be removed in 2 weeks or less.    I understand that my Dermatologist recommends that a skin \"shave\" biopsy be performed today.  A local anesthetic, " "similar to the kind that a dentist uses when filling a cavity, will be injected with a very small needle into the skin area to be sampled.  The injected skin and tissue underneath \"will go to sleep” and become numb so no pain should be felt afterwards.  An instrument shaped like a tiny \"razor blade\" (shave biopsy instrument) will be used to cut a small piece of tissue and skin from the area so that a sample of tissue can be taken and examined more closely under the microscope.  A slight amount of bleeding will occur, but it will be stopped with direct pressure and a pressure bandage and any other appropriate methods.  I understands that a scar will form where the wound was created.  Surgical ointment will be applied to help protect the wound.  Sutures are not usually needed.    II.  RISKS AND POTENTIAL COMPLICATIONS   I understand the risks and potential complications of a skin biopsy include but are not limited to the following:  Bleeding  Infection  Pain  Scar/keloid  Skin discoloration  Incomplete Removal  Recurrence  Nerve Damage/Numbness/Loss of Function  Allergic Reaction to Anesthesia  Biopsies are diagnostic procedures and based on findings additional treatment or evaluation may be required  Loss or destruction of specimen resulting in no additional findings    My Dermatologist has explained to me the nature of the condition, the nature of the procedure, and the benefits to be reasonably expected compared with alternative approaches.  My Dermatologist has discussed the likelihood of major risks or complications of this procedure including the specific risks listed above, such as bleeding, infection, and scarring/keloid.  I understand that a scar is expected after this procedure.  I understand that my physician cannot predict if the scar will form a \"keloid,\" which extends beyond the borders of the wound that is created.  A keloid is a thick, painful, and bumpy scar.  A keloid can be difficult to treat, as it " "does not always respond well to therapy, which includes injecting cortisone directly into the keloid every few weeks.  While this usually reduces the pain and size of the scar, it does not eliminate it.      I understand that photographs may be taken before and after the procedure.  These will be maintained as part of the medical providers confidential records and may not be made available to me.  I further authorize the medical provider to use the photographs for teaching purposes or to illustrate scientific papers, books, or lectures if in his/her judgment, medical research, education, or science may benefit from its use.    I have had an opportunity to fully inquire about the risks and benefits of this procedure and its alternatives.   I have been given ample time and opportunity to ask questions and to seek a second opinion if I wished to do so.  I acknowledge that there have specifically been no guarantees as to the cosmetic results from the procedure.  I am aware that with any procedure there is always the possibility of an unexpected complication.    III. POST-PROCEDURAL CARE (WHAT YOU WILL NEED TO DO \"AFTER THE BIOPSY\" TO OPTIMIZE HEALING)    Keep the area clean and dry.  Try NOT to remove the bandage or get it wet for the first 24 hours.    Gently clean the area and apply surgical ointment (such as Vaseline petrolatum ointment, which is available \"over the counter\" and not a prescription) to the biopsy site for up to 2 weeks straight.  This acts to protect the wound from the outside world.      Sutures are not usually placed in this procedure.  If any sutures were placed, return for suture removal as instructed (generally 1 week for the face, 2 weeks for the body).      Take Acetaminophen (Tylenol) for discomfort, if no contraindications.  Ibuprofen or aspirin could make bleeding worse.    Call our office immediately for signs of infection: fever, chills, increased redness, warmth, tenderness, " discomfort/pain, or pus or foul smell coming from the wound.    WHAT TO DO IF THERE IS ANY BLEEDING?  If a small amount of bleeding is noticed, place a clean cloth over the area and apply firm pressure for ten minutes.  Check the wound after 10 minutes of direct pressure.  If bleeding persists, try one more time for an additional 10 minutes of direct pressure on the area.  If the bleeding becomes heavier or does not stop after the second attempt, or if you have any other questions about this procedure, then please call your [facility] Dermatologist by calling [phone] (SKIN).     I hereby acknowledge that I have reviewed and verified the site with my Dermatologist and have requested and authorized my Dermatologist to proceed with the procedure.      Scribe Attestation      I,:  Jessica Jimenez am acting as a scribe while in the presence of the attending physician.:       I,:  Wild Gonzalez MD personally performed the services described in this documentation    as scribed in my presence.:

## 2024-11-11 PROCEDURE — 88305 TISSUE EXAM BY PATHOLOGIST: CPT | Performed by: STUDENT IN AN ORGANIZED HEALTH CARE EDUCATION/TRAINING PROGRAM

## 2024-11-11 PROCEDURE — 88342 IMHCHEM/IMCYTCHM 1ST ANTB: CPT | Performed by: STUDENT IN AN ORGANIZED HEALTH CARE EDUCATION/TRAINING PROGRAM

## 2024-11-11 NOTE — RESULT ENCOUNTER NOTE
DERMATOPATHOLOGY RESULT NOTE    Results reviewed by ordering physician.  Called patient to personally discuss results. No answer, left voicemail with result.      Instructions for Clinical Derm Team:   (remember to route Result Note to appropriate staff):    None    Result & Plan by Specimen:    Specimen A: benign  Plan: reassured, benign        A. Skin, right neck, shave biopsy:    SEBORRHEIC KERATOSIS, irritated and inflamed.      Electronically signed by Geovany Baptiste MD on 11/11/2024 at 10:18 AM

## 2024-11-12 ENCOUNTER — APPOINTMENT (OUTPATIENT)
Dept: LAB | Facility: MEDICAL CENTER | Age: 74
End: 2024-11-12

## 2024-11-12 DIAGNOSIS — Z00.6 ENCOUNTER FOR EXAMINATION FOR NORMAL COMPARISON OR CONTROL IN CLINICAL RESEARCH PROGRAM: ICD-10-CM

## 2024-11-12 PROCEDURE — 36415 COLL VENOUS BLD VENIPUNCTURE: CPT

## 2024-11-19 LAB
APOB+LDLR+PCSK9 GENE MUT ANL BLD/T: NOT DETECTED
BRCA1+BRCA2 DEL+DUP + FULL MUT ANL BLD/T: NOT DETECTED
MLH1+MSH2+MSH6+PMS2 GN DEL+DUP+FUL M: NOT DETECTED

## 2024-12-11 ENCOUNTER — OFFICE VISIT (OUTPATIENT)
Dept: FAMILY MEDICINE CLINIC | Facility: CLINIC | Age: 74
End: 2024-12-11
Payer: MEDICARE

## 2024-12-11 VITALS
BODY MASS INDEX: 28.49 KG/M2 | HEART RATE: 62 BPM | RESPIRATION RATE: 12 BRPM | WEIGHT: 166 LBS | OXYGEN SATURATION: 99 % | DIASTOLIC BLOOD PRESSURE: 84 MMHG | SYSTOLIC BLOOD PRESSURE: 122 MMHG

## 2024-12-11 DIAGNOSIS — G43.709 CHRONIC MIGRAINE WITHOUT AURA WITHOUT STATUS MIGRAINOSUS, NOT INTRACTABLE: ICD-10-CM

## 2024-12-11 DIAGNOSIS — M80.08XD AGE-RELATED OSTEOPOROSIS WITH CURRENT PATHOLOGICAL FRACTURE OF VERTEBRA WITH ROUTINE HEALING, SUBSEQUENT ENCOUNTER: ICD-10-CM

## 2024-12-11 DIAGNOSIS — Z78.0 POST-MENOPAUSAL: ICD-10-CM

## 2024-12-11 DIAGNOSIS — Z00.00 MEDICARE ANNUAL WELLNESS VISIT, SUBSEQUENT: Primary | ICD-10-CM

## 2024-12-11 DIAGNOSIS — G60.9 IDIOPATHIC PERIPHERAL NEUROPATHY: ICD-10-CM

## 2024-12-11 DIAGNOSIS — D70.9 NEUTROPENIA, UNSPECIFIED TYPE (HCC): ICD-10-CM

## 2024-12-11 DIAGNOSIS — F43.9 STRESS AT HOME: ICD-10-CM

## 2024-12-11 DIAGNOSIS — S32.030A CLOSED COMPRESSION FRACTURE OF L3 LUMBAR VERTEBRA, INITIAL ENCOUNTER (HCC): ICD-10-CM

## 2024-12-11 DIAGNOSIS — E80.6 HYPERBILIRUBINEMIA: ICD-10-CM

## 2024-12-11 DIAGNOSIS — E78.00 PURE HYPERCHOLESTEROLEMIA: ICD-10-CM

## 2024-12-11 DIAGNOSIS — K21.9 GASTROESOPHAGEAL REFLUX DISEASE WITHOUT ESOPHAGITIS: ICD-10-CM

## 2024-12-11 DIAGNOSIS — M23.203 OLD TEAR OF MEDIAL MENISCUS OF RIGHT KNEE, UNSPECIFIED TEAR TYPE: ICD-10-CM

## 2024-12-11 DIAGNOSIS — K51.00 ULCERATIVE PANCOLITIS WITHOUT COMPLICATION (HCC): ICD-10-CM

## 2024-12-11 DIAGNOSIS — L65.9 HAIR LOSS DISORDER: ICD-10-CM

## 2024-12-11 DIAGNOSIS — I10 ESSENTIAL HYPERTENSION: ICD-10-CM

## 2024-12-11 PROCEDURE — 99214 OFFICE O/P EST MOD 30 MIN: CPT | Performed by: FAMILY MEDICINE

## 2024-12-11 PROCEDURE — G0439 PPPS, SUBSEQ VISIT: HCPCS | Performed by: FAMILY MEDICINE

## 2024-12-11 NOTE — ASSESSMENT & PLAN NOTE
Continue rosuvastatin.  Continue routine monitoring of lipids.  Orders:    Lipid Panel with Direct LDL reflex; Future

## 2024-12-11 NOTE — ASSESSMENT & PLAN NOTE
Continue routine monitoring of CBC.  Orders:    CBC and differential; Future    Comprehensive metabolic panel; Future

## 2024-12-11 NOTE — ASSESSMENT & PLAN NOTE
Symptoms seem to be quite stable at this time.  I encouraged her to get a repeat colonoscopy as she is due.  She notes she has a lot going on right now would like to postpone that for the time being.  We will certainly discuss at her follow-up.

## 2024-12-11 NOTE — PROGRESS NOTES
Name: Fabiola Christina      : 1950      MRN: [id number]  Encounter Provider: Shlomo Castro Jr, MD  Encounter Date: 2024   Encounter department: [facility] PRIMARY [facility]ARE    Assessment & Plan  Medicare annual wellness visit, subsequent  She is up-to-date with vaccines except for COVID.  She would like to hold off on that today.  She also is a candidate for Shingrix but did have Zostavax.  RSV would be up to her at this point.  It is available at the pharmacies.       Essential hypertension  Blood pressure is well-controlled at this time.  Orders:    CBC and differential; Future    Comprehensive metabolic panel; Future    Lipid Panel with Direct LDL reflex; Future    Neutropenia, unspecified type (HCC)  Continue routine monitoring of CBC.  Orders:    CBC and differential; Future    Comprehensive metabolic panel; Future    Pure hypercholesterolemia  Continue rosuvastatin.  Continue routine monitoring of lipids.  Orders:    Lipid Panel with Direct LDL reflex; Future    Hyperbilirubinemia  Check CMP  Orders:    Comprehensive metabolic panel; Future    Age-related osteoporosis with current pathological fracture of vertebra with routine healing, subsequent encounter  Check DEXA  Orders:    DXA bone density spine hip and pelvis; Future    Old tear of medial meniscus of right knee, unspecified tear type  Symptoms seem most consistent with a meniscal tear which was noted on previous MRI.  She is not anxious to go undergo surgery now so she will continue work on stretching and strengthening.  She did see Dr. Jewell previously.       Closed compression fracture of L3 lumbar vertebra, initial encounter ([facility])  Fortune, symptoms are much improved from her previous fracture.  Continue routine stretching and exercise.  Check DEXA.       Chronic migraine without aura without status migrainosus, not intractable  Migraines are stable.       Gastroesophageal reflux disease without esophagitis  Stable at  this time.  She just taking as needed Pepcid.       Idiopathic peripheral neuropathy    Orders:    Comprehensive metabolic panel; Future    Ulcerative pancolitis without complication (HCC)  Symptoms seem to be quite stable at this time.  I encouraged her to get a repeat colonoscopy as she is due.  She notes she has a lot going on right now would like to postpone that for the time being.  We will certainly discuss at her follow-up.       Hair loss disorder  She is experiencing some hair loss.  It is only minimally notable on exam.  She is going to continue follow with dermatology.  Check labs.  Orders:    CBC and differential; Future    Comprehensive metabolic panel; Future    TSH, 3rd generation with Free T4 reflex; Future    Iron, TIBC and Ferritin Panel    Post-menopausal    Orders:    DXA bone density spine hip and pelvis; Future    Stress at home  Her boyfriend is undergoing a workup for a rare adrenal cancer that we found.  They are getting a second opinion at [facility].  Her daughter also is having some childcare issues.  The patient denies significant depression or anxiety.  She was slightly tearful on exam today.  I offered talk therapy but she would like to hold off.  She does not seem to require medication at this time and seems to have a pretty good handle on how this is affecting her.           Depression Screening and Follow-up Plan: Patient was screened for depression during today's encounter. They screened negative with a PHQ-2 score of 0.      Preventive health issues were discussed with patient, and age appropriate screening tests were ordered as noted in patient's After Visit Summary. Personalized health advice and appropriate referrals for health education or preventive services given if needed, as noted in patient's After Visit Summary.    History of Present Illness     HPI patient presents today for follow-up of chronic health issues as well as wellness visit.  She has been having a lot of  stress lately as her boyfriend is undergoing a workup and treatment for an adrenal cancer.  She also has been trying to help her daughter with her children.  She is found this has all been very stressful on her and she is having some difficulty managing.  She denies excessive depression but finds her self tearful on occasion.  She has anxiety about what is going on but nothing persistent.  She denies any thoughts of self-harm.  She has some chronic bilateral hip pain as well as low back pain.  She is history hypertension and remains on diltiazem.  She denies chest pain, shortness breath or palpitations.  Migraines also remain stable.  Patient Care Team:  Shlomo Evans Jr., MD as PCP - General  DO Nicolas Curry MD Mary Greiss-Coult, DO    Review of Systems   Constitutional:  Negative for appetite change, chills, fatigue, fever and unexpected weight change.   HENT:  Negative for trouble swallowing.    Eyes:  Negative for visual disturbance.   Respiratory:  Negative for cough, chest tightness, shortness of breath and wheezing.    Cardiovascular:  Negative for chest pain, palpitations and leg swelling.   Gastrointestinal:  Negative for abdominal distention, abdominal pain, blood in stool, constipation and diarrhea.   Endocrine: Negative for polyuria.   Genitourinary:  Negative for difficulty urinating and flank pain.   Musculoskeletal:  Negative for arthralgias and myalgias.   Skin:  Negative for rash.   Neurological:  Negative for dizziness and light-headedness.   Hematological:  Negative for adenopathy. Does not bruise/bleed easily.   Psychiatric/Behavioral:  Negative for dysphoric mood and sleep disturbance. The patient is not nervous/anxious.      Medical History Reviewed by provider this encounter:  Tobacco  Allergies  Meds  Problems  Med Hx  Surg Hx  Fam Hx       Annual Wellness Visit Questionnaire   Fabiola is here for her Subsequent Wellness visit.     Health Risk Assessment:    Patient rates overall health as very good. Patient feels that their physical health rating is same. Patient is very satisfied with their life. Eyesight was rated as slightly worse. Hearing was rated as same. Patient feels that their emotional and mental health rating is slightly worse. Patients states they are never, rarely angry. Patient states they are never, rarely unusually tired/fatigued. Pain experienced in the last 7 days has been some. Patient's pain rating has been 4/10. Patient states that she has experienced no weight loss or gain in last 6 months.     Depression Screening:   PHQ-2 Score: 0      Fall Risk Screening:   In the past year, patient has experienced: no history of falling in past year      Urinary Incontinence Screening:   Patient has not leaked urine accidently in the last six months.     Home Safety:  Patient does not have trouble with stairs inside or outside of their home. Patient has working smoke alarms and has working carbon monoxide detector. Home safety hazards include: none.     Nutrition:   Current diet is Regular.     Medications:   Patient is currently taking over-the-counter supplements. OTC medications include: see medication list. Patient is able to manage medications.     Activities of Daily Living (ADLs)/Instrumental Activities of Daily Living (IADLs):   Walk and transfer into and out of bed and chair?: Yes  Dress and groom yourself?: Yes    Bathe or shower yourself?: Yes    Feed yourself? Yes  Do your laundry/housekeeping?: Yes  Manage your money, pay your bills and track your expenses?: Yes  Make your own meals?: Yes    Do your own shopping?: Yes    Previous Hospitalizations:   Any hospitalizations or ED visits within the last 12 months?: No      Advance Care Planning:   Living will: No    Durable POA for healthcare: No    Advanced directive: No    End of Life Decisions reviewed with patient: Yes      PREVENTIVE SCREENINGS      Cardiovascular Screening:    General:  Screening Not Indicated and History Lipid Disorder      Diabetes Screening:     General: Screening Current      Colorectal Cancer Screening:     General: Screening Current      Breast Cancer Screening:     General: History Breast Cancer      Cervical Cancer Screening:    General: Screening Not Indicated      Osteoporosis Screening:    General: Screening Not Indicated and History Osteoporosis      Lung Cancer Screening:     General: Screening Not Indicated      Hepatitis C Screening:    General: Screening Current    Screening, Brief Intervention, and Referral to Treatment (SBIRT)    Screening  Typical number of drinks in a day: 0  Typical number of drinks in a week: 2  Interpretation: Low risk drinking behavior.    AUDIT-C Screenin) How often did you have a drink containing alcohol in the past year? 2 to 3 times a week  2) How many drinks did you have on a typical day when you were drinking in the past year? 1 to 2  3) How often did you have 6 or more drinks on one occasion in the past year? never    AUDIT-C Score: 3  Interpretation: Score 3-12 (female): POSITIVE screen for alcohol misuse    AUDIT Screenin) How often during the last year have you found that you were not able to stop drinking once you had started? 0 - never  5) How often during the last year have you failed to do what was normally expected from you because of drinking? 0 - never  6) How often during the last year have you needed a first drink in the morning to get yourself going after a heavy drinking session? 0 - never  7) How often during the last year have you had a feeling of guilt or remorse after drinking? 0 - never  8) How often during the last year have you been unable to remember what happened the night before because you had been drinking? 0 - never  9) Have you or someone else been injured as a result of your drinking? 0 - no  10) Has a relative or friend or a doctor or another health worker been concerned about your drinking or  suggested you cut down? 0 - no    AUDIT Score: 3  Interpretation: Low risk alcohol consumption    Single Item Drug Screening:  How often have you used an illegal drug (including marijuana) or a prescription medication for non-medical reasons in the past year? never    Single Item Drug Screen Score: 0  Interpretation: Negative screen for possible drug use disorder    Social Drivers of Health     Financial Resource Strain: Low Risk  (12/6/2023)    Overall Financial Resource Strain (CARDIA)     Difficulty of Paying Living Expenses: Not hard at all   Food Insecurity: No Food Insecurity (12/8/2024)    Hunger Vital Sign     Worried About Running Out of Food in the Last Year: Never true     Ran Out of Food in the Last Year: Never true   Transportation Needs: No Transportation Needs (12/8/2024)    PRAPARE - Transportation     Lack of Transportation (Medical): No     Lack of Transportation (Non-Medical): No   Housing Stability: Low Risk  (12/8/2024)    Housing Stability Vital Sign     Unable to Pay for Housing in the Last Year: No     Number of Times Moved in the Last Year: 0     Homeless in the Last Year: No   Utilities: Not At Risk (12/8/2024)    [facility] Utilities     Threatened with loss of utilities: No     No results found.    Objective   /84 (BP Location: Left arm, Patient Position: Sitting, Cuff Size: Standard)   Pulse 62   Resp 12   Wt 75.3 kg (166 lb)   SpO2 99%   BMI 28.49 kg/m²     Physical Exam  Constitutional:       General: She is not in acute distress.     Appearance: She is well-developed. She is not diaphoretic.   HENT:      Head: Normocephalic.      Right Ear: External ear normal.      Left Ear: External ear normal.      Nose: Nose normal.   Eyes:      General: No scleral icterus.        Right eye: No discharge.         Left eye: No discharge.      Conjunctiva/sclera: Conjunctivae normal.      Pupils: Pupils are equal, round, and reactive to light.   Neck:      Thyroid: No thyromegaly.      Trachea:  No tracheal deviation.   Cardiovascular:      Rate and Rhythm: Normal rate and regular rhythm.      Heart sounds: Normal heart sounds. No murmur heard.  Pulmonary:      Effort: Pulmonary effort is normal. No respiratory distress.      Breath sounds: Normal breath sounds. No stridor. No wheezing, rhonchi or rales.   Abdominal:      General: Bowel sounds are normal. There is no distension.      Palpations: Abdomen is soft.      Tenderness: There is no abdominal tenderness. There is no guarding.   Musculoskeletal:         General: No tenderness or deformity. Normal range of motion.      Right lower leg: No edema.      Left lower leg: No edema.   Lymphadenopathy:      Cervical: No cervical adenopathy.   Skin:     General: Skin is warm.      Coloration: Skin is not pale.      Findings: No erythema or rash.   Neurological:      Cranial Nerves: No cranial nerve deficit.      Coordination: Coordination normal.   Psychiatric:         Mood and Affect: Mood normal.         Behavior: Behavior normal.         Thought Content: Thought content normal.

## 2024-12-11 NOTE — ASSESSMENT & PLAN NOTE
Blood pressure is well-controlled at this time.  Orders:    CBC and differential; Future    Comprehensive metabolic panel; Future    Lipid Panel with Direct LDL reflex; Future

## 2024-12-11 NOTE — ASSESSMENT & PLAN NOTE
Symptoms seem most consistent with a meniscal tear which was noted on previous MRI.  She is not anxious to go undergo surgery now so she will continue work on stretching and strengthening.  She did see Dr. Jewell previously.

## 2024-12-11 NOTE — ASSESSMENT & PLAN NOTE
Her boyfriend is undergoing a workup for a rare adrenal cancer that we found.  They are getting a second opinion at [facility].  Her daughter also is having some childcare issues.  The patient denies significant depression or anxiety.  She was slightly tearful on exam today.  I offered talk therapy but she would like to hold off.  She does not seem to require medication at this time and seems to have a pretty good handle on how this is affecting her.

## 2024-12-11 NOTE — ASSESSMENT & PLAN NOTE
Fortune, symptoms are much improved from her previous fracture.  Continue routine stretching and exercise.  Check DEXA.

## 2024-12-23 DIAGNOSIS — F41.8 TEST ANXIETY: Primary | ICD-10-CM

## 2024-12-23 RX ORDER — LORAZEPAM 0.5 MG/1
0.5 TABLET ORAL SEE ADMIN INSTRUCTIONS
Qty: 2 TABLET | Refills: 0 | Status: SHIPPED | OUTPATIENT
Start: 2024-12-23

## 2025-01-03 ENCOUNTER — APPOINTMENT (OUTPATIENT)
Dept: LAB | Facility: MEDICAL CENTER | Age: 75
End: 2025-01-03
Payer: MEDICARE

## 2025-01-03 ENCOUNTER — RESULTS FOLLOW-UP (OUTPATIENT)
Dept: FAMILY MEDICINE CLINIC | Facility: CLINIC | Age: 75
End: 2025-01-03

## 2025-01-03 DIAGNOSIS — L65.9 HAIR LOSS DISORDER: ICD-10-CM

## 2025-01-03 DIAGNOSIS — E78.00 PURE HYPERCHOLESTEROLEMIA: ICD-10-CM

## 2025-01-03 DIAGNOSIS — D70.9 NEUTROPENIA, UNSPECIFIED TYPE (HCC): ICD-10-CM

## 2025-01-03 DIAGNOSIS — E80.6 HYPERBILIRUBINEMIA: ICD-10-CM

## 2025-01-03 DIAGNOSIS — I10 ESSENTIAL HYPERTENSION: ICD-10-CM

## 2025-01-03 DIAGNOSIS — G60.9 IDIOPATHIC PERIPHERAL NEUROPATHY: ICD-10-CM

## 2025-01-03 LAB
ALBUMIN SERPL BCG-MCNC: 4.3 G/DL (ref 3.5–5)
ALP SERPL-CCNC: 87 U/L (ref 34–104)
ALT SERPL W P-5'-P-CCNC: 37 U/L (ref 7–52)
ANION GAP SERPL CALCULATED.3IONS-SCNC: 9 MMOL/L (ref 4–13)
AST SERPL W P-5'-P-CCNC: 30 U/L (ref 13–39)
BASOPHILS # BLD AUTO: 0.09 THOUSANDS/ΜL (ref 0–0.1)
BASOPHILS NFR BLD AUTO: 2 % (ref 0–1)
BILIRUB SERPL-MCNC: 1.22 MG/DL (ref 0.2–1)
BUN SERPL-MCNC: 16 MG/DL (ref 5–25)
CALCIUM SERPL-MCNC: 9.4 MG/DL (ref 8.4–10.2)
CHLORIDE SERPL-SCNC: 104 MMOL/L (ref 96–108)
CHOLEST SERPL-MCNC: 155 MG/DL (ref ?–200)
CO2 SERPL-SCNC: 28 MMOL/L (ref 21–32)
CREAT SERPL-MCNC: 0.88 MG/DL (ref 0.6–1.3)
EOSINOPHIL # BLD AUTO: 0.17 THOUSAND/ΜL (ref 0–0.61)
EOSINOPHIL NFR BLD AUTO: 3 % (ref 0–6)
ERYTHROCYTE [DISTWIDTH] IN BLOOD BY AUTOMATED COUNT: 14.1 % (ref 11.6–15.1)
EST. AVERAGE GLUCOSE BLD GHB EST-MCNC: 123 MG/DL
FERRITIN SERPL-MCNC: 121 NG/ML (ref 11–307)
GFR SERPL CREATININE-BSD FRML MDRD: 65 ML/MIN/1.73SQ M
GLUCOSE P FAST SERPL-MCNC: 107 MG/DL (ref 65–99)
HBA1C MFR BLD: 5.9 %
HCT VFR BLD AUTO: 41.7 % (ref 34.8–46.1)
HDLC SERPL-MCNC: 48 MG/DL
HGB BLD-MCNC: 13.5 G/DL (ref 11.5–15.4)
IMM GRANULOCYTES # BLD AUTO: 0.01 THOUSAND/UL (ref 0–0.2)
IMM GRANULOCYTES NFR BLD AUTO: 0 % (ref 0–2)
IRON SATN MFR SERPL: 28 % (ref 15–50)
IRON SERPL-MCNC: 91 UG/DL (ref 50–212)
LDLC SERPL CALC-MCNC: 80 MG/DL (ref 0–100)
LYMPHOCYTES # BLD AUTO: 1.91 THOUSANDS/ΜL (ref 0.6–4.47)
LYMPHOCYTES NFR BLD AUTO: 35 % (ref 14–44)
MCH RBC QN AUTO: 28.1 PG (ref 26.8–34.3)
MCHC RBC AUTO-ENTMCNC: 32.4 G/DL (ref 31.4–37.4)
MCV RBC AUTO: 87 FL (ref 82–98)
MONOCYTES # BLD AUTO: 0.54 THOUSAND/ΜL (ref 0.17–1.22)
MONOCYTES NFR BLD AUTO: 10 % (ref 4–12)
NEUTROPHILS # BLD AUTO: 2.82 THOUSANDS/ΜL (ref 1.85–7.62)
NEUTS SEG NFR BLD AUTO: 50 % (ref 43–75)
NRBC BLD AUTO-RTO: 0 /100 WBCS
PLATELET # BLD AUTO: 274 THOUSANDS/UL (ref 149–390)
PMV BLD AUTO: 10 FL (ref 8.9–12.7)
POTASSIUM SERPL-SCNC: 4.1 MMOL/L (ref 3.5–5.3)
PROT SERPL-MCNC: 6.4 G/DL (ref 6.4–8.4)
RBC # BLD AUTO: 4.8 MILLION/UL (ref 3.81–5.12)
SODIUM SERPL-SCNC: 141 MMOL/L (ref 135–147)
TIBC SERPL-MCNC: 329 UG/DL (ref 250–450)
TRANSFERRIN SERPL-MCNC: 235 MG/DL (ref 203–362)
TRIGL SERPL-MCNC: 133 MG/DL (ref ?–150)
TSH SERPL DL<=0.05 MIU/L-ACNC: 1.44 UIU/ML (ref 0.45–4.5)
UIBC SERPL-MCNC: 238 UG/DL (ref 155–355)
WBC # BLD AUTO: 5.54 THOUSAND/UL (ref 4.31–10.16)

## 2025-01-03 PROCEDURE — 85025 COMPLETE CBC W/AUTO DIFF WBC: CPT

## 2025-01-03 PROCEDURE — 80053 COMPREHEN METABOLIC PANEL: CPT

## 2025-01-03 PROCEDURE — 36415 COLL VENOUS BLD VENIPUNCTURE: CPT

## 2025-01-03 PROCEDURE — 80061 LIPID PANEL: CPT

## 2025-01-03 PROCEDURE — 83550 IRON BINDING TEST: CPT

## 2025-01-03 PROCEDURE — 84443 ASSAY THYROID STIM HORMONE: CPT

## 2025-01-03 PROCEDURE — 83540 ASSAY OF IRON: CPT

## 2025-01-03 PROCEDURE — 82728 ASSAY OF FERRITIN: CPT

## 2025-01-20 ENCOUNTER — HOSPITAL ENCOUNTER (OUTPATIENT)
Dept: RADIOLOGY | Facility: HOSPITAL | Age: 75
Discharge: HOME | End: 2025-01-20
Payer: MEDICARE

## 2025-01-20 VITALS — HEIGHT: 64 IN | WEIGHT: 162 LBS | BODY MASS INDEX: 27.66 KG/M2

## 2025-01-20 DIAGNOSIS — Z08 ENCOUNTER FOR FOLLOW-UP SURVEILLANCE OF BREAST CANCER: ICD-10-CM

## 2025-01-20 DIAGNOSIS — Z12.31 SCREENING MAMMOGRAM FOR BREAST CANCER: ICD-10-CM

## 2025-01-20 DIAGNOSIS — Z85.3 PERSONAL HISTORY OF ADENOCARCINOMA OF BREAST: ICD-10-CM

## 2025-01-20 DIAGNOSIS — Z85.3 ENCOUNTER FOR FOLLOW-UP SURVEILLANCE OF BREAST CANCER: ICD-10-CM

## 2025-01-20 PROCEDURE — C8937 CAD BREAST MRI: HCPCS

## 2025-01-20 PROCEDURE — 77063 BREAST TOMOSYNTHESIS BI: CPT

## 2025-01-20 PROCEDURE — A9585 GADOBUTROL INJECTION: HCPCS

## 2025-01-20 PROCEDURE — 77067 SCR MAMMO BI INCL CAD: CPT

## 2025-01-20 PROCEDURE — C8908 MRI W/O FOL W/CONT, BREAST,: HCPCS

## 2025-01-20 RX ORDER — GADOBUTROL 604.72 MG/ML
8 INJECTION INTRAVENOUS
Status: COMPLETED | OUTPATIENT
Start: 2025-01-20 | End: 2025-01-20

## 2025-01-20 RX ADMIN — GADOBUTROL 8 ML: 604.72 INJECTION INTRAVENOUS at 14:30

## 2025-01-24 ENCOUNTER — OFFICE VISIT (OUTPATIENT)
Dept: FAMILY MEDICINE CLINIC | Facility: CLINIC | Age: 75
End: 2025-01-24
Payer: MEDICARE

## 2025-01-24 VITALS
DIASTOLIC BLOOD PRESSURE: 72 MMHG | OXYGEN SATURATION: 99 % | SYSTOLIC BLOOD PRESSURE: 126 MMHG | WEIGHT: 167 LBS | BODY MASS INDEX: 28.67 KG/M2 | HEART RATE: 61 BPM

## 2025-01-24 DIAGNOSIS — K51.00 ULCERATIVE PANCOLITIS WITHOUT COMPLICATION (HCC): ICD-10-CM

## 2025-01-24 DIAGNOSIS — E80.6 HYPERBILIRUBINEMIA: ICD-10-CM

## 2025-01-24 DIAGNOSIS — E66.3 OVERWEIGHT (BMI 25.0-29.9): ICD-10-CM

## 2025-01-24 DIAGNOSIS — I10 ESSENTIAL HYPERTENSION: Primary | ICD-10-CM

## 2025-01-24 DIAGNOSIS — M75.82 ROTATOR CUFF TENDINITIS, LEFT: ICD-10-CM

## 2025-01-24 DIAGNOSIS — E78.00 PURE HYPERCHOLESTEROLEMIA: ICD-10-CM

## 2025-01-24 DIAGNOSIS — R73.9 HYPERGLYCEMIA: ICD-10-CM

## 2025-01-24 DIAGNOSIS — R53.82 CHRONIC FATIGUE: ICD-10-CM

## 2025-01-24 DIAGNOSIS — D70.9 NEUTROPENIA, UNSPECIFIED TYPE (HCC): ICD-10-CM

## 2025-01-24 PROCEDURE — 99214 OFFICE O/P EST MOD 30 MIN: CPT | Performed by: FAMILY MEDICINE

## 2025-01-24 PROCEDURE — G2211 COMPLEX E/M VISIT ADD ON: HCPCS | Performed by: FAMILY MEDICINE

## 2025-01-24 NOTE — ASSESSMENT & PLAN NOTE
Monitor bili levels.      Orders:    Hemoglobin A1C; Future    Lipid Panel with Direct LDL reflex; Future

## 2025-01-24 NOTE — ASSESSMENT & PLAN NOTE
Continue routine monitoring of labs.  Continue rosuvastatin.  Orders:    Lipid Panel with Direct LDL reflex; Future

## 2025-01-24 NOTE — ASSESSMENT & PLAN NOTE
She has some pain in her left anterior shoulder consistent with rotator cuff tendinitis.  I offered physical therapy but she declined at this time.  She is going to try some home stretching exercises.

## 2025-01-24 NOTE — ASSESSMENT & PLAN NOTE
Well-controlled at this time.  Orders:    CBC and differential; Future    Comprehensive metabolic panel; Future

## 2025-01-24 NOTE — PROGRESS NOTES
Name: Fabiola Christina      : 1950      MRN: [id number]  Encounter Provider: Shlomo Castro Jr, MD  Encounter Date: 2025   Encounter department: [facility]  :  Assessment & Plan  Essential hypertension  Well-controlled at this time.  Orders:    CBC and differential; Future    Comprehensive metabolic panel; Future    Pure hypercholesterolemia  Continue routine monitoring of labs.  Continue rosuvastatin.  Orders:    Lipid Panel with Direct LDL reflex; Future    Hyperbilirubinemia  Monitor bili levels.      Orders:    Hemoglobin A1C; Future    Lipid Panel with Direct LDL reflex; Future    Rotator cuff tendinitis, left  She has some pain in her left anterior shoulder consistent with rotator cuff tendinitis.  I offered physical therapy but she declined at this time.  She is going to try some home stretching exercises.       Neutropenia, unspecified type (HCC)  Continue routine monitoring of labs.       Ulcerative pancolitis without complication (HCC)  Fortunately, she remains quite stable at this time.       Chronic fatigue  She does note some chronic fatigue.  Labs have been ordered.  Orders:    CBC and differential; Future    TSH, 3rd generation with Free T4 reflex; Future    Hyperglycemia    Orders:    Comprehensive metabolic panel; Future    Hemoglobin A1C; Future    Overweight (BMI 25.0-29.9)    She is quite frustrated by difficulty losing weight.  We discussed potential weight loss options but Medicare does not cover GLP-1 agonist.  We discussed the importance of caloric restriction and routine exercise.  Fortunately, recent labs look normal.              History of Present Illness   HPI patient presents today for follow-up for chronic health issues.  She remains extremely upset by her difficulty losing weight.  She has gained a few pounds recently which is very frustrating to her.  She brings in some documentation of her last several meals.  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  She exercises routinely as well.  Review of Systems   Constitutional:  Negative for appetite change, chills, fatigue, fever and unexpected weight change.   HENT:  Negative for trouble swallowing.    Eyes:  Negative for visual disturbance.   Respiratory:  Negative for cough, chest tightness, shortness of breath and wheezing.    Cardiovascular:  Negative for chest pain, palpitations and leg swelling.   Gastrointestinal:  Negative for abdominal distention, abdominal pain, blood in stool, constipation and diarrhea.   Endocrine: Negative for polyuria.   Genitourinary:  Negative for difficulty urinating and flank pain.   Musculoskeletal:  Positive for arthralgias (Left shoulder). Negative for myalgias.   Skin:  Negative for rash.   Neurological:  Negative for dizziness and light-headedness.   Hematological:  Negative for adenopathy. Does not bruise/bleed easily.   Psychiatric/Behavioral:  Negative for dysphoric mood and sleep disturbance. The patient is not nervous/anxious.        Objective   /72   Pulse 61   Wt 75.8 kg (167 lb)   SpO2 99%   BMI 28.67 kg/m²      Physical Exam  Constitutional:       General: She is not in acute distress.     Appearance: She is well-developed. She is not diaphoretic.   HENT:      Head: Normocephalic.      Right Ear: External ear normal.      Left Ear: External ear normal.      Nose: Nose normal.   Eyes:      General: No scleral icterus.        Right eye: No discharge.         Left eye: No discharge.      Conjunctiva/sclera: Conjunctivae normal.      Pupils: Pupils are equal, round, and reactive to light.   Neck:      Thyroid: No thyromegaly.      Trachea: No tracheal deviation.   Cardiovascular:      Rate and Rhythm: Normal rate and regular rhythm.      Heart sounds: Normal heart sounds. No murmur heard.  Pulmonary:      Effort: Pulmonary effort is normal. No respiratory distress.      Breath sounds: Normal breath sounds. No stridor. No  wheezing, rhonchi or rales.   Abdominal:      General: Bowel sounds are normal. There is no distension.      Palpations: Abdomen is soft.      Tenderness: There is no abdominal tenderness. There is no guarding.   Musculoskeletal:         General: No tenderness or deformity. Normal range of motion.      Right lower leg: No edema.      Left lower leg: No edema.   Lymphadenopathy:      Cervical: No cervical adenopathy.   Skin:     General: Skin is warm.      Coloration: Skin is not pale.      Findings: No erythema or rash.   Neurological:      Cranial Nerves: No cranial nerve deficit.      Coordination: Coordination normal.   Psychiatric:         Mood and Affect: Mood normal.         Behavior: Behavior normal.         Thought Content: Thought content normal.

## 2025-01-27 PROBLEM — E66.3 OVERWEIGHT (BMI 25.0-29.9): Status: ACTIVE | Noted: 2025-01-27

## 2025-01-28 NOTE — ASSESSMENT & PLAN NOTE
She is quite frustrated by difficulty losing weight.  We discussed potential weight loss options but Medicare does not cover GLP-1 agonist.  We discussed the importance of caloric restriction and routine exercise.  Fortunately, recent labs look normal.

## 2025-03-11 ENCOUNTER — HOSPITAL ENCOUNTER (OUTPATIENT)
Dept: BONE DENSITY | Facility: IMAGING CENTER | Age: 75
Discharge: HOME | End: 2025-03-11
Payer: MEDICARE

## 2025-03-11 VITALS — WEIGHT: 167.8 LBS | HEIGHT: 64 IN | BODY MASS INDEX: 28.65 KG/M2

## 2025-03-11 DIAGNOSIS — M80.08XD AGE-RELATED OSTEOPOROSIS WITH CURRENT PATHOLOGICAL FRACTURE OF VERTEBRA WITH ROUTINE HEALING, SUBSEQUENT ENCOUNTER: ICD-10-CM

## 2025-03-11 DIAGNOSIS — Z78.0 POST-MENOPAUSAL: ICD-10-CM

## 2025-03-11 PROCEDURE — 77080 DXA BONE DENSITY AXIAL: CPT

## 2025-03-27 ENCOUNTER — TELEPHONE (OUTPATIENT)
Age: 75
End: 2025-03-27

## 2025-03-27 NOTE — TELEPHONE ENCOUNTER
APPOINTMENTS MUST BE SCHEDULED A MINIMUM OF 14 DAYS PRIOR TO LEAVING FOR THEIR TRIP:??????   When are you traveling? September 17th - September 30th  Where specifically are you traveling to and for how long? (Must include entering country to leaving country) [facility], [facility], [facility]  Are you scheduling this appointment for just you or a group of people?? If scheduling for a group, how many?? (Maximum of 6 individuals, more than 6 send message to office for approval) 2  Have you ever been seen in our office before??No  If yes, record must be found within patients’ chart  For our records: What pharmacy do you use? [facility] (on file)  General Information:  Please be advised that we do not accept insurance for these visits. It will be out of pocket.  Payment is collected after your appointment with the physician and is based on his individual recommendations and what you receive here in the office.  Our provider will only offer what is required/highly recommended for your trip.  Previous patients who were already seen and only require malaria prophylaxis do not need scheduled visit. Send the medication request to the office for refill.  We do not carry many of the immunizations within our office due to how expensive they can be. Please note: immunizations will most likely need to be ordered and take up to two to three days to come in. We will call you when they arrive.  If you are being seen in a group: Please arrive 15 minutes prior to the earliest appointment time as you will be seen together.  Groups with 3 or more people:  Schedule 15-minute appointment for each patient at the end of the day.  If patient is 12 years old or younger:  Always book patient at the end of the day. Use more than one time slot as necessary.    - Please bring your itinerary with you and your yellow CDC card, if you have one. If you do not have a card, we will provide you with one.    - Please be  aware that when you receive your vaccinations, you will be asked to remain in the office for 15 minutes prior to getting your vaccine.

## 2025-04-23 DIAGNOSIS — M25.512 LEFT SHOULDER PAIN, UNSPECIFIED CHRONICITY: Primary | ICD-10-CM

## 2025-05-06 ENCOUNTER — OFFICE VISIT (OUTPATIENT)
Dept: OBGYN CLINIC | Facility: HOSPITAL | Age: 75
End: 2025-05-06
Payer: MEDICARE

## 2025-05-06 ENCOUNTER — HOSPITAL ENCOUNTER (OUTPATIENT)
Dept: RADIOLOGY | Facility: HOSPITAL | Age: 75
Discharge: HOME | End: 2025-05-06
Attending: ORTHOPAEDIC SURGERY
Payer: MEDICARE

## 2025-05-06 VITALS — HEIGHT: 64 IN | BODY MASS INDEX: 28.68 KG/M2 | WEIGHT: 168 LBS

## 2025-05-06 DIAGNOSIS — M70.62 TROCHANTERIC BURSITIS OF LEFT HIP: Primary | ICD-10-CM

## 2025-05-06 DIAGNOSIS — M25.512 LEFT SHOULDER PAIN, UNSPECIFIED CHRONICITY: ICD-10-CM

## 2025-05-06 DIAGNOSIS — M25.552 PAIN IN LEFT HIP: ICD-10-CM

## 2025-05-06 DIAGNOSIS — M75.52 SUBACROMIAL BURSITIS OF LEFT SHOULDER JOINT: ICD-10-CM

## 2025-05-06 DIAGNOSIS — M75.82 TENDINITIS OF LEFT ROTATOR CUFF: ICD-10-CM

## 2025-05-06 PROCEDURE — 99214 OFFICE O/P EST MOD 30 MIN: CPT | Performed by: ORTHOPAEDIC SURGERY

## 2025-05-06 PROCEDURE — 73502 X-RAY EXAM HIP UNI 2-3 VIEWS: CPT

## 2025-05-06 PROCEDURE — 73030 X-RAY EXAM OF SHOULDER: CPT

## 2025-05-06 PROCEDURE — 20610 DRAIN/INJ JOINT/BURSA W/O US: CPT | Performed by: ORTHOPAEDIC SURGERY

## 2025-05-06 RX ORDER — BETAMETHASONE SODIUM PHOSPHATE AND BETAMETHASONE ACETATE 3; 3 MG/ML; MG/ML
12 INJECTION, SUSPENSION INTRA-ARTICULAR; INTRALESIONAL; INTRAMUSCULAR; SOFT TISSUE
Status: COMPLETED | OUTPATIENT
Start: 2025-05-06 | End: 2025-05-06

## 2025-05-06 RX ORDER — LIDOCAINE HYDROCHLORIDE 10 MG/ML
2 INJECTION, SOLUTION INFILTRATION; PERINEURAL
Status: COMPLETED | OUTPATIENT
Start: 2025-05-06 | End: 2025-05-06

## 2025-05-06 RX ORDER — BUPIVACAINE HYDROCHLORIDE 2.5 MG/ML
2 INJECTION, SOLUTION INFILTRATION; PERINEURAL
Status: COMPLETED | OUTPATIENT
Start: 2025-05-06 | End: 2025-05-06

## 2025-05-06 RX ADMIN — LIDOCAINE HYDROCHLORIDE 2 ML: 10 INJECTION, SOLUTION INFILTRATION; PERINEURAL at 08:15

## 2025-05-06 RX ADMIN — BETAMETHASONE SODIUM PHOSPHATE AND BETAMETHASONE ACETATE 12 MG: 3; 3 INJECTION, SUSPENSION INTRA-ARTICULAR; INTRALESIONAL; INTRAMUSCULAR; SOFT TISSUE at 08:15

## 2025-05-06 RX ADMIN — BUPIVACAINE HYDROCHLORIDE 2 ML: 2.5 INJECTION, SOLUTION INFILTRATION; PERINEURAL at 08:15

## 2025-05-06 NOTE — ASSESSMENT & PLAN NOTE
Cortisone injection performed today, 5/6/2025  Orders:    Large joint arthrocentesis: L subacromial bursa

## 2025-05-06 NOTE — PROGRESS NOTES
Name: Fabiola Christina      : 1950       MRN: [id number]   Encounter Provider: Hema Jewell MD   Encounter Date: 25  Encounter department: [facility] [place]     ASSESSMENT & PLAN:  Assessment & Plan  Trochanteric bursitis of left hip  Cortisone injection performed today, 2025  Orders:    XR hip/pelv 2-3 vws left if performed; Future    Large joint arthrocentesis: L greater trochanteric bursa    Tendinitis of left rotator cuff  Cortisone injection performed today, 2025  Orders:    Large joint arthrocentesis: L subacromial bursa    Subacromial bursitis of left shoulder joint  Cortisone injection performed today, 2025  Orders:    Large joint arthrocentesis: L subacromial bursa      ___________________________________________________  Diagnostics reviewed and physical exam performed.  Diagnosis, treatment options and associated risks were discussed with the patient including no treatment, nonsurgical treatment and potential for surgical intervention.  The patient was given the opportunity to ask questions regarding each.  It was explained to the patient that based upon the clinical and radiographic findings, at this point in time, this is not a surgical problem.  Treatment for the above diagnoses and associated symptoms of this nature is generally conservative including a physician directed physical therapy program, improved fitness/weight loss, anti-inflammatories, and potentially various injections.  She was offered, accepted, performed injections of cortisone to her left shoulder subacromial space and left hip trochanteric bursa area today for symptomatic relief.  She tolerated both injections well.  Ice postinjection protocol advised.  Weightbearing activity as tolerated.  Iliotibial band stretching demonstrated in the office today as well as provided in her AVS.    To do next visit:  Return if symptoms worsen or fail to improve, for re-check on an  as needed basis (PRN).  ____________________________________________________  CHIEF COMPLAINT:  Chief Complaint   Patient presents with    Left Shoulder - Pain    Left Hip - Pain         SUBJECTIVE:  Fabiola Christina is a 75 y.o. RHD female who presents today for initial evaluation of her left shoulder and left hip due to pain.  Her symptoms started several months ago after starting a Pilates exercise routine.  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  She denies any specific injury or traumatic event.  Her pain laterally at her upper arm increases with repetitive use at or above shoulder level.  She is a side sleeper and if she lays on her left side at night it does wake her up.  She also has lateral based hip pain.  It does not radiate into the groin.  It does not radiate distal to her knee.  She cannot lay comfortably on her left side at night due to lateral based hip pain.  She has been taking extra strength Tylenol as she tries to avoid oral NSAIDs as she retains fluid.  She had similar symptoms at her right hip a few years ago and responded favorably to injection of cortisone.  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    She has a trip booked to [place] as well as a mediterranean cruise.          PAST MEDICAL HISTORY:  Past Medical History:   Diagnosis Date    Arthritis     Breast cancer (HCC) 10/2007    Cancer (HCC) 10/15/07    15 years clean    Gastric ulcer     GERD (gastroesophageal reflux disease)     Heart murmur     History of radiation therapy 2007    PBRT    IBS (irritable bowel syndrome)     Inflammatory bowel disease     Irritable bowel syndrome 12/11/2012    Migraine     Skin cancer     Tinnitus     Use of letrozole (Femara)     took for 2 years D/john due to side effects       PAST SURGICAL HISTORY:  Past Surgical History:   Procedure Laterality Date    ABDOMINOPLASTY      ADENOIDECTOMY      BREAST BIOPSY Right  10/12/2007    IDC    BREAST LUMPECTOMY Right 2007    BREAST SURGERY      lumpectomy, resolved 2007     SECTION      CHOLECYSTECTOMY      COLON SURGERY      COLONOSCOPY      COSMETIC SURGERY      forehead    FOOT SURGERY      IR KYPHOPLASTY/VERTEBROPLASTY  2023    JOINT REPLACEMENT      Toe left foot    MOHS SURGERY      REDUCTION MAMMAPLASTY Bilateral 2015    reduction on left/lift on right    SENTINEL LYMPH NODE BIOPSY Right 2007    TOE SURGERY      left toe surgery    TONSILLECTOMY         FAMILY HISTORY:  Family History   Problem Relation Age of Onset    Hypertension Mother     Melanoma Mother     Heart disease Mother     Cancer Mother         Skin cancer    Stroke Mother     Diabetes Mother     Heart attack Father         acute MI, CABG    Hypertension Father     Heart disease Father     Diabetes Father     Bone cancer Maternal Grandfather     Lymphoma Brother         non-Hodgkin's    Hypertension Brother     Heart disease Brother     Stroke Family         CVA    Diabetes Family     Cancer Family        SOCIAL HISTORY:  Social History     Tobacco Use    Smoking status: Former     Current packs/day: 0.00     Types: Cigarettes     Quit date: 1977     Years since quittin.7    Smokeless tobacco: Never    Tobacco comments:     Smoker in teenage years.  Quit at 21.  One pack per week.   Vaping Use    Vaping status: Never Used   Substance Use Topics    Alcohol use: Yes     Alcohol/week: 3.0 standard drinks of alcohol     Types: 3 Standard drinks or equivalent per week    Drug use: No       MEDICATIONS:    Current Outpatient Medications:     acetaminophen (TYLENOL) 650 mg CR tablet, Take 1,300 mg by mouth every 6 (six) hours as needed for mild pain, Disp: , Rfl:     Cholecalciferol (VITAMIN D3) 5000 units CAPS, Take 1 tablet by mouth daily, Disp: , Rfl:     diltiazem (CARDIZEM CD) 240 mg 24 hr capsule, Take 1 capsule (240 mg total) by mouth daily, Disp: 90 capsule, Rfl: 3     "famotidine (PEPCID) 40 MG tablet, Take 1 tablet (40 mg total) by mouth 2 (two) times a day as needed for heartburn, Disp: 180 tablet, Rfl: 3    Misc Natural Products (GLUCOSAMINE CHOND CMP ADVANCED PO), Take by mouth, Disp: , Rfl:     Multiple Vitamins-Minerals (MULTI COMPLETE) CAPS, Take 1 capsule by mouth daily, Disp: , Rfl:     Probiotic Product (VSL#3) CAPS, Take 1 capsule by mouth daily, Disp: 90 capsule, Rfl: 3    rosuvastatin (CRESTOR) 10 MG tablet, Take 1 tablet (10 mg total) by mouth daily, Disp: 90 tablet, Rfl: 3    triamcinolone (KENALOG) 0.1 % ointment, Apply topically twice daily for two weeks. Taper down to three times weekly afterwards., Disp: 80 g, Rfl: 0    ALLERGIES:  Allergies   Allergen Reactions    Ciprofloxacin Anaphylaxis     Anaphylaxis    Celecoxib Swelling     Edema of legs    Amitriptyline GI Intolerance     Action Taken: bloating,GI problems;     Bactrim [Sulfamethoxazole-Trimethoprim] Rash    Lidoderm [Lidocaine] Rash     Rash from lidoderm patch    Mesalamine Dizziness and Headache    Other Vomiting     \"black olives->GI upset\"    Sulfa Antibiotics Rash    Wound Dressing Adhesive Rash       LABS:  HgA1c:   Lab Results   Component Value Date    HGBA1C 5.9 (H) 01/03/2025     BMP:   Lab Results   Component Value Date    GLUCOSE 98 12/17/2015    CALCIUM 9.4 01/03/2025     12/17/2015    K 4.1 01/03/2025    CO2 28 01/03/2025     01/03/2025    BUN 16 01/03/2025    CREATININE 0.88 01/03/2025     CBC: No components found for: \"CBC\"    _____________________________________________________  PHYSICAL EXAMINATION:  Vital signs: Ht 5' 3.8\" (1.621 m)   Wt 76.2 kg (168 lb)   BMI 29.02 kg/m²   General: No acute distress, awake and alert  Psychiatric: Mood and affect appear appropriate  HEENT: Trachea Midline, No torticollis, no apparent facial trauma  Cardiovascular: No audible murmurs; Extremities appear perfused  Pulmonary: No audible wheezing or stridor  Skin: Intact, no open lesions; " see further details (if any) below    MUSCULOSKELETAL EXAMINATION:    Left Hip Exam     Tenderness   The patient is experiencing tenderness in the greater trochanter and lateral.    Range of Motion   Flexion:  110   External rotation:  40   Internal rotation: 20     Muscle Strength   The patient has normal left hip strength (Increased pain laterally with resistance to abduction).     Other   Erythema: absent  Sensation: normal    Comments:    Equal leg lengths      Both lower extremities are in valgus alignment      Right Shoulder Exam     Range of Motion   Active abduction:  170   External rotation:  80   Forward flexion:  160     Muscle Strength   The patient has normal right shoulder strength.      Left Shoulder Exam     Tenderness   Left shoulder tenderness location: Lateral calf tenderness, no tenderness anteriorly.    Range of Motion   Active abduction:  170   External rotation:  60   Forward flexion:  160     Muscle Strength   The patient has normal left shoulder strength.    Tests   Impingement: positive    Other   Erythema: absent  Sensation: normal     Comments:    (-) empty can test    WNL cervical spine range of motion all planes without recreating left upper extremity symptoms.              _____________________________________________________  STUDIES REVIEWED:    The attending physician has personally reviewed the pertinent films in PACS and their interpretation is as follows:    Left shoulder x-rays taken and reviewed in the office today show: Well located glenohumeral joint with small osteophyte formation present at the inferior aspect of her humeral head.  AC joint degenerative changes otherwise unremarkable    Left hip and pelvis x-rays taken and reviewed in the office today show: Very mild degenerative changes of both hips otherwise well-preserved.  Limited visualization lower lumbar spine which does show evidence of previous lumbar vertebroplasty      PROCEDURES PERFORMED:    Large joint  "arthrocentesis: L subacromial bursa  Universal Protocol:  Consent: Verbal consent obtained.  Risks and benefits: risks, benefits and alternatives were discussed  Consent given by: patient  Time out: Immediately prior to procedure a \"time out\" was called to verify the correct patient, procedure, equipment, support staff and site/side marked as required.  Timeout called at: 5/6/2025 9:12 AM.  Patient understanding: patient states understanding of the procedure being performed  Site marked: the operative site was marked  Patient identity confirmed: verbally with patient  Supporting Documentation  Indications: pain and diagnostic evaluation     Is this a Visco injection? NoProcedure Details  Location: shoulder - L subacromial bursa  Preparation: Patient was prepped and draped in the usual sterile fashion  Needle size: 22 G  Ultrasound guidance: no  Approach: posterior  Medications administered: 2 mL bupivacaine 0.25 %; 2 mL lidocaine 1 %; 12 mg betamethasone acetate-betamethasone sodium phosphate 6 (3-3) mg/mL    Patient tolerance: patient tolerated the procedure well with no immediate complications  Dressing:  Sterile dressing applied      Large joint arthrocentesis: L greater trochanteric bursa  Universal Protocol:  Consent: Verbal consent obtained.  Risks and benefits: risks, benefits and alternatives were discussed  Consent given by: patient  Time out: Immediately prior to procedure a \"time out\" was called to verify the correct patient, procedure, equipment, support staff and site/side marked as required.  Timeout called at: 5/6/2025 9:13 AM.  Patient understanding: patient states understanding of the procedure being performed  Site marked: the operative site was marked  Patient identity confirmed: verbally with patient  Supporting Documentation  Indications: pain and diagnostic evaluation     Is this a Visco injection? NoProcedure Details  Location: hip - L greater trochanteric bursa  Preparation: Patient was " prepped and draped in the usual sterile fashion  Needle size: 22 G  Ultrasound guidance: no  Approach: lateral  Medications administered: 2 mL bupivacaine 0.25 %; 2 mL lidocaine 1 %; 12 mg betamethasone acetate-betamethasone sodium phosphate 6 (3-3) mg/mL    Patient tolerance: patient tolerated the procedure well with no immediate complications  Dressing:  Sterile dressing applied           Scribe Attestation      I,:  Anthony Fraire am acting as a scribe while in the presence of the attending physician.:       I,:  Hema Jewell MD personally performed the services described in this documentation    as scribed in my presence.:

## 2025-05-06 NOTE — ASSESSMENT & PLAN NOTE
Cortisone injection performed today, 5/6/2025  Orders:    XR hip/pelv 2-3 vws left if performed; Future    Large joint arthrocentesis: L greater trochanteric bursa

## 2025-05-06 NOTE — PATIENT INSTRUCTIONS
1. Trochanteric bursitis of left hip  XR hip/pelv 2-3 vws left if performed    Large joint arthrocentesis: L greater trochanteric bursa      2. Tendinitis of left rotator cuff  Large joint arthrocentesis: L subacromial bursa      3. Subacromial bursitis of left shoulder joint  Large joint arthrocentesis: L subacromial bursa          Return if symptoms worsen or fail to improve, for re-check on an as needed basis (PRN).      Pull knee towards chest  Perform several times with rest in between

## 2025-05-16 ENCOUNTER — TELEMEDICINE (OUTPATIENT)
Dept: OTHER | Facility: HOSPITAL | Age: 75
End: 2025-05-16
Payer: MEDICARE

## 2025-05-16 DIAGNOSIS — J01.90 ACUTE SINUSITIS, RECURRENCE NOT SPECIFIED, UNSPECIFIED LOCATION: Primary | ICD-10-CM

## 2025-05-16 PROCEDURE — 99213 OFFICE O/P EST LOW 20 MIN: CPT | Performed by: NURSE PRACTITIONER

## 2025-05-16 RX ORDER — AMOXICILLIN 875 MG/1
875 TABLET, COATED ORAL 2 TIMES DAILY
Qty: 20 TABLET | Refills: 0 | Status: SHIPPED | OUTPATIENT
Start: 2025-05-16 | End: 2025-05-26

## 2025-05-16 NOTE — PROGRESS NOTES
Virtual Regular Visit  Name: Fabiola Christina      : 1950      MRN: [id number]  Encounter Provider: JORGE L Garduno  Encounter Date: 2025   Encounter department: VIRTUAL CARE       Verification of patient location:  Patient is located at Home in the following state in which I hold an active license [place] :  Assessment & Plan  Acute sinusitis, recurrence not specified, unspecified location    Orders:    amoxicillin (AMOXIL) 875 mg tablet; Take 1 tablet (875 mg total) by mouth 2 (two) times a day for 10 days        Encounter provider JORGE L Garduno    The patient was identified by name and date of birth. Fabiola Christina was informed that this is a telemedicine visit and that the visit is being conducted through the Epic Embedded platform. She agrees to proceed..  My office door was closed. No one else was in the room. She acknowledged consent and understanding of privacy and security of the video platform. The patient has agreed to participate and understands they can discontinue the visit at any time.    Patient is aware this is a billable service.     History obtained from: patient  History of Present Illness     This is a 75 year old female here today for video visit.  She states she has had a cold for about 2 weeks.  She states she has sinus pressure and ear pressure.  She states the mucous is getting more yellowed.  She is coughing up some mucous. No chest pain or sob.  She has been trying mucinex and Tylenol which are not helping.  She has been drinking water and pushing fluids.      Review of Systems   Constitutional:  Negative for activity change, fatigue and fever.   HENT:  Positive for congestion, rhinorrhea, sinus pressure and sinus pain.    Respiratory:  Positive for cough.    Cardiovascular: Negative.    Neurological: Negative.    Psychiatric/Behavioral: Negative.         Objective   There were no vitals taken for this visit.    Physical Exam  Constitutional:       General: She  is not in acute distress.     Appearance: Normal appearance. She is not ill-appearing or toxic-appearing.   HENT:      Head: Normocephalic and atraumatic.      Nose: Congestion and rhinorrhea present.   Pulmonary:      Effort: Pulmonary effort is normal. No respiratory distress.     Neurological:      General: No focal deficit present.      Mental Status: She is alert.         Visit Time  Total Visit Duration: 5 minutes not including the time spent for establishing the audio/video connection.

## 2025-05-16 NOTE — PATIENT INSTRUCTIONS
"Rest and drink extra fluids.  Start antibiotic.  Take probiotic.  OTC cough and cold as needed.  Flonase can also be helpful.  Nasal saline flushes or rosita pot can help flush the sinuses.  Follow up with PCP if no improvement.  Go to ER with any worsening symptoms.     Patient Education     Sinusitis in adults   The Basics   Written by the doctors and editors at [facility]   What is sinusitis? -- Sinusitis is a condition that can cause a stuffy nose, pain in the face, and discharge or \"mucus\" from the nose.  The sinuses are hollow areas in the bones of the face (figure 1). They have a thin lining that normally makes a small amount of mucus. When this lining gets irritated or infected, it swells and makes extra mucus. This causes symptoms.  Sinusitis usually happens after a person gets sick with a cold. The germs causing the cold can infect the sinuses, too. Sometimes, other germs can be the cause of the infection. Often, a person feels like their cold is getting better. But then, they get sinusitis and begin to feel sick again.  What are the symptoms of sinusitis? -- Common symptoms of sinusitis include:   Stuffy or blocked nose   Thick white, yellow, or green discharge from the nose   Pain in the teeth   Pain or pressure in the face - This often feels worse when a person bends forward.  People with sinusitis can also have other symptoms, such as:   Fever   Cough   Trouble smelling   Ear pressure or fullness   Headache   Bad breath   Feeling tired  Most of the time, symptoms start to improve in 7 to 10 days.  Should I see a doctor or nurse? -- See your doctor or nurse if your symptoms last more than 10 days, or if your symptoms first get better but then get worse.  Rarely, sinusitis can lead to serious problems. See your doctor or nurse right away (do not wait 10 days) if you have:   Fever higher than 102°F (38.9°C)   Sudden and severe pain in the face and head   Trouble seeing, or seeing double   Trouble thinking " clearly   Swelling or redness around 1 or both eyes   Stiff neck  Is there anything I can do on my own to feel better? -- Yes. To help with your symptoms, you can:   Take an over-the-counter pain reliever to reduce the pain.   Rinse your nose and sinuses with salt water a few times a day - Ask your doctor or nurse about the best way to do this.   Drink plenty of fluids - Staying hydrated might help to thin the mucus and make it drain more easily.  Your doctor might also recommend a steroid nose spray to reduce the swelling in your nose, especially if you have allergies. Talk to your doctor if you are thinking of using a steroid spray.  How is sinusitis treated? -- Most of the time, sinusitis does not need to be treated with antibiotic medicines. This is because most sinusitis is caused by viruses, not bacteria, and antibiotics do not kill viruses. In fact, even sinusitis caused by bacteria will usually get better on its own without antibiotics.  Some people with sinusitis do need treatment with antibiotics. If your symptoms have not improved after 10 days, ask your doctor if you should take antibiotics. They might recommend that you wait 1 more week to see if your symptoms improve. But if you have symptoms such as a fever or a lot of pain, they might prescribe antibiotics. If you do get antibiotics, follow all of your doctor's instructions about taking them.  What if my symptoms do not get better? -- If your symptoms do not get better, talk with your doctor or nurse. They might order tests to figure out why you still have symptoms. These can include:   CT scan or other imaging tests - Imaging tests create pictures of the inside of the body.   A test to look inside the sinuses - For this test, a doctor puts a thin tube with a camera on the end into the nose and up into the sinuses.  Some people get a lot of sinus infections or have symptoms that last at least 3 months. These people can have a different type of  "sinusitis called \"chronic sinusitis.\" Chronic sinusitis can be caused by different things. For example, some people have growths inside their nose or sinuses that are called \"polyps.\" Other people have allergies that cause their symptoms.  Chronic sinusitis can be treated in different ways. If you have chronic sinusitis, talk with your doctor about which treatments are right for you.  All topics are updated as new evidence becomes available and our peer review process is complete.  This topic retrieved from [employer] on: Feb 28, 2024.  Topic 43207 Version 21.0  Release: 32.2.4 - C32.58  © 2024 UpToDate, Inc. and/or its affiliates. All rights reserved.  figure 1: Sinuses of the face     The sinuses are hollow areas in the bones of the face. This drawing shows where the sinuses are, from the side and front views. There are 4 pairs of sinuses, named for the bones around them: sphenoid, frontal, ethmoid, and maxillary.  Graphic 721656 Version 3.0  Consumer Information Use and Disclaimer   Disclaimer: This generalized information is a limited summary of diagnosis, treatment, and/or medication information. It is not meant to be comprehensive and should be used as a tool to help the user understand and/or assess potential diagnostic and treatment options. It does NOT include all information about conditions, treatments, medications, side effects, or risks that may apply to a specific patient. It is not intended to be medical advice or a substitute for the medical advice, diagnosis, or treatment of a health care provider based on the health care provider's examination and assessment of a patient's specific and unique circumstances. Patients must speak with a health care provider for complete information about their health, medical questions, and treatment options, including any risks or benefits regarding use of medications. This information does not endorse any treatments or medications as safe, effective, or approved for " treating a specific patient. UpToDate, Inc. and its affiliates disclaim any warranty or liability relating to this information or the use thereof.The use of this information is governed by the Terms of Use, available at https://www.[employer].com/en/know/clinical-effectiveness-terms. 2024© UpToDate, Inc. and its affiliates and/or licensors. All rights reserved.  Copyright   © 2024 UpToDate, Inc. and/or its affiliates. All rights reserved.

## 2025-06-02 ENCOUNTER — TELEPHONE (OUTPATIENT)
Dept: INFECTIOUS DISEASES | Facility: CLINIC | Age: 75
End: 2025-06-02

## 2025-06-09 ENCOUNTER — TELEPHONE (OUTPATIENT)
Age: 75
End: 2025-06-09

## 2025-06-09 ENCOUNTER — OFFICE VISIT (OUTPATIENT)
Dept: OBGYN CLINIC | Facility: OTHER | Age: 75
End: 2025-06-09
Payer: MEDICARE

## 2025-06-09 VITALS — BODY MASS INDEX: 28 KG/M2 | WEIGHT: 158 LBS | HEIGHT: 63 IN

## 2025-06-09 DIAGNOSIS — M19.012 PRIMARY OSTEOARTHRITIS OF LEFT SHOULDER: Primary | ICD-10-CM

## 2025-06-09 PROCEDURE — 99214 OFFICE O/P EST MOD 30 MIN: CPT | Performed by: ORTHOPAEDIC SURGERY

## 2025-06-09 PROCEDURE — 20610 DRAIN/INJ JOINT/BURSA W/O US: CPT | Performed by: ORTHOPAEDIC SURGERY

## 2025-06-09 RX ORDER — BETAMETHASONE SODIUM PHOSPHATE AND BETAMETHASONE ACETATE 3; 3 MG/ML; MG/ML
6 INJECTION, SUSPENSION INTRA-ARTICULAR; INTRALESIONAL; INTRAMUSCULAR; SOFT TISSUE
Status: COMPLETED | OUTPATIENT
Start: 2025-06-09 | End: 2025-06-09

## 2025-06-09 RX ORDER — BUPIVACAINE HYDROCHLORIDE 2.5 MG/ML
2 INJECTION, SOLUTION INFILTRATION; PERINEURAL
Status: COMPLETED | OUTPATIENT
Start: 2025-06-09 | End: 2025-06-09

## 2025-06-09 RX ADMIN — BUPIVACAINE HYDROCHLORIDE 2 ML: 2.5 INJECTION, SOLUTION INFILTRATION; PERINEURAL at 13:45

## 2025-06-09 RX ADMIN — BETAMETHASONE SODIUM PHOSPHATE AND BETAMETHASONE ACETATE 6 MG: 3; 3 INJECTION, SUSPENSION INTRA-ARTICULAR; INTRALESIONAL; INTRAMUSCULAR; SOFT TISSUE at 13:45

## 2025-06-09 NOTE — ASSESSMENT & PLAN NOTE
Explained to the patient that her x rays and examination are consistent with moderate glenohumeral joint osteoarthritis of her left shoulder.  Indeed the radiographs obtained in Dr. Jewell's office do demonstrate what appears to be an effusion of the glenohumeral joint from her recent exacerbation of her osteoarthritis.  The pathoanatomy and natural history of this diagnosis was explained to the patient today in detail. She understood and all questions were answered  She was provided with a glenohumeral joint injection today into her left shoulder. This is documented appropriately below  A referral to formal physical therapy was also provided for the patient for gentle ROM and stretching exercises as tolerated  If her symptoms persist despite these non operative treatments then a total shoulder arthroplasty would be the surgical procedure. MRI may be indicated to evaluate for other potential opportunities for arthroscopic intervention including the rotator cuff integrity and the severity of the glenohumeral osteoarthritis and would be obtained if the symptoms persist despite nonoperative care  Follow up on an as needed basis

## 2025-06-09 NOTE — PROGRESS NOTES
Assessment & Plan  Primary osteoarthritis of left shoulder  Explained to the patient that her x rays and examination are consistent with moderate glenohumeral joint osteoarthritis of her left shoulder.  Indeed the radiographs obtained in Dr. Jewell's office do demonstrate what appears to be an effusion of the glenohumeral joint from her recent exacerbation of her osteoarthritis.  The pathoanatomy and natural history of this diagnosis was explained to the patient today in detail. She understood and all questions were answered  She was provided with a glenohumeral joint injection today into her left shoulder. This is documented appropriately below  A referral to formal physical therapy was also provided for the patient for gentle ROM and stretching exercises as tolerated  If her symptoms persist despite these non operative treatments then a total shoulder arthroplasty would be the surgical procedure. MRI may be indicated to evaluate for other potential opportunities for arthroscopic intervention including the rotator cuff integrity and the severity of the glenohumeral osteoarthritis and would be obtained if the symptoms persist despite nonoperative care  Follow up on an as needed basis      Subjective:   Patient ID: Fabiola Christina is a 75 y.o. female presents with a chief complaint of left shoulder pain.   The pain began a few month(s) ago and is not associated with an acute injury.  Although patient reports she was performing pilates exercises and her symptoms began afterwards. The patient describes the pain as aching and dull in intensity,  intermittent in timing, and localizes the pain to the  left glenohumeral joint, deltoid.  The pain is worse with overhead work, overuse, and raising arm over head and relieved by rest, ice, avoiding the painful activities.  The pain is not associated with numbness and tingling.  The pain is not associated with constitutional symptoms. The patient is awoken at night by the  "pain.    The patient has had prior treatment in the form of a subacromial injection with Dr Jewell on 5/6/25 without benefit.      The following portions of the patient's history were reviewed and updated as appropriate: allergies, current medications, past family history, past medical history, past social history, past surgical history and problem list.    Objective:  Ht 5' 3\" (1.6 m) Comment: verbal  Wt 71.7 kg (158 lb)   BMI 27.99 kg/m²       Left Shoulder Exam     Range of Motion   External rotation:  40   Forward flexion:  150     Muscle Strength   Abduction: 5/5     Other   Erythema: absent  Sensation: normal  Pulse: present             Physical Exam  Constitutional:       Appearance: She is well-developed.     Eyes:      Pupils: Pupils are equal, round, and reactive to light.     Pulmonary:      Effort: Pulmonary effort is normal.      Breath sounds: Normal breath sounds.     Skin:     General: Skin is warm and dry.     Neurological:      Mental Status: She is alert and oriented to person, place, and time.     Psychiatric:         Behavior: Behavior normal.         Thought Content: Thought content normal.         Judgment: Judgment normal.         Large joint arthrocentesis: L glenohumeral    Performed by: Glynn Draper MD  Authorized by: Glynn Draper MD    [place] Protocol:  procedure performed by consultantConsent: Verbal consent obtained  Risks and benefits: risks, benefits and alternatives were discussed  Consent given by: patient  Time out: Immediately prior to procedure a \"time out\" was called to verify the correct patient, procedure, equipment, support staff and site/side marked as required.  Site marked: the operative site was marked  Radiology Images displayed and confirmed. If images not available, report reviewed: imaging studies available  Patient identity confirmed: verbally with patient  Supporting Documentation  Indications: pain and diagnostic evaluation     Is this " a Visco injection? NoProcedure Details  Location: shoulder - L glenohumeral  Preparation: Patient was prepped and draped in the usual sterile fashion  Needle size: 22 G  Ultrasound guidance: no  Approach: posterior  Medications administered: 2 mL bupivacaine 0.25 %; 6 mg betamethasone acetate-betamethasone sodium phosphate 6 (3-3) mg/mL    Patient tolerance: patient tolerated the procedure well with no immediate complications  Dressing:  Sterile dressing applied        I have personally reviewed pertinent films in PACS and my interpretation is as follows.    X Ray Left Shoulder 5/6/25: Moderate glenohumeral joint osteoarthritis.  Subtle inferior head subluxation secondary to glenohumeral joint effusion, not commented upon by the radiologist      Records Reviewed: office notes from Dr Jewell's visit on 5/6/25    Scribe Attestation      I,:  Sergey Soto am acting as a scribe while in the presence of the attending physician.:       I,:  Glynn Draper MD personally performed the services described in this documentation    as scribed in my presence.:

## 2025-06-09 NOTE — TELEPHONE ENCOUNTER
Patient is at lab now and would like her lab due date moved up to today so they are done before her upcoming appointment.

## 2025-06-10 NOTE — TELEPHONE ENCOUNTER
Patient returned call.  Relayed message that the date on the lab work was updated to 6/10/2025.  Patient stated she will have lab work done prior to her appointment on  Friday, June 13th.  Patient was frustrated that she went to the lab on 6/9 and could not have drawn.  I apologized for the confusion and thanked the patient for her understanding.  No call back needed.

## 2025-06-11 ENCOUNTER — APPOINTMENT (OUTPATIENT)
Dept: LAB | Facility: MEDICAL CENTER | Age: 75
End: 2025-06-11
Attending: FAMILY MEDICINE
Payer: MEDICARE

## 2025-06-11 DIAGNOSIS — E80.6 HYPERBILIRUBINEMIA: ICD-10-CM

## 2025-06-11 DIAGNOSIS — E78.00 PURE HYPERCHOLESTEROLEMIA: ICD-10-CM

## 2025-06-11 DIAGNOSIS — I10 ESSENTIAL HYPERTENSION: ICD-10-CM

## 2025-06-11 DIAGNOSIS — R53.82 CHRONIC FATIGUE: ICD-10-CM

## 2025-06-11 DIAGNOSIS — R73.9 HYPERGLYCEMIA: ICD-10-CM

## 2025-06-11 LAB
ALBUMIN SERPL BCG-MCNC: 4.2 G/DL (ref 3.5–5)
ALP SERPL-CCNC: 88 U/L (ref 34–104)
ALT SERPL W P-5'-P-CCNC: 22 U/L (ref 7–52)
ANION GAP SERPL CALCULATED.3IONS-SCNC: 7 MMOL/L (ref 4–13)
AST SERPL W P-5'-P-CCNC: 20 U/L (ref 13–39)
BASOPHILS # BLD AUTO: 0.03 THOUSANDS/ÂΜL (ref 0–0.1)
BASOPHILS NFR BLD AUTO: 0 % (ref 0–1)
BILIRUB SERPL-MCNC: 0.66 MG/DL (ref 0.2–1)
BUN SERPL-MCNC: 19 MG/DL (ref 5–25)
CALCIUM SERPL-MCNC: 9.3 MG/DL (ref 8.4–10.2)
CHLORIDE SERPL-SCNC: 102 MMOL/L (ref 96–108)
CHOLEST SERPL-MCNC: 140 MG/DL (ref ?–200)
CO2 SERPL-SCNC: 30 MMOL/L (ref 21–32)
CREAT SERPL-MCNC: 0.81 MG/DL (ref 0.6–1.3)
EOSINOPHIL # BLD AUTO: 0.02 THOUSAND/ÂΜL (ref 0–0.61)
EOSINOPHIL NFR BLD AUTO: 0 % (ref 0–6)
ERYTHROCYTE [DISTWIDTH] IN BLOOD BY AUTOMATED COUNT: 14.2 % (ref 11.6–15.1)
EST. AVERAGE GLUCOSE BLD GHB EST-MCNC: 131 MG/DL
GFR SERPL CREATININE-BSD FRML MDRD: 71 ML/MIN/1.73SQ M
GLUCOSE P FAST SERPL-MCNC: 101 MG/DL (ref 65–99)
HBA1C MFR BLD: 6.2 %
HCT VFR BLD AUTO: 40.1 % (ref 34.8–46.1)
HDLC SERPL-MCNC: 55 MG/DL
HGB BLD-MCNC: 12.9 G/DL (ref 11.5–15.4)
IMM GRANULOCYTES # BLD AUTO: 0.04 THOUSAND/UL (ref 0–0.2)
IMM GRANULOCYTES NFR BLD AUTO: 0 % (ref 0–2)
LDLC SERPL CALC-MCNC: 70 MG/DL (ref 0–100)
LYMPHOCYTES # BLD AUTO: 1.88 THOUSANDS/ÂΜL (ref 0.6–4.47)
LYMPHOCYTES NFR BLD AUTO: 21 % (ref 14–44)
MCH RBC QN AUTO: 29.1 PG (ref 26.8–34.3)
MCHC RBC AUTO-ENTMCNC: 32.2 G/DL (ref 31.4–37.4)
MCV RBC AUTO: 91 FL (ref 82–98)
MONOCYTES # BLD AUTO: 0.68 THOUSAND/ÂΜL (ref 0.17–1.22)
MONOCYTES NFR BLD AUTO: 7 % (ref 4–12)
NEUTROPHILS # BLD AUTO: 6.5 THOUSANDS/ÂΜL (ref 1.85–7.62)
NEUTS SEG NFR BLD AUTO: 72 % (ref 43–75)
NRBC BLD AUTO-RTO: 0 /100 WBCS
PLATELET # BLD AUTO: 320 THOUSANDS/UL (ref 149–390)
PMV BLD AUTO: 10.6 FL (ref 8.9–12.7)
POTASSIUM SERPL-SCNC: 4.4 MMOL/L (ref 3.5–5.3)
PROT SERPL-MCNC: 6.2 G/DL (ref 6.4–8.4)
RBC # BLD AUTO: 4.43 MILLION/UL (ref 3.81–5.12)
SODIUM SERPL-SCNC: 139 MMOL/L (ref 135–147)
TRIGL SERPL-MCNC: 73 MG/DL (ref ?–150)
TSH SERPL DL<=0.05 MIU/L-ACNC: 0.64 UIU/ML (ref 0.45–4.5)
WBC # BLD AUTO: 9.15 THOUSAND/UL (ref 4.31–10.16)

## 2025-06-11 PROCEDURE — 36415 COLL VENOUS BLD VENIPUNCTURE: CPT

## 2025-06-11 PROCEDURE — 80061 LIPID PANEL: CPT

## 2025-06-11 PROCEDURE — 80053 COMPREHEN METABOLIC PANEL: CPT

## 2025-06-11 PROCEDURE — 83036 HEMOGLOBIN GLYCOSYLATED A1C: CPT

## 2025-06-11 PROCEDURE — 85025 COMPLETE CBC W/AUTO DIFF WBC: CPT

## 2025-06-11 PROCEDURE — 84443 ASSAY THYROID STIM HORMONE: CPT

## 2025-06-12 ENCOUNTER — RESULTS FOLLOW-UP (OUTPATIENT)
Dept: FAMILY MEDICINE CLINIC | Facility: CLINIC | Age: 75
End: 2025-06-12

## 2025-06-13 ENCOUNTER — OFFICE VISIT (OUTPATIENT)
Dept: FAMILY MEDICINE CLINIC | Facility: CLINIC | Age: 75
End: 2025-06-13
Payer: MEDICARE

## 2025-06-13 VITALS
SYSTOLIC BLOOD PRESSURE: 134 MMHG | HEART RATE: 67 BPM | OXYGEN SATURATION: 98 % | WEIGHT: 156.4 LBS | DIASTOLIC BLOOD PRESSURE: 70 MMHG | BODY MASS INDEX: 27.71 KG/M2

## 2025-06-13 DIAGNOSIS — E80.6 HYPERBILIRUBINEMIA: ICD-10-CM

## 2025-06-13 DIAGNOSIS — S32.030A CLOSED COMPRESSION FRACTURE OF L3 LUMBAR VERTEBRA, INITIAL ENCOUNTER (HCC): ICD-10-CM

## 2025-06-13 DIAGNOSIS — M80.08XD AGE-RELATED OSTEOPOROSIS WITH CURRENT PATHOLOGICAL FRACTURE OF VERTEBRA WITH ROUTINE HEALING, SUBSEQUENT ENCOUNTER: Primary | ICD-10-CM

## 2025-06-13 DIAGNOSIS — D70.9 NEUTROPENIA, UNSPECIFIED TYPE (HCC): ICD-10-CM

## 2025-06-13 DIAGNOSIS — K21.9 GASTROESOPHAGEAL REFLUX DISEASE WITHOUT ESOPHAGITIS: ICD-10-CM

## 2025-06-13 DIAGNOSIS — K51.00 ULCERATIVE PANCOLITIS WITHOUT COMPLICATION (HCC): ICD-10-CM

## 2025-06-13 DIAGNOSIS — G43.709 CHRONIC MIGRAINE WITHOUT AURA WITHOUT STATUS MIGRAINOSUS, NOT INTRACTABLE: ICD-10-CM

## 2025-06-13 DIAGNOSIS — E78.5 HYPERLIPIDEMIA, UNSPECIFIED HYPERLIPIDEMIA TYPE: ICD-10-CM

## 2025-06-13 DIAGNOSIS — R73.9 HYPERGLYCEMIA: ICD-10-CM

## 2025-06-13 DIAGNOSIS — E78.00 PURE HYPERCHOLESTEROLEMIA: ICD-10-CM

## 2025-06-13 DIAGNOSIS — G60.9 IDIOPATHIC PERIPHERAL NEUROPATHY: ICD-10-CM

## 2025-06-13 DIAGNOSIS — I10 ESSENTIAL HYPERTENSION: ICD-10-CM

## 2025-06-13 PROCEDURE — 99214 OFFICE O/P EST MOD 30 MIN: CPT | Performed by: FAMILY MEDICINE

## 2025-06-13 PROCEDURE — G2211 COMPLEX E/M VISIT ADD ON: HCPCS | Performed by: FAMILY MEDICINE

## 2025-06-13 RX ORDER — ROSUVASTATIN CALCIUM 10 MG/1
10 TABLET, COATED ORAL DAILY
Qty: 90 TABLET | Refills: 3 | Status: SHIPPED | OUTPATIENT
Start: 2025-06-13

## 2025-06-13 RX ORDER — FAMOTIDINE 40 MG/1
40 TABLET, FILM COATED ORAL 2 TIMES DAILY PRN
Qty: 200 TABLET | Refills: 3 | Status: SHIPPED | OUTPATIENT
Start: 2025-06-13

## 2025-06-13 RX ORDER — METFORMIN HYDROCHLORIDE 500 MG/1
500 TABLET, EXTENDED RELEASE ORAL
Qty: 100 TABLET | Refills: 3 | Status: SHIPPED | OUTPATIENT
Start: 2025-06-13

## 2025-06-13 RX ORDER — DILTIAZEM HYDROCHLORIDE 240 MG/1
240 CAPSULE, COATED, EXTENDED RELEASE ORAL DAILY
Qty: 100 CAPSULE | Refills: 3 | Status: SHIPPED | OUTPATIENT
Start: 2025-06-13

## 2025-06-13 NOTE — ASSESSMENT & PLAN NOTE
Check lipids prior to follow-up  Orders:    Comprehensive metabolic panel; Future    Lipid Panel with Direct LDL reflex; Future

## 2025-06-13 NOTE — PROGRESS NOTES
Name: Fabiola Christina      : 1950      MRN: [id number]  Encounter Provider: Shlomo Castro Jr, MD  Encounter Date: 2025   [facility]  :  Assessment & Plan  Essential hypertension  Stable with current regimen.  She has been on diltiazem for quite some time for history of headaches and this has been helpful.  Orders:    diltiazem (CARDIZEM CD) 240 mg 24 hr capsule; Take 1 capsule (240 mg total) by mouth daily    Comprehensive metabolic panel; Future    CBC and differential; Future    Gastroesophageal reflux disease without esophagitis  Reflux is stable.  She remains on Pepcid.  Orders:    famotidine (PEPCID) 40 MG tablet; Take 1 tablet (40 mg total) by mouth 2 (two) times a day as needed for heartburn    Hyperlipidemia, unspecified hyperlipidemia type  Continue rosuvastatin.  Orders:    rosuvastatin (CRESTOR) 10 MG tablet; Take 1 tablet (10 mg total) by mouth daily    Comprehensive metabolic panel; Future    Lipid Panel with Direct LDL reflex; Future    CBC and differential; Future    Age-related osteoporosis with current pathological fracture of vertebra with routine healing, subsequent encounter  She was noted to have osteoporosis on her last DEXA.  She is going to continue on calcium plus D.  She would like to hold on bisphosphonate therapy at this time.       Closed compression fracture of L3 lumbar vertebra, initial encounter (HCC)  She has a prior history of L3 compression fracture that occurred status post fall.  She does have some noted osteoporosis.  Certainly consider initiation of therapy at her follow-up visit.       Neutropenia, unspecified type ([facility])  Monitor CBC.  Orders:    Comprehensive metabolic panel; Future    CBC and differential; Future    Chronic migraine without aura without status migrainosus, not intractable    Migraines unfortunately.  Quite stable at this time.  Continue routine monitoring.       Pure  hypercholesterolemia  Check lipids prior to follow-up  Orders:    Comprehensive metabolic panel; Future    Lipid Panel with Direct LDL reflex; Future    Hyperbilirubinemia  Stable.  Other LFTs look good.  Orders:    Comprehensive metabolic panel; Future    CBC and differential; Future    Ulcerative pancolitis without complication (HCC)  Continue close follow-up with GI.  She is currently not on medication besides probiotics.       Idiopathic peripheral neuropathy  Minimally bothersome at this time.  Continue routine monitoring.       Hyperglycemia  She is frustrated that her glucose is trending up despite dietary interventions.  She asked if we could try something to help prevent an onset of diabetes.  Start low-dose metformin 500 mg daily.  Orders:    metFORMIN (GLUCOPHAGE-XR) 500 mg 24 hr tablet; Take 1 tablet (500 mg total) by mouth daily with breakfast    Hemoglobin A1C; Future          Depression Screening and Follow-up Plan: Patient was screened for depression during today's encounter. They screened negative with a PHQ-2 score of 0.      History of Present Illness   HPIpatient presents for follow-up of chronic health issues.  She is under a great deal of stress as her boyfriend is being treated for several cancers.  She feels she is managing well and denies excessive depression or anxiety.  Reflux is stable with Pepcid.  She denies any dysphagia recently.  Review of Systems   Constitutional:  Negative for appetite change, chills, fatigue, fever and unexpected weight change.   HENT:  Negative for trouble swallowing.    Eyes:  Negative for visual disturbance.   Respiratory:  Negative for cough, chest tightness, shortness of breath and wheezing.    Cardiovascular:  Negative for chest pain, palpitations and leg swelling.   Gastrointestinal:  Negative for abdominal distention, abdominal pain, blood in stool, constipation and diarrhea.   Endocrine: Negative for polyuria.   Genitourinary:  Negative for difficulty  urinating and flank pain.   Musculoskeletal:  Positive for arthralgias. Negative for myalgias.   Skin:  Negative for rash.   Neurological:  Negative for dizziness and light-headedness.   Hematological:  Negative for adenopathy. Does not bruise/bleed easily.   Psychiatric/Behavioral:  Negative for dysphoric mood and sleep disturbance. The patient is not nervous/anxious.        Objective   /70   Pulse 67   Wt 70.9 kg (156 lb 6.4 oz)   SpO2 98%   BMI 27.71 kg/m²      Physical Exam  Constitutional:       General: She is not in acute distress.     Appearance: She is well-developed. She is not diaphoretic.   HENT:      Head: Normocephalic.      Right Ear: External ear normal.      Left Ear: External ear normal.      Nose: Nose normal.     Eyes:      General: No scleral icterus.        Right eye: No discharge.         Left eye: No discharge.      Conjunctiva/sclera: Conjunctivae normal.      Pupils: Pupils are equal, round, and reactive to light.     Neck:      Thyroid: No thyromegaly.      Trachea: No tracheal deviation.     Cardiovascular:      Rate and Rhythm: Normal rate and regular rhythm.      Heart sounds: Normal heart sounds. No murmur heard.  Pulmonary:      Effort: Pulmonary effort is normal. No respiratory distress.      Breath sounds: Normal breath sounds. No stridor. No wheezing, rhonchi or rales.   Abdominal:      General: Bowel sounds are normal. There is no distension.      Palpations: Abdomen is soft.      Tenderness: There is no abdominal tenderness. There is no guarding.     Musculoskeletal:         General: No tenderness or deformity. Normal range of motion.      Right lower leg: No edema.      Left lower leg: No edema.   Lymphadenopathy:      Cervical: No cervical adenopathy.     Skin:     General: Skin is warm.      Coloration: Skin is not pale.      Findings: No erythema or rash.     Neurological:      Cranial Nerves: No cranial nerve deficit.      Coordination: Coordination normal.      Psychiatric:         Mood and Affect: Mood normal.         Behavior: Behavior normal.         Thought Content: Thought content normal.

## 2025-06-13 NOTE — ASSESSMENT & PLAN NOTE
She was noted to have osteoporosis on her last DEXA.  She is going to continue on calcium plus D.  She would like to hold on bisphosphonate therapy at this time.

## 2025-06-13 NOTE — ASSESSMENT & PLAN NOTE
Stable with current regimen.  She has been on diltiazem for quite some time for history of headaches and this has been helpful.  Orders:    diltiazem (CARDIZEM CD) 240 mg 24 hr capsule; Take 1 capsule (240 mg total) by mouth daily    Comprehensive metabolic panel; Future    CBC and differential; Future

## 2025-06-13 NOTE — ASSESSMENT & PLAN NOTE
She is frustrated that her glucose is trending up despite dietary interventions.  She asked if we could try something to help prevent an onset of diabetes.  Start low-dose metformin 500 mg daily.  Orders:    metFORMIN (GLUCOPHAGE-XR) 500 mg 24 hr tablet; Take 1 tablet (500 mg total) by mouth daily with breakfast    Hemoglobin A1C; Future

## 2025-06-13 NOTE — ASSESSMENT & PLAN NOTE
Stable.  Other LFTs look good.  Orders:    Comprehensive metabolic panel; Future    CBC and differential; Future

## 2025-06-13 NOTE — ASSESSMENT & PLAN NOTE
She has a prior history of L3 compression fracture that occurred status post fall.  She does have some noted osteoporosis.  Certainly consider initiation of therapy at her follow-up visit.        Verified Results  COMP METABOLIC PANEL WITH CBCA ([address] [place] [address]) [id number] 05:22PM KEYONA STEIN   [Nov 16, 2018 8:38AM KEYONA STEIN]  very mild reduction in kidney function recommend pt drink more water  blood count, liver enzymes are healthy and normal     Test Name Result Flag Reference   WHITE BLOOD COUNT 10.2 K/mcL  4.2-11.0   RED CELL COUNT 4.81 mil/mcL  4.00-5.20   HEMOGLOBIN 14.6 g/dl  12.0-15.5   HEMATOCRIT 44.7 %  36.0-46.5   MEAN CORPUSCULAR VOLUME 92.9 fL  78.0-100.0   MEAN CORPUSCULAR HEMOGLOBIN 30.4 pg  26.0-34.0   MEAN CORPUSCULAR HGB CONC 32.7 g/dl  32.0-36.5   RDW-CV 12.6 %  11.0-15.0   PLATELET COUNT 543 K/mcL  140-450   BOBBY% 51 %     LYM% 39 %     MON% 7 %     EOS% 1 %     BASO% 1 %     BOBBY ABS 5.4 K/mcL  1.8-7.7   LYM ABS 3.9 K/mcL  1.0-4.8   MON ABS 0.7 K/mcL  0.3-0.9   EOS ABS 0.1 K/mcL  0.1-0.5   BASO ABS 0.1 K/mcL  0.0-0.3   SODIUM 138 mmol/L  135-145   POTASSIUM 4.4 mmol/L  3.4-5.1   CHLORIDE 105 mmol/L     CARBON DIOXIDE 24 mmol/L  21-32   ANION GAP 13 mmol/L  10-20   GLUCOSE 85 mg/dl  65-99   BUN 18 mg/dl  6-20   CREATININE 1.10 mg/dl H 0.51-0.95   GFR EST.  AMER 70     eGFR 60 - 89 mL/min/1.73m2 = Mild decrease in kidney function. GFR EST. NONAFRI AMER 60     eGFR 60 - 89 mL/min/1.73m2 = Mild decrease in kidney function.    BUN/CREATININE RATIO 16  7-25   BILIRUBIN TOTAL 0.4 mg/dl  0.2-1.0   GOT/AST 22 Units/L  <38   ALKALINE PHOSPHATASE 65 Units/L     ALBUMIN 4.0 g/dl  3.6-5.1   TOTAL PROTEIN 7.5 g/dl  6.4-8.2   GLOBULIN (CALCULATED) 3.5 g/dl  2.0-4.0   A/G RATIO 1.1  1.0-2.4   CALCIUM 9.4 mg/dl  8.4-10.2   GPT/ALT 32 Units/L  <79   DIFF TYPE      AUTOMATED DIFFERENTIAL   FASTING STATUS NO hrs     NRBC 0 /100 WBC  0   PERCENT IMMATURE GRANULOCYTES 1 %     ABSOLUTE IMMATURE GRANULOCYTES 0.1 K/mcL  0-0.2

## 2025-06-13 NOTE — PATIENT INSTRUCTIONS
"Patient Education     Carb counting for adults with diabetes   The Basics   Written by the doctors and editors at [facility]   What is carb counting? -- This is a type of meal planning that many people with diabetes use. It is a way to figure out how many carbohydrates, or \"carbs,\" you eat.  The body breaks down the food we eat into 3 main types of nutrients: carbs, proteins, and fats. Carbs are sugars and starches that come from food. The body uses carbs for energy.  Why do I need to count carbs? -- People with diabetes need to pay attention to how many carbs they eat. This is because carbs raise your blood sugar level.  Carb counting helps you:   Choose the right amount of insulin to take before meals and snacks - If you take insulin before meals, the dose depends on several things, including how many carbs you plan to eat. (It also depends on how much you plan to exercise and your blood sugar level.)   Plan your meals and snacks for the day - You can use carb counting to figure out how many carbs to eat at each meal and snack. This helps you make sure that you eat the right amount over the entire day.   Keep your blood sugar levels well managed - Spreading out the carbs you eat over a whole day can help keep your blood sugar from getting too high. If you take insulin or another diabetes medicine that can cause low blood sugar, eating about the same amount of carbs at each meal every day also helps keep your blood sugar from getting too low. Reducing the amount of carbs you eat can help you manage your diabetes better and prevent medical problems that diabetes can cause.  Your doctor, nurse, or dietitian (food expert) can help you figure out how many carbs to try to eat each day. This will depend on your eating habits, weight, activity level, and which diabetes medicines you take.  People who take insulin before meals might need to be very careful when they count the carbs in every meal and snack. This is so they " "can give themselves the right amount of insulin. If the insulin dose doesn't match the amount of carbs, their blood sugar might get too low or too high. Other people might be able to be a little more flexible as long as they get about the same amount of carbs at each meal or throughout the day.  Which foods have carbs? -- Foods with a lot of carbs include:   Grains - These include bread, pasta, rice, and cereal.   Fruits and starchy vegetables - Starchy vegetables include potatoes, corn, and squash.   Milk and other dairy products - Dairy products include cheese and yogurt.   Foods with added sugar - These include sweets and baked goods likes cookies and cakes, as well as sugary drinks like juice and soda.  It is best to get most of your carbs from fruits, vegetables, whole grains (like whole-wheat bread, whole-grain cereals, and brown rice), and low-fat milk and dairy products.  How do I count carbs? -- To count carbs in packaged foods, check the food's nutrition label (if it has one).  On the label (figure 1), check for:   \"Total Carbohydrate\" number - This tells you how many carbs are in 1 serving size of the food. If you eat 1 serving, then the number of carbs you eat is the same as the number of total carbohydrates.   \"Serving size\" - This tells you how much food is in 1 serving. If you have 2 servings, the number of carbs will be 2 times the number of carbohydrates listed.   \"Dietary Fiber\" - Fiber is a carb that is not digested, which means that it does not raise blood sugar. Foods with a lot of fiber can help manage your blood sugar. If a food has more than 5 grams (g) of fiber, you need less insulin to cover the total carbs in that food. So, if you are calculating an insulin dose, only count the carbs that are not from fiber (figure 1).  What is exchange planning? -- Exchange planning, or the \"exchange system,\" is a way for people to plan their meals without reading labels. This can be helpful since many " "foods don't come with a nutrition label.  The exchange system involves knowing how much of different foods have about 15 grams of carbs (table 1 and table 2 and table 3). Your doctor, nurse, or dietitian gives you a certain number of \"carb choices\" to eat with each meal and snack (table 4). Each \"choice\" is a portion of food that has about 15 grams of carbs. Knowing your options makes it easier to \"exchange\" 1 carb choice for another as you plan your meals and snacks. For example, 1 small apple could be exchanged for 1/3 cup of pasta.  How can I plan my meals? -- First, make sure that you know how many carbs you should be eating each day. Ask your doctor, nurse, or dietitian if you are not sure.  Here are some tips that might help:   Spread out your carbs over 4 to 6 small meals each day instead of 3 big ones.   Eat a similar number of carbs at each meal, for example, at each dinner.   Eat your meals at a similar time each day.   Plan your meals ahead of time.   Use the \"plate method.\" This is a simpler way to make sure that you get a good balance of carbs and other nutrients with each meal. It is not as exact as counting all of your carbs, but it can be helpful for people who prefer a simpler approach. If you take insulin before meals, it is generally better to adjust your insulin dose by counting how many carbs you plan to eat or using the exchange planning strategy.  For the plate method, you start with a plate about 9 inches (23 cm) across. Fill it with (figure 2):   1/2 non-starchy vegetables   1/4 protein   1/4 carbs   Follow your doctor's instructions for how and when to check your blood sugar. This can help you learn how certain foods affect your blood sugar.   Keep track of your meals and blood sugar levels. Show this to your doctor or nurse so they can adjust your treatment if needed. If you take insulin, you will also need to keep track of your exercise patterns and how much insulin you give yourself with " "each dose.   If you take insulin, make sure that you understand how to use it. This includes knowing how to adjust the dose based on your blood sugar level and what you plan to eat. Foods that have a lot of protein or fat also can affect your blood sugar level. Some people need to adjust their insulin doses when they eat these foods.   Remember that other things besides carbs can raise or lower your blood sugar level. These things can include exercise, getting sick, drinking alcohol, traveling, and stress. If you take insulin, make sure that you know how and when to adjust your dose in these situations.  If you are having trouble counting carbs or managing your blood sugar, talk to your doctor or nurse. They can help. A dietitian can also help you plan specific menus that will give you the right amount of carbs each day.  For more information, you can also get a book on counting carbs or check the American Diabetes Association website (www.diabetes.org).  All topics are updated as new evidence becomes available and our peer review process is complete.  This topic retrieved from [employer] on: Mar 27, 2024.  Topic 47451 Version 11.0  Release: 32.2.4 - C32.85  © 2024 UpToDate, Inc. and/or its affiliates. All rights reserved.  figure 1: Counting carbohydrates     To figure out the \"carb count\" in 1 serving, start with the number of grams of total carbohydrates (46 grams), then subtract the number of grams of dietary fiber (7 grams). It's also important to look at the serving size. In this example, the carb count is 39 grams. You can use this number when counting carbs for your insulin dose.  Graphic 16353 Version 8.0  table 1: Bread and grains with 15 grams of carbs*  Bread    Food  Serving size    Bagel 1/4 large bagel (1 oz)   Biscuit 1 biscuit (2.5 inches across)   Bread, reduced calorie, light 2 slices (1.5 oz)   Cornbread 1.75 inch cube (1.5 oz)   English muffin 1/2 muffin   Hot dog or hamburger bun 1/2 bun (3/4 oz) " "  Naan, chapati, or roti 1 oz   Pancake 1 pancake (4 inches across, 1/4 inch thick)   Luz (6 inches across) 1/2 luz   Tortilla, corn 1 small tortilla (6 inches across)   Tortilla, flour (white or whole wheat) 1 small tortilla (6 inches across) or 1/3 large tortilla (10 inches across)   Waffle 1 waffle (4-inch square or 4 inches across)   Cereals and grains (including pasta and rice)    Food  Serving size (cooked)    Barley, couscous, millet, pasta (white or whole wheat, all shapes and sizes), polenta, quinoa (all colors), or rice (white, brown, and other colors and types) 1/3 cup   Bran cereal (twigs, buds, or flakes), shredded wheat (plain), or sugar-coated cereal 1/2 cup   Bulgur, kasha, tabbouleh (tabouli), or wild rice 1/2 cup   Granola cereal 1/4 cup   Hot cereal (oats, oatmeal, grits) 1/2 cup   Unsweetened, ready-to-eat cereal 3/4 cup   * For bread and grains, 15 grams of carbs is considered 1 serving or \"choice\" for people who need to count carbs.  Graphic 304490 Version 1.0  table 2: Fruits with 15 grams of carbs*  Food  Serving size    Applesauce, unsweetened 1/2 cup   Banana 1 extra small banana, about 4 inches long (4 oz)   Blueberries 3/4 cup   Dried fruits (blueberries, cherries, cranberries, mixed fruit, raisins) 2 tbsp   Fruit, canned 1/2 cup   Fruit, whole, small (apple) 1 small fruit (4 oz)   Fruit, whole, medium (nectarine, orange, pear, tangerine) 1 medium fruit (6 oz)   Fruit juice, unsweetened 1/2 cup   Grapes 17 small grapes (3 oz)   Melon, diced 1 cup   Strawberries, whole 1 and 1/4 cups   When listed, weight (oz) includes skin and seeds. If you are not sure if your fruit is the right size for 1 serving, you can use a food scale to check the weight.  * For fruits, 15 grams of carbs is considered 1 serving or \"choice\" for people who need to count carbs.  Graphic 627607 Version 1.0  table 3: Starchy vegetables with 15 grams of carbs*  Food  Serving size (cooked)    Cassava, dasheen, or " "plantain 1/3 cup   Corn, green peas, mixed vegetables, or parsnips 1/2 cup   Marinara, pasta, or spaghetti sauce 1/2 cup   Mixed vegetables (with corn or peas) 1 cup   Potato, baked with skin 1/4 large (3 oz)   Potato, Persian-fried (oven-baked) 1 cup (2 oz)   Potato, mashed with milk and fat 1/2 cup   Squash, winter (acorn, butternut) 1 cup   Yam or sweet potato, plain 1/2 cup (3 and 1/2 oz)   If you are not sure if your vegetable is the right size for 1 serving, you can use a food scale to check the weight.  * For starchy vegetables, 15 grams of carbs is considered 1 serving or \"choice\" for people who need to count carbs.  Graphic 648052 Version 1.0  table 4: Sample exchange system meal plan  Time  Exchange pattern  Sample menu  Carbohydrate count (g)    8 am 3 carbohydrate group    2 starch 1 English muffin 30    1 fruit 1 1/4 c strawberries 15    1 protein group 1/4 c cottage cheese -    1 fat group 1 tsp margarine -      Total: 45    12 noon 4 carbohydrate group    2 starch 2 slices of bread 30    1 fruit 1 orange 15    1 vegetable 1 c salad -    1 milk 8 oz skim milk 12    3 protein group 3 oz chicken -    1 fat group 1 tbsp low fat lovett -      Total: 57    3 pm 1 carbohydrate group    1 fruit or 1 starch 1 apple or 6 crackers 15      Total: 15    6 pm 4 carbohydrate group    2 starch 1 c potato 30    1 fruit 1/2 c fruit salad 15    1 vegetable 1 c salad -    1 milk 8 oz skim milk 12    6 protein group 6 oz fish -    1 fat group 2 tbsp low fat salad dressing -      Total: 57    9 pm 1 carbohydrate group    1 starch 6 crackers 15    1 protein 2 tbsp peanut butter -      Total: 15    Graphic 29657 Version 3.0  figure 2: The \"plate method\"     For the plate method, you start with a plate about 9 inches (23 cm) across. Then fill it with 1/2 non-starchy vegetables, 1/4 protein, and 1/4 carbs.  Graphic 821101 Version 2.0  Consumer Information Use and Disclaimer   Disclaimer: This generalized information is a limited " summary of diagnosis, treatment, and/or medication information. It is not meant to be comprehensive and should be used as a tool to help the user understand and/or assess potential diagnostic and treatment options. It does NOT include all information about conditions, treatments, medications, side effects, or risks that may apply to a specific patient. It is not intended to be medical advice or a substitute for the medical advice, diagnosis, or treatment of a health care provider based on the health care provider's examination and assessment of a patient's specific and unique circumstances. Patients must speak with a health care provider for complete information about their health, medical questions, and treatment options, including any risks or benefits regarding use of medications. This information does not endorse any treatments or medications as safe, effective, or approved for treating a specific patient. UpToDate, Inc. and its affiliates disclaim any warranty or liability relating to this information or the use thereof.The use of this information is governed by the Terms of Use, available at https://www.wolterskluwer.com/en/know/clinical-effectiveness-terms. 2024© UpToDate, Inc. and its affiliates and/or licensors. All rights reserved.  Copyright   © 2024 UpToDate, Inc. and/or its affiliates. All rights reserved.

## 2025-06-13 NOTE — ASSESSMENT & PLAN NOTE
Reflux is stable.  She remains on Pepcid.  Orders:    famotidine (PEPCID) 40 MG tablet; Take 1 tablet (40 mg total) by mouth 2 (two) times a day as needed for heartburn

## 2025-06-16 ENCOUNTER — EVALUATION (OUTPATIENT)
Dept: PHYSICAL THERAPY | Facility: CLINIC | Age: 75
End: 2025-06-16
Attending: ORTHOPAEDIC SURGERY
Payer: MEDICARE

## 2025-06-16 DIAGNOSIS — G89.29 CHRONIC LEFT SHOULDER PAIN: Primary | ICD-10-CM

## 2025-06-16 DIAGNOSIS — M25.512 CHRONIC LEFT SHOULDER PAIN: Primary | ICD-10-CM

## 2025-06-16 PROCEDURE — 97110 THERAPEUTIC EXERCISES: CPT | Performed by: PHYSICAL THERAPIST

## 2025-06-16 PROCEDURE — 97140 MANUAL THERAPY 1/> REGIONS: CPT | Performed by: PHYSICAL THERAPIST

## 2025-06-16 PROCEDURE — 97161 PT EVAL LOW COMPLEX 20 MIN: CPT | Performed by: PHYSICAL THERAPIST

## 2025-06-16 NOTE — PROGRESS NOTES
PT Evaluation     Today's date: 2025  Patient name: Fabiola Christina  : 1950  MRN: [id number]  Referring provider: Glynn Draper*  Dx:   Encounter Diagnosis     ICD-10-CM    1. Chronic left shoulder pain  M25.512     G89.29                      Assessment  Impairments: abnormal muscle firing, abnormal muscle tone, abnormal or restricted ROM, impaired physical strength, lacks appropriate home exercise program and pain with function    Assessment details: Fabiola Christina is a pleasant 75 y.o. female presents with L shoulder pain. Pt with decreased scapular mobility, effecting shoulder mechanics when reaching and increasing neural tension. Educated on stretching/NMRE to address.   No further referral appears necessary at this time.       Skilled PT services appropriate to facilitate return to prior level of function with transition to home exercise program for independent management when appropriate.    Understanding of Dx/Px/POC: good     Prognosis: good    Goals  Patient will be able to manage symptoms independently  LT weeks  1. pt will reach foto predicted  2. pt will decrease worst pain 75%   ST weeks  1. Pt will decrease worst pain 50%  2. Patient will successfully manage HEP        Plan  Patient would benefit from: skilled PT  Referral necessary: No  Planned modality interventions: thermotherapy: hydrocollator packs    Planned therapy interventions: home exercise program, manual therapy, neuromuscular re-education, patient education, functional ROM exercises, strengthening, stretching, joint mobilization, graded activity, graded exercise, therapeutic exercise, body mechanics training, motor coordination training and activity modification    Frequency: 2x week  Duration in weeks: 12  Treatment plan discussed with: patient      Subjective Evaluation    History of Present Illness  Mechanism of injury: Pt reports L Lateral arm pain for 6 months. This began around the time she started  pilates. During the day her arm feels okay but most pain occurs when she lays on that side at night. Minimal change with injections at this point for subacromial space and GH. Pt denies neck pain. 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   Pain  Current pain ratin  At best pain ratin  At worst pain ratin  Quality: dull ache and throbbing      Objective     Palpation     Additional Palpation Details  L UT, scalenes restricted and TTP      Passive Range of Motion   Left Shoulder   Flexion: 160 degrees   External rotation 90°: 70 degrees   Internal rotation 0°: WFL    Strength/Myotome Testing     Left Shoulder     Planes of Motion   Flexion: 4   External rotation at 0°: 4+   Internal rotation at 0°: 4+     Tests     Left Shoulder   Positive ULTT1.   Negative Speed's.     General Comments:      Shoulder Comments   + L ULTT median  Shoulder flexion MMT: L scapula elevated, anterior tilt, protracted  TTP biceps long head             Precautions: none      Manuals             Cm, UT STM CB                                                   Neuro Re-Ed                                                                                                        Ther Ex             L dial stretch nv            Scalene towel stretch reviewed            sunil jordan                                                                             Ther Activity                                       Gait Training                                       Modalities

## 2025-06-19 ENCOUNTER — OFFICE VISIT (OUTPATIENT)
Dept: PHYSICAL THERAPY | Facility: CLINIC | Age: 75
End: 2025-06-19
Attending: ORTHOPAEDIC SURGERY
Payer: MEDICARE

## 2025-06-19 DIAGNOSIS — G89.29 CHRONIC LEFT SHOULDER PAIN: Primary | ICD-10-CM

## 2025-06-19 DIAGNOSIS — M25.512 CHRONIC LEFT SHOULDER PAIN: Primary | ICD-10-CM

## 2025-06-19 PROCEDURE — 97140 MANUAL THERAPY 1/> REGIONS: CPT | Performed by: PHYSICAL THERAPIST

## 2025-06-19 PROCEDURE — 97110 THERAPEUTIC EXERCISES: CPT | Performed by: PHYSICAL THERAPIST

## 2025-06-19 NOTE — PROGRESS NOTES
Daily Note     Today's date: 2025  Patient name: Fabiola Christina  : 1950  MRN: [id number]  Referring provider: Glynn Draper*  Dx:   Encounter Diagnosis     ICD-10-CM    1. Chronic left shoulder pain  M25.512     G89.29                      Subjective: Pt reports decreased pain with sleeping on her side recently.       Objective: See treatment diary below      Assessment: Tolerated treatment well. Patient would benefit from continued PT. Pt responding well to mobilization of 1st rib/scalene/UT and reducing scapular hike.       Plan: Continue per plan of care.      Precautions: none      Manuals            Scalene, UT STM CB CB                                                    Neuro Re-Ed                                                                                                        Ther Ex             L dial stretch nv nv           Scalene towel stretch reviewed 10x10:           sunil reviewed 2x10 BTB           Shoulder extension  BTB x20                                                               Ther Activity                                       Gait Training                                       Modalities

## 2025-06-24 ENCOUNTER — OFFICE VISIT (OUTPATIENT)
Dept: PHYSICAL THERAPY | Facility: CLINIC | Age: 75
End: 2025-06-24
Attending: ORTHOPAEDIC SURGERY
Payer: MEDICARE

## 2025-06-24 DIAGNOSIS — M25.512 CHRONIC LEFT SHOULDER PAIN: Primary | ICD-10-CM

## 2025-06-24 DIAGNOSIS — G89.29 CHRONIC LEFT SHOULDER PAIN: Primary | ICD-10-CM

## 2025-06-24 PROCEDURE — 97140 MANUAL THERAPY 1/> REGIONS: CPT | Performed by: PHYSICAL THERAPIST

## 2025-06-24 PROCEDURE — 97110 THERAPEUTIC EXERCISES: CPT | Performed by: PHYSICAL THERAPIST

## 2025-06-24 NOTE — PROGRESS NOTES
Daily Note     Today's date: 2025  Patient name: Fabiola Christina  : 1950  MRN: [id number]  Referring provider: Glynn Draper*  Dx:   Encounter Diagnosis     ICD-10-CM    1. Chronic left shoulder pain  M25.512     G89.29                      Subjective: Pt reports decreased pain overall in the shoulder. She noticed daily stress was causing neck stiffness and greater arm pain but improved post stretching.       Objective: See treatment diary below      Assessment: Tolerated treatment well. Patient would benefit from continued PT. Improvement in L side thoracic girdle mobility but still restricted relative to R.       Plan: Continue per plan of care.      Precautions: none      Manuals           Cm, UT STM CB CB CB                                                   Neuro Re-Ed                                                                                                        Ther Ex             L dial stretch nv nv           Scalene towel stretch reviewed 10x10: CB          sunil reviewed 2x10 BTB CB          Shoulder extension  BTB x20 CB          LTR w/arm at side   10x10:                                                 Ther Activity                                       Gait Training                                       Modalities

## 2025-06-26 ENCOUNTER — OFFICE VISIT (OUTPATIENT)
Dept: PHYSICAL THERAPY | Facility: CLINIC | Age: 75
End: 2025-06-26
Attending: ORTHOPAEDIC SURGERY
Payer: MEDICARE

## 2025-06-26 DIAGNOSIS — G89.29 CHRONIC LEFT SHOULDER PAIN: Primary | ICD-10-CM

## 2025-06-26 DIAGNOSIS — M25.512 CHRONIC LEFT SHOULDER PAIN: Primary | ICD-10-CM

## 2025-06-26 PROCEDURE — 97140 MANUAL THERAPY 1/> REGIONS: CPT | Performed by: PHYSICAL THERAPIST

## 2025-06-26 PROCEDURE — 97110 THERAPEUTIC EXERCISES: CPT | Performed by: PHYSICAL THERAPIST

## 2025-06-26 NOTE — PROGRESS NOTES
Daily Note     Today's date: 2025  Patient name: Fabiola Christina  : 1950  MRN: [id number]  Referring provider: Glynn Draper*  Dx:   Encounter Diagnosis     ICD-10-CM    1. Chronic left shoulder pain  M25.512     G89.29                      Subjective: Pt reports decreased pain in the arm recently. She did have a spasm on b/l neck but this has improved since.       Objective: See treatment diary below      Assessment: Tolerated treatment well. Patient would benefit from continued PT.       Plan: Continue per plan of care.      Precautions: none      Manuals          Cm, UT STM CB CB CB CB                                                  Neuro Re-Ed                                                                                                        Ther Ex             L dial stretch nv nv           Scalene towel stretch reviewed 10x10: CB CB         robbbery reviewed 2x10 BTB CB CB         Shoulder extension  BTB x20 CB CB         LTR w/arm at side   10x10: CB                                                Ther Activity                                       Gait Training                                       Modalities

## 2025-07-01 ENCOUNTER — OFFICE VISIT (OUTPATIENT)
Dept: PHYSICAL THERAPY | Facility: CLINIC | Age: 75
End: 2025-07-01
Attending: ORTHOPAEDIC SURGERY
Payer: MEDICARE

## 2025-07-01 DIAGNOSIS — M25.512 CHRONIC LEFT SHOULDER PAIN: Primary | ICD-10-CM

## 2025-07-01 DIAGNOSIS — G89.29 CHRONIC LEFT SHOULDER PAIN: Primary | ICD-10-CM

## 2025-07-01 PROCEDURE — 97112 NEUROMUSCULAR REEDUCATION: CPT | Performed by: PHYSICAL THERAPIST

## 2025-07-01 PROCEDURE — 97140 MANUAL THERAPY 1/> REGIONS: CPT | Performed by: PHYSICAL THERAPIST

## 2025-07-01 PROCEDURE — 97110 THERAPEUTIC EXERCISES: CPT | Performed by: PHYSICAL THERAPIST

## 2025-07-01 NOTE — PROGRESS NOTES
Daily Note     Today's date: 2025  Patient name: Fabiola Christina  : 1950  MRN: [id number]  Referring provider: Glynn Draper*  Dx:   Encounter Diagnosis     ICD-10-CM    1. Chronic left shoulder pain  M25.512     G89.29                      Subjective: Pt notes no new complaints with her arm recently.       Objective: See treatment diary below      Assessment: Tolerated treatment well. Patient would benefit from continued PT. Thoracic girdle restrictions continue to improve well. Educated on additional strengthening with minimal soreness.       Plan: Continue per plan of care.      Precautions: none      Manuals         Cm, UT STM CB CB CB CB CB                                                 Neuro Re-Ed                                                                                                        Ther Ex             L dial stretch nv nv           Scalene towel stretch reviewed 10x10: CB CB CB        robbery reviewed 2x10 BTB CB CB CB        Shoulder extension  BTB x20 CB CB CB        LTR w/arm at side   10x10: CB         rows             Wall clocks     2x10                     Ther Activity                                       Gait Training                                       Modalities

## 2025-07-03 ENCOUNTER — OFFICE VISIT (OUTPATIENT)
Dept: PHYSICAL THERAPY | Facility: CLINIC | Age: 75
End: 2025-07-03
Attending: ORTHOPAEDIC SURGERY
Payer: MEDICARE

## 2025-07-03 DIAGNOSIS — G89.29 CHRONIC LEFT SHOULDER PAIN: Primary | ICD-10-CM

## 2025-07-03 DIAGNOSIS — M25.512 CHRONIC LEFT SHOULDER PAIN: Primary | ICD-10-CM

## 2025-07-03 PROCEDURE — 97110 THERAPEUTIC EXERCISES: CPT | Performed by: PHYSICAL THERAPIST

## 2025-07-03 PROCEDURE — 97140 MANUAL THERAPY 1/> REGIONS: CPT | Performed by: PHYSICAL THERAPIST

## 2025-07-03 NOTE — PROGRESS NOTES
Daily Note     Today's date: 7/3/2025  Patient name: Fabiola Christina  : 1950  MRN: [id number]  Referring provider: Glynn Draper*  Dx:   Encounter Diagnosis     ICD-10-CM    1. Chronic left shoulder pain  M25.512     G89.29                      Subjective: Pt reports some increased soreness in the shoulder after lifting lots of boxes in a storage unit. It does still feel improved from a few weeks ago.       Objective: See treatment diary below      Assessment: Tolerated treatment well. Patient would benefit from continued PT.      Plan: Continue per plan of care.      Precautions: none      Manuals 6/17 6/19 6/24 6/26 7/1 7/3       Cm, UT STM CB CB CB CB CB CB                                                Neuro Re-Ed                                                                                                        Ther Ex             L dial stretch nv nv           Scalene towel stretch reviewed 10x10: CB CB CB CB       robbery reviewed 2x10 BTB CB CB CB CB       Shoulder extension  BTB x20 CB CB CB CB       LTR w/arm at side   10x10: CB         rows             Wall clocks     2x10 CB                    Ther Activity                                       Gait Training                                       Modalities

## 2025-07-08 ENCOUNTER — OFFICE VISIT (OUTPATIENT)
Dept: PHYSICAL THERAPY | Facility: CLINIC | Age: 75
End: 2025-07-08
Attending: ORTHOPAEDIC SURGERY
Payer: MEDICARE

## 2025-07-08 DIAGNOSIS — G89.29 CHRONIC LEFT SHOULDER PAIN: Primary | ICD-10-CM

## 2025-07-08 DIAGNOSIS — M25.512 CHRONIC LEFT SHOULDER PAIN: Primary | ICD-10-CM

## 2025-07-08 PROCEDURE — 97140 MANUAL THERAPY 1/> REGIONS: CPT | Performed by: PHYSICAL THERAPIST

## 2025-07-08 PROCEDURE — 97110 THERAPEUTIC EXERCISES: CPT | Performed by: PHYSICAL THERAPIST

## 2025-07-08 NOTE — PROGRESS NOTES
Daily Note     Today's date: 2025  Patient name: Fabiola Christina  : 1950  MRN: [id number]  Referring provider: Glynn Draper*  Dx:   Encounter Diagnosis     ICD-10-CM    1. Chronic left shoulder pain  M25.512     G89.29                      Subjective: Pt reports decreased overall pain in the shoulder.       Objective: See treatment diary below      Assessment: Tolerated treatment well. Patient would benefit from continued PT. Educated on additional strengthening for home.       Plan: Continue per plan of care.      Precautions: none      Manuals 6/17 6/19 6/24 6/26 7/1 7/3 7/8      [place], [place] STM CB CB CB CB CB CB CB                                               Neuro Re-Ed                                                                                                        Ther Ex             L dial stretch nv nv           [place] towel stretch reviewed 10x10: CB CB CB CB CB      robbery reviewed 2x10 BTB CB CB CB CB CB      Shoulder extension  BTB x20 CB CB CB CB CB      LTR w/arm at side   10x10: CB         rows       2x10 15# [place]      Wall clocks     2x10 CB CB      Med ball reach       2x10 4#      Ther Activity                                       Gait Training                                       Modalities

## 2025-07-10 ENCOUNTER — OFFICE VISIT (OUTPATIENT)
Dept: PHYSICAL THERAPY | Facility: CLINIC | Age: 75
End: 2025-07-10
Attending: ORTHOPAEDIC SURGERY
Payer: MEDICARE

## 2025-07-10 ENCOUNTER — OFFICE VISIT (OUTPATIENT)
Age: 75
End: 2025-07-10

## 2025-07-10 VITALS — TEMPERATURE: 97.8 F | WEIGHT: 165 LBS | BODY MASS INDEX: 29.23 KG/M2

## 2025-07-10 DIAGNOSIS — D48.9 NEOPLASM OF UNCERTAIN BEHAVIOR: Primary | ICD-10-CM

## 2025-07-10 DIAGNOSIS — G89.29 CHRONIC LEFT SHOULDER PAIN: Primary | ICD-10-CM

## 2025-07-10 DIAGNOSIS — M25.512 CHRONIC LEFT SHOULDER PAIN: Primary | ICD-10-CM

## 2025-07-10 DIAGNOSIS — L82.0 INFLAMED SEBORRHEIC KERATOSIS: ICD-10-CM

## 2025-07-10 PROCEDURE — 88305 TISSUE EXAM BY PATHOLOGIST: CPT | Performed by: STUDENT IN AN ORGANIZED HEALTH CARE EDUCATION/TRAINING PROGRAM

## 2025-07-10 PROCEDURE — 88342 IMHCHEM/IMCYTCHM 1ST ANTB: CPT | Performed by: STUDENT IN AN ORGANIZED HEALTH CARE EDUCATION/TRAINING PROGRAM

## 2025-07-10 PROCEDURE — 88341 IMHCHEM/IMCYTCHM EA ADD ANTB: CPT | Performed by: STUDENT IN AN ORGANIZED HEALTH CARE EDUCATION/TRAINING PROGRAM

## 2025-07-10 PROCEDURE — 97140 MANUAL THERAPY 1/> REGIONS: CPT | Performed by: PHYSICAL THERAPIST

## 2025-07-10 NOTE — PROGRESS NOTES
"[facility]atology [facility] Note     Patient Name: Fabiola Christina  Encounter Date: 07/10/2025       Have you been cared for by a [facility] Dermatologist in the last 3 years and, if so, which description applies to you? Yes. I have been here within the last 3 years, and my medical history has NOT changed since that time. I am of child-bearing potential.     REVIEW OF SYSTEMS:  Have you recently had or currently have any of the following? No changes in my recent health.   PAST MEDICAL HISTORY:  Have you personally ever had or currently have any of the following?  If \"YES,\" then please provide more detail. No changes in my medical history.   HISTORY OF IMMUNOSUPPRESSION: Do you have a history of any of the following:  Systemic Immunosuppression such as Diabetes, Biologic or Immunotherapy, Chemotherapy, Organ Transplantation, Bone Marrow Transplantation or Prednisone?  No     Answering \"YES\" requires the addition of the dotphrase \"IMMUNOSUPPRESSED\" as the first diagnosis of the patient's visit.   FAMILY HISTORY:  Any \"first degree relatives\" (parent, brother, sister, or child) with the following?    No changes in my family's known health.   PATIENT EXPERIENCE:    Do you want the Dermatologist to perform a COMPLETE skin exam today including a clinical examination under the \"bra and underwear\" areas?  NO  If necessary, do we have your permission to call and leave a detailed message on your Preferred Phone number that includes your specific medical information?  Yes      Allergies[1] Current Medications[2]        Whom besides the patient is providing clinical information about today's encounter?   NO ADDITIONAL HISTORIAN (patient alone provided history)    Physical Exam and Assessment/Plan by Diagnosis:     NEOPLASM OF UNCERTAIN BEHAVIOR OF SKIN  Physical Exam:  (Anatomic Location); (Size and Morphological Description); (Differential Diagnosis):  A; left upper chest; 1.4 cm x 1.5 cm pink scaly plaque; ddx: SCCIS vs. " "BCC  Pertinent Positives:  Pertinent Negatives:        Additional History of Present Condition:  Patient notes spot of concern on the chest, she notes it has grew in size since November. She notes the spots are itchy and tender to touch. Patient notes she tried using triamcinolone ointment that helped with the itching. Patient notes it  bleed on its own.    Assessment and Plan:  I have discussed with the patient that a sample of skin via a \"skin biopsy” would be potentially helpful to further make a specific diagnosis under the microscope.  Based on a thorough discussion of this condition and the management approach to it (including a comprehensive discussion of the known risks, side effects and potential benefits of treatment), the patient (family) agrees to implement the following specific plan:    Procedure:  Skin Biopsy.  After a thorough discussion of treatment options and risk/benefits/alternatives (including but not limited to local pain, scarring, dyspigmentation, blistering, possible superinfection, and inability to confirm a diagnosis via histopathology), verbal and written consent were obtained and portion of the rash was biopsied for tissue sample.  See below for consent that was obtained from patient and subsequent Procedure Note.    PROCEDURE TANGENTIAL (SHAVE) BIOPSY NOTE:    Performing Physician: Dr. Fernandez   Anatomic Location; Clinical Description with size (cm); Pre-Op Diagnosis:   A; left upper chest; 1.4 cm x 1.5 cm pink scaly plaque; ddx: SCCIS vs. BCC  Post-op diagnosis: Same     Local anesthesia: 1% xylocaine with epi      Topical anesthesia: None    Hemostasis: Aluminum chloride       After obtaining informed consent  at which time there was a discussion about the purpose of biopsy  and low risks of infection and bleeding.  The area was prepped and draped in the usual fashion. Anesthesia was obtained with 1% lidocaine with epinephrine. A shave biopsy to an appropriate sampling depth was " "obtained by Shave (Dermablade or 15 blade) The resulting wound was covered with surgical ointment and bandaged appropriately.     The patient tolerated the procedure well without complications and was without signs of functional compromise.      Specimen has been sent for review by Dermatopathology.    Standard post-procedure care has been explained and has been included in written form within the patient's copy of Informed Consent.    INFORMED CONSENT DISCUSSION AND POST-OPERATIVE INSTRUCTIONS FOR PATIENT    I.  RATIONALE FOR PROCEDURE  I understand that a skin biopsy allows the Dermatologist to test a lesion or rash under the microscope to obtain a diagnosis.  It usually involves numbing the area with numbing medication and removing a small piece of skin; sometimes the area will be closed with sutures. In this specific procedure, sutures are not usually needed.  If any sutures are placed, then they are usually need to be removed in 2 weeks or less.    I understand that my Dermatologist recommends that a skin \"shave\" biopsy be performed today.  A local anesthetic, similar to the kind that a dentist uses when filling a cavity, will be injected with a very small needle into the skin area to be sampled.  The injected skin and tissue underneath \"will go to sleep” and become numb so no pain should be felt afterwards.  An instrument shaped like a tiny \"razor blade\" (shave biopsy instrument) will be used to cut a small piece of tissue and skin from the area so that a sample of tissue can be taken and examined more closely under the microscope.  A slight amount of bleeding will occur, but it will be stopped with direct pressure and a pressure bandage and any other appropriate methods.  I understands that a scar will form where the wound was created.  Surgical ointment will be applied to help protect the wound.  Sutures are not usually needed.    II.  RISKS AND POTENTIAL COMPLICATIONS   I understand the risks and potential " "complications of a skin biopsy include but are not limited to the following:  Bleeding  Infection  Pain  Scar/keloid  Skin discoloration  Incomplete Removal  Recurrence  Nerve Damage/Numbness/Loss of Function  Allergic Reaction to Anesthesia  Biopsies are diagnostic procedures and based on findings additional treatment or evaluation may be required  Loss or destruction of specimen resulting in no additional findings    My Dermatologist has explained to me the nature of the condition, the nature of the procedure, and the benefits to be reasonably expected compared with alternative approaches.  My Dermatologist has discussed the likelihood of major risks or complications of this procedure including the specific risks listed above, such as bleeding, infection, and scarring/keloid.  I understand that a scar is expected after this procedure.  I understand that my physician cannot predict if the scar will form a \"keloid,\" which extends beyond the borders of the wound that is created.  A keloid is a thick, painful, and bumpy scar.  A keloid can be difficult to treat, as it does not always respond well to therapy, which includes injecting cortisone directly into the keloid every few weeks.  While this usually reduces the pain and size of the scar, it does not eliminate it.      I understand that photographs may be taken before and after the procedure.  These will be maintained as part of the medical providers confidential records and may not be made available to me.  I further authorize the medical provider to use the photographs for teaching purposes or to illustrate scientific papers, books, or lectures if in his/her judgment, medical research, education, or science may benefit from its use.    I have had an opportunity to fully inquire about the risks and benefits of this procedure and its alternatives.   I have been given ample time and opportunity to ask questions and to seek a second opinion if I wished to do so.  I " "acknowledge that there have specifically been no guarantees as to the cosmetic results from the procedure.  I am aware that with any procedure there is always the possibility of an unexpected complication.    III. POST-PROCEDURAL CARE (WHAT YOU WILL NEED TO DO \"AFTER THE BIOPSY\" TO OPTIMIZE HEALING)    Keep the area clean and dry.  Try NOT to remove the bandage or get it wet for the first 24 hours.    Gently clean the area and apply surgical ointment (such as Vaseline petrolatum ointment, which is available \"over the counter\" and not a prescription) to the biopsy site for up to 2 weeks straight.  This acts to protect the wound from the outside world.      Sutures are not usually placed in this procedure.  If any sutures were placed, return for suture removal as instructed (generally 1 week for the face, 2 weeks for the body).      Take Acetaminophen (Tylenol) for discomfort, if no contraindications.  Ibuprofen or aspirin could make bleeding worse.    Call our office immediately for signs of infection: fever, chills, increased redness, warmth, tenderness, discomfort/pain, or pus or foul smell coming from the wound.    WHAT TO DO IF THERE IS ANY BLEEDING?  If a small amount of bleeding is noticed, place a clean cloth over the area and apply firm pressure for ten minutes.  Check the wound after 10 minutes of direct pressure.  If bleeding persists, try one more time for an additional 10 minutes of direct pressure on the area.  If the bleeding becomes heavier or does not stop after the second attempt, or if you have any other questions about this procedure, then please call your [facility]'s Dermatologist by calling [phone] (SKIN).     I hereby acknowledge that I have reviewed and verified the site with my Dermatologist and have requested and authorized my Dermatologist to proceed with the procedure.    IRRITATED PAPULE   Physical Exam:  Anatomic Location Affected:  right upper chest, right upper arm  Morphological " Description:  scaly papule   Pertinent Positives:  Pertinent Negatives:    Additional History of Present Condition:  She notes two more spots that appeared that are similar over the past week, one of which that had a scale that was picked at     Assessment and Plan:  Based on a thorough discussion of this condition and the management approach to it (including a comprehensive discussion of the known risks, side effects and potential benefits of treatment), the patient (family) agrees to implement the following specific plan:  Monitor for changes; follow up sooner than scheduled skin check if any change in size, symptoms      INFLAMED SEBORRHEIC KERATOSIS  Physical Exam:  Anatomic Location Affected & Morphological Description:  Waxy well demarcated sessile stuck on brown papule/s with erythema/crusting on the forehead.     Additional History of Present Condition:  Present for years, recently has become irritated    Assessment and Plan:  Based on a thorough discussion of this condition and the management approach to it (including a comprehensive discussion of the known risks, side effects and potential benefits of treatment), the patient (family) agrees to implement the following specific plan:  Cryotherapy given symptoms and inflammation  After care discussed    PROCEDURE:  DESTRUCTION OF BENIGN LESIONS  After a thorough discussion of treatment options and risk/benefits/alternatives (including but not limited to local pain, scarring, dyspigmentation, blistering, and possible superinfection), verbal and written consent were obtained and the aforementioned lesions were treated on with cryotherapy using liquid nitrogen x 1 cycle for 5-10 seconds.    TOTAL NUMBER of 3 lesions were treated today on the ANATOMIC LOCATION: forehead.     The patient tolerated the procedure well, and after-care instructions were provided.    Scribe Attestation      I,:  Nicky Carr MA am acting as a scribe while in the presence of the  attending physician.:       I,:  Wild Gonzalez MD personally performed the services described in this documentation    as scribed in my presence.:           Bo Fernandez MD   PGY-3 Dermatology Resident         [1]   Allergies  Allergen Reactions    Ciprofloxacin Anaphylaxis     Anaphylaxis    Celecoxib Swelling     Edema of legs    Amitriptyline GI Intolerance     Action Taken: bloating,GI problems;     Bactrim [Sulfamethoxazole-Trimethoprim] Rash    Lidoderm [Lidocaine] Rash     Rash from lidoderm patch    Mesalamine Dizziness and Headache    Sulfa Antibiotics Rash    Wound Dressing Adhesive Rash   [2]   Current Outpatient Medications:     acetaminophen (TYLENOL) 650 mg CR tablet, Take 1,300 mg by mouth every 6 (six) hours as needed for mild pain, Disp: , Rfl:     [START ON 9/15/2025] atovaquone-proguanil (MALARONE) 250-100 mg, Take 1 tablet by mouth daily with breakfast for 24 days Begin 1 day before entering malaria area and continue daily while in malaria area and for 1 week after leaving area Do not start before September 15, 2025., Disp: 24 tablet, Rfl: 1    Cholecalciferol (VITAMIN D3) 5000 units CAPS, Take 1 tablet by mouth in the morning., Disp: , Rfl:     diltiazem (CARDIZEM CD) 240 mg 24 hr capsule, Take 1 capsule (240 mg total) by mouth daily, Disp: 100 capsule, Rfl: 3    famotidine (PEPCID) 40 MG tablet, Take 1 tablet (40 mg total) by mouth 2 (two) times a day as needed for heartburn, Disp: 200 tablet, Rfl: 3    metFORMIN (GLUCOPHAGE-XR) 500 mg 24 hr tablet, Take 1 tablet (500 mg total) by mouth daily with breakfast, Disp: 100 tablet, Rfl: 3    Misc Natural Products (GLUCOSAMINE CHOND CMP ADVANCED PO), Take by mouth, Disp: , Rfl:     Multiple Vitamins-Minerals (MULTI COMPLETE) CAPS, Take 1 capsule by mouth in the morning., Disp: , Rfl:     Probiotic Product (VSL#3) CAPS, Take 1 capsule by mouth daily, Disp: 90 capsule, Rfl: 3    rosuvastatin (CRESTOR) 10 MG tablet, Take 1 tablet (10 mg total) by  mouth daily, Disp: 90 tablet, Rfl: 3    triamcinolone (KENALOG) 0.1 % ointment, Apply topically twice daily for two weeks. Taper down to three times weekly afterwards., Disp: 80 g, Rfl: 0

## 2025-07-10 NOTE — PROGRESS NOTES
Daily Note     Today's date: 7/10/2025  Patient name: Fabiola Christina  : 1950  MRN: [id number]  Referring provider: Glynn Draper*  Dx:   Encounter Diagnosis     ICD-10-CM    1. Chronic left shoulder pain  M25.512     G89.29                      Subjective: Pt reports some continued soreness in the shoulder. She was doing more yard work recently. Overall, it is still some improved from prior.       Objective: See treatment diary below      Assessment: Tolerated treatment well. Patient would benefit from continued PT      Plan: Continue per plan of care.      Precautions: none      Manuals 6/17 6/19 6/24 6/26 7/1 7/3 7/8 7/10     [place], [place] STM CB CB CB CB CB CB CB CB     1ST/2ND RIB MOBS        CB                                 Neuro Re-Ed                                                                                                        Ther Ex             Pec stretch at wall        CB     [place] towel stretch reviewed 10x10: CB CB CB CB CB      robbery reviewed 2x10 BTB CB CB CB CB CB      Shoulder extension  BTB x20 CB CB CB CB CB      LTR w/arm at side   10x10: CB         rows       2x10 15# [place]      Wall clocks     2x10 CB CB      Med ball reach       2x10 4#      Ther Activity                                       Gait Training                                       Modalities

## 2025-07-15 ENCOUNTER — OFFICE VISIT (OUTPATIENT)
Dept: PHYSICAL THERAPY | Facility: CLINIC | Age: 75
End: 2025-07-15
Attending: ORTHOPAEDIC SURGERY
Payer: MEDICARE

## 2025-07-15 DIAGNOSIS — G89.29 CHRONIC LEFT SHOULDER PAIN: Primary | ICD-10-CM

## 2025-07-15 DIAGNOSIS — M25.512 CHRONIC LEFT SHOULDER PAIN: Primary | ICD-10-CM

## 2025-07-15 PROCEDURE — 97110 THERAPEUTIC EXERCISES: CPT | Performed by: PHYSICAL THERAPIST

## 2025-07-15 PROCEDURE — 97140 MANUAL THERAPY 1/> REGIONS: CPT | Performed by: PHYSICAL THERAPIST

## 2025-07-17 PROCEDURE — 88341 IMHCHEM/IMCYTCHM EA ADD ANTB: CPT | Performed by: STUDENT IN AN ORGANIZED HEALTH CARE EDUCATION/TRAINING PROGRAM

## 2025-07-17 PROCEDURE — 88342 IMHCHEM/IMCYTCHM 1ST ANTB: CPT | Performed by: STUDENT IN AN ORGANIZED HEALTH CARE EDUCATION/TRAINING PROGRAM

## 2025-07-17 PROCEDURE — 88305 TISSUE EXAM BY PATHOLOGIST: CPT | Performed by: STUDENT IN AN ORGANIZED HEALTH CARE EDUCATION/TRAINING PROGRAM

## 2025-07-18 ENCOUNTER — OFFICE VISIT (OUTPATIENT)
Dept: PHYSICAL THERAPY | Facility: CLINIC | Age: 75
End: 2025-07-18
Attending: ORTHOPAEDIC SURGERY
Payer: MEDICARE

## 2025-07-18 DIAGNOSIS — M25.512 CHRONIC LEFT SHOULDER PAIN: Primary | ICD-10-CM

## 2025-07-18 DIAGNOSIS — G89.29 CHRONIC LEFT SHOULDER PAIN: Primary | ICD-10-CM

## 2025-07-18 PROCEDURE — 97110 THERAPEUTIC EXERCISES: CPT | Performed by: PHYSICAL THERAPIST

## 2025-07-18 PROCEDURE — 97140 MANUAL THERAPY 1/> REGIONS: CPT | Performed by: PHYSICAL THERAPIST

## 2025-07-18 NOTE — PROGRESS NOTES
Daily Note     Today's date: 2025  Patient name: Fabiola Christina  : 1950  MRN: [id number]  Referring provider: Glynn Draper*  Dx:   Encounter Diagnosis     ICD-10-CM    1. Chronic left shoulder pain  M25.512     G89.29                      Subjective: Pt notes soreness in the shoulder after lifting lots of boxes.       Objective: See treatment diary below      Assessment: Tolerated treatment well. Patient would benefit from continued PT. Cervical restriction is improving and unaffected by lifting.      Plan: Continue per plan of care.      Precautions: none      Manuals  7/3 7/8 7/10 7/15 7/18   [place] STM CB CB CB CB CB CB CB CB CB CB   1ST/2ND RIB MOBS        CB CB CB   Pec release           CB CB                Neuro Re-Ed                                                                                                        Ther Ex             Pec stretch at wall        CB     [place] towel stretch reviewed 10x10: CB CB CB CB CB  CB CB   robbery reviewed 2x10 BTB CB CB CB CB CB  CB CB   Shoulder extension  BTB x20 CB CB CB CB CB  CB CB   LTR w/arm at side   10x10: CB         rows       2x10 15# [place]  CB CB   Wall clocks     2x10 CB CB  CB CB   Med ball reach       2x10 4#  CB CB   Ther Activity                                       Gait Training                                       Modalities

## 2025-07-22 ENCOUNTER — OFFICE VISIT (OUTPATIENT)
Dept: PHYSICAL THERAPY | Facility: CLINIC | Age: 75
End: 2025-07-22
Attending: ORTHOPAEDIC SURGERY
Payer: MEDICARE

## 2025-07-22 DIAGNOSIS — G89.29 CHRONIC LEFT SHOULDER PAIN: Primary | ICD-10-CM

## 2025-07-22 DIAGNOSIS — M25.512 CHRONIC LEFT SHOULDER PAIN: Primary | ICD-10-CM

## 2025-07-22 PROCEDURE — 97140 MANUAL THERAPY 1/> REGIONS: CPT | Performed by: PHYSICAL THERAPIST

## 2025-07-22 PROCEDURE — 97110 THERAPEUTIC EXERCISES: CPT | Performed by: PHYSICAL THERAPIST

## 2025-07-22 NOTE — PROGRESS NOTES
Daily Note     Today's date: 2025  Patient name: Fabiola Christina  : 1950  MRN: [id number]  Referring provider: Glynn Draper*  Dx:   Encounter Diagnosis     ICD-10-CM    1. Chronic left shoulder pain  M25.512     G89.29                      Subjective: Pt notes a little increased soreness in the anterior L arm today.       Objective: See treatment diary below      Assessment: Tolerated treatment well. Patient would benefit from continued PT. Increased restriction L bicep today with associated discomfort with reaching. Improvement in pain post mobilization. Educated soreness should improve as she stays consistent with strengthening activity.       Plan: Continue per plan of care.      Precautions: none      Manuals             [place], [place] STM CB            / RIB MOBS CB            Pec release CB; bicep                         Neuro Re-Ed                                                                                                        Ther Ex             Pec stretch at wall             [place] towel stretch 10x10:            robbery 2x10 BTB            Shoulder extension 2x10 BTB            LTR w/arm at side             rows 2X10 12#            Wall clocks Rtb X10            Med ball reach NV            Ther Activity                                       Gait Training                                       Modalities

## 2025-07-24 ENCOUNTER — OFFICE VISIT (OUTPATIENT)
Dept: PHYSICAL THERAPY | Facility: CLINIC | Age: 75
End: 2025-07-24
Attending: ORTHOPAEDIC SURGERY
Payer: MEDICARE

## 2025-07-24 DIAGNOSIS — G89.29 CHRONIC LEFT SHOULDER PAIN: Primary | ICD-10-CM

## 2025-07-24 DIAGNOSIS — M25.512 CHRONIC LEFT SHOULDER PAIN: Primary | ICD-10-CM

## 2025-07-24 PROCEDURE — 97140 MANUAL THERAPY 1/> REGIONS: CPT | Performed by: PHYSICAL THERAPIST

## 2025-07-24 NOTE — PROGRESS NOTES
Daily Note     Today's date: 2025  Patient name: Fabiola Christina  : 1950  MRN: [id number]  Referring provider: Glynn Draper*  Dx:   Encounter Diagnosis     ICD-10-CM    1. Chronic left shoulder pain  M25.512     G89.29                      Subjective: Pt notes some improvement in pain recently.      Objective: See treatment diary below      Assessment: Tolerated treatment well. Patient would benefit from continued PT. Shortened session secondary to pt home obligations. No increased complaints post manuals.       Plan: Continue per plan of care.      Precautions: none      Manuals            [place], [place] STM CB CB           1ST/2ND RIB MOBS CB CB           Pec release CB; bicep CB                        Neuro Re-Ed                                                                                                        Ther Ex             Pec stretch at wall             [place] towel stretch 10x10: CB           robbery 2x10 BTB            Shoulder extension 2x10 BTB            LTR w/arm at side             rows 2X10 12#            Wall clocks Rtb X10            Med ball reach NV            Ther Activity                                       Gait Training                                       Modalities

## 2025-08-04 ENCOUNTER — OFFICE VISIT (OUTPATIENT)
Dept: PHYSICAL THERAPY | Facility: CLINIC | Age: 75
End: 2025-08-04
Attending: ORTHOPAEDIC SURGERY
Payer: MEDICARE

## 2025-08-04 DIAGNOSIS — G89.29 CHRONIC LEFT SHOULDER PAIN: Primary | ICD-10-CM

## 2025-08-04 DIAGNOSIS — M25.512 CHRONIC LEFT SHOULDER PAIN: Primary | ICD-10-CM

## 2025-08-04 PROCEDURE — 97140 MANUAL THERAPY 1/> REGIONS: CPT | Performed by: PHYSICAL THERAPIST

## 2025-08-04 PROCEDURE — 97112 NEUROMUSCULAR REEDUCATION: CPT | Performed by: PHYSICAL THERAPIST

## 2025-08-04 PROCEDURE — 97110 THERAPEUTIC EXERCISES: CPT | Performed by: PHYSICAL THERAPIST

## 2025-08-06 ENCOUNTER — OFFICE VISIT (OUTPATIENT)
Dept: PHYSICAL THERAPY | Facility: CLINIC | Age: 75
End: 2025-08-06
Attending: ORTHOPAEDIC SURGERY
Payer: MEDICARE

## 2025-08-06 DIAGNOSIS — M25.512 CHRONIC LEFT SHOULDER PAIN: Primary | ICD-10-CM

## 2025-08-06 DIAGNOSIS — M25.552 LEFT HIP PAIN: ICD-10-CM

## 2025-08-06 DIAGNOSIS — G89.29 CHRONIC LEFT SHOULDER PAIN: Primary | ICD-10-CM

## 2025-08-06 PROCEDURE — 97110 THERAPEUTIC EXERCISES: CPT | Performed by: PHYSICAL THERAPIST

## 2025-08-06 PROCEDURE — 97112 NEUROMUSCULAR REEDUCATION: CPT | Performed by: PHYSICAL THERAPIST

## 2025-08-06 PROCEDURE — 97140 MANUAL THERAPY 1/> REGIONS: CPT | Performed by: PHYSICAL THERAPIST

## 2025-08-08 ENCOUNTER — OFFICE VISIT (OUTPATIENT)
Dept: PHYSICAL THERAPY | Facility: CLINIC | Age: 75
End: 2025-08-08
Attending: ORTHOPAEDIC SURGERY
Payer: MEDICARE

## 2025-08-08 DIAGNOSIS — M25.552 LEFT HIP PAIN: Primary | ICD-10-CM

## 2025-08-08 PROCEDURE — 97112 NEUROMUSCULAR REEDUCATION: CPT | Performed by: PHYSICAL THERAPIST

## 2025-08-08 PROCEDURE — 97140 MANUAL THERAPY 1/> REGIONS: CPT | Performed by: PHYSICAL THERAPIST

## 2025-08-08 PROCEDURE — 97110 THERAPEUTIC EXERCISES: CPT | Performed by: PHYSICAL THERAPIST

## 2025-08-15 ENCOUNTER — OFFICE VISIT (OUTPATIENT)
Dept: PHYSICAL THERAPY | Facility: CLINIC | Age: 75
End: 2025-08-15
Attending: ORTHOPAEDIC SURGERY
Payer: MEDICARE

## 2025-08-18 ENCOUNTER — OFFICE VISIT (OUTPATIENT)
Dept: PHYSICAL THERAPY | Facility: CLINIC | Age: 75
End: 2025-08-18
Attending: ORTHOPAEDIC SURGERY
Payer: MEDICARE

## 2025-08-18 DIAGNOSIS — M25.552 LEFT HIP PAIN: Primary | ICD-10-CM

## 2025-08-18 PROCEDURE — 97110 THERAPEUTIC EXERCISES: CPT | Performed by: PHYSICAL THERAPIST

## 2025-08-18 PROCEDURE — 97140 MANUAL THERAPY 1/> REGIONS: CPT | Performed by: PHYSICAL THERAPIST

## 2025-08-18 PROCEDURE — 97112 NEUROMUSCULAR REEDUCATION: CPT | Performed by: PHYSICAL THERAPIST

## 2025-08-21 ENCOUNTER — OFFICE VISIT (OUTPATIENT)
Dept: PHYSICAL THERAPY | Facility: CLINIC | Age: 75
End: 2025-08-21
Attending: ORTHOPAEDIC SURGERY
Payer: MEDICARE

## 2025-08-21 DIAGNOSIS — M25.552 LEFT HIP PAIN: Primary | ICD-10-CM

## 2025-08-21 PROCEDURE — 97112 NEUROMUSCULAR REEDUCATION: CPT | Performed by: PHYSICAL THERAPIST

## 2025-08-21 PROCEDURE — 97110 THERAPEUTIC EXERCISES: CPT | Performed by: PHYSICAL THERAPIST

## 2025-08-21 PROCEDURE — 97140 MANUAL THERAPY 1/> REGIONS: CPT | Performed by: PHYSICAL THERAPIST
